# Patient Record
Sex: MALE | Race: WHITE | NOT HISPANIC OR LATINO | Employment: OTHER | URBAN - METROPOLITAN AREA
[De-identification: names, ages, dates, MRNs, and addresses within clinical notes are randomized per-mention and may not be internally consistent; named-entity substitution may affect disease eponyms.]

---

## 2017-01-17 ENCOUNTER — OFFICE VISIT (OUTPATIENT)
Dept: INTERNAL MEDICINE | Facility: CLINIC | Age: 52
End: 2017-01-17

## 2017-01-17 VITALS
BODY MASS INDEX: 25.18 KG/M2 | WEIGHT: 170 LBS | SYSTOLIC BLOOD PRESSURE: 120 MMHG | HEART RATE: 72 BPM | DIASTOLIC BLOOD PRESSURE: 80 MMHG | HEIGHT: 69 IN

## 2017-01-17 DIAGNOSIS — I10 ESSENTIAL HYPERTENSION: Primary | ICD-10-CM

## 2017-01-17 DIAGNOSIS — E87.6 HYPOKALEMIA: ICD-10-CM

## 2017-01-17 DIAGNOSIS — Z72.0 TOBACCO ABUSE: ICD-10-CM

## 2017-01-17 DIAGNOSIS — J44.9 CHRONIC OBSTRUCTIVE PULMONARY DISEASE, UNSPECIFIED COPD TYPE (HCC): ICD-10-CM

## 2017-01-17 LAB
ALBUMIN SERPL-MCNC: 4.4 G/DL (ref 3.2–4.8)
ALBUMIN/GLOB SERPL: 1.8 G/DL (ref 1.5–2.5)
ALP SERPL-CCNC: 93 U/L (ref 25–100)
ALT SERPL W P-5'-P-CCNC: 17 U/L (ref 7–40)
ANION GAP SERPL CALCULATED.3IONS-SCNC: 12 MMOL/L (ref 3–11)
ARTICHOKE IGE QN: 149 MG/DL (ref 0–130)
AST SERPL-CCNC: 12 U/L (ref 0–33)
BILIRUB SERPL-MCNC: 0.3 MG/DL (ref 0.3–1.2)
BUN BLD-MCNC: 15 MG/DL (ref 9–23)
BUN/CREAT SERPL: 16.7 (ref 7–25)
CALCIUM SPEC-SCNC: 9.6 MG/DL (ref 8.7–10.4)
CHLORIDE SERPL-SCNC: 109 MMOL/L (ref 99–109)
CHOLEST SERPL-MCNC: 226 MG/DL (ref 0–200)
CO2 SERPL-SCNC: 32 MMOL/L (ref 20–31)
CREAT BLD-MCNC: 0.9 MG/DL (ref 0.6–1.3)
GFR SERPL CREATININE-BSD FRML MDRD: 89 ML/MIN/1.73
GLOBULIN UR ELPH-MCNC: 2.5 GM/DL
GLUCOSE BLD-MCNC: 89 MG/DL (ref 70–100)
HDLC SERPL-MCNC: 45 MG/DL (ref 40–60)
POTASSIUM BLD-SCNC: 3.4 MMOL/L (ref 3.5–5.5)
PROT SERPL-MCNC: 6.9 G/DL (ref 5.7–8.2)
SODIUM BLD-SCNC: 153 MMOL/L (ref 132–146)
TRIGL SERPL-MCNC: 124 MG/DL (ref 0–150)

## 2017-01-17 PROCEDURE — 99214 OFFICE O/P EST MOD 30 MIN: CPT | Performed by: FAMILY MEDICINE

## 2017-01-17 PROCEDURE — 80061 LIPID PANEL: CPT | Performed by: FAMILY MEDICINE

## 2017-01-17 PROCEDURE — 36415 COLL VENOUS BLD VENIPUNCTURE: CPT | Performed by: FAMILY MEDICINE

## 2017-01-17 PROCEDURE — 80053 COMPREHEN METABOLIC PANEL: CPT | Performed by: FAMILY MEDICINE

## 2017-01-17 RX ORDER — AMLODIPINE BESYLATE AND BENAZEPRIL HYDROCHLORIDE 10; 20 MG/1; MG/1
1 CAPSULE ORAL DAILY
Qty: 30 CAPSULE | Refills: 5 | Status: SHIPPED | OUTPATIENT
Start: 2017-01-17 | End: 2017-06-16 | Stop reason: SDUPTHER

## 2017-01-17 RX ORDER — QUETIAPINE 200 MG/1
TABLET, FILM COATED, EXTENDED RELEASE ORAL DAILY
COMMUNITY
Start: 2016-12-27 | End: 2018-11-08

## 2017-01-17 RX ORDER — POTASSIUM CHLORIDE 750 MG/1
TABLET, FILM COATED, EXTENDED RELEASE ORAL DAILY
COMMUNITY
Start: 2016-11-18 | End: 2017-01-24 | Stop reason: SDUPTHER

## 2017-01-17 RX ORDER — BUSPIRONE HYDROCHLORIDE 15 MG/1
TABLET ORAL DAILY
COMMUNITY
Start: 2016-11-21 | End: 2018-05-07

## 2017-01-17 RX ORDER — QUETIAPINE FUMARATE 50 MG/1
TABLET, EXTENDED RELEASE ORAL DAILY
COMMUNITY
Start: 2016-12-27 | End: 2017-01-17 | Stop reason: DRUGHIGH

## 2017-01-17 NOTE — PROGRESS NOTES
Subjective   Aguila Finn is a 51 y.o. male who presents to follow-up for Hypertension      History of Present Illness   He was last seen in the office on 10/13/2016 for hypertension and COPD management.  Previous intervention/treatment includes: continued on current medications, encouraged smoking cessation, refilled Anoro.  Reports stable symptoms.    Reports medication complaince and denies any intolerable side effects with his blood pressure medication.  Checks his blood pressure at home and does not report any persistently elevated blood pressures.      States that he saw an endocrinologist on 11/17/16 and followed-up in December.  Reportedly had his labs were rechecked and they looked normal so he was told to follow-up as needed.  States that the endocrinologist started him on potassium because of chronic hypokalemia (first noticed in September 2016).    Has not yet seen a pulmonologist for his COPD.  States that he has an appointment on 01/23/17.  Reports medication complaince and denies any intolerable side effects.  Currently smoking 1 PPD and is also vaping.  Cut back from 2 PPD.    Review of Systems   Constitutional: Negative for activity change, appetite change, chills, fatigue, fever and unexpected weight change.   Respiratory: Negative for cough, shortness of breath and wheezing.    Cardiovascular: Negative for chest pain and palpitations.   Gastrointestinal: Negative for abdominal pain, constipation, diarrhea, nausea and vomiting.   Genitourinary: Negative for decreased urine volume, dysuria and hematuria.   Neurological: Negative for dizziness, syncope and headaches.   Psychiatric/Behavioral: Negative for self-injury, sleep disturbance and suicidal ideas. The patient is not nervous/anxious.         Negative for depressed mood.     The following portions of the patient's history were reviewed and updated as appropriate: current medications, past medical history, past social history and problem  "list.    Objective   Visit Vitals   • /80 (BP Location: Left arm, Patient Position: Sitting)   • Pulse 72   • Ht 69\" (175.3 cm)   • Wt 170 lb (77.1 kg)   • BMI 25.1 kg/m2     Physical Exam   Constitutional: He is oriented to person, place, and time. He appears well-developed and well-nourished.   No acute distress.   HENT:   Head: Normocephalic and atraumatic.   Eyes: Conjunctivae and EOM are normal.   Neck: Normal range of motion.   Cardiovascular: Normal rate, regular rhythm, normal heart sounds and intact distal pulses.    Pulmonary/Chest: Effort normal. No respiratory distress. He has decreased breath sounds (bilaterally). He has no wheezes.   Abdominal: Soft. Bowel sounds are normal. There is no tenderness.   Musculoskeletal: Normal range of motion.   Neurological: He is alert and oriented to person, place, and time.   Moves all extremities spontaneously.   Skin: Skin is warm and dry.   Psychiatric: His speech is normal and behavior is normal. Judgment and thought content normal. His affect is blunt.       Assessment/Plan   Aguila was seen today for hypertension.    Diagnoses and all orders for this visit:    Essential hypertension  -     amLODIPine-benazepril (LOTREL) 10-20 MG per capsule; Take 1 capsule by mouth Daily.  -     Comprehensive Metabolic Panel  -     Lipid Panel    Blood pressure today in office was 120/80.  The above labs were ordered today.  Will continue current regimen and refill his medication today.  Advised patient to return to the clinic in 3 months for next routine follow-up.    Chronic obstructive pulmonary disease, unspecified COPD type    Stable symptoms per patient report.  Reviewed patient's chart and discussed that his appointment with Dr. Kim was rescheduled for 02/09/17 at 2PM.  Will continue current regimen today.    Hypokalemia  -     Comprehensive Metabolic Panel    The above labs were ordered today.  Will continue current regimen today and adjust medication dose if " indicated by his labs.  Advised patient to return to the clinic in 3 months for next routine follow-up.    Tobacco abuse    Encouraged smoking cessation today.

## 2017-01-17 NOTE — PATIENT INSTRUCTIONS
Please go to the lab before you leave to have your labs drawn.  You will be called or receive a letter with your results.  You have an appointment with Dr. Barrera (Pulmonary doctor) on 02/09/17 at 2PM.  Your appointment on 01/23/17 was cancelled.  Please call their office at [phone] to determine the reason.  Please continue taking your current medications as directed.  I have refilled your blood pressure medication.  Your medication has been electronically prescribed and will be at your pharmacy.  Please pick this up and take as directed.  If you need any refills on your Anoro, please call and let me know.  Please follow-up as indicated.  Return to the clinic sooner if your symptoms worsen or if you have any other concerns.

## 2017-01-17 NOTE — MR AVS SNAPSHOT
Aguila Finn   1/17/2017 9:00 AM   Office Visit    Dept Phone:  [phone]   Encounter #:  [id number]    Provider:  Gianna Bueno MD   Department:  [facility] PRIMARY CARE                Your Full Care Plan              Today's Medication Changes          These changes are accurate as of: 1/17/17  9:57 AM.  If you have any questions, ask your nurse or doctor.               Medication(s)that have changed:     busPIRone 15 MG tablet   Commonly known as:  BUSPAR   Daily.   What changed:  Another medication with the same name was removed. Continue taking this medication, and follow the directions you see here.   Changed by:  Gianna Bueno MD       QUEtiapine  MG 24 hr tablet   Commonly known as:  SEROquel XR   Daily.   What changed:  Another medication with the same name was removed. Continue taking this medication, and follow the directions you see here.   Changed by:  Gianna Bueno MD         Stop taking medication(s)listed here:     mirtazapine 30 MG tablet   Commonly known as:  REMERON   Stopped by:  Gianna Bueno MD           tamsulosin 0.4 MG capsule 24 hr capsule   Commonly known as:  FLOMAX   Stopped by:  Gianna Bueno MD                Where to Get Your Medications      These medications were sent to [address] AT [address] [phone] [place] [id number] [place] [address]     Phone:  [phone]     amLODIPine-benazepril 10-20 MG per capsule                  Your Updated Medication List          This list is accurate as of: 1/17/17  9:57 AM.  Always use your most recent med list.                amLODIPine-benazepril 10-20 MG per capsule   Commonly known as:  LOTREL   Take 1 capsule by mouth Daily.       ANORO ELLIPTA 62.5-25 MCG/INH aerosol powder    Generic drug:  Umeclidinium-Vilanterol   Inhale 1 puff Daily.       busPIRone 15 MG tablet   Commonly known  as:  BUSPAR       potassium chloride 10 MEQ CR tablet   Commonly known as:  K-DUR       QUEtiapine  MG 24 hr tablet   Commonly known as:  SEROquel XR               We Performed the Following     Comprehensive Metabolic Panel     Lipid Panel       You Were Diagnosed With        Codes Comments    Essential hypertension    -  Primary ICD-10-CM: I10  ICD-9-CM: 401.9     Chronic obstructive pulmonary disease, unspecified COPD type     ICD-10-CM: J44.9  ICD-9-CM: 496     Hypokalemia     ICD-10-CM: E87.6  ICD-9-CM: 276.8     Tobacco abuse     ICD-10-CM: Z72.0  ICD-9-CM: 305.1       Instructions    Please go to the lab before you leave to have your labs drawn.  You will be called or receive a letter with your results.  You have an appointment with Dr. Barrera (Pulmonary doctor) on 02/09/17 at 2PM.  Your appointment on 01/23/17 was cancelled.  Please call their office at [phone] to determine the reason.  Please continue taking your current medications as directed.  I have refilled your blood pressure medication.  Your medication has been electronically prescribed and will be at your pharmacy.  Please pick this up and take as directed.  If you need any refills on your Anoro, please call and let me know.  Please follow-up as indicated.  Return to the clinic sooner if your symptoms worsen or if you have any other concerns.     Patient Instructions History      Upcoming Appointments     Visit Type Date Time Department    FOLLOW UP 1/17/2017  9:00 AM [facility]    NEW PATIENT 2/9/2017  2:00 PM [facility]    FULL PFT 2/9/2017  1:30 PM [facility]    CHEST X-RAY 2/9/2017  1:15 PM [facility]      iDiDiD Signup     [employer] allows you to send messages to your doctor, view your test results, renew your prescriptions, schedule appointments, and more. To sign up, go to [employer] and click on the Sign Up Now link in the New User? box. Enter your iDiDiD  "Activation Code exactly as it appears below along with the last four digits of your Social Security Number and your Date of Birth () to complete the sign-up process. If you do not sign up before the expiration date, you must request a new code.    [employer] Activation Code: [email or web address]  Expires: 2017  9:56 AM    If you have questions, you can email [email or web address] or call [phone] to talk to our MyChart staff. Remember, Mookiehart is NOT to be used for urgent needs. For medical emergencies, dial 911.               Other Info from Your Visit           Your Appointments     2017  1:15 PM EST   [facility]    [address]  [facility] [address]   [phone]            2017  1:30 PM EST   [facility]    [address]  [facility] [address]   [phone]            2017  2:00 PM EST   New Patient with Enoc Kim MD   [facility] (--)    [address]  [facility] [address]   [phone]           Bring all previous medical records and films, along with current medications and insurance information.              Allergies     No Known Allergies      Reason for Visit     Hypertension           Vital Signs     Blood Pressure Pulse Height Weight Body Mass Index Smoking Status    120/80 (BP Location: Left arm, Patient Position: Sitting) 72 69\" (175.3 cm) 170 lb (77.1 kg) 25.1 kg/m2 Current Every Day Smoker      Problems and Diagnoses Noted     Chronic airway obstruction    High blood pressure    Tobacco abuse    Hypokalemia          Results         "

## 2017-01-18 ENCOUNTER — TELEPHONE (OUTPATIENT)
Dept: INTERNAL MEDICINE | Facility: CLINIC | Age: 52
End: 2017-01-18

## 2017-01-18 NOTE — TELEPHONE ENCOUNTER
Attempted to call patient regarding his lab results, but was unable to reach him.  Left a message on  requesting that he call back to discuss his lab results.

## 2017-01-23 ENCOUNTER — LAB (OUTPATIENT)
Dept: INTERNAL MEDICINE | Facility: CLINIC | Age: 52
End: 2017-01-23

## 2017-01-23 DIAGNOSIS — E87.0 HYPERNATREMIA: Primary | ICD-10-CM

## 2017-01-23 LAB
ALBUMIN SERPL-MCNC: 4.2 G/DL (ref 3.2–4.8)
ALBUMIN/GLOB SERPL: 1.8 G/DL (ref 1.5–2.5)
ALP SERPL-CCNC: 96 U/L (ref 25–100)
ALT SERPL W P-5'-P-CCNC: 13 U/L (ref 7–40)
ANION GAP SERPL CALCULATED.3IONS-SCNC: 3 MMOL/L (ref 3–11)
AST SERPL-CCNC: 15 U/L (ref 0–33)
BILIRUB SERPL-MCNC: 0.3 MG/DL (ref 0.3–1.2)
BUN BLD-MCNC: 17 MG/DL (ref 9–23)
BUN/CREAT SERPL: 18.9 (ref 7–25)
CALCIUM SPEC-SCNC: 9.2 MG/DL (ref 8.7–10.4)
CHLORIDE SERPL-SCNC: 108 MMOL/L (ref 99–109)
CO2 SERPL-SCNC: 33 MMOL/L (ref 20–31)
CREAT BLD-MCNC: 0.9 MG/DL (ref 0.6–1.3)
GFR SERPL CREATININE-BSD FRML MDRD: 89 ML/MIN/1.73
GLOBULIN UR ELPH-MCNC: 2.4 GM/DL
GLUCOSE BLD-MCNC: 101 MG/DL (ref 70–100)
OSMOLALITY UR: 504 MOSM/KG (ref 300–1100)
POTASSIUM BLD-SCNC: 3.5 MMOL/L (ref 3.5–5.5)
PROT SERPL-MCNC: 6.6 G/DL (ref 5.7–8.2)
SODIUM BLD-SCNC: 144 MMOL/L (ref 132–146)
SODIUM UR-SCNC: 63 MMOL/L (ref 30–90)

## 2017-01-23 PROCEDURE — 80053 COMPREHEN METABOLIC PANEL: CPT | Performed by: FAMILY MEDICINE

## 2017-01-23 PROCEDURE — 84300 ASSAY OF URINE SODIUM: CPT | Performed by: FAMILY MEDICINE

## 2017-01-23 PROCEDURE — 83935 ASSAY OF URINE OSMOLALITY: CPT | Performed by: FAMILY MEDICINE

## 2017-01-24 ENCOUNTER — OFFICE VISIT (OUTPATIENT)
Dept: INTERNAL MEDICINE | Facility: CLINIC | Age: 52
End: 2017-01-24

## 2017-01-24 VITALS
HEIGHT: 69 IN | WEIGHT: 180.6 LBS | SYSTOLIC BLOOD PRESSURE: 132 MMHG | BODY MASS INDEX: 26.75 KG/M2 | TEMPERATURE: 98.2 F | DIASTOLIC BLOOD PRESSURE: 74 MMHG

## 2017-01-24 DIAGNOSIS — E87.0 HYPERNATREMIA: ICD-10-CM

## 2017-01-24 DIAGNOSIS — E87.6 CHRONIC HYPOKALEMIA: Primary | ICD-10-CM

## 2017-01-24 PROCEDURE — 99213 OFFICE O/P EST LOW 20 MIN: CPT | Performed by: FAMILY MEDICINE

## 2017-01-24 RX ORDER — POTASSIUM CHLORIDE 750 MG/1
10 TABLET, FILM COATED, EXTENDED RELEASE ORAL DAILY
Qty: 30 TABLET | Refills: 2 | Status: SHIPPED | OUTPATIENT
Start: 2017-01-24 | End: 2017-07-25

## 2017-01-24 NOTE — PATIENT INSTRUCTIONS
I have refilled your potassium.  Your medication has been electronically prescribed and will be at your pharmacy.  Please pick it up and take as directed.  Please continue taking your current medications as directed.  Please follow-up as indicated.  Return to the clinic sooner if you have any other concerns.

## 2017-01-24 NOTE — MR AVS SNAPSHOT
Aguila Finn   1/24/2017 8:15 AM   Office Visit    Dept Phone:  [phone]   Encounter #:  [id number]    Provider:  Gianna Bueno MD   Department:  [facility] PRIMARY CARE                Your Full Care Plan              Today's Medication Changes          These changes are accurate as of: 1/24/17  8:39 AM.  If you have any questions, ask your nurse or doctor.               Medication(s)that have changed:     potassium chloride 10 MEQ CR tablet   Commonly known as:  K-DUR   Take 1 tablet by mouth Daily.   What changed:    - how much to take  - how to take this   Changed by:  Gianna Bueno MD            Where to Get Your Medications      These medications were sent to [address] [place] [address] - [phone]  - [phone] [place] [id number]     Phone:  [phone]     potassium chloride 10 MEQ CR tablet                  Your Updated Medication List          This list is accurate as of: 1/24/17  8:39 AM.  Always use your most recent med list.                amLODIPine-benazepril 10-20 MG per capsule   Commonly known as:  LOTREL   Take 1 capsule by mouth Daily.       ANORO ELLIPTA 62.5-25 MCG/INH aerosol powder    Generic drug:  Umeclidinium-Vilanterol   Inhale 1 puff Daily.       busPIRone 15 MG tablet   Commonly known as:  BUSPAR       potassium chloride 10 MEQ CR tablet   Commonly known as:  K-DUR   Take 1 tablet by mouth Daily.       QUEtiapine  MG 24 hr tablet   Commonly known as:  SEROquel XR               You Were Diagnosed With        Codes Comments    Chronic hypokalemia    -  Primary ICD-10-CM: E87.6  ICD-9-CM: 276.8     Acute hypernatremia     ICD-10-CM: E87.0  ICD-9-CM: 276.0       Instructions    I have refilled your potassium.  Your medication has been electronically prescribed and will be at your pharmacy.  Please pick it up and take as directed.  Please continue taking  your current medications as directed.  Please follow-up as indicated.  Return to the clinic sooner if you have any other concerns.     Patient Instructions History      Upcoming Appointments     Visit Type Date Time Department    FOLLOW UP 2017  8:15 AM [facility]    NEW PATIENT 2017 12:00 PM [facility]    CHEST X-RAY 2017 11:15 AM [facility]    FULL PFT 2017 11:30 AM [facility]    FOLLOW UP 2017  9:00 AM [facility]      Laura Sapienshart Signup     [facility] [employer] allows you to send messages to your doctor, view your test results, renew your prescriptions, schedule appointments, and more. To sign up, go to [email or web address] and click on the Sign Up Now link in the New User? box. Enter your IndaBox Activation Code exactly as it appears below along with the last four digits of your Social Security Number and your Date of Birth () to complete the sign-up process. If you do not sign up before the expiration date, you must request a new code.    IndaBox Activation Code: [email or web address]  Expires: 2017  9:56 AM    If you have questions, you can email [email or web address] or call [phone] to talk to our IndaBox staff. Remember, MyChart is NOT to be used for urgent needs. For medical emergencies, dial 911.               Other Info from Your Visit           Your Appointments     2017 11:15 AM EST   Chest X-Ray with RAD TECH [facility]   [facility] (--)    [address]  [place] [address]   [phone]            2017 11:30 AM EST   Full PFT with Pft Lab 3 [facility]   [facility] (--)    [address]  [place] [address]   [phone]            2017 12:00 PM EST   New Patient with JOSE LUIS Monroe   [facility] (--)    [address]  [facility] [id number] [phone]           Bring all previous medical records and films, along with current medications and insurance information.            Apr 18, 2017  9:00 AM EDT   Follow Up with Gianna Bueno MD   [facility] (--)    [address]  [facility] [address] [phone]           Arrive 15 minutes prior to appointment.              Allergies     No Known Allergies      Reason for Visit     Follow-up high sodium       Vital Signs     Blood Pressure Temperature Height    132/74 (BP Location: Right arm, Patient Position: Sitting, Cuff Size: Adult) 98.2 °F (36.8 °C) (Temporal Artery ) 69\" (175.3 cm)    Weight Body Mass Index Smoking Status    180 lb 9.6 oz (81.9 kg) 26.67 kg/m2 Current Every Day Smoker      Problems and Diagnoses Noted     Chronic hypokalemia    Acute hypernatremia            "

## 2017-01-24 NOTE — PROGRESS NOTES
"Subjective   Aguila Finn is a 51 y.o. male who presents to follow-up for Follow-up (high sodium )      History of Present Illness   He was last seen in the office on 01/17/17 for hypertension and chronic hypokalemia management.  Previous intervention/treatment includes: recheck CMP, continued current blood pressure regimen.    Patient was found to have hypernatremia (unsure if chronic versus acute) after his last office visit.  Sodium level was 153, up from 143 three months ago.  Patient otherwise denies any concerning symptoms, including nausea, vomiting, lethargy, seizures.  Also denies any new medications, except for a potassium supplement.    Patient also has chronic hypokalemia, diagnosed about one year ago.  Was started on potassium about one month ago.  Reports medication compliance and denies any intolerable side effects.    Review of Systems   Constitutional: Negative for chills and fever.   Respiratory: Negative for cough, shortness of breath and wheezing.    Cardiovascular: Negative for chest pain and palpitations.   Gastrointestinal: Negative for abdominal pain, constipation, diarrhea, nausea and vomiting.   Neurological: Negative for dizziness, syncope, weakness and headaches.   Psychiatric/Behavioral: Negative for confusion. The patient is not nervous/anxious.         Negative for depressed mood.     The following portions of the patient's history were reviewed and updated as appropriate: current medications, past medical history, past social history and problem list.    Objective   Visit Vitals   • /74 (BP Location: Right arm, Patient Position: Sitting, Cuff Size: Adult)   • Temp 98.2 °F (36.8 °C) (Temporal Artery )   • Ht 69\" (175.3 cm)   • Wt 180 lb 9.6 oz (81.9 kg)   • BMI 26.67 kg/m2     Physical Exam   Constitutional: He is oriented to person, place, and time. He appears well-developed and well-nourished.   No acute distress.   HENT:   Head: Normocephalic and atraumatic.   Eyes: " Conjunctivae and EOM are normal.   Neck: Normal range of motion.   Cardiovascular: Normal rate, regular rhythm, normal heart sounds and intact distal pulses.    Pulmonary/Chest: Effort normal. He has no wheezes.   Fair air entry bilaterally.   Abdominal: Soft. Bowel sounds are normal. There is no tenderness.   Musculoskeletal: Normal range of motion.   Moves all extremities.   Neurological: He is alert and oriented to person, place, and time.   Skin: Skin is warm and dry.   Psychiatric: He has a normal mood and affect. His behavior is normal.       Assessment/Plan   Aguila was seen today for follow-up.    Diagnoses and all orders for this visit:    Chronic hypokalemia  -     potassium chloride (K-DUR) 10 MEQ CR tablet; Take 1 tablet by mouth Daily.    Discussed lab results with patient today.  Potassium has improved from 3.4 to 3.5.  No concerning symptoms per patient report.  Will refill his potassium today and recheck labs in 3 months.    Hypernatremia    Discussed lab results with patient today.  Sodium has improved from 153 to 144.    Aldosterone/renin ratio is pending.  Will consider starting an aldosterone antagonist if indicated.    No concerning symptoms per patient report.  Encouraged to stay well hydrated.  Advised patient to return to the clinic if he develops any concerning symptoms.

## 2017-02-09 ENCOUNTER — OFFICE VISIT (OUTPATIENT)
Dept: PULMONOLOGY | Facility: CLINIC | Age: 52
End: 2017-02-09

## 2017-02-09 VITALS
HEART RATE: 89 BPM | DIASTOLIC BLOOD PRESSURE: 68 MMHG | WEIGHT: 176.8 LBS | TEMPERATURE: 98.6 F | SYSTOLIC BLOOD PRESSURE: 122 MMHG | HEIGHT: 69 IN | RESPIRATION RATE: 16 BRPM | OXYGEN SATURATION: 96 % | BODY MASS INDEX: 26.19 KG/M2

## 2017-02-09 DIAGNOSIS — I10 ESSENTIAL HYPERTENSION: ICD-10-CM

## 2017-02-09 DIAGNOSIS — Z72.0 TOBACCO ABUSE: ICD-10-CM

## 2017-02-09 DIAGNOSIS — F31.76 BIPOLAR 1 DISORDER, DEPRESSED, FULL REMISSION (HCC): ICD-10-CM

## 2017-02-09 DIAGNOSIS — J44.9 CHRONIC OBSTRUCTIVE PULMONARY DISEASE, UNSPECIFIED COPD TYPE (HCC): Primary | ICD-10-CM

## 2017-02-09 DIAGNOSIS — F32.A ANXIETY AND DEPRESSION: ICD-10-CM

## 2017-02-09 DIAGNOSIS — J41.0 SIMPLE CHRONIC BRONCHITIS (HCC): ICD-10-CM

## 2017-02-09 DIAGNOSIS — F41.9 ANXIETY AND DEPRESSION: ICD-10-CM

## 2017-02-09 DIAGNOSIS — E87.6 CHRONIC HYPOKALEMIA: ICD-10-CM

## 2017-02-09 PROBLEM — J42 CHRONIC BRONCHITIS: Status: ACTIVE | Noted: 2017-02-09

## 2017-02-09 PROCEDURE — 94726 PLETHYSMOGRAPHY LUNG VOLUMES: CPT | Performed by: NURSE PRACTITIONER

## 2017-02-09 PROCEDURE — 99204 OFFICE O/P NEW MOD 45 MIN: CPT | Performed by: NURSE PRACTITIONER

## 2017-02-09 PROCEDURE — 94200 LUNG FUNCTION TEST (MBC/MVV): CPT | Performed by: NURSE PRACTITIONER

## 2017-02-09 PROCEDURE — 94060 EVALUATION OF WHEEZING: CPT | Performed by: NURSE PRACTITIONER

## 2017-02-09 PROCEDURE — 94729 DIFFUSING CAPACITY: CPT | Performed by: NURSE PRACTITIONER

## 2017-02-09 NOTE — PROGRESS NOTES
[facility], operated by [facility] Follow up    CHIEF COMPLAINT    Referral for COPD    HISTORY OF PRESENT ILLNESS    Aguila Finn is a 51 y.o.male here today for referral for COPD.  He has a history of smoking a pack a day for about 39 years ago continues to smoke.  He has never been seen by a pulmonologist in the past and remains on ANORO.  He is been on this over the past 6 months or so.  He's never seen a pulmonologist regularly, he did have a brief period, couple years ago where he saw Dr. Luna at [facility] and was diagnosed with COPD.  He has exertional dyspnea, but doesn't feel like it significantly limits his activities.  He does have some fatigue, his significant other states he does snore but she does not note any apneic spells.  He does not have supplemental oxygen at home nor has he ever in the past.    He has a cough with clear to yellow sputum production and congestion.  He denies hemoptysis.  He has no pleuritic chest pains, no sternal or radiating type chest pains.  No edema or palpitations.    His most recent hospitalization for COPD in the flu was 2016 at [place].  He has never had a CT scan of the chest that he can remember.    He has had a history of hypernatremia, he is followed by his PCP for hypertension and chronic hypokalemia.  He had blood work a few weeks ago revealing a sodium of 144 potassium 3.5.  Slightly elevated serum bicarbonate 33.    He denies fevers chills or night sweats.  He did notice he was breathing a lot better after the Ventolin was given during our pulmonary function testing.  He had no significant bronchodilator response but did increase 9%.  He does not have nebulizers at home but they have helped him in the past when he goes to the hospital    He is a history of bipolar depression, suicidal ideations, presenting to [facility] 9/23/16.  He has had no further exacerbations of this on Seroquel.      His wife notes that although he snores she does not notice apneic spells  and he himself denies PND or apnea, she does notice some wheezing at night.    A couple months ago he decreased his smoking from 2 packs a day down to one pack a day, with help using the vapor cigarettes.  It has the lowest dose of nicotine in it and both he and his wife state he has less wheezing and coughing especially in the morning    Patient Active Problem List   Diagnosis   • Essential hypertension   • COPD (chronic obstructive pulmonary disease)   • Anxiety and depression   • Recurrent kidney stones   • Hyperthyroidism   • Bipolar 1 disorder, depressed, full remission   • Tobacco abuse   • Chronic hypokalemia   • Chronic bronchitis       No Known Allergies    Current Outpatient Prescriptions:   •  amLODIPine-benazepril (LOTREL) 10-20 MG per capsule, Take 1 capsule by mouth Daily., Disp: 30 capsule, Rfl: 5  •  ANORO ELLIPTA 62.5-25 MCG/INH aerosol powder , Inhale 1 puff Daily., Disp: 60 each, Rfl: 2  •  busPIRone (BUSPAR) 15 MG tablet, Daily., Disp: , Rfl:   •  potassium chloride (K-DUR) 10 MEQ CR tablet, Take 1 tablet by mouth Daily., Disp: 30 tablet, Rfl: 2  •  QUEtiapine XR (SEROquel XR) 200 MG 24 hr tablet, Daily., Disp: , Rfl:   MEDICATION LIST AND ALLERGIES REVIEWED.    Social History   Substance Use Topics   • Smoking status: Current Every Day Smoker     Packs/day: 2.00     Types: Cigarettes   • Smokeless tobacco: Never Used   • Alcohol use Yes      Comment: a few drinks on occasion, not excessive per patient       FAMILY AND SOCIAL HISTORY REVIEWED.    Review of Systems   Constitutional: Negative for chills, fatigue and fever.   HENT: Positive for postnasal drip. Negative for nosebleeds and sore throat.    Eyes: Negative.  Negative for pain and redness.   Respiratory: Positive for cough, shortness of breath and wheezing. Negative for chest tightness and stridor.    Cardiovascular: Negative for chest pain and palpitations.   Gastrointestinal: Negative for abdominal distention, abdominal pain and nausea.  "  Endocrine: Negative for cold intolerance and heat intolerance.   Genitourinary: Negative for dysuria, flank pain and hematuria.   Musculoskeletal: Negative for arthralgias and myalgias.   Skin: Negative for color change and rash.   Neurological: Negative for dizziness, syncope and numbness.   Hematological: Negative for adenopathy. Does not bruise/bleed easily.   Psychiatric/Behavioral: Negative for agitation, behavioral problems, confusion and sleep disturbance.   .    Visit Vitals   • /68   • Pulse 89   • Temp 98.6 °F (37 °C)   • Resp 16   • Ht 69\" (175.3 cm)   • Wt 176 lb 12.8 oz (80.2 kg)   • SpO2 96%   • BMI 26.11 kg/m2     Physical Exam   Constitutional: He is oriented to person, place, and time. Vital signs are normal. He appears well-developed. He is cooperative.  Non-toxic appearance. No distress.   HENT:   Head: Normocephalic. Head is without abrasion and without contusion.   Mouth/Throat: Oropharynx is clear and moist and mucous membranes are normal.   Eyes: Conjunctivae are normal. Pupils are equal, round, and reactive to light.   Neck: Trachea normal. No JVD present. No tracheal deviation present. No thyroid mass and no thyromegaly present.   Cardiovascular: Normal rate, regular rhythm and normal heart sounds.  Exam reveals no gallop.    No murmur heard.  No venous cords or edema or calf tenderness, no cyanosis.   Pulmonary/Chest: Effort normal. No stridor. No respiratory distress. He has wheezes. He has no rales.   Very and expiratory mild distant wheezing noted posteriorly in the lower lung zones.  No rhonchi   Abdominal: Soft. He exhibits no ascites. There is no hepatosplenomegaly. There is no tenderness.   Lymphadenopathy:        Head (right side): No submandibular adenopathy present.        Head (left side): No submandibular adenopathy present.     He has no cervical adenopathy.        Right: No supraclavicular adenopathy present.        Left: No supraclavicular adenopathy present. "   Neurological: He is alert and oriented to person, place, and time. He has normal strength.   Skin: Skin is warm and dry. No abrasion and no rash noted.   Psychiatric: He has a normal mood and affect. His speech is normal and behavior is normal. Cognition and memory are normal.       RESULTS    Pulmonary function tests reveal severe obstruction with an FEV1 1.35 L, 36%.  FEV1/FVC ratio 42%.  Total lung capacity normal at 109% though residual volume is increased at 204%.  Diffusion is normal     Lungs are hyperexpanded consistent with his history of COPD, costophrenic angles are sharp though is no effusions or consolidations.  He has prominent, chronic appearing interstitial markings.  No prior films to compare.    PROBLEM LIST    Problem List Items Addressed This Visit        Cardiovascular and Mediastinum    Essential hypertension       Respiratory    COPD (chronic obstructive pulmonary disease) - Primary    Overview     Severe COPD, FEV1 1.35 L, 36%.         Relevant Orders    XR Chest PA & Lateral (Completed)    Pulmonary Function Test (Completed)    CT Chest Without Contrast    Chronic bronchitis       Other    Anxiety and depression    Bipolar 1 disorder, depressed, full remission    Tobacco abuse    Chronic hypokalemia            DISCUSSION    Remain on the Anoro    He was given a sample of Asmanex 100, with a written prescription 2 puffs twice a day to rinse and spit, he is to use the spacer.    He was given a sample of pro air respite click.  Told to use 1-2 puffs as needed for shortness of breath.    Prior to him following up in the office we will obtain a CT scan of the chest, for screening given his history of smoking.    Pulmicort function testing was reviewed today    We discussed smoking cessation strategies, smoking cessation aids, and eventual long-term loss of lung function.    We'll order a nebulizer machine and supplies, he was given albuterol samples and instructed to use 1-4 doses daily as  needed for cough wheezing and dyspnea    I suggested we check his oxygen level at night to see if he qualifies as this may help him feel less fatigued during the day but he quickly declined the need for this and became slightly agitated when his wife urged him to at least see if he qualifies.,  He has    Melissa Troncoso, APRN  02/09/201712:14 PM  Electronically signed     Please note that portions of this note were completed with a voice recognition program. Efforts were made to edit the dictations, but occasionally words are mistranscribed.      CC: Gianna Bueno MD

## 2017-04-26 ENCOUNTER — OFFICE VISIT (OUTPATIENT)
Dept: INTERNAL MEDICINE | Facility: CLINIC | Age: 52
End: 2017-04-26

## 2017-04-26 VITALS
HEIGHT: 69 IN | DIASTOLIC BLOOD PRESSURE: 76 MMHG | SYSTOLIC BLOOD PRESSURE: 120 MMHG | WEIGHT: 166 LBS | BODY MASS INDEX: 24.59 KG/M2 | OXYGEN SATURATION: 100 % | RESPIRATION RATE: 18 BRPM | HEART RATE: 86 BPM

## 2017-04-26 DIAGNOSIS — E78.5 HYPERLIPIDEMIA, UNSPECIFIED HYPERLIPIDEMIA TYPE: Primary | ICD-10-CM

## 2017-04-26 DIAGNOSIS — I10 ESSENTIAL HYPERTENSION: ICD-10-CM

## 2017-04-26 DIAGNOSIS — Z72.0 TOBACCO ABUSE: ICD-10-CM

## 2017-04-26 PROCEDURE — 99214 OFFICE O/P EST MOD 30 MIN: CPT | Performed by: FAMILY MEDICINE

## 2017-04-26 RX ORDER — ASPIRIN 81 MG/1
81 TABLET ORAL DAILY
Qty: 30 TABLET | Refills: 5 | Status: SHIPPED | OUTPATIENT
Start: 2017-04-26 | End: 2018-05-08 | Stop reason: SDUPTHER

## 2017-04-26 RX ORDER — ATORVASTATIN CALCIUM 40 MG/1
40 TABLET, FILM COATED ORAL DAILY
Qty: 30 TABLET | Refills: 2 | Status: SHIPPED | OUTPATIENT
Start: 2017-04-26 | End: 2017-07-15 | Stop reason: SDUPTHER

## 2017-04-26 NOTE — PROGRESS NOTES
"Subjective   Aguila Finn is a 51 y.o. male who presents to follow-up for Hypertension      History of Present Illness   He was last seen in the office on 01/17/17 for hypertension management.  Previous intervention/treatment includes: continue on current medication regimen.    Patient is here to follow-up for chronic hypertension.  He is not exercising and is adherent to a low-salt diet.  He does check his blood pressure at home.  It is well controlled at home.  He denies chest pain, chest pressure/discomfort, palpitations and syncope.  Cardiovascular risk factors: dyslipidemia, hypertension, male gender, sedentary lifestyle and smoking/ tobacco exposure.  He is compliant with his medication.  Denies intolerable side effects to his medication.  Denies drinking alcohol.  Currently smoking less than 1 PPD.    Review of Systems   Constitutional: Positive for appetite change (poor, chronic). Negative for chills, fever and unexpected weight change.   Respiratory: Positive for shortness of breath (intermittent) and wheezing (intermittent). Negative for cough.         Negative for hemoptysis.   Cardiovascular: Negative for chest pain and palpitations.   Gastrointestinal: Negative for abdominal pain, constipation, diarrhea, nausea and vomiting.   Neurological: Negative for dizziness, syncope and headaches.   Psychiatric/Behavioral: Negative for self-injury and suicidal ideas. The patient is not nervous/anxious.         Positive for depressed mood.     The following portions of the patient's history were reviewed and updated as appropriate: allergies, current medications, past family history, past medical history, past social history and problem list.    Objective   /76  Pulse 86  Resp 18  Ht 69\" (175.3 cm)  Wt 166 lb (75.3 kg)  SpO2 100%  BMI 24.51 kg/m2     Physical Exam   Constitutional: He is oriented to person, place, and time. He appears well-developed and well-nourished.   No acute distress.   HENT: "   Head: Normocephalic and atraumatic.   Nose: Nose normal.   Mouth/Throat: Oropharynx is clear and moist.   Eyes: Conjunctivae and EOM are normal.   Neck: Normal range of motion.   Cardiovascular: Normal rate, regular rhythm, normal heart sounds and intact distal pulses.    Pulmonary/Chest: Effort normal. No respiratory distress. He has no wheezes.   Fair to poor air movement diffusely.   Abdominal: Soft. Bowel sounds are normal. There is no tenderness.   Musculoskeletal: Normal range of motion.   Moves all extremities.   Neurological: He is alert and oriented to person, place, and time.   Skin: Skin is warm and dry.   Psychiatric: He has a normal mood and affect. His behavior is normal. Judgment and thought content normal.   Vitals reviewed.      Assessment/Plan   Aguila was seen today for hypertension.    Diagnoses and all orders for this visit:    Hyperlipidemia, unspecified hyperlipidemia type  -     atorvastatin (LIPITOR) 40 MG tablet; Take 1 tablet by mouth Daily.  -     aspirin 81 MG EC tablet; Take 1 tablet by mouth Daily.    Reviewed previous labs with patient today.  10 year ASCVD risk is greater than 10% so recommended that patient start taking Atorvastatin and Aspirin.  Patient voiced understanding and agreement.  Patient also reports that his father had a heart attack in his 60s.  Will prescribe the above medications today and consider rechecking labs at his follow-up appointment.  Advised patient to return to the clinic in 3 months for next routine follow-up.    Essential hypertension    Stable.  Blood pressure today in office was 120/76.  Will continue current regimen today.  A refill for his medication was not requested today.  Advised patient to return to the clinic in 3 months for next routine follow-up.    Tobacco abuse    Encouraged smoking cessation today.

## 2017-04-26 NOTE — PATIENT INSTRUCTIONS
I have prescribed Atorvastatin (cholesterol medication) to help with your cholesterol and decrease risk of developing heart disease.  Your new medication has been electronically prescribed and will be at your pharmacy.  Please pick this up and take as directed.  You appear to have a few refills left on your blood pressure medication.  If you need more refills to get to your next appointment, please call the office.  Please continue taking your other current medications as directed.  Please follow-up as indicated.  Return to the clinic sooner if you have any other concerns.

## 2017-05-12 ENCOUNTER — OFFICE VISIT (OUTPATIENT)
Dept: PULMONOLOGY | Facility: CLINIC | Age: 52
End: 2017-05-12

## 2017-05-12 VITALS
SYSTOLIC BLOOD PRESSURE: 120 MMHG | HEIGHT: 69 IN | DIASTOLIC BLOOD PRESSURE: 74 MMHG | BODY MASS INDEX: 25.18 KG/M2 | HEART RATE: 80 BPM | TEMPERATURE: 98.3 F | RESPIRATION RATE: 16 BRPM | OXYGEN SATURATION: 95 % | WEIGHT: 170 LBS

## 2017-05-12 DIAGNOSIS — J44.9 CHRONIC OBSTRUCTIVE PULMONARY DISEASE, UNSPECIFIED COPD TYPE (HCC): Primary | ICD-10-CM

## 2017-05-12 DIAGNOSIS — J42 CHRONIC BRONCHITIS, UNSPECIFIED CHRONIC BRONCHITIS TYPE (HCC): ICD-10-CM

## 2017-05-12 DIAGNOSIS — Z72.0 TOBACCO ABUSE: ICD-10-CM

## 2017-05-12 PROCEDURE — 94620 PR PULMONARY STRESS TESTING,SIMPLE: CPT | Performed by: NURSE PRACTITIONER

## 2017-05-12 PROCEDURE — 94060 EVALUATION OF WHEEZING: CPT | Performed by: NURSE PRACTITIONER

## 2017-05-12 PROCEDURE — 99214 OFFICE O/P EST MOD 30 MIN: CPT | Performed by: NURSE PRACTITIONER

## 2017-06-16 DIAGNOSIS — I10 ESSENTIAL HYPERTENSION: ICD-10-CM

## 2017-06-16 RX ORDER — AMLODIPINE BESYLATE AND BENAZEPRIL HYDROCHLORIDE 10; 20 MG/1; MG/1
1 CAPSULE ORAL DAILY
Qty: 30 CAPSULE | Refills: 5 | Status: SHIPPED | OUTPATIENT
Start: 2017-06-16 | End: 2017-12-29 | Stop reason: SDUPTHER

## 2017-06-16 NOTE — TELEPHONE ENCOUNTER
PATIENT WOULD LIKE AMLODIPINE REFILLED. PATIENT WOULD LIKE THE PRESCRIPTION SENT TO [facility]. THANK YOU.

## 2017-07-15 DIAGNOSIS — E78.5 HYPERLIPIDEMIA, UNSPECIFIED HYPERLIPIDEMIA TYPE: ICD-10-CM

## 2017-07-15 RX ORDER — ATORVASTATIN CALCIUM 40 MG/1
TABLET, FILM COATED ORAL
Qty: 30 TABLET | Refills: 1 | Status: SHIPPED | OUTPATIENT
Start: 2017-07-15 | End: 2018-05-08 | Stop reason: SDUPTHER

## 2017-07-25 ENCOUNTER — HOSPITAL ENCOUNTER (EMERGENCY)
Facility: HOSPITAL | Age: 52
Discharge: HOME | End: 2017-07-25
Attending: EMERGENCY MEDICINE | Admitting: EMERGENCY MEDICINE

## 2017-07-25 ENCOUNTER — OFFICE VISIT (OUTPATIENT)
Dept: INTERNAL MEDICINE | Facility: CLINIC | Age: 52
End: 2017-07-25

## 2017-07-25 VITALS
HEIGHT: 69 IN | BODY MASS INDEX: 25.48 KG/M2 | RESPIRATION RATE: 18 BRPM | SYSTOLIC BLOOD PRESSURE: 120 MMHG | WEIGHT: 172 LBS | DIASTOLIC BLOOD PRESSURE: 88 MMHG | HEART RATE: 78 BPM

## 2017-07-25 VITALS
TEMPERATURE: 98.4 F | SYSTOLIC BLOOD PRESSURE: 130 MMHG | WEIGHT: 172 LBS | HEART RATE: 89 BPM | HEIGHT: 69 IN | RESPIRATION RATE: 18 BRPM | DIASTOLIC BLOOD PRESSURE: 82 MMHG | OXYGEN SATURATION: 93 % | BODY MASS INDEX: 25.48 KG/M2

## 2017-07-25 DIAGNOSIS — E87.6 CHRONIC HYPOKALEMIA: ICD-10-CM

## 2017-07-25 DIAGNOSIS — E87.6 HYPOKALEMIA: Primary | ICD-10-CM

## 2017-07-25 DIAGNOSIS — I10 ESSENTIAL HYPERTENSION: Primary | ICD-10-CM

## 2017-07-25 DIAGNOSIS — E78.5 HYPERLIPIDEMIA, UNSPECIFIED HYPERLIPIDEMIA TYPE: ICD-10-CM

## 2017-07-25 DIAGNOSIS — Z13.1 SCREENING FOR DIABETES MELLITUS: ICD-10-CM

## 2017-07-25 LAB
ALBUMIN SERPL-MCNC: 4 G/DL (ref 3.2–4.8)
ALBUMIN SERPL-MCNC: 4.4 G/DL (ref 3.2–4.8)
ALBUMIN/GLOB SERPL: 1.5 G/DL (ref 1.5–2.5)
ALBUMIN/GLOB SERPL: 1.8 G/DL (ref 1.5–2.5)
ALP SERPL-CCNC: 111 U/L (ref 25–100)
ALP SERPL-CCNC: 113 U/L (ref 25–100)
ALT SERPL W P-5'-P-CCNC: 17 U/L (ref 7–40)
ALT SERPL W P-5'-P-CCNC: 24 U/L (ref 7–40)
ANION GAP SERPL CALCULATED.3IONS-SCNC: 6 MMOL/L (ref 3–11)
ANION GAP SERPL CALCULATED.3IONS-SCNC: 7 MMOL/L (ref 3–11)
ARTICHOKE IGE QN: 97 MG/DL (ref 0–130)
AST SERPL-CCNC: 15 U/L (ref 0–33)
AST SERPL-CCNC: 17 U/L (ref 0–33)
BILIRUB SERPL-MCNC: 0.3 MG/DL (ref 0.3–1.2)
BILIRUB SERPL-MCNC: 0.4 MG/DL (ref 0.3–1.2)
BUN BLD-MCNC: 15 MG/DL (ref 9–23)
BUN BLD-MCNC: 15 MG/DL (ref 9–23)
BUN/CREAT SERPL: 12.5 (ref 7–25)
BUN/CREAT SERPL: 15 (ref 7–25)
CALCIUM SPEC-SCNC: 8.8 MG/DL (ref 8.7–10.4)
CALCIUM SPEC-SCNC: 9.2 MG/DL (ref 8.7–10.4)
CHLORIDE SERPL-SCNC: 105 MMOL/L (ref 99–109)
CHLORIDE SERPL-SCNC: 106 MMOL/L (ref 99–109)
CHOLEST SERPL-MCNC: 153 MG/DL (ref 0–200)
CO2 SERPL-SCNC: 32 MMOL/L (ref 20–31)
CO2 SERPL-SCNC: 34 MMOL/L (ref 20–31)
CREAT BLD-MCNC: 1 MG/DL (ref 0.6–1.3)
CREAT BLD-MCNC: 1.2 MG/DL (ref 0.6–1.3)
GFR SERPL CREATININE-BSD FRML MDRD: 64 ML/MIN/1.73
GFR SERPL CREATININE-BSD FRML MDRD: 78 ML/MIN/1.73
GLOBULIN UR ELPH-MCNC: 2.4 GM/DL
GLOBULIN UR ELPH-MCNC: 2.6 GM/DL
GLUCOSE BLD-MCNC: 86 MG/DL (ref 70–100)
GLUCOSE BLD-MCNC: 88 MG/DL (ref 70–100)
HBA1C MFR BLD: 5.4 %
HDLC SERPL-MCNC: 35 MG/DL (ref 40–60)
MAGNESIUM SERPL-MCNC: 2.1 MG/DL (ref 1.3–2.7)
POTASSIUM BLD-SCNC: 2.7 MMOL/L (ref 3.5–5.5)
POTASSIUM BLD-SCNC: 2.9 MMOL/L (ref 3.5–5.5)
PROT SERPL-MCNC: 6.6 G/DL (ref 5.7–8.2)
PROT SERPL-MCNC: 6.8 G/DL (ref 5.7–8.2)
SODIUM BLD-SCNC: 145 MMOL/L (ref 132–146)
SODIUM BLD-SCNC: 145 MMOL/L (ref 132–146)
TRIGL SERPL-MCNC: 171 MG/DL (ref 0–150)

## 2017-07-25 PROCEDURE — 80061 LIPID PANEL: CPT | Performed by: FAMILY MEDICINE

## 2017-07-25 PROCEDURE — 99214 OFFICE O/P EST MOD 30 MIN: CPT | Performed by: FAMILY MEDICINE

## 2017-07-25 PROCEDURE — 83735 ASSAY OF MAGNESIUM: CPT | Performed by: EMERGENCY MEDICINE

## 2017-07-25 PROCEDURE — 80053 COMPREHEN METABOLIC PANEL: CPT | Performed by: EMERGENCY MEDICINE

## 2017-07-25 PROCEDURE — 93005 ELECTROCARDIOGRAM TRACING: CPT | Performed by: EMERGENCY MEDICINE

## 2017-07-25 PROCEDURE — 99283 EMERGENCY DEPT VISIT LOW MDM: CPT

## 2017-07-25 PROCEDURE — 36415 COLL VENOUS BLD VENIPUNCTURE: CPT

## 2017-07-25 PROCEDURE — 83036 HEMOGLOBIN GLYCOSYLATED A1C: CPT | Performed by: FAMILY MEDICINE

## 2017-07-25 PROCEDURE — 80053 COMPREHEN METABOLIC PANEL: CPT | Performed by: FAMILY MEDICINE

## 2017-07-25 RX ORDER — POTASSIUM CHLORIDE 750 MG/1
TABLET, FILM COATED, EXTENDED RELEASE ORAL
Qty: 10 TABLET | Refills: 0 | Status: SHIPPED | OUTPATIENT
Start: 2017-07-25 | End: 2018-05-07

## 2017-07-25 RX ORDER — POTASSIUM CHLORIDE 750 MG/1
40 CAPSULE, EXTENDED RELEASE ORAL ONCE
Status: COMPLETED | OUTPATIENT
Start: 2017-07-25 | End: 2017-07-25

## 2017-07-25 RX ADMIN — POTASSIUM CHLORIDE 40 MEQ: 750 CAPSULE, EXTENDED RELEASE ORAL at 14:43

## 2017-07-25 NOTE — ED PROVIDER NOTES
Subjective   HPI Comments: Aguila Finn is a 51 y.o.male who presents to the ED with c/o an abnormal lab finding. He recently had routine labs drawn and received a phone this afternoon advising him that he is hypokalemic with a potassium measuring 2.7. Over the last two years or so he has had recurrent episodes of this. He states he has been start on and taken off of potassium replacements several times. He also c/o fatigue but denies any other complaints at this time. History of smoking.       Patient is a 51 y.o. male presenting with general illness.   History provided by:  Patient  Illness   Location:  Abnormal lab finding  Quality:  Hypokalemia  Severity:  Moderate  Onset quality:  Sudden  Timing:  Constant  Progression:  Unchanged  Chronicity:  Recurrent  Context:  Had routine labs drawn showing low potassium measuring 2.7. He was referred to the ED for evaluation   Relieved by:  Nothing  Worsened by:  Nothing  Ineffective treatments:  Previous attempts with potassium replacement in the past  Associated symptoms: fatigue    Associated symptoms: no abdominal pain, no chest pain, no congestion, no cough, no diarrhea, no fever, no nausea, no rhinorrhea, no shortness of breath, no sore throat and no vomiting        Review of Systems   Constitutional: Positive for fatigue. Negative for chills and fever.        Hypokalemia.    HENT: Negative for congestion, rhinorrhea and sore throat.    Respiratory: Negative for cough and shortness of breath.    Cardiovascular: Negative for chest pain.   Gastrointestinal: Negative for abdominal pain, diarrhea, nausea and vomiting.   Genitourinary: Negative for difficulty urinating, dysuria, frequency, hematuria and urgency.   All other systems reviewed and are negative.      Past Medical History:   Diagnosis Date   • Anxiety    • COPD (chronic obstructive pulmonary disease)    • Depression    • Hyperlipidemia    • Hypertension    • Recurrent kidney stones        No Known  Allergies    History reviewed. No pertinent surgical history.    Family History   Problem Relation Age of Onset   • Hyperlipidemia Father    • Hypertension Father    • Heart attack Father    • Diabetes Sister    • COPD Mother        Social History     Social History   • Marital status:      Spouse name: N/A   • Number of children: N/A   • Years of education: N/A     Social History Main Topics   • Smoking status: Current Every Day Smoker     Packs/day: 2.00     Types: Cigarettes   • Smokeless tobacco: Never Used   • Alcohol use Yes      Comment: a few drinks on occasion, not excessive per patient   • Drug use: No   • Sexual activity: Defer     Other Topics Concern   • None     Social History Narrative    He is a smoker of about a pack a day and has been smoking at least the past 39 years.  Denies drug use.  Occasional EtOH use.  Occasional caffeine, he has 2 dogs and a cat.  Recent travel to [place] on a cruise.  He is retired with a high school education and is  and lives at home with his wife.         Objective   Physical Exam   Constitutional: He is oriented to person, place, and time. He appears well-developed and well-nourished. No distress.   HENT:   Head: Normocephalic and atraumatic.   Nose: Nose normal.   Mouth/Throat: Oropharynx is clear and moist.   Eyes: Conjunctivae and EOM are normal. Pupils are equal, round, and reactive to light. No scleral icterus.   Neck: Normal range of motion. Neck supple.   Cardiovascular: Normal rate, regular rhythm, normal heart sounds and intact distal pulses.    No murmur heard.  Pulmonary/Chest: Effort normal and breath sounds normal. No respiratory distress.   Abdominal: Soft. Bowel sounds are normal. There is no tenderness.   Musculoskeletal: Normal range of motion. He exhibits no edema.   Neurological: He is alert and oriented to person, place, and time.   Skin: Skin is warm and dry.   Psychiatric: He has a normal mood and affect. His  behavior is normal.   Nursing note and vitals reviewed.      Procedures         ED Course  ED Course   Comment By Time   Discussed with Dr. Sepulveda.  He reports the office will recheck the potassium.  I discussed the recurrent cycle of episodic hypokalemia when he is removed from his potassium supplementation.  This will be addressed in the office in follow-up Boogie Durand MD 07/25 1563   I discussed findings with the patient in laboratory evaluation.  I discussed outpatient evaluation, recheck of the potassium was to be handled through his primary care physician's office, and indications for return.  Very pleasant and responsible patient and nurse spouse verbalized understanding agreement with the plan of care. Boogie Durand MD 07/25 1610                     [facility]    Final diagnoses:   Hypokalemia       Documentation assistance provided by anuel Stroud.  Information recorded by the scribe was done at my direction and has been verified and validated by me.     Bindu Stroud  07/25/17 1436       Boogie Durand MD  07/25/17 [id number]

## 2017-07-25 NOTE — DISCHARGE INSTRUCTIONS
Take your potassium tablets one twice a day for 2 days, then daily thereafter.    The office will call you probably with a follow-up appointment and an appointment to recheck your potassium level.  Call the office if they do not call you by tomorrow afternoon.  Discuss your recurrent episodes of hypokalemia with your primary care physician when your off supplementation, and discuss evaluation for your potassium wasting, and the possibility of chronic supplementation to prevent this    Continue your usual medications otherwise.    Return to the ED if you have any recurrent or acute symptoms as discussed.

## 2017-07-25 NOTE — PATIENT INSTRUCTIONS
Please go to the lab before you leave to have your labs drawn.  You will be called or receive a letter with your results.  Please continue taking your current medications as directed.  Please follow-up as indicated.  Return to the clinic sooner if you have any other concerns.

## 2017-07-25 NOTE — PROGRESS NOTES
"Subjective   Aguila Finn is a 51 y.o. male who presents to follow-up for Hyperlipidemia      History of Present Illness   He was last seen in the office on 04/26/17 for chronic disease management.  Previous intervention/treatment includes: started on Atorvastatin and Aspirin.    He ishere to follow-up for chronic hypertension.  He is exercising and is adherent to a low-salt diet.  He does check his blood pressure at home.  It is well controlled at home.  Reported range is 120s/80s.  Patient denies chest pain, chest pressure/discomfort, dyspnea, orthopnea, palpitations and syncope.  Cardiovascular risk factors: dyslipidemia, hypertension, male gender, obesity (BMI >= 30 kg/m2) and smoking/ tobacco exposure.  He is compliant with his medications.  He does smoke about 1 PPD (has been trying to cut back).     Patient is also here to follow up for hyperlipidemia management with a lipid panel recheck.   He indicates his exercise level as walking.  Diet: low salt  Patient is compliant with medications.  Side effects to medications: No   Chest pain: No  Myalgia: No  Memory change: No    Patient is due for labs.    Review of Systems   Constitutional: Negative for chills and fever.   Respiratory: Negative for cough, shortness of breath and wheezing.    Cardiovascular: Negative for chest pain and palpitations.   Gastrointestinal: Negative for abdominal pain, constipation, diarrhea, nausea and vomiting.   Neurological: Negative for dizziness, syncope and headaches.     The following portions of the patient's history were reviewed and updated as appropriate: current medications, past family history, past medical history, past social history and problem list.    Objective   /88  Pulse 78  Resp 18  Ht 69\" (175.3 cm)  Wt 172 lb (78 kg)  BMI 25.4 kg/m2     Physical Exam   Constitutional: He is oriented to person, place, and time. He appears well-developed and well-nourished.   No acute distress.   HENT:   Head: " Normocephalic and atraumatic.   Eyes: Conjunctivae and EOM are normal.   Neck: Normal range of motion.   Cardiovascular: Normal rate, regular rhythm, normal heart sounds and intact distal pulses.    No significant lower extremity swelling.   Pulmonary/Chest: Effort normal. He has no wheezes.   Fair air entry bilaterally.   Abdominal: Soft. Bowel sounds are normal. There is no tenderness.   Musculoskeletal: Normal range of motion.   Moves all extremities.   Neurological: He is alert and oriented to person, place, and time.   Skin: Skin is warm and dry.   Psychiatric: He has a normal mood and affect. His behavior is normal.   Vitals reviewed.      Assessment/Plan   Aguila was seen today for hyperlipidemia.    Diagnoses and all orders for this visit:    Essential hypertension  -     Lipid Panel  -     Comprehensive Metabolic Panel    Controlled.  Blood pressure today in office was 120/88.  Will continue current regimen today.  A refill for his medication was not requested today.  Advised patient to return to the clinic in 6 months for next routine follow-up.    Hyperlipidemia, unspecified hyperlipidemia type  -     Lipid Panel  -     Comprehensive Metabolic Panel    Patient denies any concerning side effects since starting his Atorvastatin and aspirin.  The above labs were ordered today.  Will continue current regimen today.  Refills for his medications were not requested today.  Advised patient to return to the clinic in 6 months for next routine follow-up.    Screening for diabetes mellitus  -     POC Glycosylated Hemoglobin (Hb A1C)    The above labs were ordered today.  Will treat if indicated.

## 2017-07-26 DIAGNOSIS — E87.6 CHRONIC HYPOKALEMIA: Primary | ICD-10-CM

## 2017-07-27 ENCOUNTER — LAB (OUTPATIENT)
Dept: INTERNAL MEDICINE | Facility: CLINIC | Age: 52
End: 2017-07-27

## 2017-07-27 DIAGNOSIS — E87.6 CHRONIC HYPOKALEMIA: ICD-10-CM

## 2017-07-27 LAB
ANION GAP SERPL CALCULATED.3IONS-SCNC: 7 MMOL/L (ref 3–11)
BUN BLD-MCNC: 19 MG/DL (ref 9–23)
BUN/CREAT SERPL: 17.3 (ref 7–25)
CALCIUM SPEC-SCNC: 8.9 MG/DL (ref 8.7–10.4)
CHLORIDE SERPL-SCNC: 105 MMOL/L (ref 99–109)
CO2 SERPL-SCNC: 31 MMOL/L (ref 20–31)
CREAT BLD-MCNC: 1.1 MG/DL (ref 0.6–1.3)
GFR SERPL CREATININE-BSD FRML MDRD: 70 ML/MIN/1.73
GLUCOSE BLD-MCNC: 82 MG/DL (ref 70–100)
MAGNESIUM SERPL-MCNC: 2.2 MG/DL (ref 1.3–2.7)
POTASSIUM BLD-SCNC: 3.3 MMOL/L (ref 3.5–5.5)
SODIUM BLD-SCNC: 143 MMOL/L (ref 132–146)

## 2017-07-27 PROCEDURE — 83735 ASSAY OF MAGNESIUM: CPT | Performed by: FAMILY MEDICINE

## 2017-07-27 PROCEDURE — 84244 ASSAY OF RENIN: CPT | Performed by: FAMILY MEDICINE

## 2017-07-27 PROCEDURE — 80048 BASIC METABOLIC PNL TOTAL CA: CPT | Performed by: FAMILY MEDICINE

## 2017-08-05 LAB
ALDOST SERPL-MCNC: 3.7 NG/DL (ref 0–30)
ALDOST/RENIN PLAS-RTO: 7.7 {RATIO} (ref 0–30)
RENIN PLAS-CCNC: 0.48 NG/ML/HR (ref 0.17–5.38)

## 2017-08-07 DIAGNOSIS — E87.6 CHRONIC HYPOKALEMIA: Primary | ICD-10-CM

## 2017-12-29 DIAGNOSIS — I10 ESSENTIAL HYPERTENSION: ICD-10-CM

## 2017-12-29 RX ORDER — AMLODIPINE BESYLATE AND BENAZEPRIL HYDROCHLORIDE 10; 20 MG/1; MG/1
CAPSULE ORAL
Qty: 90 CAPSULE | Refills: 0 | Status: SHIPPED | OUTPATIENT
Start: 2017-12-29 | End: 2018-05-08 | Stop reason: SDUPTHER

## 2018-05-07 ENCOUNTER — OFFICE VISIT (OUTPATIENT)
Dept: INTERNAL MEDICINE | Facility: CLINIC | Age: 53
End: 2018-05-07

## 2018-05-07 VITALS
OXYGEN SATURATION: 98 % | RESPIRATION RATE: 18 BRPM | SYSTOLIC BLOOD PRESSURE: 128 MMHG | WEIGHT: 178 LBS | DIASTOLIC BLOOD PRESSURE: 80 MMHG | HEIGHT: 69 IN | HEART RATE: 79 BPM | BODY MASS INDEX: 26.36 KG/M2

## 2018-05-07 DIAGNOSIS — I10 ESSENTIAL HYPERTENSION: Primary | ICD-10-CM

## 2018-05-07 DIAGNOSIS — E87.6 CHRONIC HYPOKALEMIA: ICD-10-CM

## 2018-05-07 DIAGNOSIS — E78.5 HYPERLIPIDEMIA, UNSPECIFIED HYPERLIPIDEMIA TYPE: ICD-10-CM

## 2018-05-07 LAB
ALBUMIN SERPL-MCNC: 4.4 G/DL (ref 3.2–4.8)
ALBUMIN/GLOB SERPL: 1.9 G/DL (ref 1.5–2.5)
ALP SERPL-CCNC: 89 U/L (ref 25–100)
ALT SERPL W P-5'-P-CCNC: 20 U/L (ref 7–40)
ANION GAP SERPL CALCULATED.3IONS-SCNC: 11 MMOL/L (ref 3–11)
ARTICHOKE IGE QN: 151 MG/DL (ref 0–130)
AST SERPL-CCNC: 16 U/L (ref 0–33)
BILIRUB SERPL-MCNC: 0.4 MG/DL (ref 0.3–1.2)
BUN BLD-MCNC: 18 MG/DL (ref 9–23)
BUN/CREAT SERPL: 18 (ref 7–25)
CALCIUM SPEC-SCNC: 8.8 MG/DL (ref 8.7–10.4)
CHLORIDE SERPL-SCNC: 104 MMOL/L (ref 99–109)
CHOLEST SERPL-MCNC: 219 MG/DL (ref 0–200)
CO2 SERPL-SCNC: 33 MMOL/L (ref 20–31)
CREAT BLD-MCNC: 1 MG/DL (ref 0.6–1.3)
GFR SERPL CREATININE-BSD FRML MDRD: 78 ML/MIN/1.73
GLOBULIN UR ELPH-MCNC: 2.3 GM/DL
GLUCOSE BLD-MCNC: 80 MG/DL (ref 70–100)
HDLC SERPL-MCNC: 44 MG/DL (ref 40–60)
POTASSIUM BLD-SCNC: 2.9 MMOL/L (ref 3.5–5.5)
PROT SERPL-MCNC: 6.7 G/DL (ref 5.7–8.2)
SODIUM BLD-SCNC: 148 MMOL/L (ref 132–146)
TRIGL SERPL-MCNC: 288 MG/DL (ref 0–150)

## 2018-05-07 PROCEDURE — 80053 COMPREHEN METABOLIC PANEL: CPT | Performed by: FAMILY MEDICINE

## 2018-05-07 PROCEDURE — 99214 OFFICE O/P EST MOD 30 MIN: CPT | Performed by: FAMILY MEDICINE

## 2018-05-07 PROCEDURE — 80061 LIPID PANEL: CPT | Performed by: FAMILY MEDICINE

## 2018-05-07 RX ORDER — BUSPIRONE HYDROCHLORIDE 15 MG/1
15 TABLET ORAL DAILY PRN
COMMUNITY
End: 2019-10-29 | Stop reason: SDUPTHER

## 2018-05-07 NOTE — PROGRESS NOTES
"Subjective   Aguila Finn is a 52 y.o. male who presents to follow-up for Hypertension and Hyperlipidemia      History of Present Illness   He was last seen in the office on 07/25/18 for hypertension management.  Previous intervention/treatment includes: continued on current regimen.    Patient is here to follow-up for chronic hypertension management.  He is exercising (walking 3-4 times a week) and is adherent to a low-salt diet.  He does check his blood pressure at home.  It is well controlled at home.  Reported average is 120s/80s.  Patient denies chest pain, chest pressure/discomfort, dyspnea, orthopnea, palpitations and syncope.  Cardiovascular risk factors: dyslipidemia, hypertension, male gender and smoking/ tobacco exposure.  He is compliant with his medications.  He does smoke about 1.5 PPD.    Patient is also here to follow up for hyperlipidemia management with a lipid panel recheck.   He indicates his exercise level as walking 3-4 times per week.  Diet: low salt, not limiting fatty foods  Patient is compliant with medications.  Side effects to medications: No   Chest pain: No  Myalgia: No  Memory change: No    Patient is due for labs.    Review of Systems   Constitutional: Negative for chills and fever.   Respiratory: Negative for cough, shortness of breath and wheezing.    Cardiovascular: Negative for chest pain, palpitations and leg swelling.   Gastrointestinal: Negative for abdominal pain, constipation, diarrhea, nausea and vomiting.   Neurological: Negative for dizziness, syncope and headaches.     The following portions of the patient's history were reviewed and updated as appropriate: current medications, past medical history, past social history and problem list.    Objective   /80   Pulse 79   Resp 18   Ht 175.3 cm (69\")   Wt 80.7 kg (178 lb)   SpO2 98%   BMI 26.29 kg/m²      Physical Exam   Constitutional: He is oriented to person, place, and time. He appears well-developed and " well-nourished.   No acute distress.   HENT:   Head: Normocephalic and atraumatic.   Eyes: Conjunctivae and EOM are normal.   Neck: Normal range of motion.   Cardiovascular: Normal rate, regular rhythm, normal heart sounds and intact distal pulses.    No significant lower extremity swelling.   Pulmonary/Chest: Effort normal. No respiratory distress. He has decreased breath sounds (chronic, fair air entry bilaterally). He has no wheezes.   Abdominal: Soft. Bowel sounds are normal. There is no tenderness.   Musculoskeletal: Normal range of motion.   Moves all extremities.   Neurological: He is alert and oriented to person, place, and time.   Skin: Skin is warm and dry.   Psychiatric: He has a normal mood and affect. His behavior is normal.   Vitals reviewed.    Assessment/Plan   Aguila was seen today for hypertension and hyperlipidemia.    Diagnoses and all orders for this visit:    Essential hypertension  -     Comprehensive Metabolic Panel    Stable.  Blood pressure today in office was 128/80.  Will continue current regimen today.  A refill for his medication was requested today.  Advised patient to return to the clinic in 3 months for next routine follow-up or sooner if needed.    Chronic hypokalemia  -     Comprehensive Metabolic Panel    The above labs were ordered today.  Will treat if lab results indicate.    Hyperlipidemia, unspecified hyperlipidemia type  -     Comprehensive Metabolic Panel  -     Lipid Panel    The above labs were ordered today.  Will continue current regimen for now and plan to adjust if lab results indicate.  Advised patient to return to the clinic in 3 months for next routine follow-up or sooner if needed.

## 2018-05-07 NOTE — PATIENT INSTRUCTIONS
Please go to the lab before you leave to have your labs drawn.  You will be called with your results.  Please follow-up as indicated.  Return to the clinic sooner if your blood pressure worsens or if you have any other concerns.

## 2018-05-08 DIAGNOSIS — E87.6 CHRONIC HYPOKALEMIA: Primary | ICD-10-CM

## 2018-05-08 DIAGNOSIS — E87.0 HYPERNATREMIA: ICD-10-CM

## 2018-05-08 DIAGNOSIS — I10 ESSENTIAL HYPERTENSION: ICD-10-CM

## 2018-05-08 DIAGNOSIS — E78.5 HYPERLIPIDEMIA, UNSPECIFIED HYPERLIPIDEMIA TYPE: ICD-10-CM

## 2018-05-08 RX ORDER — POTASSIUM CHLORIDE 20 MEQ/1
40 TABLET, EXTENDED RELEASE ORAL DAILY
Qty: 6 TABLET | Refills: 0 | Status: SHIPPED | OUTPATIENT
Start: 2018-05-08 | End: 2018-05-11

## 2018-05-08 RX ORDER — ASPIRIN 81 MG/1
81 TABLET ORAL DAILY
Qty: 90 TABLET | Refills: 1 | Status: SHIPPED | OUTPATIENT
Start: 2018-05-08 | End: 2019-11-20 | Stop reason: SDUPTHER

## 2018-05-08 RX ORDER — ATORVASTATIN CALCIUM 40 MG/1
40 TABLET, FILM COATED ORAL DAILY
Qty: 90 TABLET | Refills: 0 | Status: SHIPPED | OUTPATIENT
Start: 2018-05-08 | End: 2019-11-20 | Stop reason: SDUPTHER

## 2018-05-08 RX ORDER — AMLODIPINE BESYLATE AND BENAZEPRIL HYDROCHLORIDE 10; 20 MG/1; MG/1
1 CAPSULE ORAL DAILY
Qty: 90 CAPSULE | Refills: 0 | Status: SHIPPED | OUTPATIENT
Start: 2018-05-08 | End: 2018-08-08 | Stop reason: SDUPTHER

## 2018-08-08 ENCOUNTER — OFFICE VISIT (OUTPATIENT)
Dept: INTERNAL MEDICINE | Facility: CLINIC | Age: 53
End: 2018-08-08

## 2018-08-08 VITALS
SYSTOLIC BLOOD PRESSURE: 120 MMHG | DIASTOLIC BLOOD PRESSURE: 78 MMHG | WEIGHT: 175 LBS | HEIGHT: 69 IN | BODY MASS INDEX: 25.92 KG/M2 | RESPIRATION RATE: 16 BRPM

## 2018-08-08 DIAGNOSIS — E87.6 CHRONIC HYPOKALEMIA: Primary | ICD-10-CM

## 2018-08-08 DIAGNOSIS — I10 ESSENTIAL HYPERTENSION: Primary | ICD-10-CM

## 2018-08-08 DIAGNOSIS — E87.6 CHRONIC HYPOKALEMIA: ICD-10-CM

## 2018-08-08 LAB
ANION GAP SERPL CALCULATED.3IONS-SCNC: 5 MMOL/L (ref 3–11)
BUN BLD-MCNC: 17 MG/DL (ref 9–23)
BUN/CREAT SERPL: 16.5 (ref 7–25)
CALCIUM SPEC-SCNC: 8.8 MG/DL (ref 8.7–10.4)
CHLORIDE SERPL-SCNC: 105 MMOL/L (ref 99–109)
CO2 SERPL-SCNC: 36 MMOL/L (ref 20–31)
CREAT BLD-MCNC: 1.03 MG/DL (ref 0.6–1.3)
GFR SERPL CREATININE-BSD FRML MDRD: 76 ML/MIN/1.73
GLUCOSE BLD-MCNC: 82 MG/DL (ref 70–100)
POTASSIUM BLD-SCNC: 2.8 MMOL/L (ref 3.5–5.5)
SODIUM BLD-SCNC: 146 MMOL/L (ref 132–146)

## 2018-08-08 PROCEDURE — 99214 OFFICE O/P EST MOD 30 MIN: CPT | Performed by: FAMILY MEDICINE

## 2018-08-08 PROCEDURE — 80048 BASIC METABOLIC PNL TOTAL CA: CPT | Performed by: FAMILY MEDICINE

## 2018-08-08 RX ORDER — POTASSIUM CHLORIDE 20 MEQ/1
20 TABLET, EXTENDED RELEASE ORAL DAILY
Qty: 4 TABLET | Refills: 0 | Status: SHIPPED | OUTPATIENT
Start: 2018-08-08 | End: 2018-08-12

## 2018-08-08 RX ORDER — AMLODIPINE BESYLATE AND BENAZEPRIL HYDROCHLORIDE 10; 20 MG/1; MG/1
1 CAPSULE ORAL DAILY
Qty: 90 CAPSULE | Refills: 0 | Status: SHIPPED | OUTPATIENT
Start: 2018-08-08 | End: 2020-01-10 | Stop reason: SDDI

## 2018-08-08 NOTE — PATIENT INSTRUCTIONS
Please go to the lab before you leave to have your labs drawn.  You will be called with your results.  Please keep your upcoming appointment with your kidney specialist to further evaluate your low potassium.  Please continue taking your current medications as directed.  Your blood pressure medication has been refilled.  Please keep your upcoming appointment with your lung specialist.  Please follow-up as indicated.  Return to the clinic sooner if you have any other concerns.

## 2018-08-08 NOTE — PROGRESS NOTES
"Subjective   Aguila Finn is a 52 y.o. male who presents to follow-up for Hypertension      History of Present Illness   Patient is here to follow-up for chronic hypertension management.  He is not exercising as often as he previously was and is adherent to a low-salt diet.  He does not check his blood pressure at home.  Patient denies chest pain, chest pressure/discomfort, palpitations and syncope.  Cardiovascular risk factors: dyslipidemia, hypertension, male gender and smoking/ tobacco exposure.  He is compliant with his medications.  He does smoke about 1 PPD, down from 1.5 PPD.    Review of Systems   Constitutional: Negative for chills and fever.   Cardiovascular: Negative for chest pain, palpitations and leg swelling.   Gastrointestinal: Negative for abdominal pain, constipation, diarrhea, nausea and vomiting.   Genitourinary: Negative for decreased urine volume and flank pain.   Neurological: Negative for dizziness, syncope and headaches.     The following portions of the patient's history were reviewed and updated as appropriate: current medications, past medical history, past social history and problem list.    Objective   /78   Resp 16   Ht 175.3 cm (69\")   Wt 79.4 kg (175 lb)   BMI 25.84 kg/m²      Physical Exam   Constitutional: He is oriented to person, place, and time. He appears well-developed and well-nourished.   No acute distress.   HENT:   Head: Normocephalic and atraumatic.   Eyes: Conjunctivae and EOM are normal.   Neck: Normal range of motion.   Cardiovascular: Normal rate, regular rhythm, normal heart sounds and intact distal pulses.    Pulmonary/Chest: Effort normal. No respiratory distress. He has decreased breath sounds (chronic, fair air entry bilaterally). He has no wheezes.   Abdominal: Soft. There is no tenderness.   Musculoskeletal: Normal range of motion.   Moves all extremities.   Neurological: He is alert and oriented to person, place, and time.   Skin: Skin is warm and " dry.   Psychiatric: He has a normal mood and affect. His behavior is normal.   Vitals reviewed.      Assessment/Plan   Aguila was seen today for hypertension.    Diagnoses and all orders for this visit:    Essential hypertension  -     amLODIPine-benazepril (LOTREL) 10-20 MG per capsule; Take 1 capsule by mouth Daily.    Stable.  Blood pressure today in office was 120/78.  Will continue current regimen today.  A refill for his medication was requested today.  Advised patient to return to the clinic in 3 months for next routine follow-up or sooner if needed.    Chronic hypokalemia  -     Basic Metabolic Panel    The above labs were ordered today.  A referral was ordered in May to have patient further evaluated by a nephrologist for this issue.  Patient reports that he was not contacted to schedule this appointment so he was scheduled today with [facility] for 09/26/18.

## 2018-08-16 ENCOUNTER — OFFICE VISIT (OUTPATIENT)
Dept: PULMONOLOGY | Facility: CLINIC | Age: 53
End: 2018-08-16

## 2018-08-16 VITALS
HEART RATE: 91 BPM | HEIGHT: 69 IN | WEIGHT: 173.38 LBS | SYSTOLIC BLOOD PRESSURE: 140 MMHG | BODY MASS INDEX: 25.68 KG/M2 | TEMPERATURE: 98.8 F | DIASTOLIC BLOOD PRESSURE: 80 MMHG | RESPIRATION RATE: 18 BRPM | OXYGEN SATURATION: 96 %

## 2018-08-16 DIAGNOSIS — Z72.0 TOBACCO ABUSE: ICD-10-CM

## 2018-08-16 DIAGNOSIS — J44.9 CHRONIC OBSTRUCTIVE PULMONARY DISEASE, UNSPECIFIED COPD TYPE (HCC): Primary | ICD-10-CM

## 2018-08-16 PROCEDURE — 99214 OFFICE O/P EST MOD 30 MIN: CPT | Performed by: NURSE PRACTITIONER

## 2018-08-16 RX ORDER — ALBUTEROL SULFATE 90 UG/1
2 AEROSOL, METERED RESPIRATORY (INHALATION) EVERY 4 HOURS PRN
Qty: 6.7 G | Refills: 11 | Status: SHIPPED | OUTPATIENT
Start: 2018-08-16 | End: 2019-08-20 | Stop reason: SDUPTHER

## 2018-08-16 NOTE — PROGRESS NOTES
"[facility] Pulmonary Follow up    CHIEF COMPLAINT    COPD    HISTORY OF PRESENT ILLNESS    Aguila Finn is a 52 y.o.male current smoker with severe COPD here today for follow-up.    He last saw JOSE LUIS Perea in May 2017.  He denies any acute respiratory illnesses or exacerbations since then.    Earlier this year he was seen by his ophthalmologist and diagnosed with glaucoma.  He was previously on Anoro this was discontinued about 4 months ago with the diagnosis of glaucoma.  He has since returned his ophthalmologist and intraocular pressures were normal.  He has noticed worsening shortness of breath since discontinuing Anoro. He is not currently on any inhaled therapy.    Unfortunately he continues to smoke.  He has smoked for about 40 years and indicates that he has \"tried everything\" to quit.  He is currently considering seeing a hypnotist.    He continues on supplemental oxygen at night.    Patient Active Problem List   Diagnosis   • Essential hypertension   • COPD (chronic obstructive pulmonary disease) (CMS/HCC)   • Anxiety and depression   • Recurrent kidney stones   • Bipolar 1 disorder, depressed, full remission (CMS/HCC)   • Tobacco abuse   • Chronic hypokalemia   • Chronic bronchitis (CMS/HCC)   • Hyperlipidemia       No Known Allergies    Current Outpatient Prescriptions:   •  amLODIPine-benazepril (LOTREL) 10-20 MG per capsule, Take 1 capsule by mouth Daily., Disp: 90 capsule, Rfl: 0  •  aspirin 81 MG EC tablet, Take 1 tablet by mouth Daily., Disp: 90 tablet, Rfl: 1  •  atorvastatin (LIPITOR) 40 MG tablet, Take 1 tablet by mouth Daily., Disp: 90 tablet, Rfl: 0  •  busPIRone (BUSPAR) 15 MG tablet, Take 15 mg by mouth Daily As Needed., Disp: , Rfl:   •  QUEtiapine XR (SEROquel XR) 200 MG 24 hr tablet, Daily., Disp: , Rfl:   •  albuterol (PROVENTIL HFA;VENTOLIN HFA) 108 (90 Base) MCG/ACT inhaler, Inhale 2 puffs Every 4 (Four) Hours As Needed for Wheezing., Disp: 6.7 g, Rfl: 11  •  Fluticasone " "Furoate-Vilanterol (BREO ELLIPTA) 100-25 MCG/INH aerosol powder , Inhale 1 puff Daily., Disp: 1 each, Rfl: 5  MEDICATION LIST AND ALLERGIES REVIEWED.    Social History   Substance Use Topics   • Smoking status: Current Every Day Smoker     Packs/day: 2.00     Years: 40.00     Types: Cigarettes   • Smokeless tobacco: Never Used   • Alcohol use Yes      Comment: a few drinks on occasion, not excessive per patient       FAMILY AND SOCIAL HISTORY REVIEWED.    Review of Systems   Constitutional: Negative for chills, fatigue, fever and unexpected weight change.   HENT: Negative for congestion, nosebleeds, postnasal drip, rhinorrhea, sinus pressure and trouble swallowing.    Respiratory: Positive for shortness of breath. Negative for cough, chest tightness and wheezing.    Cardiovascular: Negative for chest pain and leg swelling.   Gastrointestinal: Negative for abdominal pain, constipation, diarrhea, nausea and vomiting.   Genitourinary: Negative for dysuria, frequency, hematuria and urgency.   Musculoskeletal: Negative for myalgias.   Neurological: Negative for dizziness, weakness, numbness and headaches.   All other systems reviewed and are negative.  .    /80 (BP Location: Left arm, Patient Position: Sitting, Cuff Size: Adult)   Pulse 91   Temp 98.8 °F (37.1 °C)   Resp 18   Ht 175.3 cm (69\")   Wt 78.6 kg (173 lb 6 oz)   SpO2 96%   BMI 25.60 kg/m²     Immunization History   Administered Date(s) Administered   • Flu Vaccine Quad PF >18YRS 09/30/2016       Physical Exam   Constitutional: He is oriented to person, place, and time. He appears well-developed. No distress.   HENT:   Head: Normocephalic and atraumatic.   Neck: Neck supple.   Cardiovascular: Normal rate and regular rhythm.    No murmur heard.  Pulmonary/Chest: Effort normal. No stridor. No respiratory distress. He has no wheezes. He has no rales.   Abdominal: Soft.   Musculoskeletal: Normal range of motion. He exhibits no edema.   Neurological: He " is alert and oriented to person, place, and time.   Skin: Skin is warm and dry.   Psychiatric: He has a normal mood and affect. His behavior is normal.   Vitals reviewed.        RESULTS        PROBLEM LIST    Problem List Items Addressed This Visit        Respiratory    COPD (chronic obstructive pulmonary disease) (CMS/[facility]) - Primary    Overview     Severe COPD, FEV1 1.35 L, 36%.         Relevant Medications    Fluticasone Furoate-Vilanterol (BREO ELLIPTA) 100-25 MCG/INH aerosol powder     albuterol (PROVENTIL HFA;VENTOLIN HFA) 108 (90 Base) MCG/ACT inhaler       Other    Tobacco abuse            DISCUSSION    We discussed his recent diagnosis of glaucoma and options for inhalers.  We will avoid any anticholinergics in the future.  He will begin on Breo 100  1 puff daily, he was instructed to rinse his mouth out after each use  I will also prescribe him a rescue inhaler for as needed use    I encouraged continued attempts at smoking cessation.  We discussed low-dose screening CT guidelines.  He does not currently meet the minimum age requirements but I have recommended a low-dose screening CT in September 2019 after his 54th birthday.    He'll continue on supplemental oxygen at night.    Follow-up in 6 months with spirometry or sooner if needed.    I spent 25 minutes with the patient. I spent > 50% percent of this time counseling and discussing diagnosis, diagnostic testing, current status and treatment options.    Amie Jordan, JOSE LUIS  08/16/201810:30 AM  Electronically signed     Please note that portions of this note were completed with a voice recognition program. Efforts were made to edit the dictations, but occasionally words are mistranscribed.      CC: Gianna Bueno MD

## 2018-08-23 ENCOUNTER — LAB (OUTPATIENT)
Dept: INTERNAL MEDICINE | Facility: CLINIC | Age: 53
End: 2018-08-23

## 2018-08-23 DIAGNOSIS — E87.6 CHRONIC HYPOKALEMIA: ICD-10-CM

## 2018-08-23 DIAGNOSIS — E87.6 CHRONIC HYPOKALEMIA: Primary | ICD-10-CM

## 2018-08-23 LAB
ANION GAP SERPL CALCULATED.3IONS-SCNC: 6 MMOL/L (ref 3–11)
BUN BLD-MCNC: 21 MG/DL (ref 9–23)
BUN/CREAT SERPL: 20.6 (ref 7–25)
CALCIUM SPEC-SCNC: 8.7 MG/DL (ref 8.7–10.4)
CHLORIDE SERPL-SCNC: 104 MMOL/L (ref 99–109)
CO2 SERPL-SCNC: 36 MMOL/L (ref 20–31)
CREAT BLD-MCNC: 1.02 MG/DL (ref 0.6–1.3)
GFR SERPL CREATININE-BSD FRML MDRD: 76 ML/MIN/1.73
GLUCOSE BLD-MCNC: 100 MG/DL (ref 70–100)
POTASSIUM BLD-SCNC: 3 MMOL/L (ref 3.5–5.5)
SODIUM BLD-SCNC: 146 MMOL/L (ref 132–146)

## 2018-08-23 PROCEDURE — 80048 BASIC METABOLIC PNL TOTAL CA: CPT | Performed by: FAMILY MEDICINE

## 2018-08-23 RX ORDER — POTASSIUM CHLORIDE 20 MEQ/1
20 TABLET, EXTENDED RELEASE ORAL DAILY
Qty: 3 TABLET | Refills: 0 | Status: SHIPPED | OUTPATIENT
Start: 2018-08-23 | End: 2018-08-26

## 2018-11-08 ENCOUNTER — OFFICE VISIT (OUTPATIENT)
Dept: INTERNAL MEDICINE | Facility: CLINIC | Age: 53
End: 2018-11-08

## 2018-11-08 VITALS
WEIGHT: 182 LBS | BODY MASS INDEX: 26.96 KG/M2 | HEIGHT: 69 IN | DIASTOLIC BLOOD PRESSURE: 72 MMHG | TEMPERATURE: 98.6 F | SYSTOLIC BLOOD PRESSURE: 138 MMHG

## 2018-11-08 DIAGNOSIS — E87.6 CHRONIC HYPOKALEMIA: ICD-10-CM

## 2018-11-08 DIAGNOSIS — F32.A ANXIETY AND DEPRESSION: ICD-10-CM

## 2018-11-08 DIAGNOSIS — F41.9 ANXIETY AND DEPRESSION: ICD-10-CM

## 2018-11-08 DIAGNOSIS — Z23 INFLUENZA VACCINATION ADMINISTERED AT CURRENT VISIT: ICD-10-CM

## 2018-11-08 DIAGNOSIS — I10 ESSENTIAL HYPERTENSION: Primary | ICD-10-CM

## 2018-11-08 PROCEDURE — 90686 IIV4 VACC NO PRSV 0.5 ML IM: CPT | Performed by: INTERNAL MEDICINE

## 2018-11-08 PROCEDURE — 99214 OFFICE O/P EST MOD 30 MIN: CPT | Performed by: INTERNAL MEDICINE

## 2018-11-08 PROCEDURE — 90471 IMMUNIZATION ADMIN: CPT | Performed by: INTERNAL MEDICINE

## 2018-11-08 RX ORDER — SPIRONOLACTONE 50 MG/1
50 TABLET, FILM COATED ORAL DAILY
COMMUNITY
End: 2020-12-07 | Stop reason: SDUPTHER

## 2018-11-08 NOTE — PROGRESS NOTES
"Subjective   Aguila Finn is a 53 y.o. male here for follow-up HTN, hypokalemia, A&D. HTN: no issues, takes meds as prescribed. Recently saw nephro for chronic hypokalemia (baseline ~3) and was started on spironolactone. His check of K last week was normal. A&D: sees psych, just on buspar PRN now and doing very well with that. Also has hx BPDI but has had none of those sx recently.      The following portions of the patient's history were reviewed and updated as appropriate: allergies, current medications, past medical history, past social history and problem list.    Review of Systems:  General: negative  CV: negative  Respiratory: negative  Neuro: negative  Psych: anxiety    Objective   /72 (BP Location: Left arm, Patient Position: Sitting, Cuff Size: Adult)   Temp 98.6 °F (37 °C) (Temporal Artery )   Ht 175.3 cm (69\")   Wt 82.6 kg (182 lb)   BMI 26.88 kg/m²     Physical Exam   Constitutional: He is oriented to person, place, and time. He appears well-developed and well-nourished.   Cardiovascular: Normal rate, regular rhythm and normal heart sounds.    Pulmonary/Chest: Effort normal and breath sounds normal. He has no wheezes. He has no rales.   Neurological: He is alert and oriented to person, place, and time.   Skin: Skin is warm and dry.   Psychiatric: He has a normal mood and affect. His behavior is normal. Thought content normal.   Vitals reviewed.      Assessment/Plan   Aguila was seen today for hypertension.    Diagnoses and all orders for this visit:    Essential hypertension  -acceptable control, continue current meds    Chronic hypokalemia  -on spirono now with normalization of K    Anxiety and depression  -continue buspar PRN    Influenza vaccination administered at current visit  -     Fluarix/Fluzone/Afluria Quad/FluLaval Quad           "

## 2019-02-18 ENCOUNTER — OFFICE VISIT (OUTPATIENT)
Dept: PULMONOLOGY | Facility: CLINIC | Age: 54
End: 2019-02-18

## 2019-02-18 VITALS
WEIGHT: 180 LBS | DIASTOLIC BLOOD PRESSURE: 100 MMHG | BODY MASS INDEX: 26.66 KG/M2 | SYSTOLIC BLOOD PRESSURE: 150 MMHG | HEIGHT: 69 IN | HEART RATE: 94 BPM | TEMPERATURE: 98 F | OXYGEN SATURATION: 96 %

## 2019-02-18 DIAGNOSIS — F17.218 CIGARETTE NICOTINE DEPENDENCE WITH OTHER NICOTINE-INDUCED DISORDER: ICD-10-CM

## 2019-02-18 DIAGNOSIS — J44.9 CHRONIC OBSTRUCTIVE PULMONARY DISEASE, UNSPECIFIED COPD TYPE (HCC): Primary | ICD-10-CM

## 2019-02-18 PROCEDURE — 94618 PULMONARY STRESS TESTING: CPT | Performed by: NURSE PRACTITIONER

## 2019-02-18 PROCEDURE — 94375 RESPIRATORY FLOW VOLUME LOOP: CPT | Performed by: NURSE PRACTITIONER

## 2019-02-18 PROCEDURE — 99406 BEHAV CHNG SMOKING 3-10 MIN: CPT | Performed by: NURSE PRACTITIONER

## 2019-02-18 PROCEDURE — 99214 OFFICE O/P EST MOD 30 MIN: CPT | Performed by: NURSE PRACTITIONER

## 2019-02-18 RX ORDER — NICOTINE 21 MG/24HR
1 PATCH, TRANSDERMAL 24 HOURS TRANSDERMAL EVERY 24 HOURS
Qty: 7 EACH | Refills: 6 | Status: SHIPPED | OUTPATIENT
Start: 2019-02-18 | End: 2019-10-29

## 2019-02-18 NOTE — PROGRESS NOTES
[facility] Pulmonary Follow up    CHIEF COMPLAINT    COPD    HISTORY OF PRESENT ILLNESS    Aguila Finn is a 53 y.o.male here today for follow-up of his COPD.  He was last seen in our office in August.  He denies any respiratory illnesses since that time.  He has noticed a worsening of his shortness of breath with activity.  He states that he has to climb stairs to go into his home and gets very winded when climbing stairs.  He does mention that he recovers quickly with rest.  He has also noticed a chest tightness occasionally with activity that goes away with rest.  The tightness does not radiate and does not cause pain.      He continues to take Breo 100 daily for COPD.  He also uses a rescue inhaler as needed for shortness of breath.  He usually uses this before activity, but not daily.  In the past he has had difficulties with increased ocular pressure and was told not to take inhaled anti-cholinergics.  He saw his ophthalmologist last week and his right eye had normal pressure in his left eye had an increased ocular pressure.  He had a shunt placed in his left eye in the past for increased pressure.    He denies fever, chills, sputum production, hemoptysis, chest pain, weight loss or palpitations.  He denies any allergy symptoms.  He denies reflux symptoms.  He denies lower extremity edema.    He continues to smoke 1 pack per day and has smoked for 80 pack history.  He states that he has tried multiple ways to quit smoking and has been unsuccessful in the past.  He is willing to try anything at this point quit smoking.  He remains on 2 L nasal cannula at nighttime.  His wife is curious to know if he can qualify for portable oxygen.    He is up-to-date on his current vaccinations.  He is accompanied by his wife today.  Patient Active Problem List   Diagnosis   • Essential hypertension   • COPD (chronic obstructive pulmonary disease) (CMS/[facility])   • Anxiety and depression   • Recurrent kidney stones   • Bipolar 1  disorder, depressed, full remission (CMS/HCC)   • Tobacco abuse   • Chronic hypokalemia   • Chronic bronchitis (CMS/HCC)   • Hyperlipidemia       No Known Allergies    Current Outpatient Medications:   •  albuterol (PROVENTIL HFA;VENTOLIN HFA) 108 (90 Base) MCG/ACT inhaler, Inhale 2 puffs Every 4 (Four) Hours As Needed for Wheezing., Disp: 6.7 g, Rfl: 11  •  amLODIPine-benazepril (LOTREL) 10-20 MG per capsule, Take 1 capsule by mouth Daily., Disp: 90 capsule, Rfl: 0  •  Fluticasone Furoate-Vilanterol (BREO ELLIPTA) 100-25 MCG/INH aerosol powder , Inhale 1 puff Daily., Disp: 1 each, Rfl: 5  •  aspirin 81 MG EC tablet, Take 1 tablet by mouth Daily., Disp: 90 tablet, Rfl: 1  •  atorvastatin (LIPITOR) 40 MG tablet, Take 1 tablet by mouth Daily., Disp: 90 tablet, Rfl: 0  •  busPIRone (BUSPAR) 15 MG tablet, Take 15 mg by mouth Daily As Needed., Disp: , Rfl:   •  nicotine (NICODERM CQ) 21 MG/24HR patch, Place 1 patch on the skin as directed by provider Daily., Disp: 7 each, Rfl: 6  •  spironolactone (ALDACTONE) 50 MG tablet, Take 50 mg by mouth Daily., Disp: , Rfl:   MEDICATION LIST AND ALLERGIES REVIEWED.    Social History     Tobacco Use   • Smoking status: Current Every Day Smoker     Packs/day: 2.00     Years: 40.00     Pack years: 80.00     Types: Cigarettes   • Smokeless tobacco: Never Used   Substance Use Topics   • Alcohol use: Yes     Comment: a few drinks on occasion, not excessive per patient   • Drug use: No       FAMILY AND SOCIAL HISTORY REVIEWED.    Review of Systems   Constitutional: Negative for activity change, appetite change, fatigue, fever and unexpected weight change.   HENT: Negative for congestion, postnasal drip, rhinorrhea, sinus pressure, sore throat and voice change.    Eyes: Negative for visual disturbance.   Respiratory: Positive for cough, chest tightness and shortness of breath. Negative for wheezing.    Cardiovascular: Negative for chest pain, palpitations and leg swelling.  "  Gastrointestinal: Negative for abdominal distention, abdominal pain, nausea and vomiting.   Endocrine: Negative for cold intolerance and heat intolerance.   Genitourinary: Negative for difficulty urinating and urgency.   Musculoskeletal: Negative for arthralgias, back pain and neck pain.   Skin: Negative for color change and pallor.   Allergic/Immunologic: Negative for environmental allergies and food allergies.   Neurological: Negative for dizziness, syncope, weakness and light-headedness.   Hematological: Negative for adenopathy. Does not bruise/bleed easily.   Psychiatric/Behavioral: Negative for agitation and behavioral problems.   .    /100 (BP Location: Right arm, Patient Position: Sitting)   Pulse 94   Temp 98 °F (36.7 °C)   Ht 175.3 cm (69\")   Wt 81.6 kg (180 lb)   SpO2 96% Comment: sitting room temp  BMI 26.58 kg/m²     Immunization History   Administered Date(s) Administered   • FLUARIX/FLUZONE/AFLURIA/FLULAVAL QUAD 11/08/2018   • Flu Vaccine Quad PF >18YRS 09/30/2016       Physical Exam   Constitutional: He is oriented to person, place, and time. He appears well-developed and well-nourished.   HENT:   Head: Normocephalic and atraumatic.   Eyes: Pupils are equal, round, and reactive to light.   Neck: Normal range of motion. Neck supple. No thyromegaly present.   Cardiovascular: Normal rate, regular rhythm, normal heart sounds and intact distal pulses. Exam reveals no gallop and no friction rub.   No murmur heard.  Pulmonary/Chest: Effort normal and breath sounds normal. No respiratory distress. He has no wheezes. He has no rales. He exhibits no tenderness.   Abdominal: Soft. Bowel sounds are normal. There is no tenderness.   Musculoskeletal: Normal range of motion.   Lymphadenopathy:     He has no cervical adenopathy.   Neurological: He is alert and oriented to person, place, and time.   Skin: Skin is warm and dry. Capillary refill takes less than 2 seconds. He is not diaphoretic. "   Psychiatric: He has a normal mood and affect. His behavior is normal.   Nursing note and vitals reviewed.        RESULTS    PFTS in the office today, read by me.    Spirometry Interpretation 02/18/19:    1. Very severe airway obstruction. (FEV1< 35% pred.).  2. The maximum voluntary ventilation (MVV) is reduced.     6 minute walk test:  Performed in office today, patient walked 1450 feet, his oxygen level never dropped below 98% however his shortness of breath increased towards the end of the test and his blood pressure went up slightly at 160/98.  He does not qualify for portable oxygen at this time.    PROBLEM LIST    Problem List Items Addressed This Visit        Respiratory    COPD (chronic obstructive pulmonary disease) (CMS/[facility]) - Primary    Overview     Severe COPD, FEV1 1.35 L, 36%.         Relevant Orders    Spirometry Without Bronchodilator (Completed)    Walking Oximetry (Completed)      Other Visit Diagnoses     Cigarette nicotine dependence with other nicotine-induced disorder        Relevant Medications    nicotine (NICODERM CQ) 21 MG/24HR patch    Other Relevant Orders    CT chest low dose wo            DISCUSSION    Mr. Finn was here for follow-up of COPD.  He states that his breathing has worsened since his last visit.  We discussed his PFTs in detail today.  His PFTs are very similar when compared to previous testing.  We discussed his lung function will continue to decrease as he continues to smoke.  He will continue Breo 100 for COPD.  I provided him a sample of Trelegy to try.  I advised him to call his ophthalmologist first to make sure that he can take this medication.  If he cannot take this medication he will continue Breo 100 daily.  Due to his increased ocular pressure has limited on what inhaler she can use.  He will call me if he needs a refill.  He will also continue to use his rescue inhaler as needed for shortness of breath.  I advised him to use this more frequently and see if  that helps his shortness of breath.  Based on his 6 minute walk test he does not qualify for oxygen at this time.    We discussed smoking cessation for 5 minutes today.  I advised him that it is imperative for him to quit smoking.  He is willing to try the nicotine patches to help in smoking cessation.  I will call these in today.  He will be on 21 mg per day nicotine patch for 6 weeks, then he will decrease to 14 mg for 2 weeks and then 7 mg for 2 weeks.  We discussed that as long as he smokes his lung function will continue to decline.  Based on his smoking history he does qualify for a low-dose CT screening when he turns 54 in September.  I will place the order today for CT low-dose screening and we will have this performed in September after his birthday.  He is agreeable to this testing.    He will follow-up in 6 months or sooner if his symptoms worsen.  We will call him prior to September to get him set up with a CT scan.  I spent 25 minutes with the patient and family. I spent > 50% percent of this time counseling and discussing diagnosis, prognosis, diagnostic testing, evaluation, current status, treatment options and management.    Debora Parada, APRN  02/18/20199:15 AM  Electronically signed     Please note that portions of this note were completed with a voice recognition program. Efforts were made to edit the dictations, but occasionally words are mistranscribed.      CC: Kenya Martinez MD

## 2019-05-09 ENCOUNTER — OFFICE VISIT (OUTPATIENT)
Dept: INTERNAL MEDICINE | Facility: CLINIC | Age: 54
End: 2019-05-09

## 2019-05-09 VITALS
DIASTOLIC BLOOD PRESSURE: 82 MMHG | HEIGHT: 69 IN | WEIGHT: 180 LBS | TEMPERATURE: 97.9 F | SYSTOLIC BLOOD PRESSURE: 130 MMHG | BODY MASS INDEX: 26.66 KG/M2

## 2019-05-09 DIAGNOSIS — Z00.00 HEALTH CARE MAINTENANCE: Primary | ICD-10-CM

## 2019-05-09 DIAGNOSIS — Z11.59 ENCOUNTER FOR HEPATITIS C SCREENING TEST FOR LOW RISK PATIENT: ICD-10-CM

## 2019-05-09 DIAGNOSIS — Z12.5 PROSTATE CANCER SCREENING: ICD-10-CM

## 2019-05-09 DIAGNOSIS — Z23 PNEUMOCOCCAL VACCINATION ADMINISTERED AT CURRENT VISIT: ICD-10-CM

## 2019-05-09 LAB
ALBUMIN SERPL-MCNC: 4.4 G/DL (ref 3.5–5.2)
ALBUMIN/GLOB SERPL: 1.5 G/DL
ALP SERPL-CCNC: 80 U/L (ref 39–117)
ALT SERPL W P-5'-P-CCNC: 12 U/L (ref 1–41)
ANION GAP SERPL CALCULATED.3IONS-SCNC: 8.6 MMOL/L
AST SERPL-CCNC: 12 U/L (ref 1–40)
BILIRUB SERPL-MCNC: 0.2 MG/DL (ref 0.2–1.2)
BUN BLD-MCNC: 22 MG/DL (ref 6–20)
BUN/CREAT SERPL: 18.6 (ref 7–25)
CALCIUM SPEC-SCNC: 9.1 MG/DL (ref 8.6–10.5)
CHLORIDE SERPL-SCNC: 105 MMOL/L (ref 98–107)
CHOLEST SERPL-MCNC: 212 MG/DL (ref 0–200)
CO2 SERPL-SCNC: 29.4 MMOL/L (ref 22–29)
CREAT BLD-MCNC: 1.18 MG/DL (ref 0.76–1.27)
DEPRECATED RDW RBC AUTO: 43.7 FL (ref 37–54)
ERYTHROCYTE [DISTWIDTH] IN BLOOD BY AUTOMATED COUNT: 12.8 % (ref 12.3–15.4)
GFR SERPL CREATININE-BSD FRML MDRD: 64 ML/MIN/1.73
GLOBULIN UR ELPH-MCNC: 2.9 GM/DL
GLUCOSE BLD-MCNC: 81 MG/DL (ref 65–99)
HCT VFR BLD AUTO: 45.9 % (ref 37.5–51)
HCV AB SER DONR QL: NORMAL
HDLC SERPL-MCNC: 44 MG/DL (ref 40–60)
HGB BLD-MCNC: 14.8 G/DL (ref 13–17.7)
LDLC SERPL CALC-MCNC: 141 MG/DL (ref 0–100)
LDLC/HDLC SERPL: 3.2 {RATIO}
MAGNESIUM SERPL-MCNC: 2.2 MG/DL (ref 1.6–2.6)
MCH RBC QN AUTO: 30.1 PG (ref 26.6–33)
MCHC RBC AUTO-ENTMCNC: 32.2 G/DL (ref 31.5–35.7)
MCV RBC AUTO: 93.3 FL (ref 79–97)
PLATELET # BLD AUTO: 176 10*3/MM3 (ref 140–450)
PMV BLD AUTO: 12.8 FL (ref 6–12)
POTASSIUM BLD-SCNC: 4.5 MMOL/L (ref 3.5–5.2)
PROT SERPL-MCNC: 7.3 G/DL (ref 6–8.5)
PSA SERPL-MCNC: 0.85 NG/ML (ref 0–4)
RBC # BLD AUTO: 4.92 10*6/MM3 (ref 4.14–5.8)
SODIUM BLD-SCNC: 143 MMOL/L (ref 136–145)
TRIGL SERPL-MCNC: 137 MG/DL (ref 0–150)
VLDLC SERPL-MCNC: 27.4 MG/DL (ref 5–40)
WBC NRBC COR # BLD: 6.71 10*3/MM3 (ref 3.4–10.8)

## 2019-05-09 PROCEDURE — 90471 IMMUNIZATION ADMIN: CPT | Performed by: INTERNAL MEDICINE

## 2019-05-09 PROCEDURE — 86803 HEPATITIS C AB TEST: CPT | Performed by: INTERNAL MEDICINE

## 2019-05-09 PROCEDURE — 85027 COMPLETE CBC AUTOMATED: CPT | Performed by: INTERNAL MEDICINE

## 2019-05-09 PROCEDURE — 80061 LIPID PANEL: CPT | Performed by: INTERNAL MEDICINE

## 2019-05-09 PROCEDURE — G0103 PSA SCREENING: HCPCS | Performed by: INTERNAL MEDICINE

## 2019-05-09 PROCEDURE — 80053 COMPREHEN METABOLIC PANEL: CPT | Performed by: INTERNAL MEDICINE

## 2019-05-09 PROCEDURE — 99396 PREV VISIT EST AGE 40-64: CPT | Performed by: INTERNAL MEDICINE

## 2019-05-09 PROCEDURE — 83735 ASSAY OF MAGNESIUM: CPT | Performed by: INTERNAL MEDICINE

## 2019-05-09 PROCEDURE — 90670 PCV13 VACCINE IM: CPT | Performed by: INTERNAL MEDICINE

## 2019-05-09 NOTE — PROGRESS NOTES
Subjective   Aguila Finn is a 53 y.o. male here for annual exam. He is fasting. He had cscope 3 years ago, unsure of interval. He sees pulm for COPD. Has never had PNA vaccine. He had flu vaccine last visit. He has had shingles, never got zostavax and unaware of shingrix. No acute issues today. Is a smoker.     Review of Systems   Constitutional: Negative.    HENT: Negative.    Eyes: Negative.    Respiratory: Positive for shortness of breath.    Cardiovascular: Negative.    Gastrointestinal: Negative.    Endocrine: Negative.    Genitourinary: Negative.    Musculoskeletal: Negative.    Skin: Negative.    Allergic/Immunologic: Negative.    Neurological: Negative.    Hematological: Negative.    Psychiatric/Behavioral: Negative.        Past Medical History:   Diagnosis Date   • Anxiety    • COPD (chronic obstructive pulmonary disease) (CMS/[facility])    • Depression    • Hyperlipidemia    • Hypertension    • Recurrent kidney stones      Family History   Problem Relation Age of Onset   • Hyperlipidemia Father    • Hypertension Father    • Heart attack Father    • Diabetes Sister    • COPD Mother      History reviewed. No pertinent surgical history.  Social History     Socioeconomic History   • Marital status:      Spouse name: Not on file   • Number of children: Not on file   • Years of education: Not on file   • Highest education level: Not on file   Tobacco Use   • Smoking status: Current Every Day Smoker     Packs/day: 2.00     Years: 40.00     Pack years: 80.00     Types: Cigarettes   • Smokeless tobacco: Never Used   Substance and Sexual Activity   • Alcohol use: Yes     Comment: a few drinks on occasion, not excessive per patient   • Drug use: No   • Sexual activity: Defer   Social History Narrative    He is a smoker of about a pack a day and has been smoking at least the past 39 years.  Denies drug use.  Occasional EtOH use.  Occasional caffeine, he has 2 dogs and a cat.  Recent travel to [place] on a cruise.  He is retired with a high school education and is  and lives at home with his wife.         Current Outpatient Medications:   •  albuterol (PROVENTIL HFA;VENTOLIN HFA) 108 (90 Base) MCG/ACT inhaler, Inhale 2 puffs Every 4 (Four) Hours As Needed for Wheezing., Disp: 6.7 g, Rfl: 11  •  amLODIPine-benazepril (LOTREL) 10-20 MG per capsule, Take 1 capsule by mouth Daily., Disp: 90 capsule, Rfl: 0  •  aspirin 81 MG EC tablet, Take 1 tablet by mouth Daily., Disp: 90 tablet, Rfl: 1  •  atorvastatin (LIPITOR) 40 MG tablet, Take 1 tablet by mouth Daily., Disp: 90 tablet, Rfl: 0  •  busPIRone (BUSPAR) 15 MG tablet, Take 15 mg by mouth Daily As Needed., Disp: , Rfl:   •  Fluticasone Furoate-Vilanterol (BREO ELLIPTA) 100-25 MCG/INH aerosol powder , Inhale 1 puff Daily., Disp: 1 each, Rfl: 5  •  nicotine (NICODERM CQ) 21 MG/24HR patch, Place 1 patch on the skin as directed by provider Daily., Disp: 7 each, Rfl: 6  •  spironolactone (ALDACTONE) 50 MG tablet, Take 50 mg by mouth Daily., Disp: , Rfl:     Objective   /82 (BP Location: Left arm, Patient Position: Sitting, Cuff Size: Adult)   Temp 97.9 °F (36.6 °C) (Temporal)   Ht 175.3 cm (69\")   Wt 81.6 kg (180 lb)   BMI 26.58 kg/m²   Physical Exam   Constitutional: He is oriented to person, place, and time. He appears well-developed and well-nourished. No distress.   HENT:   Head: Normocephalic and atraumatic.   Right Ear: Tympanic membrane, external ear and ear canal normal.   Left Ear: Tympanic membrane, external ear and ear canal normal.   Nose: Nose normal.   Mouth/Throat: Oropharynx is clear and moist.   Eyes: Conjunctivae and lids are normal. Pupils are equal, round, and reactive to light. No scleral icterus.   Neck: Neck supple. Carotid bruit is not present. No thyroid mass and no thyromegaly present.   Cardiovascular: Normal rate, regular rhythm, normal heart sounds and intact distal pulses. Exam reveals no gallop, no S3, no S4 " and no friction rub.   No murmur heard.  Pulmonary/Chest: Effort normal and breath sounds normal. No respiratory distress. He has no wheezes. He has no rhonchi. He has no rales.   Reduced breath sounds throughout all lung fields but no abnormal sounds   Abdominal: Soft. Bowel sounds are normal. He exhibits no distension and no mass. There is no hepatosplenomegaly. There is no tenderness.   Musculoskeletal: Normal range of motion.   Lymphadenopathy:     He has no cervical adenopathy.   Neurological: He is alert and oriented to person, place, and time. No cranial nerve deficit or sensory deficit.   Skin: Skin is warm and dry. No rash noted. No erythema. No pallor.   Psychiatric: He has a normal mood and affect. His speech is normal and behavior is normal. Judgment and thought content normal. Cognition and memory are normal.   Vitals reviewed.      Assessment/Plan   Aguila was seen today for annual exam.    Diagnoses and all orders for this visit:    Health care maintenance  -     Comprehensive Metabolic Panel  -     CBC (No Diff)  -     Lipid Panel  -     Magnesium    Encounter for hepatitis C screening test for low risk patient  -     Hepatitis C Antibody    Prostate cancer screening  -     PSA SCREENING    Pneumococcal vaccination administered at current visit  -     pneumococcal conj. 13-valent (PREVNAR-13) vaccine 0.5 mL        1. HCM  -cscope UTD  -fasting labs today  -prevnar 13 today, pneumovax in one year. Early dosing 2/2 COPD  -defers tdap, will consider shingrix at pharmacy  Reviewed the following with the patient: advised patient of need for:  colonoscopy, PSA and/or prostate exam, immunizations discussed, Prevnar vaccine, Pneumovax vaccine, Adacel-Tdap vaccine and shingrix and exercise recommended, smoking cessation.

## 2019-08-09 NOTE — TELEPHONE ENCOUNTER
Fax refill request for Rx Breo 100/25 approved and faxed per chart to [facility].

## 2019-08-20 ENCOUNTER — OFFICE VISIT (OUTPATIENT)
Dept: PULMONOLOGY | Facility: CLINIC | Age: 54
End: 2019-08-20

## 2019-08-20 VITALS
DIASTOLIC BLOOD PRESSURE: 72 MMHG | WEIGHT: 174.8 LBS | TEMPERATURE: 97.9 F | HEIGHT: 69 IN | OXYGEN SATURATION: 97 % | SYSTOLIC BLOOD PRESSURE: 118 MMHG | HEART RATE: 93 BPM | BODY MASS INDEX: 25.89 KG/M2

## 2019-08-20 DIAGNOSIS — G47.34 NOCTURNAL HYPOXEMIA: ICD-10-CM

## 2019-08-20 DIAGNOSIS — J44.9 CHRONIC OBSTRUCTIVE PULMONARY DISEASE, UNSPECIFIED COPD TYPE (HCC): Primary | ICD-10-CM

## 2019-08-20 DIAGNOSIS — F17.210 CIGARETTE NICOTINE DEPENDENCE WITHOUT COMPLICATION: ICD-10-CM

## 2019-08-20 PROCEDURE — 99406 BEHAV CHNG SMOKING 3-10 MIN: CPT | Performed by: NURSE PRACTITIONER

## 2019-08-20 PROCEDURE — 99214 OFFICE O/P EST MOD 30 MIN: CPT | Performed by: NURSE PRACTITIONER

## 2019-08-20 RX ORDER — ALBUTEROL SULFATE 90 UG/1
2 AEROSOL, METERED RESPIRATORY (INHALATION) EVERY 4 HOURS PRN
Qty: 6.7 G | Refills: 11 | Status: SHIPPED | OUTPATIENT
Start: 2019-08-20 | End: 2022-08-18 | Stop reason: SDUPTHER

## 2019-08-20 NOTE — PROGRESS NOTES
[facility] Pulmonary Follow up    CHIEF COMPLAINT    COPD/nocturnal hypoxemia    HISTORY OF PRESENT ILLNESS    Aguila Finn is a 53 y.o.male here today for follow-up of his COPD and nocturnal hypoxemia.  He was last seen in the office by me in February.  He denies any respiratory illnesses or exacerbations since his last appointment.  He denies any worsening shortness of breath since his last appointment.    He remains on Breo 100 due to increased ocular pressure he has been unable to tolerate any other inhalers in the past.  He states that his breathing has remained stable using Breo 100.  He would use a rescue inhaler at least once a day.    He continues to wear 2 L nasal cannula at nighttime.  He denies any daytime somnolence or fatigue.  He feels well rested in the mornings.  He states that he is sleeping better since he has started wearing oxygen at night.    He denies fever, chills, sputum production, hemoptysis, night sweats, weight loss, chest pain or palpitations.  He denies any lower extremity edema.  He denies any sinus or allergy symptoms.  he denies reflux symptoms.    He continues to smoke less than 1 pack/day.  He has a 80 pack history.  He had tried the patches at his last appointment and got down to smoking 1 cigarette/day.  However his sister passed away and he picked back up with his smoking.  He will qualify for yearly CT screening starting in September.      He is up-to-date on his current vaccinations.    Patient Active Problem List   Diagnosis   • Essential hypertension   • COPD (chronic obstructive pulmonary disease) (CMS/HCC)   • Anxiety and depression   • Recurrent kidney stones   • Bipolar 1 disorder, depressed, full remission (CMS/HCC)   • Tobacco abuse   • Chronic hypokalemia   • Chronic bronchitis (CMS/HCC)   • Hyperlipidemia   • Cigarette nicotine dependence without complication   • Nocturnal hypoxemia       No Known Allergies    Current Outpatient Medications:   •  albuterol sulfate   (90 Base) MCG/ACT inhaler, Inhale 2 puffs Every 4 (Four) Hours As Needed for Wheezing., Disp: 6.7 g, Rfl: 11  •  amLODIPine-benazepril (LOTREL) 10-20 MG per capsule, Take 1 capsule by mouth Daily., Disp: 90 capsule, Rfl: 0  •  aspirin 81 MG EC tablet, Take 1 tablet by mouth Daily., Disp: 90 tablet, Rfl: 1  •  atorvastatin (LIPITOR) 40 MG tablet, Take 1 tablet by mouth Daily., Disp: 90 tablet, Rfl: 0  •  Fluticasone Furoate-Vilanterol (BREO ELLIPTA) 100-25 MCG/INH inhaler, Inhale 1 puff Daily., Disp: 60 each, Rfl: 11  •  nicotine (NICODERM CQ) 21 MG/24HR patch, Place 1 patch on the skin as directed by provider Daily., Disp: 7 each, Rfl: 6  •  spironolactone (ALDACTONE) 50 MG tablet, Take 50 mg by mouth Daily., Disp: , Rfl:   •  busPIRone (BUSPAR) 15 MG tablet, Take 15 mg by mouth Daily As Needed., Disp: , Rfl:   MEDICATION LIST AND ALLERGIES REVIEWED.    Social History     Tobacco Use   • Smoking status: Current Every Day Smoker     Packs/day: 2.00     Years: 40.00     Pack years: 80.00     Types: Cigarettes   • Smokeless tobacco: Never Used   Substance Use Topics   • Alcohol use: Yes     Comment: a few drinks on occasion, not excessive per patient   • Drug use: No       FAMILY AND SOCIAL HISTORY REVIEWED.    Review of Systems   Constitutional: Negative for activity change, appetite change, fatigue, fever and unexpected weight change.   HENT: Negative for congestion, postnasal drip, rhinorrhea, sinus pressure, sore throat and voice change.    Eyes: Positive for pain. Negative for visual disturbance.   Respiratory: Positive for shortness of breath and wheezing. Negative for cough and chest tightness.    Cardiovascular: Negative for chest pain, palpitations and leg swelling.   Gastrointestinal: Negative for abdominal distention, abdominal pain, nausea and vomiting.   Endocrine: Negative for cold intolerance and heat intolerance.   Genitourinary: Negative for difficulty urinating and urgency.   Musculoskeletal:  "Negative for arthralgias, back pain and neck pain.   Skin: Negative for color change and pallor.   Allergic/Immunologic: Negative for environmental allergies and food allergies.   Neurological: Negative for dizziness, syncope, weakness and light-headedness.   Hematological: Negative for adenopathy. Does not bruise/bleed easily.   Psychiatric/Behavioral: Negative for agitation and behavioral problems. The patient is nervous/anxious.    .    /72   Pulse 93   Temp 97.9 °F (36.6 °C)   Ht 175.3 cm (69\")   Wt 79.3 kg (174 lb 12.8 oz)   SpO2 97% Comment: resting, room air  BMI 25.81 kg/m²     Immunization History   Administered Date(s) Administered   • FLUARIX/FLUZONE/AFLURIA/FLULAVAL QUAD 11/08/2018   • Flu Vaccine Quad PF >18YRS 09/30/2016   • Influenza, Unspecified 05/10/2019   • Pneumococcal Conjugate 13-Valent (PCV13) 05/09/2019   • Shingrix 05/10/2019       Physical Exam   Constitutional: He is oriented to person, place, and time. He appears well-developed and well-nourished.   HENT:   Head: Normocephalic and atraumatic.   Eyes: Pupils are equal, round, and reactive to light.   Neck: Normal range of motion. Neck supple. No thyromegaly present.   Cardiovascular: Normal rate, regular rhythm, normal heart sounds and intact distal pulses. Exam reveals no gallop and no friction rub.   No murmur heard.  Pulmonary/Chest: Effort normal and breath sounds normal. No respiratory distress. He has no wheezes. He has no rales. He exhibits no tenderness.   Abdominal: Soft. Bowel sounds are normal. There is no tenderness.   Musculoskeletal: Normal range of motion.   Lymphadenopathy:     He has no cervical adenopathy.   Neurological: He is alert and oriented to person, place, and time.   Skin: Skin is warm and dry. Capillary refill takes less than 2 seconds. He is not diaphoretic.   Psychiatric: He has a normal mood and affect. His behavior is normal.   Nursing note and vitals reviewed.        RESULTS      PROBLEM " LIST    Problem List Items Addressed This Visit        Respiratory    COPD (chronic obstructive pulmonary disease) (CMS/[facility]) - Primary    Overview     Severe COPD, FEV1 1.35 L, 36%.         Relevant Medications    Fluticasone Furoate-Vilanterol (BREO ELLIPTA) 100-25 MCG/INH inhaler    albuterol sulfate  (90 Base) MCG/ACT inhaler    Nocturnal hypoxemia       Other    Cigarette nicotine dependence without complication            DISCUSSION    Mr. Finn was here for follow-up of his COPD.  He will remain on Breo 100 due to his intraocular pressure he cannot increase his steroid use.  He will continue to use his albuterol rescue inhaler as needed for shortness of breath.    He will continue to wear his 2 L nasal cannula at nighttime for his nocturnal hypoxemia.  I will send a new order over to his current [employer] company.      We discussed smoking cessation for 5 to 10 minutes in the office today.  He is not interested in any medication aids at this time.  He states that he is going to try to cut back smoking but does not have a stop date planned.  Based on his 80 pack history he does qualify for a CT low-dose screening in September.  I had already placed this order and we will call him closer to September to have this scheduled.  He is agreeable to proceed with a CT scan.  I will call the results to him when I receive them.    He will follow-up in 6 months or sooner if his symptoms worsen.  He will call with any additional concerns or questions.  I spent 25 minutes with the patient. I spent > 50% percent of this time counseling and discussing diagnosis, prognosis, diagnostic testing, evaluation, current status, treatment options and management.    Debora Parada, APRN  08/20/20199:47 AM  Electronically signed     Please note that portions of this note were completed with a voice recognition program. Efforts were made to edit the dictations, but occasionally words are mistranscribed.      CC: Michelle  Kenya Cummins MD

## 2019-09-03 DIAGNOSIS — F17.210 CIGARETTE NICOTINE DEPENDENCE WITHOUT COMPLICATION: Primary | ICD-10-CM

## 2019-09-27 ENCOUNTER — TRANSCRIBE ORDERS (OUTPATIENT)
Dept: PULMONOLOGY | Facility: CLINIC | Age: 54
End: 2019-09-27

## 2019-10-21 ENCOUNTER — HOSPITAL ENCOUNTER (OUTPATIENT)
Dept: CT IMAGING | Facility: HOSPITAL | Age: 54
Discharge: HOME | End: 2019-10-21
Admitting: NURSE PRACTITIONER

## 2019-10-21 DIAGNOSIS — F17.210 CIGARETTE NICOTINE DEPENDENCE WITHOUT COMPLICATION: ICD-10-CM

## 2019-10-21 PROCEDURE — G0297 LDCT FOR LUNG CA SCREEN: HCPCS

## 2019-10-23 ENCOUNTER — TRANSCRIBE ORDERS (OUTPATIENT)
Dept: PULMONOLOGY | Facility: CLINIC | Age: 54
End: 2019-10-23

## 2019-10-29 ENCOUNTER — OFFICE VISIT (OUTPATIENT)
Dept: INTERNAL MEDICINE | Facility: CLINIC | Age: 54
End: 2019-10-29

## 2019-10-29 VITALS
HEIGHT: 69 IN | BODY MASS INDEX: 26.51 KG/M2 | SYSTOLIC BLOOD PRESSURE: 154 MMHG | TEMPERATURE: 97.9 F | DIASTOLIC BLOOD PRESSURE: 100 MMHG | WEIGHT: 179 LBS

## 2019-10-29 DIAGNOSIS — F41.9 ANXIETY: Primary | ICD-10-CM

## 2019-10-29 DIAGNOSIS — I10 ESSENTIAL HYPERTENSION: ICD-10-CM

## 2019-10-29 PROCEDURE — 99213 OFFICE O/P EST LOW 20 MIN: CPT | Performed by: INTERNAL MEDICINE

## 2019-10-29 RX ORDER — BUSPIRONE HYDROCHLORIDE 15 MG/1
15 TABLET ORAL 3 TIMES DAILY
Qty: 90 TABLET | Refills: 11 | Status: SHIPPED | OUTPATIENT
Start: 2019-10-29 | End: 2022-04-05

## 2019-10-29 NOTE — PROGRESS NOTES
"Subjective   Aguila Finn is a 54 y.o. male here for follow-up anxiety and HTN.  HTN: Elevated today, patient reports generally his blood pressure is controlled.  He does feel quite anxious.  He was on BuSpar 15 mg twice daily in the past and did very well on this.  He found some old,  BuSpar that he has been taking.  He does think that it has been working, but he would like a new prescription.  He denies any negative side effect to BuSpar in the past.  He denies any symptoms of depression or bipolar.    I have reviewed the following portions of the patient's history and confirmed they are accurate: current medications, past medical history and problem list     I have personally completed the patient's review of systems.    Review of Systems:  General: negative  CV: negative  Respiratory: negative  Neuro: negative  Psych: Anxiety    Objective   /100 (BP Location: Left arm, Patient Position: Sitting, Cuff Size: Large Adult)   Temp 97.9 °F (36.6 °C) (Temporal)   Ht 175.3 cm (69\")   Wt 81.2 kg (179 lb)   BMI 26.43 kg/m²     Physical Exam   Constitutional: He is oriented to person, place, and time. He appears well-developed and well-nourished.   Pulmonary/Chest: Effort normal.   Neurological: He is alert and oriented to person, place, and time.   Skin: Skin is warm and dry.   Psychiatric: His behavior is normal. Judgment and thought content normal.   Anxious   Vitals reviewed.      Assessment/Plan   Aguila was seen today for follow-up.    Diagnoses and all orders for this visit:    Anxiety  -     busPIRone (BUSPAR) 15 MG tablet; Take 1 tablet by mouth 3 (Three) Times a Day.  -Uncontrolled, start BuSpar twice daily.  Told patient if he is having a high anxiety that he can take 3 times daily as well    Essential hypertension  -Uncontrolled today, the patient very anxious.  Will follow closely and adjust medications as needed           Karyna Mantilla MA    "

## 2019-11-01 DIAGNOSIS — R91.1 PULMONARY NODULE: Primary | ICD-10-CM

## 2019-11-05 ENCOUNTER — OFFICE VISIT (OUTPATIENT)
Dept: PULMONOLOGY | Facility: CLINIC | Age: 54
End: 2019-11-05

## 2019-11-05 ENCOUNTER — DOCUMENTATION (OUTPATIENT)
Dept: ONCOLOGY | Facility: CLINIC | Age: 54
End: 2019-11-05

## 2019-11-05 VITALS
WEIGHT: 175.8 LBS | DIASTOLIC BLOOD PRESSURE: 100 MMHG | BODY MASS INDEX: 26.04 KG/M2 | HEIGHT: 69 IN | HEART RATE: 88 BPM | SYSTOLIC BLOOD PRESSURE: 158 MMHG | TEMPERATURE: 97.8 F | OXYGEN SATURATION: 97 %

## 2019-11-05 DIAGNOSIS — C34.91 NON-SMALL CELL CANCER OF RIGHT LUNG (HCC): Primary | ICD-10-CM

## 2019-11-05 DIAGNOSIS — R91.1 LUNG NODULE: Primary | ICD-10-CM

## 2019-11-05 PROCEDURE — 99214 OFFICE O/P EST MOD 30 MIN: CPT | Performed by: INTERNAL MEDICINE

## 2019-11-05 NOTE — PROGRESS NOTES
CC:    Abnormal LDCT for lung cancer screening    HPI:    53 y/o WM w/ h/o COPD FEV1 30%, on nocturnal O2, still smoking ~ 80 py, HTN, HLD, Depression, who comes in today to discuss an recently abnormal LDCT for lung cancer screening.  This was performed on 10/21/19 and showed emphysema, and a 1.7 cm non-calcified nodule in the medial / posterior right base abutting the pleura.  No obvious mediastinal or hilar LAD.  No cough / hemoptysis / fever / chills / nightsweats / weight loss.  No symptoms attributable to this nodule.    PMH:    Past Medical History:   Diagnosis Date   • Anxiety    • COPD (chronic obstructive pulmonary disease) (CMS/HCC)    • Depression    • Hyperlipidemia    • Hypertension    • Recurrent kidney stones      PSH:    No past surgical history on file.  FH:    Family History   Problem Relation Age of Onset   • Hyperlipidemia Father    • Hypertension Father    • Heart attack Father    • Diabetes Sister    • COPD Mother      SH:    Social History     Socioeconomic History   • Marital status:      Spouse name: Not on file   • Number of children: Not on file   • Years of education: Not on file   • Highest education level: Not on file   Tobacco Use   • Smoking status: Current Every Day Smoker     Packs/day: 2.00     Years: 40.00     Pack years: 80.00     Types: Cigarettes   • Smokeless tobacco: Never Used   Substance and Sexual Activity   • Alcohol use: Yes     Comment: a few drinks on occasion, not excessive per patient   • Drug use: No   • Sexual activity: Defer   Social History Narrative    He is a smoker of about a pack a day and has been smoking at least the past 39 years.  Denies drug use.  Occasional EtOH use.  Occasional caffeine, he has 2 dogs and a cat.  Recent travel to [place] on a cruise.  He is retired with a high school education and is  and lives at home with his wife.     ALLERGIES:    No Known Allergies  MEDICATIONS:      Current Outpatient  Medications:   •  albuterol sulfate  (90 Base) MCG/ACT inhaler, Inhale 2 puffs Every 4 (Four) Hours As Needed for Wheezing., Disp: 6.7 g, Rfl: 11  •  aspirin 81 MG EC tablet, Take 1 tablet by mouth Daily., Disp: 90 tablet, Rfl: 1  •  atorvastatin (LIPITOR) 40 MG tablet, Take 1 tablet by mouth Daily., Disp: 90 tablet, Rfl: 0  •  busPIRone (BUSPAR) 15 MG tablet, Take 1 tablet by mouth 3 (Three) Times a Day., Disp: 90 tablet, Rfl: 11  •  Fluticasone Furoate-Vilanterol (BREO ELLIPTA) 100-25 MCG/INH inhaler, Inhale 1 puff Daily., Disp: 60 each, Rfl: 11  •  spironolactone (ALDACTONE) 50 MG tablet, Take 50 mg by mouth Daily., Disp: , Rfl:   •  amLODIPine-benazepril (LOTREL) 10-20 MG per capsule, Take 1 capsule by mouth Daily., Disp: 90 capsule, Rfl: 0  ROS:  Per HPI, otherwise all systems reviewed and negative.    DIAGNOSTIC DATA (Reviewed and interpreted by me unless otherwise specified):    LDCT 10/21/19 - emphysema, and a 1.7 cm non-calcified nodule in the medial / posterior right base abutting the pleura.  No obvious mediastinal or hilar LAD.     [place] 2/18/19 - very severe obstruction with FEV1 30%    6MW 2/18/19 - starting sat 99% on RA, minimum sat 99% on RA, walked 441 meters 73% predicted    Vitals:    11/05/19 1014   BP: 158/100   Pulse: 88   Temp: 97.8 °F (36.6 °C)   SpO2: 97%       Physical Exam   Constitutional: Oriented to person, place, and time. Appears well-developed and well-nourished.   Head: Normocephalic and atraumatic.   Nose: Nose normal.   Mouth/Throat: Oropharynx is clear and moist.   Eyes: Conjunctivae are normal.  Pupils normal.  Neck: No tracheal deviation present.   Cardiovascular: Normal rate, regular rhythm, normal heart sounds and intact distal pulses.  Exam reveals no gallop and no friction rub.  No thrill.  No JVD.  No edema.  No murmur heard.  Pulmonary/Chest: Effort normal and breath sounds normal.  No tenderness to palpation.  No clubbing.   Abdominal: Soft. Bowel sounds are  normal. No distension. No tenderness. There is no guarding.   Musculoskeletal: Normal range of motion.  No tenderness.  Lymphadenopathy:  No cervical adenopathy.   Neurological:  No new focal neurological deficits observed   Skin: Skin is warm and dry. No rash noted.   Psychiatric: Normal mood and affect.  Behavior is normal. Judgment normal.    Assessment/Plan     1)  Abnormal CT Chest - intermediate to high pre-test probability for malignancy.  Will start w/ PET-CT for staging.  He is a very marginal surgical candidate.  FEV1 in 30's on several occasions, but there is a discordance in that his 6MW showed no desaturation and he walked 73% predicted.  Also has coronary calcifications which may need investigated before any potential surgery.  Further workup depending on PET results.  If localized to right base and we prove malignancy may be better off having SBRT.    RTC 2 weeks (after PET)    Francisco Javier Rogers MD  Pulmonology and Critical Care Medicine  11/05/19 11:10 AM  Electronically Signed    C.C.:  Gianna Bueno MD, Kenya Martinez MD

## 2019-11-05 NOTE — PROGRESS NOTES
Met patient and wife in lung nodule clinic. Patient has smoked up to 3ppd and started smoking at age 11. LDCT ordered revealing 1.7cm RLL nodule. PFT's show severe COPD but 6min walk is wnl and reports living on the third floor. He does report SOA with incline and upstairs but denies significant SOA otherwise. States he recently lost a sister to cancer and is anxious about this nodule. Scans and PFT's reviewed. Per Dr. Rogers PET scan now. Introduced lung navigator role and provided contact information. He v/u and agreeable to plan.

## 2019-11-06 ENCOUNTER — TRANSCRIBE ORDERS (OUTPATIENT)
Dept: PULMONOLOGY | Facility: CLINIC | Age: 54
End: 2019-11-06

## 2019-11-07 ENCOUNTER — HOSPITAL ENCOUNTER (OUTPATIENT)
Dept: PET IMAGING | Facility: HOSPITAL | Age: 54
Discharge: HOME | End: 2019-11-07
Admitting: INTERNAL MEDICINE

## 2019-11-07 ENCOUNTER — HOSPITAL ENCOUNTER (OUTPATIENT)
Dept: PET IMAGING | Facility: HOSPITAL | Age: 54
Discharge: HOME | End: 2019-11-07

## 2019-11-07 DIAGNOSIS — C34.91 NON-SMALL CELL CANCER OF RIGHT LUNG (HCC): ICD-10-CM

## 2019-11-07 LAB — GLUCOSE BLDC GLUCOMTR-MCNC: 82 MG/DL (ref 70–130)

## 2019-11-07 PROCEDURE — 82962 GLUCOSE BLOOD TEST: CPT

## 2019-11-07 PROCEDURE — A9552 F18 FDG: HCPCS | Performed by: INTERNAL MEDICINE

## 2019-11-07 PROCEDURE — 78815 PET IMAGE W/CT SKULL-THIGH: CPT

## 2019-11-07 PROCEDURE — 0 FLUDEOXYGLUCOSE F18 SOLUTION: Performed by: INTERNAL MEDICINE

## 2019-11-07 RX ADMIN — FLUDEOXYGLUCOSE F18 1 DOSE: 300 INJECTION INTRAVENOUS at 13:34

## 2019-11-13 ENCOUNTER — TRANSCRIBE ORDERS (OUTPATIENT)
Dept: PULMONOLOGY | Facility: CLINIC | Age: 54
End: 2019-11-13

## 2019-11-14 ENCOUNTER — TELEPHONE (OUTPATIENT)
Dept: ONCOLOGY | Facility: CLINIC | Age: 54
End: 2019-11-14

## 2019-11-14 NOTE — TELEPHONE ENCOUNTER
Patient called requesting PET results. Reviewed with Dr. Rogers. Notified him PET is negative but to keep OV with Dr. Rogers 11/26/19 to discussed further plan. He v/u and agreeable to plan. He knows to call with questions or concerns.

## 2019-11-20 DIAGNOSIS — E78.5 HYPERLIPIDEMIA, UNSPECIFIED HYPERLIPIDEMIA TYPE: ICD-10-CM

## 2019-11-20 RX ORDER — ATORVASTATIN CALCIUM 40 MG/1
40 TABLET, FILM COATED ORAL DAILY
Qty: 90 TABLET | Refills: 0 | Status: SHIPPED | OUTPATIENT
Start: 2019-11-20 | End: 2020-02-21

## 2019-11-20 RX ORDER — ASPIRIN 81 MG/1
81 TABLET ORAL DAILY
Qty: 90 TABLET | Refills: 1 | Status: SHIPPED | OUTPATIENT
Start: 2019-11-20 | End: 2020-05-22 | Stop reason: SDUPTHER

## 2019-11-26 ENCOUNTER — OFFICE VISIT (OUTPATIENT)
Dept: PULMONOLOGY | Facility: CLINIC | Age: 54
End: 2019-11-26

## 2019-11-26 VITALS
TEMPERATURE: 97.8 F | DIASTOLIC BLOOD PRESSURE: 86 MMHG | BODY MASS INDEX: 26.36 KG/M2 | SYSTOLIC BLOOD PRESSURE: 138 MMHG | HEART RATE: 74 BPM | OXYGEN SATURATION: 97 % | HEIGHT: 69 IN | WEIGHT: 178 LBS

## 2019-11-26 DIAGNOSIS — R91.1 LUNG NODULE: ICD-10-CM

## 2019-11-26 DIAGNOSIS — Z23 IMMUNIZATION DUE: Primary | ICD-10-CM

## 2019-11-26 DIAGNOSIS — J44.9 CHRONIC OBSTRUCTIVE PULMONARY DISEASE, UNSPECIFIED COPD TYPE (HCC): ICD-10-CM

## 2019-11-26 DIAGNOSIS — Z72.0 TOBACCO ABUSE: ICD-10-CM

## 2019-11-26 PROCEDURE — 90674 CCIIV4 VAC NO PRSV 0.5 ML IM: CPT | Performed by: INTERNAL MEDICINE

## 2019-11-26 PROCEDURE — 90471 IMMUNIZATION ADMIN: CPT | Performed by: INTERNAL MEDICINE

## 2019-11-26 PROCEDURE — 99214 OFFICE O/P EST MOD 30 MIN: CPT | Performed by: INTERNAL MEDICINE

## 2019-11-26 NOTE — PROGRESS NOTES
CC:    Abnormal LDCT for lung cancer screening    HPI:    55 y/o WM w/ h/o COPD FEV1 30%, on nocturnal O2, still smoking ~ 80 py, HTN, HLD, Depression, who comes in today to discuss an recently abnormal LDCT for lung cancer screening.  This was performed on 10/21/19 and showed emphysema, and a 1.7 cm non-calcified nodule in the medial / posterior right base abutting the pleura.  No obvious mediastinal or hilar LAD.  No cough / hemoptysis / fever / chills / nightsweats / weight loss.  No symptoms attributable to this nodule.    INTERVAL HISTORY:    Patient returns for follow up.  Breathing ok.  He quit smoking.  He has some intermittent chest pain and SOA.    PMH:    Past Medical History:   Diagnosis Date   • Anxiety    • COPD (chronic obstructive pulmonary disease) (CMS/HCC)    • Depression    • Hyperlipidemia    • Hypertension    • Recurrent kidney stones      PSH:    No past surgical history on file.  FH:    Family History   Problem Relation Age of Onset   • Hyperlipidemia Father    • Hypertension Father    • Heart attack Father    • Diabetes Sister    • COPD Mother      SH:    Social History     Socioeconomic History   • Marital status:      Spouse name: Not on file   • Number of children: Not on file   • Years of education: Not on file   • Highest education level: Not on file   Tobacco Use   • Smoking status: Former Smoker     Packs/day: 2.00     Years: 40.00     Pack years: 80.00     Types: Cigarettes     Last attempt to quit: 2019     Years since quittin.0   • Smokeless tobacco: Never Used   Substance and Sexual Activity   • Alcohol use: Yes     Comment: a few drinks on occasion, not excessive per patient   • Drug use: No   • Sexual activity: Defer   Social History Narrative    He is a smoker of about a pack a day and has been smoking at least the past 39 years.  Denies drug use.  Occasional EtOH use.  Occasional caffeine, he has 2 dogs and a cat.  [place] [facility]  [place]  He is retired with a high school education and is  and lives at home with his wife.     ALLERGIES:    No Known Allergies  MEDICATIONS:      Current Outpatient Medications:   •  albuterol sulfate  (90 Base) MCG/ACT inhaler, Inhale 2 puffs Every 4 (Four) Hours As Needed for Wheezing., Disp: 6.7 g, Rfl: 11  •  amLODIPine-benazepril (LOTREL) 10-20 MG per capsule, Take 1 capsule by mouth Daily., Disp: 90 capsule, Rfl: 0  •  aspirin 81 MG EC tablet, Take 1 tablet by mouth Daily., Disp: 90 tablet, Rfl: 1  •  atorvastatin (LIPITOR) 40 MG tablet, Take 1 tablet by mouth Daily., Disp: 90 tablet, Rfl: 0  •  busPIRone (BUSPAR) 15 MG tablet, Take 1 tablet by mouth 3 (Three) Times a Day., Disp: 90 tablet, Rfl: 11  •  Fluticasone Furoate-Vilanterol (BREO ELLIPTA) 100-25 MCG/INH inhaler, Inhale 1 puff Daily., Disp: 60 each, Rfl: 11  •  spironolactone (ALDACTONE) 50 MG tablet, Take 50 mg by mouth Daily., Disp: , Rfl:   No current facility-administered medications for this visit.   ROS:  Per HPI, otherwise all systems reviewed and negative.    DIAGNOSTIC DATA (Reviewed and interpreted by me unless otherwise specified):    LDCT 10/21/19 - emphysema, and a 1.7 cm non-calcified nodule in the medial / posterior right base abutting the pleura.  No obvious mediastinal or hilar LAD.     CT-PET 11/7/19 - RLL nodule is PET negative.  Remains stable in size and configuration.    [place] 2/18/19 - very severe obstruction with FEV1 30%    6MW 2/18/19 - starting sat 99% on RA, minimum sat 99% on RA, walked 441 meters 73% predicted    Vitals:    11/26/19 0839   BP: 138/86   Pulse: 74   Temp: 97.8 °F (36.6 °C)   SpO2: 97%       Physical Exam   Constitutional: Oriented to person, place, and time. Appears well-developed and well-nourished.   Head: Normocephalic and atraumatic.   Nose: Nose normal.   Mouth/Throat: Oropharynx is clear and moist.   Eyes: Conjunctivae are normal.  Pupils normal.  Neck: No tracheal  deviation present.   Cardiovascular: Normal rate, regular rhythm, normal heart sounds and intact distal pulses.  Exam reveals no gallop and no friction rub.  No thrill.  No JVD.  No edema.  No murmur heard.  Pulmonary/Chest: Effort normal and breath sounds normal.  No tenderness to palpation.  No clubbing.   Abdominal: Soft. Bowel sounds are normal. No distension. No tenderness. There is no guarding.   Musculoskeletal: Normal range of motion.  No tenderness.  Lymphadenopathy:  No cervical adenopathy.   Neurological:  No new focal neurological deficits observed   Skin: Skin is warm and dry. No rash noted.   Psychiatric: Normal mood and affect.  Behavior is normal. Judgment normal.    Assessment/Plan     1)  Lung Nodule - PET negative.  This has shifted him from a intermediate to high pretest probability, to low-intermediate.  Will continue close surveillance.  Plan on getting CT w/ ilogic protocol in 3 months.      2) COPD - continue Breo and albuterol.  Repeat PFT's next visit, I suspect they may improve now that he has quit smoking.      3) Tobacco abuse - currently in remission, counseled patient, 80 py, quit 2019.    4) Chest pain / SOA - get stress echo and transthoracic echo, refer to Cardiology    RTC 3 months w/ CT ilogic protocol and full PFT    Francisco Javier Rogers MD  Pulmonology and Critical Care Medicine  11/26/19 10:04 AM  Electronically Signed    C.C.:  Gianna Bueno MD, Kenya Martinez MD

## 2019-11-27 DIAGNOSIS — R07.89 CHEST PAIN, ATYPICAL: Primary | ICD-10-CM

## 2019-11-27 DIAGNOSIS — R06.02 SHORTNESS OF BREATH: ICD-10-CM

## 2019-12-18 ENCOUNTER — HOSPITAL ENCOUNTER (OUTPATIENT)
Dept: CARDIOLOGY | Facility: HOSPITAL | Age: 54
Discharge: HOME | End: 2019-12-18
Admitting: INTERNAL MEDICINE

## 2019-12-18 VITALS — HEIGHT: 69 IN | WEIGHT: 177 LBS | BODY MASS INDEX: 26.22 KG/M2

## 2019-12-18 DIAGNOSIS — R07.89 CHEST PAIN, ATYPICAL: ICD-10-CM

## 2019-12-18 DIAGNOSIS — R06.02 SHORTNESS OF BREATH: ICD-10-CM

## 2019-12-18 LAB
BH CV ECHO MEAS - AO MAX PG (FULL): 1.5 MMHG
BH CV ECHO MEAS - AO MAX PG: 5.3 MMHG
BH CV ECHO MEAS - AO ROOT AREA (BSA CORRECTED): 1.7
BH CV ECHO MEAS - AO ROOT AREA: 8.7 CM^2
BH CV ECHO MEAS - AO ROOT DIAM: 3.3 CM
BH CV ECHO MEAS - AO V2 MAX: 114.8 CM/SEC
BH CV ECHO MEAS - AVA(V,A): 2.8 CM^2
BH CV ECHO MEAS - AVA(V,D): 2.8 CM^2
BH CV ECHO MEAS - BSA(HAYCOCK): 2 M^2
BH CV ECHO MEAS - BSA: 2 M^2
BH CV ECHO MEAS - BZI_BMI: 26.1 KILOGRAMS/M^2
BH CV ECHO MEAS - BZI_METRIC_HEIGHT: 175.3 CM
BH CV ECHO MEAS - BZI_METRIC_WEIGHT: 80.3 KG
BH CV ECHO MEAS - EDV(CUBED): 120 ML
BH CV ECHO MEAS - EDV(MOD-SP2): 63 ML
BH CV ECHO MEAS - EDV(MOD-SP4): 80 ML
BH CV ECHO MEAS - EDV(TEICH): 114.6 ML
BH CV ECHO MEAS - EF(CUBED): 50.1 %
BH CV ECHO MEAS - EF(MOD-BP): 63 %
BH CV ECHO MEAS - EF(MOD-SP2): 66.7 %
BH CV ECHO MEAS - EF(MOD-SP4): 62.5 %
BH CV ECHO MEAS - EF(TEICH): 42 %
BH CV ECHO MEAS - ESV(CUBED): 59.9 ML
BH CV ECHO MEAS - ESV(MOD-SP2): 21 ML
BH CV ECHO MEAS - ESV(MOD-SP4): 30 ML
BH CV ECHO MEAS - ESV(TEICH): 66.4 ML
BH CV ECHO MEAS - FS: 20.7 %
BH CV ECHO MEAS - IVS/LVPW: 0.96
BH CV ECHO MEAS - IVSD: 0.94 CM
BH CV ECHO MEAS - LA DIMENSION: 3.4 CM
BH CV ECHO MEAS - LA/AO: 1
BH CV ECHO MEAS - LAD MAJOR: 5.1 CM
BH CV ECHO MEAS - LAT PEAK E' VEL: 10.5 CM/SEC
BH CV ECHO MEAS - LATERAL E/E' RATIO: 6.7
BH CV ECHO MEAS - LV DIASTOLIC VOL/BSA (35-75): 40.8 ML/M^2
BH CV ECHO MEAS - LV MASS(C)D: 167.5 GRAMS
BH CV ECHO MEAS - LV MASS(C)DI: 85.4 GRAMS/M^2
BH CV ECHO MEAS - LV MAX PG: 3.8 MMHG
BH CV ECHO MEAS - LV MEAN PG: 1.7 MMHG
BH CV ECHO MEAS - LV SYSTOLIC VOL/BSA (12-30): 15.3 ML/M^2
BH CV ECHO MEAS - LV V1 MAX: 97 CM/SEC
BH CV ECHO MEAS - LV V1 MEAN: 59.8 CM/SEC
BH CV ECHO MEAS - LV V1 VTI: 17.7 CM
BH CV ECHO MEAS - LVIDD: 4.9 CM
BH CV ECHO MEAS - LVIDS: 3.9 CM
BH CV ECHO MEAS - LVLD AP2: 7.8 CM
BH CV ECHO MEAS - LVLD AP4: 7.7 CM
BH CV ECHO MEAS - LVLS AP2: 6.3 CM
BH CV ECHO MEAS - LVLS AP4: 7 CM
BH CV ECHO MEAS - LVOT AREA (M): 3.1 CM^2
BH CV ECHO MEAS - LVOT AREA: 3.3 CM^2
BH CV ECHO MEAS - LVOT DIAM: 2 CM
BH CV ECHO MEAS - LVPWD: 0.97 CM
BH CV ECHO MEAS - MED PEAK E' VEL: 6.9 CM/SEC
BH CV ECHO MEAS - MEDIAL E/E' RATIO: 10.1
BH CV ECHO MEAS - MV A MAX VEL: 74.8 CM/SEC
BH CV ECHO MEAS - MV DEC TIME: 0.17 SEC
BH CV ECHO MEAS - MV E MAX VEL: 71 CM/SEC
BH CV ECHO MEAS - MV E/A: 0.95
BH CV ECHO MEAS - PA ACC SLOPE: 522.5 CM/SEC^2
BH CV ECHO MEAS - PA ACC TIME: 0.15 SEC
BH CV ECHO MEAS - PA MAX PG: 2.7 MMHG
BH CV ECHO MEAS - PA PR(ACCEL): 9.3 MMHG
BH CV ECHO MEAS - PA V2 MAX: 81.4 CM/SEC
BH CV ECHO MEAS - SI(CUBED): 30.7 ML/M^2
BH CV ECHO MEAS - SI(LVOT): 29.5 ML/M^2
BH CV ECHO MEAS - SI(MOD-SP2): 21.4 ML/M^2
BH CV ECHO MEAS - SI(MOD-SP4): 25.5 ML/M^2
BH CV ECHO MEAS - SI(TEICH): 24.6 ML/M^2
BH CV ECHO MEAS - SV(CUBED): 60.1 ML
BH CV ECHO MEAS - SV(LVOT): 57.8 ML
BH CV ECHO MEAS - SV(MOD-SP2): 42 ML
BH CV ECHO MEAS - SV(MOD-SP4): 50 ML
BH CV ECHO MEAS - SV(TEICH): 48.2 ML
BH CV ECHO MEAS - TAPSE (>1.6): 1.9 CM2
BH CV ECHO MEASUREMENTS AVERAGE E/E' RATIO: 8.16
BH CV STRESS BP STAGE 1: 134
BH CV STRESS DURATION MIN STAGE 1: 3
BH CV STRESS DURATION MIN STAGE 2: 0
BH CV STRESS DURATION SEC STAGE 1: 0
BH CV STRESS DURATION SEC STAGE 2: 38
BH CV STRESS GRADE STAGE 1: 10
BH CV STRESS GRADE STAGE 2: 12
BH CV STRESS HR STAGE 1: 139
BH CV STRESS HR STAGE 2: 203
BH CV STRESS METS STAGE 1: 5
BH CV STRESS METS STAGE 2: 7.5
BH CV STRESS O2 STAGE 1: 94
BH CV STRESS O2 STAGE 2: 94
BH CV STRESS PROTOCOL 1: NORMAL
BH CV STRESS RECOVERY BP: NORMAL MMHG
BH CV STRESS RECOVERY HR: 95 BPM
BH CV STRESS RECOVERY O2: 96 %
BH CV STRESS SPEED STAGE 1: 1.7
BH CV STRESS SPEED STAGE 2: 2.5
BH CV STRESS STAGE 1: 1
BH CV STRESS STAGE 2: 2
BH CV VAS BP RIGHT ARM: NORMAL MMHG
BH CV XLRA - RV BASE: 3 CM
BH CV XLRA - RV LENGTH: 8.3 CM
BH CV XLRA - RV MID: 2.7 CM
BH CV XLRA - TDI S': 12.9 CM/SEC
LEFT ATRIUM VOLUME INDEX: 24 ML/M^2
LEFT ATRIUM VOLUME: 47 ML
MAXIMAL PREDICTED HEART RATE: 165 BPM
PERCENT MAX PREDICTED HR: 123.03 %
STRESS BASELINE BP: NORMAL MMHG
STRESS BASELINE HR: 82 BPM
STRESS O2 SAT REST: 95 %
STRESS PERCENT HR: 145 %
STRESS POST ESTIMATED WORKLOAD: 6.1 METS
STRESS POST EXERCISE DUR MIN: 3 MIN
STRESS POST EXERCISE DUR SEC: 38 SEC
STRESS POST O2 SAT PEAK: 94 %
STRESS POST PEAK BP: NORMAL MMHG
STRESS POST PEAK HR: 203 BPM
STRESS TARGET HR: 140 BPM

## 2019-12-18 PROCEDURE — 93017 CV STRESS TEST TRACING ONLY: CPT

## 2019-12-18 PROCEDURE — 93350 STRESS TTE ONLY: CPT | Performed by: INTERNAL MEDICINE

## 2019-12-18 PROCEDURE — 93352 ADMIN ECG CONTRAST AGENT: CPT | Performed by: INTERNAL MEDICINE

## 2019-12-18 PROCEDURE — 93320 DOPPLER ECHO COMPLETE: CPT

## 2019-12-18 PROCEDURE — 93350 STRESS TTE ONLY: CPT

## 2019-12-18 PROCEDURE — 25010000002 SULFUR HEXAFLUORIDE MICROSPH 60.7-25 MG RECONSTITUTED SUSPENSION: Performed by: INTERNAL MEDICINE

## 2019-12-18 PROCEDURE — 93325 DOPPLER ECHO COLOR FLOW MAPG: CPT

## 2019-12-18 PROCEDURE — 93018 CV STRESS TEST I&R ONLY: CPT | Performed by: INTERNAL MEDICINE

## 2019-12-18 RX ADMIN — SULFUR HEXAFLUORIDE 5 ML: KIT at 08:40

## 2020-01-09 PROBLEM — R06.02 SHORTNESS OF BREATH: Status: ACTIVE | Noted: 2020-01-09

## 2020-01-09 PROBLEM — R07.9 CHEST PAIN: Status: ACTIVE | Noted: 2020-01-09

## 2020-01-09 NOTE — PROGRESS NOTES
OFFICE VISIT  NOTE  [facility] CARDIOLOGY      Name: Aguila Finn Jr.    Date: 1/10/2020  MRN:  [id number]  :  1965      REFERRING/PRIMARY PROVIDER:  Francisco Javier Rogers*    Chief Complaint   Patient presents with   • Chest Pain   • Shortness of Breath       HPI: Aguila Finn Jr. is a 54 y.o. male who presents today for new consultation for chest pain and shortness of breath.  Patient has associated history of COPD, former tobacco abuse, quit 6 months ago, hypertension, hyperlipidemia.  He had a screening CT scan for lung cancer, noted coronary calcifications, he underwent a stress echocardiogram that was equivocal due to ST depression during SVT.  LV function was normal and hyperdynamic at peak heart rate.  Patient reports no symptoms of chest pain.  He states he has occasional chest tightness when he lays down but not during exercise or exertion.  He does have shortness of breath when walking upstairs but his breathing has improved since stopping smoking 6 months ago.  He had a cardiac catheterization is essentially normal in .    Past Medical History:   Diagnosis Date   • Anxiety    • COPD (chronic obstructive pulmonary disease) (CMS/[facility], [facility])    • Depression    • Hyperlipidemia    • Hypertension    • Recurrent kidney stones        Past Surgical History:   Procedure Laterality Date   • CATARACT EXTRACTION, BILATERAL     • COLONOSCOPY     • WISDOM TOOTH EXTRACTION         Social History     Socioeconomic History   • Marital status:      Spouse name: Not on file   • Number of children: Not on file   • Years of education: Not on file   • Highest education level: Not on file   Tobacco Use   • Smoking status: Former Smoker     Packs/day: 2.00     Years: 40.00     Pack years: 80.00     Types: Cigarettes     Last attempt to quit: 2019     Years since quittin.1   • Smokeless tobacco: Never Used   Substance and Sexual Activity   • Alcohol use: Yes      Comment: a few drinks on occasion, not excessive per patient   • Drug use: No   • Sexual activity: Defer   Social History Narrative    He is a smoker of about a pack a day and has been smoking at least the past 39 years.  Denies drug use.  Occasional EtOH use.  Occasional caffeine, he has 2 dogs and a cat.  Recent travel to [place] on a cruise.  He is retired with a high school education and is  and lives at home with his wife.       Family History   Problem Relation Age of Onset   • Hyperlipidemia Father    • Hypertension Father    • Heart attack Father    • Stroke Father    • Diabetes Sister    • Heart disease Sister    • Hypertension Sister    • COPD Mother    • Diabetes Sister    • Hypertension Sister    • Diabetes Sister    • Hypertension Sister    • Throat cancer Sister         ROS:   Constitutional no fever,  no weight loss   Skin no rash, no subcutaneous nodules   Otolaryngeal no difficulty swallowing   Cardiovascular See HPI   Pulmonary no cough, no sputum production   Gastrointestinal no constipation, no diarrhea   Genitourinary no dysuria, no hematuria   Hematologic no easy bruisability, no abnormal bleeding   Musculoskeletal no muscle pain   Neurologic no dizziness, no falls         No Known Allergies      Current Outpatient Medications:   •  albuterol sulfate  (90 Base) MCG/ACT inhaler, Inhale 2 puffs Every 4 (Four) Hours As Needed for Wheezing., Disp: 6.7 g, Rfl: 11  •  aspirin 81 MG EC tablet, Take 1 tablet by mouth Daily., Disp: 90 tablet, Rfl: 1  •  atorvastatin (LIPITOR) 40 MG tablet, Take 1 tablet by mouth Daily., Disp: 90 tablet, Rfl: 0  •  busPIRone (BUSPAR) 15 MG tablet, Take 1 tablet by mouth 3 (Three) Times a Day., Disp: 90 tablet, Rfl: 11  •  Fluticasone Furoate-Vilanterol (BREO ELLIPTA) 100-25 MCG/INH inhaler, Inhale 1 puff Daily., Disp: 60 each, Rfl: 11  •  spironolactone (ALDACTONE) 50 MG tablet, Take 50 mg by mouth Daily., Disp: , Rfl:     Vitals:     "01/10/20 0841   BP: 148/86   BP Location: Right arm   Patient Position: Sitting   Pulse: 83   Weight: 83 kg (183 lb)   Height: 175.3 cm (69\")     Body mass index is 27.02 kg/m².    PHYSICAL EXAM:    General Appearance:   · well developed  · well nourished  HENT:   · oropharynx moist  · lips not cyanotic  Neck:  · thyroid not enlarged  · supple  Respiratory:  · no respiratory distress  · normal breath sounds  · no rales  Cardiovascular:  · no jugular venous distention  · regular rhythm  · apical impulse normal  · S1 normal, S2 normal  · no S3, no S4   · no murmur  · no rub, no thrill  · carotid pulses normal; no bruit  · pedal pulses normal  · lower extremity edema: none    Gastrointestinal:   · bowel sounds normal  · non-tender  · no hepatomegaly, no splenomegaly  Musculoskeletal:  · no clubbing of fingers.   · normocephalic, head atraumatic  Skin:   · warm, dry  Psychiatric:  · judgement and insight appropriate  · normal mood and affect    RESULTS:     ECG 12 Lead  Date/Time: 1/10/2020 9:14 AM  Performed by: Brent Miles MD  Authorized by: Brent Miles MD   Comparison: compared with previous ECG from 7/25/2017  Similar to previous ECG  Rhythm: sinus rhythm  Rate: normal  QRS axis: normal    Clinical impression: normal ECG            Results for orders placed during the hospital encounter of 12/18/19   Adult Stress Echo W/ Cont or Stress Agent if Necessary Per Protocol    Narrative · Equivocal exercise stress echo. Imaging reveals normal hyperdynamic   function at peak heart rate with no wall motion abnormality. However, the   patient had SVT during exercise with resultant 2 mm horizontal anterior   and inferior ST depression.  · Expected exercise time: 9:40, actual time: 3:38 (GOMEZ +55)  · Calculated resting EF = 63.0%.  · Left ventricular systolic function is normal.  · No significant structural or functional valvular disease.  · Technically difficult study due to lung interference.            Labs:  Lab " Results   Component Value Date    CHOL 212 (H) 05/09/2019    TRIG 137 05/09/2019    HDL 44 05/09/2019     (H) 05/09/2019    AST 12 05/09/2019    ALT 12 05/09/2019     Lab Results   Component Value Date    HGBA1C 5.4 07/25/2017     No components found for: CREATINININE  eGFR Non  Amer   Date Value Ref Range Status   05/09/2019 64 >60 mL/min/1.73 Final   08/23/2018 76 >60 mL/min/1.73 Final   08/08/2018 76 >60 mL/min/1.73 Final         ASSESSMENT:  Problem List Items Addressed This Visit        Cardiovascular and Mediastinum    Essential hypertension    Hyperlipidemia - Primary       Respiratory    COPD (chronic obstructive pulmonary disease) (CMS/[facility])    Overview     Severe COPD, FEV1 1.35 L, 36%.         Shortness of breath    Overview     12/18/19 Stress/echo  · Equivocal exercise stress echo. Imaging reveals normal hyperdynamic function at peak heart rate with no wall motion abnormality. However, the patient had SVT during exercise with resultant 2 mm horizontal anterior and inferior ST depression.  · Calculated resting EF = 63.0%.  · No significant structural or functional valvular disease.  · Technically difficult study due to lung interference.            Nervous and Auditory    Chest pain    Overview     12/18/19 Stress/echo  · Equivocal exercise stress echo. Imaging reveals normal hyperdynamic function at peak heart rate with no wall motion abnormality. However, the patient had SVT during exercise with resultant 2 mm horizontal anterior and inferior ST depression.  · Calculated resting EF = 63.0%.  · No significant structural or functional valvular disease.  · Technically difficult study due to lung interference.    05/11/2007 Cath at [facility]  · Minor coronary artery plaques  · False-positive stress nuclear examinations  · Normal hemodynamics               PLAN:    1.  Equivocal stress echocardiogram:  At this time he is relatively asymptomatic from cardiac standpoint, breathing is actually  improving, and he would like to avoid procedures if possible  Patient instructed to call our office if he has any chest pain or worsening shortness of breath and we will schedule cardiac catheterization.    2.  Coronary calcification:  Continue prevention measures with aspirin 81 mg daily and atorvastatin 40 mg daily  Repeat lipids at PCP visit.  Encourage patient to decrease saturated fat intake and increase exercise to 150 minutes/week.    3.  Hypertension:  Long-term goal blood pressure is 130/80  He states he has whitecoat hypertension  Continue current medical therapy and follow-up with PCP for blood pressure check in 2 weeks.    Return to clinic in 6 months.    Thank you for the opportunity to share in the care of your patient; please do not hesitate to call me with any questions.     Brent Miles MD, [facility]  Office: [phone] [address] [id number]

## 2020-01-10 ENCOUNTER — CONSULT (OUTPATIENT)
Dept: CARDIOLOGY | Facility: CLINIC | Age: 55
End: 2020-01-10

## 2020-01-10 VITALS
SYSTOLIC BLOOD PRESSURE: 148 MMHG | WEIGHT: 183 LBS | BODY MASS INDEX: 27.11 KG/M2 | HEIGHT: 69 IN | DIASTOLIC BLOOD PRESSURE: 86 MMHG | HEART RATE: 83 BPM

## 2020-01-10 DIAGNOSIS — R06.02 SHORTNESS OF BREATH: ICD-10-CM

## 2020-01-10 DIAGNOSIS — I10 ESSENTIAL HYPERTENSION: ICD-10-CM

## 2020-01-10 DIAGNOSIS — J44.9 CHRONIC OBSTRUCTIVE PULMONARY DISEASE, UNSPECIFIED COPD TYPE (HCC): ICD-10-CM

## 2020-01-10 DIAGNOSIS — R07.9 CHEST PAIN, UNSPECIFIED TYPE: ICD-10-CM

## 2020-01-10 DIAGNOSIS — E78.5 HYPERLIPIDEMIA, UNSPECIFIED HYPERLIPIDEMIA TYPE: Primary | ICD-10-CM

## 2020-01-10 PROCEDURE — 93000 ELECTROCARDIOGRAM COMPLETE: CPT | Performed by: INTERNAL MEDICINE

## 2020-01-10 PROCEDURE — 99204 OFFICE O/P NEW MOD 45 MIN: CPT | Performed by: INTERNAL MEDICINE

## 2020-02-10 ENCOUNTER — TRANSCRIBE ORDERS (OUTPATIENT)
Dept: PULMONOLOGY | Facility: CLINIC | Age: 55
End: 2020-02-10

## 2020-02-10 ENCOUNTER — HOSPITAL ENCOUNTER (OUTPATIENT)
Dept: CT IMAGING | Facility: HOSPITAL | Age: 55
Discharge: HOME | End: 2020-02-10
Admitting: INTERNAL MEDICINE

## 2020-02-10 DIAGNOSIS — R91.1 LUNG NODULE: ICD-10-CM

## 2020-02-10 PROCEDURE — 71250 CT THORAX DX C-: CPT

## 2020-02-20 ENCOUNTER — OFFICE VISIT (OUTPATIENT)
Dept: PULMONOLOGY | Facility: CLINIC | Age: 55
End: 2020-02-20

## 2020-02-20 VITALS
HEIGHT: 69 IN | OXYGEN SATURATION: 97 % | SYSTOLIC BLOOD PRESSURE: 134 MMHG | BODY MASS INDEX: 26.81 KG/M2 | HEART RATE: 90 BPM | TEMPERATURE: 97.8 F | WEIGHT: 181 LBS | DIASTOLIC BLOOD PRESSURE: 90 MMHG | RESPIRATION RATE: 18 BRPM

## 2020-02-20 DIAGNOSIS — J44.9 CHRONIC OBSTRUCTIVE PULMONARY DISEASE, UNSPECIFIED COPD TYPE (HCC): Primary | ICD-10-CM

## 2020-02-20 DIAGNOSIS — J45.909 ASTHMA, UNSPECIFIED ASTHMA SEVERITY, UNSPECIFIED WHETHER COMPLICATED, UNSPECIFIED WHETHER PERSISTENT: ICD-10-CM

## 2020-02-20 DIAGNOSIS — R91.1 LUNG NODULE: ICD-10-CM

## 2020-02-20 PROCEDURE — 86003 ALLG SPEC IGE CRUDE XTRC EA: CPT | Performed by: INTERNAL MEDICINE

## 2020-02-20 PROCEDURE — 94729 DIFFUSING CAPACITY: CPT | Performed by: INTERNAL MEDICINE

## 2020-02-20 PROCEDURE — 82785 ASSAY OF IGE: CPT | Performed by: INTERNAL MEDICINE

## 2020-02-20 PROCEDURE — 94726 PLETHYSMOGRAPHY LUNG VOLUMES: CPT | Performed by: INTERNAL MEDICINE

## 2020-02-20 PROCEDURE — 99214 OFFICE O/P EST MOD 30 MIN: CPT | Performed by: INTERNAL MEDICINE

## 2020-02-20 PROCEDURE — 94375 RESPIRATORY FLOW VOLUME LOOP: CPT | Performed by: INTERNAL MEDICINE

## 2020-02-20 PROCEDURE — 85025 COMPLETE CBC W/AUTO DIFF WBC: CPT | Performed by: INTERNAL MEDICINE

## 2020-02-20 NOTE — PROGRESS NOTES
CC:    Abnormal LDCT for lung cancer screening    HPI:    55 y/o WM w/ h/o COPD FEV1 30%, on nocturnal O2, still smoking ~ 80 py, HTN, HLD, Depression, who comes in today to discuss an recently abnormal LDCT for lung cancer screening.  This was performed on 10/21/19 and showed emphysema, and a 1.7 cm non-calcified nodule in the medial / posterior right base abutting the pleura.  No obvious mediastinal or hilar LAD.  No cough / hemoptysis / fever / chills / nightsweats / weight loss.  No symptoms attributable to this nodule.    INTERVAL HISTORY:    Patient returns for follow up.  Remains tobacco free.  Getting short of breath even walking on a flat surface.  Cold air makes things considerably worse.  Using Breo.      PMH:    Past Medical History:   Diagnosis Date   • Anxiety    • COPD (chronic obstructive pulmonary disease) (CMS/HCC)    • Depression    • Hyperlipidemia    • Hypertension    • Recurrent kidney stones      PSH:    Past Surgical History:   Procedure Laterality Date   • CATARACT EXTRACTION, BILATERAL     • COLONOSCOPY     • WISDOM TOOTH EXTRACTION       FH:    Family History   Problem Relation Age of Onset   • Hyperlipidemia Father    • Hypertension Father    • Heart attack Father    • Stroke Father    • Diabetes Sister    • Heart disease Sister    • Hypertension Sister    • COPD Mother    • Diabetes Sister    • Hypertension Sister    • Diabetes Sister    • Hypertension Sister    • Throat cancer Sister      SH:    Social History     Socioeconomic History   • Marital status:      Spouse name: Not on file   • Number of children: Not on file   • Years of education: Not on file   • Highest education level: Not on file   Tobacco Use   • Smoking status: Former Smoker     Packs/day: 2.00     Years: 40.00     Pack years: 80.00     Types: Cigarettes     Last attempt to quit: 2019     Years since quittin.2   • Smokeless tobacco: Never Used   Substance and Sexual Activity   • Alcohol use: Yes      Comment: a few drinks on occasion, not excessive per patient   • Drug use: No   • Sexual activity: Defer   Social History Narrative    He is a smoker of about a pack a day and has been smoking at least the past 39 years.  Denies drug use.  Occasional EtOH use.  Occasional caffeine, he has 2 dogs and a cat.  Recent travel to [place] on a cruise.  He is retired with a high school education and is  and lives at home with his wife.     ALLERGIES:    No Known Allergies  MEDICATIONS:      Current Outpatient Medications:   •  albuterol sulfate  (90 Base) MCG/ACT inhaler, Inhale 2 puffs Every 4 (Four) Hours As Needed for Wheezing., Disp: 6.7 g, Rfl: 11  •  aspirin 81 MG EC tablet, Take 1 tablet by mouth Daily., Disp: 90 tablet, Rfl: 1  •  atorvastatin (LIPITOR) 40 MG tablet, Take 1 tablet by mouth Daily., Disp: 90 tablet, Rfl: 0  •  busPIRone (BUSPAR) 15 MG tablet, Take 1 tablet by mouth 3 (Three) Times a Day., Disp: 90 tablet, Rfl: 11  •  Fluticasone Furoate-Vilanterol (BREO ELLIPTA) 100-25 MCG/INH inhaler, Inhale 1 puff Daily., Disp: 60 each, Rfl: 11  •  spironolactone (ALDACTONE) 50 MG tablet, Take 50 mg by mouth Daily., Disp: , Rfl:   ROS:  Per HPI, otherwise all systems reviewed and negative.    DIAGNOSTIC DATA (Reviewed and interpreted by me unless otherwise specified):    LDCT 10/21/19 - emphysema, and a 1.7 cm non-calcified nodule in the medial / posterior right base abutting the pleura.  No obvious mediastinal or hilar LAD.     CT-PET 11/7/19 - RLL nodule is PET negative.  Remains stable in size and configuration.    CT Chest ilogic protocol 2/10/20 - no change in RLL nodule, upper lobe predominant Emphysema R > L    Pradeep 2/18/19 - very severe obstruction with FEV1 30%    PFT 2/20/20 - very severe obstruction, hyperinflation, air trapping, moderate reduction in DLCO partially corrects for alveolar volume    6MW 2/18/19 - starting sat 99% on RA, minimum sat 99% on RA, walked 441  meters 73% predicted    Vitals:    02/20/20 0848   BP: 134/90   Pulse: 90   Resp: 18   Temp: 97.8 °F (36.6 °C)   SpO2: 97%       Physical Exam   Constitutional: Oriented to person, place, and time. Appears well-developed and well-nourished.   Head: Normocephalic and atraumatic.   Nose: Nose normal.   Mouth/Throat: Oropharynx is clear and moist.   Eyes: Conjunctivae are normal.  Pupils normal.  Neck: No tracheal deviation present.   Cardiovascular: Normal rate, regular rhythm, normal heart sounds and intact distal pulses.  Exam reveals no gallop and no friction rub.  No thrill.  No JVD.  No edema.  No murmur heard.  Pulmonary/Chest: Effort normal and breath sounds normal.  No tenderness to palpation.  No clubbing.   Abdominal: Soft. Bowel sounds are normal. No distension. No tenderness. There is no guarding.   Musculoskeletal: Normal range of motion.  No tenderness.  Lymphadenopathy:  No cervical adenopathy.   Neurological:  No new focal neurological deficits observed   Skin: Skin is warm and dry. No rash noted.   Psychiatric: Normal mood and affect.  Behavior is normal. Judgment normal.    Assessment/Plan     1)  Lung Nodule - PET negative and stable after ~ 4 months of surveillance.  Continue close monitoring, CT in 3 months.  If stable will go to every 6 months.  Not a surgical candidate.  Treatment options would be empiric SBRT vs attempt at Delfin w/ fiducials followed by SBRT if there is interval growth.    2) GOLD 4D COPD - Still severely symptomatic.  Change Breo to Trelegy.  Refer to Pulmonary Rehab.  Check A1AT cheek swab and markers of allergic inflammation - cbc w/ diff (eos) & allergy panel - if suggestive may improve if we treat asthmatic component more aggressively.  If no improvement by next visit will consider changing to all nebulized therapy:  Pulmicort, brovana, yupelri and adding daliresp / azithro / NAC.  Another option for him may be therapy with endobronchial valves if no improvement with above  measures.  I will have one of my partners take a look at his chart.    3) Tobacco abuse - currently in remission, counseled patient, 80 py, quit 2019.    RTC 3 months w/ CT Chest, Pradeep, and 6MW    Francisco Javier Rogers MD  Pulmonology and Critical Care Medicine  02/20/20 9:22 AM  Electronically Signed    C.C.:  Gianna Bueno MD, Kenya Martinez MD

## 2020-02-21 DIAGNOSIS — E78.5 HYPERLIPIDEMIA, UNSPECIFIED HYPERLIPIDEMIA TYPE: ICD-10-CM

## 2020-02-21 LAB
BASOPHILS # BLD AUTO: 0.07 10*3/MM3 (ref 0–0.2)
BASOPHILS NFR BLD AUTO: 1 % (ref 0–1.5)
DEPRECATED RDW RBC AUTO: 41.3 FL (ref 37–54)
EOSINOPHIL # BLD AUTO: 0.11 10*3/MM3 (ref 0–0.4)
EOSINOPHIL NFR BLD AUTO: 1.5 % (ref 0.3–6.2)
ERYTHROCYTE [DISTWIDTH] IN BLOOD BY AUTOMATED COUNT: 12.3 % (ref 12.3–15.4)
HCT VFR BLD AUTO: 39.6 % (ref 37.5–51)
HGB BLD-MCNC: 13.6 G/DL (ref 13–17.7)
IMM GRANULOCYTES # BLD AUTO: 0.04 10*3/MM3 (ref 0–0.05)
IMM GRANULOCYTES NFR BLD AUTO: 0.6 % (ref 0–0.5)
LYMPHOCYTES # BLD AUTO: 0.84 10*3/MM3 (ref 0.7–3.1)
LYMPHOCYTES NFR BLD AUTO: 11.7 % (ref 19.6–45.3)
MCH RBC QN AUTO: 31.1 PG (ref 26.6–33)
MCHC RBC AUTO-ENTMCNC: 34.3 G/DL (ref 31.5–35.7)
MCV RBC AUTO: 90.4 FL (ref 79–97)
MONOCYTES # BLD AUTO: 0.9 10*3/MM3 (ref 0.1–0.9)
MONOCYTES NFR BLD AUTO: 12.5 % (ref 5–12)
NEUTROPHILS # BLD AUTO: 5.24 10*3/MM3 (ref 1.7–7)
NEUTROPHILS NFR BLD AUTO: 72.7 % (ref 42.7–76)
NRBC BLD AUTO-RTO: 0 /100 WBC (ref 0–0.2)
PLATELET # BLD AUTO: 245 10*3/MM3 (ref 140–450)
PMV BLD AUTO: 11.8 FL (ref 6–12)
RBC # BLD AUTO: 4.38 10*6/MM3 (ref 4.14–5.8)
WBC NRBC COR # BLD: 7.2 10*3/MM3 (ref 3.4–10.8)

## 2020-02-21 RX ORDER — ATORVASTATIN CALCIUM 40 MG/1
TABLET, FILM COATED ORAL
Qty: 90 TABLET | Refills: 0 | Status: SHIPPED | OUTPATIENT
Start: 2020-02-21 | End: 2020-05-22 | Stop reason: SDUPTHER

## 2020-02-26 LAB
A ALTERNATA IGE QN: <0.1 KU/L
A FUMIGATUS IGE QN: <0.1 KU/L
AMER ROACH IGE QN: <0.1 KU/L
BAHIA GRASS IGE QN: <0.1 KU/L
BERMUDA GRASS IGE QN: <0.1 KU/L
BOXELDER IGE QN: <0.1 KU/L
C HERBARUM IGE QN: <0.1 KU/L
CAT DANDER IGG QN: <0.1 KU/L
CMN PIGWEED IGE QN: <0.1 KU/L
COMMON RAGWEED IGE QN: <0.1 KU/L
CONV CLASS DESCRIPTION: NORMAL
D FARINAE IGE QN: <0.1 KU/L
D PTERONYSS IGE QN: <0.1 KU/L
DOG DANDER IGE QN: <0.1 KU/L
ENGL PLANTAIN IGE QN: <0.1 KU/L
HAZELNUT POLN IGE QN: <0.1 KU/L
JOHNSON GRASS IGE QN: <0.1 KU/L
KENT BLUE GRASS IGE QN: <0.1 KU/L
M RACEMOSUS IGE QN: <0.1 KU/L
MT JUNIPER IGE QN: <0.1 KU/L
MUGWORT IGE QN: <0.1 KU/L
NETTLE IGE QN: <0.1 KU/L
P NOTATUM IGE QN: <0.1 KU/L
S BOTRYOSUM IGE QN: <0.1 KU/L
SHEEP SORREL IGE QN: <0.1 KU/L
SWEET GUM IGE QN: <0.1 KU/L
T011-IGE MAPLE LEAF SYCAMORE: <0.1 KU/L
TOTAL IGE SMQN RAST: 35 IU/ML (ref 6–495)
WHITE ELM IGE QN: <0.1 KU/L
WHITE HICKORY IGE QN: <0.1 KU/L
WHITE MULBERRY IGE QN: <0.1 KU/L
WHITE OAK IGE QN: <0.1 KU/L

## 2020-04-08 ENCOUNTER — TRANSCRIBE ORDERS (OUTPATIENT)
Dept: PULMONOLOGY | Facility: CLINIC | Age: 55
End: 2020-04-08

## 2020-05-22 DIAGNOSIS — E78.5 HYPERLIPIDEMIA, UNSPECIFIED HYPERLIPIDEMIA TYPE: ICD-10-CM

## 2020-05-22 RX ORDER — ATORVASTATIN CALCIUM 40 MG/1
40 TABLET, FILM COATED ORAL DAILY
Qty: 90 TABLET | Refills: 0 | Status: SHIPPED | OUTPATIENT
Start: 2020-05-22 | End: 2020-08-25 | Stop reason: SDUPTHER

## 2020-05-22 RX ORDER — ASPIRIN 81 MG/1
81 TABLET ORAL DAILY
Qty: 90 TABLET | Refills: 1 | Status: SHIPPED | OUTPATIENT
Start: 2020-05-22 | End: 2020-12-06 | Stop reason: SDUPTHER

## 2020-05-22 RX ORDER — ATORVASTATIN CALCIUM 40 MG/1
40 TABLET, FILM COATED ORAL DAILY
Qty: 90 TABLET | Refills: 0 | Status: SHIPPED | OUTPATIENT
Start: 2020-05-22 | End: 2020-07-09

## 2020-05-22 RX ORDER — ASPIRIN 81 MG/1
81 TABLET ORAL DAILY
Qty: 90 TABLET | Refills: 1 | Status: SHIPPED | OUTPATIENT
Start: 2020-05-22 | End: 2020-07-09

## 2020-05-27 ENCOUNTER — HOSPITAL ENCOUNTER (OUTPATIENT)
Dept: CT IMAGING | Facility: HOSPITAL | Age: 55
Discharge: HOME | End: 2020-05-27
Admitting: INTERNAL MEDICINE

## 2020-05-27 DIAGNOSIS — R91.1 LUNG NODULE: ICD-10-CM

## 2020-05-27 PROCEDURE — 71250 CT THORAX DX C-: CPT

## 2020-07-07 NOTE — PROGRESS NOTES
OFFICE VISIT  NOTE  [facility] CARDIOLOGY      Name: Aguila Finn Jr.    Date: 2020  MRN:  [id number]  :  1965      REFERRING/PRIMARY PROVIDER:  Kenya Martinez MD    Chief Complaint   Patient presents with   • Essential hypertension       HPI: Aguila Finn Jr. is a 54 y.o. male who presents today for follow-up for chest pain and shortness of breath.  Patient has associated history of COPD, former tobacco abuse, quit 6 months ago, hypertension, hyperlipidemia.  He had a screening CT scan for lung cancer, noted coronary calcifications, he underwent a stress echocardiogram that was equivocal due to ST depression during SVT.  LV function was normal and hyperdynamic at peak heart rate.  Patient reports no symptoms of chest pain.  when walking upstairs but his breathing has improved since stopping smoking 2019.  He had a cardiac catheterization is essentially normal in .  He is doing well since last visit, denies chest pain or shortness of breath.  No relapse with tobacco.  Walks up and down 3 flights of stairs at least 3-4 times per day without chest pain or shortness of breath.  Tolerating aspirin and atorvastatin.  No recent lipids to review.    Past Medical History:   Diagnosis Date   • Anxiety    • COPD (chronic obstructive pulmonary disease) (CMS/HCC)    • Depression    • Hyperlipidemia    • Hypertension    • Recurrent kidney stones        Past Surgical History:   Procedure Laterality Date   • CATARACT EXTRACTION, BILATERAL     • COLONOSCOPY     • WISDOM TOOTH EXTRACTION         Social History     Socioeconomic History   • Marital status:      Spouse name: Not on file   • Number of children: Not on file   • Years of education: Not on file   • Highest education level: Not on file   Tobacco Use   • Smoking status: Former Smoker     Packs/day: 2.00     Years: 40.00     Pack years: 80.00     Types: Cigarettes     Last attempt to quit: 2019      Years since quittin.6   • Smokeless tobacco: Never Used   Substance and Sexual Activity   • Alcohol use: Yes     Comment: a few drinks on occasion, not excessive per patient   • Drug use: No   • Sexual activity: Defer   Social History Narrative    He is a smoker of about a pack a day and has been smoking at least the past 39 years.  Denies drug use.  Occasional EtOH use.  Occasional caffeine, he has 2 dogs and a cat.  Recent travel to [place] on a cruise.  He is retired with a high school education and is  and lives at home with his wife.       Family History   Problem Relation Age of Onset   • Hyperlipidemia Father    • Hypertension Father    • Heart attack Father    • Stroke Father    • Diabetes Sister    • Heart disease Sister    • Hypertension Sister    • COPD Mother    • Diabetes Sister    • Hypertension Sister    • Diabetes Sister    • Hypertension Sister    • Throat cancer Sister         ROS:   Constitutional no fever,  no weight loss   Skin no rash, no subcutaneous nodules   Otolaryngeal no difficulty swallowing   Cardiovascular See HPI   Pulmonary no cough, no sputum production   Gastrointestinal no constipation, no diarrhea   Genitourinary no dysuria, no hematuria   Hematologic no easy bruisability, no abnormal bleeding   Musculoskeletal no muscle pain   Neurologic no dizziness, no falls         No Known Allergies      Current Outpatient Medications:   •  albuterol sulfate  (90 Base) MCG/ACT inhaler, Inhale 2 puffs Every 4 (Four) Hours As Needed for Wheezing., Disp: 6.7 g, Rfl: 11  •  aspirin 81 MG EC tablet, Take 1 tablet by mouth Daily., Disp: 90 tablet, Rfl: 1  •  atorvastatin (LIPITOR) 40 MG tablet, Take 1 tablet by mouth Daily., Disp: 90 tablet, Rfl: 0  •  busPIRone (BUSPAR) 15 MG tablet, Take 1 tablet by mouth 3 (Three) Times a Day., Disp: 90 tablet, Rfl: 11  •  Fluticasone-Umeclidin-Vilant (TRELEGY ELLIPTA) 100-62.5-25 MCG/INH aerosol powder , Inhale 1  "puff Daily., Disp: 1 each, Rfl: 11  •  spironolactone (ALDACTONE) 50 MG tablet, Take 50 mg by mouth Daily., Disp: , Rfl:     Vitals:    07/09/20 0914   Weight: 81.6 kg (180 lb)   Height: 175.3 cm (69\")     Body mass index is 26.58 kg/m².    RESULTS:   Procedures     VITALS: Pt reports temp of 98.4 degrees F orally. Home BP today , and Heart rate of   Weight measured at home: 180 Lbs.  GENERAL: Stable appearing, in no distress. Alert and oriented.  SKIN: no visible facial rash or concerning facial lesions noted. No skin redness or discoloration seen. Patient denies new skin findings on brief self-exam of arms, legs, chest, abdomen.  EYES: conjunctiva clear, sclera non-icteric, no eye drainage, grossly normal EOM.  EARS: hearing grossly intact, no pain elicited with ear tugging bilaterally  MOUTH: no visible perioral lesions, no perioral cyanosis, no lip swelling.  NECK: Grossly normal ROM, no visible thyroid enlargement. Patient did not palpate any cervical LAD under physician-guided exam.  HEART: Patient self-reported heart rate of  beats per minute (measured by pt with physician instruction, or by fitness tracker HR monitor).  LUNGS: Does not appear dyspneic. No audible wheezes or rales. No nasal flaring.  ABDOMEN: no tenderness with patient self-palpation diffusely around abdomen under physician guidance, no pain elicited by leg lifts  MUSCULOSKELETAL: No significant cervical kyphosis, grossly normal active ROM in upper extremities.  NEURO: Intact recent memory. No facial or eyelid drooping. No speech impairment, answers questions appropriately.  PSYCH: Judgment and insight good; normal mood and affect.        Results for orders placed during the hospital encounter of 12/18/19   Adult Stress Echo W/ Cont or Stress Agent if Necessary Per Protocol    Narrative · Equivocal exercise stress echo. Imaging reveals normal hyperdynamic   function at peak heart rate with no wall motion abnormality. However, the   patient " had SVT during exercise with resultant 2 mm horizontal anterior   and inferior ST depression.  · Expected exercise time: 9:40, actual time: 3:38 (GOMEZ +55)  · Calculated resting EF = 63.0%.  · Left ventricular systolic function is normal.  · No significant structural or functional valvular disease.  · Technically difficult study due to lung interference.            Labs:  Lab Results   Component Value Date    CHOL 212 (H) 05/09/2019    TRIG 137 05/09/2019    HDL 44 05/09/2019     (H) 05/09/2019    AST 12 05/09/2019    ALT 12 05/09/2019     Lab Results   Component Value Date    HGBA1C 5.4 07/25/2017     No components found for: CREATINININE  eGFR Non  Amer   Date Value Ref Range Status   05/09/2019 64 >60 mL/min/1.73 Final   08/23/2018 76 >60 mL/min/1.73 Final   08/08/2018 76 >60 mL/min/1.73 Final         ASSESSMENT:  Problem List Items Addressed This Visit        Cardiovascular and Mediastinum    Essential hypertension - Primary    Hyperlipidemia       Respiratory    COPD (chronic obstructive pulmonary disease) (CMS/[facility])    Overview     Severe COPD, FEV1 1.35 L, 36%.         Shortness of breath    Overview     12/18/19 Stress/echo  · Equivocal exercise stress echo. Imaging reveals normal hyperdynamic function at peak heart rate with no wall motion abnormality. However, the patient had SVT during exercise with resultant 2 mm horizontal anterior and inferior ST depression.  · Calculated resting EF = 63.0%.  · No significant structural or functional valvular disease.  · Technically difficult study due to lung interference.            Nervous and Auditory    Chest pain    Overview     12/18/19 Stress/echo  · Equivocal exercise stress echo. Imaging reveals normal hyperdynamic function at peak heart rate with no wall motion abnormality. However, the patient had SVT during exercise with resultant 2 mm horizontal anterior and inferior ST depression.  · Calculated resting EF = 63.0%.  · No significant structural  or functional valvular disease.  · Technically difficult study due to lung interference.    05/11/2007 Cath at [facility]  · Minor coronary artery plaques  · False-positive stress nuclear examinations  · Normal hemodynamics               PLAN:    1.  Equivocal stress echocardiogram:  At this time he is relatively asymptomatic from cardiac standpoint, breathing is actually improving, and he would like to avoid procedures if possible  Patient instructed to call our office if he has any chest pain or worsening shortness of breath and we will schedule cardiac catheterization.  Continue aspirin    2.  Coronary calcification:  Continue prevention measures with aspirin 81 mg daily and atorvastatin 40 mg daily  Repeat lipids at next PCP visit.  Encourage patient to decrease saturated fat intake and increase exercise to 150 minutes/week.    3.  Hypertension:  Long-term goal blood pressure is 130/80  Continue current medical therapy.  Encourage patient to check blood pressure at home at least 1-2 times per week    Return to clinic in 12 months.    This patient has consented to a telehealth visit via synchronous audio and visual teleconference. The visit was scheduled as a teleconference visit to comply with patient safety concerns in accordance with CDC recommendations.  All vitals recorded within this visit are reported by the patient.  I spent 15 minutes in total including but not limited to the 11 minutes spent in direct conversation with this patient.       Thank you for the opportunity to share in the care of your patient; please do not hesitate to call me with any questions.     Brent Miles MD, [facility]  Office: [phone] [address] [place] [address]

## 2020-07-09 ENCOUNTER — TELEMEDICINE (OUTPATIENT)
Dept: CARDIOLOGY | Facility: CLINIC | Age: 55
End: 2020-07-09

## 2020-07-09 VITALS — WEIGHT: 180 LBS | BODY MASS INDEX: 26.66 KG/M2 | HEIGHT: 69 IN

## 2020-07-09 DIAGNOSIS — R07.9 CHEST PAIN, UNSPECIFIED TYPE: ICD-10-CM

## 2020-07-09 DIAGNOSIS — R06.02 SHORTNESS OF BREATH: ICD-10-CM

## 2020-07-09 DIAGNOSIS — E78.5 HYPERLIPIDEMIA, UNSPECIFIED HYPERLIPIDEMIA TYPE: ICD-10-CM

## 2020-07-09 DIAGNOSIS — J44.9 CHRONIC OBSTRUCTIVE PULMONARY DISEASE, UNSPECIFIED COPD TYPE (HCC): ICD-10-CM

## 2020-07-09 DIAGNOSIS — I10 ESSENTIAL HYPERTENSION: Primary | ICD-10-CM

## 2020-07-09 PROCEDURE — 99214 OFFICE O/P EST MOD 30 MIN: CPT | Performed by: INTERNAL MEDICINE

## 2020-08-25 DIAGNOSIS — E78.5 HYPERLIPIDEMIA, UNSPECIFIED HYPERLIPIDEMIA TYPE: ICD-10-CM

## 2020-08-26 RX ORDER — ATORVASTATIN CALCIUM 40 MG/1
40 TABLET, FILM COATED ORAL DAILY
Qty: 90 TABLET | Refills: 0 | Status: SHIPPED | OUTPATIENT
Start: 2020-08-26 | End: 2020-12-06 | Stop reason: SDUPTHER

## 2020-08-28 DIAGNOSIS — J44.9 CHRONIC OBSTRUCTIVE PULMONARY DISEASE, UNSPECIFIED COPD TYPE (HCC): ICD-10-CM

## 2020-08-28 RX ORDER — ALBUTEROL SULFATE 90 UG/1
AEROSOL, METERED RESPIRATORY (INHALATION)
Qty: 6.7 G | Refills: 10 | OUTPATIENT
Start: 2020-08-28

## 2020-08-28 RX ORDER — ALBUTEROL SULFATE 90 UG/1
2 AEROSOL, METERED RESPIRATORY (INHALATION) EVERY 4 HOURS PRN
Qty: 6.7 G | Refills: 11 | OUTPATIENT
Start: 2020-08-28

## 2020-10-05 ENCOUNTER — LAB (OUTPATIENT)
Dept: LAB | Facility: HOSPITAL | Age: 55
End: 2020-10-05

## 2020-10-05 ENCOUNTER — OFFICE VISIT (OUTPATIENT)
Dept: INTERNAL MEDICINE | Facility: CLINIC | Age: 55
End: 2020-10-05

## 2020-10-05 VITALS
TEMPERATURE: 97.1 F | WEIGHT: 178 LBS | BODY MASS INDEX: 26.36 KG/M2 | SYSTOLIC BLOOD PRESSURE: 138 MMHG | DIASTOLIC BLOOD PRESSURE: 82 MMHG | HEIGHT: 69 IN

## 2020-10-05 DIAGNOSIS — Z23 NEED FOR INFLUENZA VACCINATION: ICD-10-CM

## 2020-10-05 DIAGNOSIS — Z00.00 HEALTH CARE MAINTENANCE: Primary | ICD-10-CM

## 2020-10-05 DIAGNOSIS — Z12.5 PROSTATE CANCER SCREENING: ICD-10-CM

## 2020-10-05 PROCEDURE — 90471 IMMUNIZATION ADMIN: CPT | Performed by: INTERNAL MEDICINE

## 2020-10-05 PROCEDURE — G0103 PSA SCREENING: HCPCS | Performed by: INTERNAL MEDICINE

## 2020-10-05 PROCEDURE — 90686 IIV4 VACC NO PRSV 0.5 ML IM: CPT | Performed by: INTERNAL MEDICINE

## 2020-10-05 PROCEDURE — 80061 LIPID PANEL: CPT | Performed by: INTERNAL MEDICINE

## 2020-10-05 PROCEDURE — 85027 COMPLETE CBC AUTOMATED: CPT | Performed by: INTERNAL MEDICINE

## 2020-10-05 PROCEDURE — 80053 COMPREHEN METABOLIC PANEL: CPT | Performed by: INTERNAL MEDICINE

## 2020-10-05 PROCEDURE — 99396 PREV VISIT EST AGE 40-64: CPT | Performed by: INTERNAL MEDICINE

## 2020-10-05 NOTE — PROGRESS NOTES
Subjective   Aguila Finn Jr. is a 55 y.o. male here for annual exam. No issues today.     Review of Systems   Constitutional: Negative.    HENT: Negative.    Eyes: Negative.    Respiratory: Negative.    Cardiovascular: Negative.    Gastrointestinal: Negative.    Endocrine: Negative.    Genitourinary: Negative.    Musculoskeletal: Negative.    Skin: Negative.    Allergic/Immunologic: Negative.    Neurological: Negative.    Hematological: Negative.    Psychiatric/Behavioral: Negative.           Past Medical History:   Diagnosis Date   • Anxiety    • COPD (chronic obstructive pulmonary disease) (CMS/HCC)    • Depression    • Hyperlipidemia    • Hypertension    • Recurrent kidney stones      Family History   Problem Relation Age of Onset   • Hyperlipidemia Father    • Hypertension Father    • Heart attack Father    • Stroke Father    • Diabetes Sister    • Heart disease Sister    • Hypertension Sister    • COPD Mother    • Diabetes Sister    • Hypertension Sister    • Diabetes Sister    • Hypertension Sister    • Throat cancer Sister      Past Surgical History:   Procedure Laterality Date   • CATARACT EXTRACTION, BILATERAL     • COLONOSCOPY     • WISDOM TOOTH EXTRACTION       Social History     Socioeconomic History   • Marital status:      Spouse name: Not on file   • Number of children: Not on file   • Years of education: Not on file   • Highest education level: Not on file   Tobacco Use   • Smoking status: Former Smoker     Packs/day: 2.00     Years: 40.00     Pack years: 80.00     Types: Cigarettes     Quit date: 2019     Years since quittin.9   • Smokeless tobacco: Never Used   Substance and Sexual Activity   • Alcohol use: Yes     Comment: a few drinks on occasion, not excessive per patient   • Drug use: No   • Sexual activity: Defer   Social History Narrative    He is a smoker of about a pack a day and has been smoking at least the past 39 years.  Denies drug use.  Occasional EtOH use.   "Occasional caffeine, he has 2 dogs and a cat.  Recent travel to [place] on a cruise.  He is retired with a high school education and is  and lives at home with his wife.         Current Outpatient Medications:   •  albuterol sulfate  (90 Base) MCG/ACT inhaler, Inhale 2 puffs Every 4 (Four) Hours As Needed for Wheezing., Disp: 6.7 g, Rfl: 11  •  aspirin 81 MG EC tablet, Take 1 tablet by mouth Daily., Disp: 90 tablet, Rfl: 1  •  atorvastatin (LIPITOR) 40 MG tablet, Take 1 tablet by mouth Daily., Disp: 90 tablet, Rfl: 0  •  busPIRone (BUSPAR) 15 MG tablet, Take 1 tablet by mouth 3 (Three) Times a Day., Disp: 90 tablet, Rfl: 11  •  Fluticasone-Umeclidin-Vilant (TRELEGY ELLIPTA) 100-62.5-25 MCG/INH aerosol powder , Inhale 1 puff Daily., Disp: 1 each, Rfl: 11  •  spironolactone (ALDACTONE) 50 MG tablet, Take 50 mg by mouth Daily., Disp: , Rfl:     Objective   /82 (BP Location: Left arm, Patient Position: Sitting, Cuff Size: Large Adult)   Temp 97.1 °F (36.2 °C) (Temporal)   Ht 175.3 cm (69\")   Wt 80.7 kg (178 lb)   BMI 26.29 kg/m²   Physical Exam  Vitals signs reviewed.   Constitutional:       General: He is not in acute distress.     Appearance: He is well-developed.   HENT:      Head: Normocephalic and atraumatic.      Right Ear: Tympanic membrane, ear canal and external ear normal.      Left Ear: Tympanic membrane, ear canal and external ear normal.      Nose: Nose normal.   Eyes:      General: Lids are normal. No scleral icterus.     Conjunctiva/sclera: Conjunctivae normal.      Pupils: Pupils are equal, round, and reactive to light.   Neck:      Musculoskeletal: Neck supple.      Thyroid: No thyroid mass or thyromegaly.      Vascular: No carotid bruit.   Cardiovascular:      Rate and Rhythm: Normal rate and regular rhythm.      Heart sounds: Normal heart sounds. No murmur. No friction rub. No gallop. No S3 or S4 sounds.    Pulmonary:      Effort: Pulmonary effort is " normal. No respiratory distress.      Breath sounds: Normal breath sounds. No wheezing, rhonchi or rales.   Abdominal:      General: Bowel sounds are normal. There is no distension.      Palpations: Abdomen is soft. There is no mass.      Tenderness: There is no abdominal tenderness.   Musculoskeletal: Normal range of motion.   Lymphadenopathy:      Cervical: No cervical adenopathy.   Skin:     General: Skin is warm and dry.      Coloration: Skin is not pale.      Findings: No erythema or rash.   Neurological:      Mental Status: He is alert and oriented to person, place, and time.      Cranial Nerves: No cranial nerve deficit.      Sensory: No sensory deficit.   Psychiatric:         Speech: Speech normal.         Behavior: Behavior normal.         Thought Content: Thought content normal.         Judgment: Judgment normal.         Assessment/Plan   Aguila was seen today for annual exam.    Diagnoses and all orders for this visit:    Health care maintenance  -     CBC (No Diff)  -     Comprehensive Metabolic Panel  -     Lipid Panel    Prostate cancer screening  -     PSA Screen    Need for influenza vaccination  -     FluLaval Quad >6 Months ([id number])        1. HCM  -cscope UTD  -fasting labs today  Reviewed the following with the patient: advised patient of need for:  colonoscopy, PSA and/or prostate exam, immunizations discussed, influenza vaccine, Adacel-Tdap vaccine and shingrix, encouraged patient to exercise 5-7 days per week for 30 minutes at a time and weight loss encouraged.  -rec shingrix at pharmacy, may have to wait until 60 per insurance       Counseled regarding: age-appropriate screening labs and tests, wearing seatbelt and sunscreen regularly  Discussed: regular exercise and diet changes to promote healthy weight, checking skin regularly for abnormal moles and lesions    Please note that portions of this note were completed with a voice recognition program. Efforts were made to edit the dictations,  but occasionally words are mistranscribed.

## 2020-10-06 LAB
ALBUMIN SERPL-MCNC: 4.8 G/DL (ref 3.5–5.2)
ALBUMIN/GLOB SERPL: 1.6 G/DL
ALP SERPL-CCNC: 93 U/L (ref 39–117)
ALT SERPL W P-5'-P-CCNC: 21 U/L (ref 1–41)
ANION GAP SERPL CALCULATED.3IONS-SCNC: 12.9 MMOL/L (ref 5–15)
AST SERPL-CCNC: 17 U/L (ref 1–40)
BILIRUB SERPL-MCNC: 0.4 MG/DL (ref 0–1.2)
BUN SERPL-MCNC: 18 MG/DL (ref 6–20)
BUN/CREAT SERPL: 15.4 (ref 7–25)
CALCIUM SPEC-SCNC: 9.9 MG/DL (ref 8.6–10.5)
CHLORIDE SERPL-SCNC: 102 MMOL/L (ref 98–107)
CHOLEST SERPL-MCNC: 164 MG/DL (ref 0–200)
CO2 SERPL-SCNC: 28.1 MMOL/L (ref 22–29)
CREAT SERPL-MCNC: 1.17 MG/DL (ref 0.76–1.27)
DEPRECATED RDW RBC AUTO: 40.9 FL (ref 37–54)
ERYTHROCYTE [DISTWIDTH] IN BLOOD BY AUTOMATED COUNT: 12.9 % (ref 12.3–15.4)
GFR SERPL CREATININE-BSD FRML MDRD: 64 ML/MIN/1.73
GLOBULIN UR ELPH-MCNC: 3 GM/DL
GLUCOSE SERPL-MCNC: 91 MG/DL (ref 65–99)
HCT VFR BLD AUTO: 44.9 % (ref 37.5–51)
HDLC SERPL-MCNC: 51 MG/DL (ref 40–60)
HGB BLD-MCNC: 15.3 G/DL (ref 13–17.7)
LDLC SERPL CALC-MCNC: 82 MG/DL (ref 0–100)
LDLC/HDLC SERPL: 1.6 {RATIO}
MCH RBC QN AUTO: 30 PG (ref 26.6–33)
MCHC RBC AUTO-ENTMCNC: 34.1 G/DL (ref 31.5–35.7)
MCV RBC AUTO: 88 FL (ref 79–97)
PLATELET # BLD AUTO: 216 10*3/MM3 (ref 140–450)
PMV BLD AUTO: 12.4 FL (ref 6–12)
POTASSIUM SERPL-SCNC: 4.1 MMOL/L (ref 3.5–5.2)
PROT SERPL-MCNC: 7.8 G/DL (ref 6–8.5)
PSA SERPL-MCNC: 0.87 NG/ML (ref 0–4)
RBC # BLD AUTO: 5.1 10*6/MM3 (ref 4.14–5.8)
SODIUM SERPL-SCNC: 143 MMOL/L (ref 136–145)
TRIGL SERPL-MCNC: 157 MG/DL (ref 0–150)
VLDLC SERPL-MCNC: 31.4 MG/DL (ref 5–40)
WBC # BLD AUTO: 8.15 10*3/MM3 (ref 3.4–10.8)

## 2020-12-06 DIAGNOSIS — E78.5 HYPERLIPIDEMIA, UNSPECIFIED HYPERLIPIDEMIA TYPE: ICD-10-CM

## 2020-12-07 RX ORDER — ASPIRIN 81 MG/1
81 TABLET ORAL DAILY
Qty: 90 TABLET | Refills: 3 | Status: SHIPPED | OUTPATIENT
Start: 2020-12-07 | End: 2022-04-05 | Stop reason: SDUPTHER

## 2020-12-07 RX ORDER — ATORVASTATIN CALCIUM 40 MG/1
40 TABLET, FILM COATED ORAL DAILY
Qty: 90 TABLET | Refills: 3 | Status: SHIPPED | OUTPATIENT
Start: 2020-12-07 | End: 2022-04-05 | Stop reason: SDUPTHER

## 2020-12-07 RX ORDER — SPIRONOLACTONE 50 MG/1
50 TABLET, FILM COATED ORAL DAILY
Qty: 90 TABLET | Refills: 3 | Status: SHIPPED | OUTPATIENT
Start: 2020-12-07 | End: 2022-01-07 | Stop reason: SDUPTHER

## 2021-01-15 ENCOUNTER — DOCUMENTATION (OUTPATIENT)
Dept: PULMONOLOGY | Facility: CLINIC | Age: 56
End: 2021-01-15

## 2021-01-15 ENCOUNTER — TELEPHONE (OUTPATIENT)
Dept: PULMONOLOGY | Facility: CLINIC | Age: 56
End: 2021-01-15

## 2021-01-29 ENCOUNTER — DOCUMENTATION (OUTPATIENT)
Dept: PULMONOLOGY | Facility: CLINIC | Age: 56
End: 2021-01-29

## 2021-02-22 ENCOUNTER — IMMUNIZATION (OUTPATIENT)
Dept: VACCINE CLINIC | Facility: HOSPITAL | Age: 56
End: 2021-02-22

## 2021-02-22 PROCEDURE — 0001A: CPT | Performed by: INTERNAL MEDICINE

## 2021-02-22 PROCEDURE — 91300 HC SARSCOV02 VAC 30MCG/0.3ML IM: CPT | Performed by: INTERNAL MEDICINE

## 2021-02-26 ENCOUNTER — TELEPHONE (OUTPATIENT)
Dept: PULMONOLOGY | Facility: CLINIC | Age: 56
End: 2021-02-26

## 2021-03-15 ENCOUNTER — IMMUNIZATION (OUTPATIENT)
Dept: VACCINE CLINIC | Facility: HOSPITAL | Age: 56
End: 2021-03-15

## 2021-03-15 PROCEDURE — 0002A: CPT | Performed by: INTERNAL MEDICINE

## 2021-03-15 PROCEDURE — 91300 HC SARSCOV02 VAC 30MCG/0.3ML IM: CPT | Performed by: INTERNAL MEDICINE

## 2021-08-24 DIAGNOSIS — J44.9 CHRONIC OBSTRUCTIVE PULMONARY DISEASE, UNSPECIFIED COPD TYPE (HCC): Primary | ICD-10-CM

## 2021-08-24 RX ORDER — TIOTROPIUM BROMIDE INHALATION SPRAY 3.12 UG/1
2 SPRAY, METERED RESPIRATORY (INHALATION)
Qty: 1 EACH | Refills: 6 | Status: SHIPPED | OUTPATIENT
Start: 2021-08-24 | End: 2022-08-18 | Stop reason: SDUPTHER

## 2021-08-24 RX ORDER — BUDESONIDE AND FORMOTEROL FUMARATE DIHYDRATE 160; 4.5 UG/1; UG/1
2 AEROSOL RESPIRATORY (INHALATION) 2 TIMES DAILY
Qty: 1 EACH | Refills: 6 | Status: SHIPPED | OUTPATIENT
Start: 2021-08-24 | End: 2022-06-23 | Stop reason: SDUPTHER

## 2021-12-07 DIAGNOSIS — E78.5 HYPERLIPIDEMIA, UNSPECIFIED HYPERLIPIDEMIA TYPE: ICD-10-CM

## 2021-12-08 RX ORDER — ATORVASTATIN CALCIUM 40 MG/1
TABLET, FILM COATED ORAL
Qty: 90 TABLET | Refills: 3 | OUTPATIENT
Start: 2021-12-08

## 2021-12-08 RX ORDER — ASPIRIN 81 MG/1
TABLET ORAL
Qty: 90 TABLET | Refills: 3 | OUTPATIENT
Start: 2021-12-08

## 2021-12-08 RX ORDER — SPIRONOLACTONE 50 MG/1
TABLET, FILM COATED ORAL
Qty: 90 TABLET | Refills: 3 | OUTPATIENT
Start: 2021-12-08

## 2022-01-07 RX ORDER — SPIRONOLACTONE 50 MG/1
50 TABLET, FILM COATED ORAL DAILY
Qty: 90 TABLET | Refills: 3 | Status: SHIPPED | OUTPATIENT
Start: 2022-01-07 | End: 2023-02-04 | Stop reason: SDUPTHER

## 2022-04-05 ENCOUNTER — TELEMEDICINE (OUTPATIENT)
Dept: INTERNAL MEDICINE | Facility: CLINIC | Age: 57
End: 2022-04-05

## 2022-04-05 DIAGNOSIS — F32.A ANXIETY AND DEPRESSION: Primary | ICD-10-CM

## 2022-04-05 DIAGNOSIS — F41.9 ANXIETY AND DEPRESSION: Primary | ICD-10-CM

## 2022-04-05 DIAGNOSIS — E78.5 HYPERLIPIDEMIA, UNSPECIFIED HYPERLIPIDEMIA TYPE: ICD-10-CM

## 2022-04-05 PROBLEM — R06.02 SHORTNESS OF BREATH: Status: RESOLVED | Noted: 2020-01-09 | Resolved: 2022-04-05

## 2022-04-05 PROCEDURE — 99214 OFFICE O/P EST MOD 30 MIN: CPT | Performed by: INTERNAL MEDICINE

## 2022-04-05 RX ORDER — BUPROPION HYDROCHLORIDE 150 MG/1
150 TABLET ORAL DAILY
Qty: 30 TABLET | Refills: 2 | Status: SHIPPED | OUTPATIENT
Start: 2022-04-05 | End: 2022-07-26 | Stop reason: SDUPTHER

## 2022-04-05 RX ORDER — ASPIRIN 81 MG/1
81 TABLET ORAL DAILY
Qty: 90 TABLET | Refills: 3 | Status: SHIPPED | OUTPATIENT
Start: 2022-04-05 | End: 2023-02-11 | Stop reason: SDUPTHER

## 2022-04-05 RX ORDER — ATORVASTATIN CALCIUM 40 MG/1
40 TABLET, FILM COATED ORAL DAILY
Qty: 90 TABLET | Refills: 3 | Status: SHIPPED | OUTPATIENT
Start: 2022-04-05

## 2022-04-05 NOTE — PROGRESS NOTES
Mode of Visit: Video  Location of patient: other: car in [place]  You have chosen to receive care through a telehealth visit.  The patient has signed the video visit consent form.  The visit included audio and video interaction. No technical issues occurred during this visit.     Subjective   Aguila Finn Jr. is a 56 y.o. male here for mood concerns. He says lately he has felt very anxious, thinking about everything and feels very overwhelmed. Says it started when his wife was dx with cancer but she has been doing well with no recurrence. He notes he spent 2 days in [place] ago and was dx with bipolar depression. He denies any hx of or current sx of shauna or hypomania. He says he can start crying randomly but that is rare. He denies need for counseling, interested in a med. Previously tried zoloft which did not do well for him.     I have reviewed the following portions of the patient's history and confirmed they are accurate: current medications, past medical history, past social history and problem list     I have personally reviewed and performed the ROS. Kenya Martinez MD     Review of Systems:  General: negative  Neuro: negative  Psych: see hpi    Objective   There were no vitals taken for this visit.  No VS done due to tele visit    Physical Exam  Vitals reviewed.   Constitutional:       Appearance: He is well-developed.   Pulmonary:      Effort: Pulmonary effort is normal.   Neurological:      Mental Status: He is alert and oriented to person, place, and time.   Psychiatric:         Behavior: Behavior normal.         Thought Content: Thought content normal.         Judgment: Judgment normal.         Assessment/Plan   Diagnoses and all orders for this visit:    1. Anxiety and depression (Primary)  -     buPROPion XL (Wellbutrin XL) 150 MG 24 hr tablet; Take 1 tablet by mouth Daily.  Dispense: 30 tablet; Refill: 2  -new uncontrolled problem, new med. Discussed common SE to wellbutrin.  I've explained to him that drugs of the this class can have side effects such as weight loss, dry mouth, insomnia, headache, nausea. These medications are generally effective at alleviating symptoms of anxiety and/or depression. Let me know if significant side effects do occur.    2. Hyperlipidemia, unspecified hyperlipidemia type  -     atorvastatin (LIPITOR) 40 MG tablet; Take 1 tablet by mouth Daily.  Dispense: 90 tablet; Refill: 3  -     aspirin (aspirin) 81 MG EC tablet; Take 1 tablet by mouth Daily.  Dispense: 90 tablet; Refill: 3             Kenya Martinez MD

## 2022-06-23 DIAGNOSIS — J44.9 CHRONIC OBSTRUCTIVE PULMONARY DISEASE, UNSPECIFIED COPD TYPE: ICD-10-CM

## 2022-06-23 RX ORDER — BUDESONIDE AND FORMOTEROL FUMARATE DIHYDRATE 160; 4.5 UG/1; UG/1
2 AEROSOL RESPIRATORY (INHALATION) 2 TIMES DAILY
Qty: 10.2 G | Refills: 1 | Status: SHIPPED | OUTPATIENT
Start: 2022-06-23 | End: 2022-08-18 | Stop reason: SDUPTHER

## 2022-07-26 DIAGNOSIS — F32.A ANXIETY AND DEPRESSION: ICD-10-CM

## 2022-07-26 DIAGNOSIS — F41.9 ANXIETY AND DEPRESSION: ICD-10-CM

## 2022-07-27 RX ORDER — BUPROPION HYDROCHLORIDE 150 MG/1
150 TABLET ORAL DAILY
Qty: 30 TABLET | Refills: 2 | Status: SHIPPED | OUTPATIENT
Start: 2022-07-27 | End: 2022-10-24

## 2022-08-18 ENCOUNTER — OFFICE VISIT (OUTPATIENT)
Dept: PULMONOLOGY | Facility: CLINIC | Age: 57
End: 2022-08-18

## 2022-08-18 VITALS
DIASTOLIC BLOOD PRESSURE: 100 MMHG | TEMPERATURE: 97.5 F | WEIGHT: 176.8 LBS | SYSTOLIC BLOOD PRESSURE: 128 MMHG | HEART RATE: 102 BPM | BODY MASS INDEX: 26.19 KG/M2 | HEIGHT: 69 IN | OXYGEN SATURATION: 96 %

## 2022-08-18 DIAGNOSIS — J44.9 CHRONIC OBSTRUCTIVE PULMONARY DISEASE, UNSPECIFIED COPD TYPE: Primary | ICD-10-CM

## 2022-08-18 DIAGNOSIS — R91.1 LUNG NODULE: ICD-10-CM

## 2022-08-18 PROCEDURE — 94726 PLETHYSMOGRAPHY LUNG VOLUMES: CPT | Performed by: INTERNAL MEDICINE

## 2022-08-18 PROCEDURE — 99214 OFFICE O/P EST MOD 30 MIN: CPT | Performed by: INTERNAL MEDICINE

## 2022-08-18 PROCEDURE — 94060 EVALUATION OF WHEEZING: CPT | Performed by: INTERNAL MEDICINE

## 2022-08-18 PROCEDURE — 94729 DIFFUSING CAPACITY: CPT | Performed by: INTERNAL MEDICINE

## 2022-08-18 RX ORDER — ALBUTEROL SULFATE 90 UG/1
4 AEROSOL, METERED RESPIRATORY (INHALATION) ONCE
Status: COMPLETED | OUTPATIENT
Start: 2022-08-18 | End: 2022-08-18

## 2022-08-18 RX ORDER — ALBUTEROL SULFATE 90 UG/1
2 AEROSOL, METERED RESPIRATORY (INHALATION) EVERY 4 HOURS PRN
Qty: 6.7 G | Refills: 11 | Status: SHIPPED | OUTPATIENT
Start: 2022-08-18

## 2022-08-18 RX ORDER — BUDESONIDE AND FORMOTEROL FUMARATE DIHYDRATE 160; 4.5 UG/1; UG/1
2 AEROSOL RESPIRATORY (INHALATION) 2 TIMES DAILY
Qty: 10.2 G | Refills: 11 | Status: SHIPPED | OUTPATIENT
Start: 2022-08-18 | End: 2022-09-28

## 2022-08-18 RX ORDER — TIOTROPIUM BROMIDE INHALATION SPRAY 3.12 UG/1
2 SPRAY, METERED RESPIRATORY (INHALATION)
Qty: 1 EACH | Refills: 11 | Status: SHIPPED | OUTPATIENT
Start: 2022-08-18 | End: 2022-09-28 | Stop reason: SDUPTHER

## 2022-08-18 RX ORDER — ALBUTEROL SULFATE 2.5 MG/3ML
2.5 SOLUTION RESPIRATORY (INHALATION) 4 TIMES DAILY PRN
Qty: 60 EACH | Refills: 11 | Status: SHIPPED | OUTPATIENT
Start: 2022-08-18

## 2022-08-18 RX ADMIN — ALBUTEROL SULFATE 4 PUFF: 90 AEROSOL, METERED RESPIRATORY (INHALATION) at 15:04

## 2022-08-18 NOTE — PROGRESS NOTES
CC:    Abnormal LDCT for lung cancer screening    HPI:    53 y/o WM w/ h/o COPD FEV1 30%, on nocturnal O2, still smoking ~ 80 py, HTN, HLD, Depression, who comes in today to discuss an recently abnormal LDCT for lung cancer screening.  This was performed on 10/21/19 and showed emphysema, and a 1.7 cm non-calcified nodule in the medial / posterior right base abutting the pleura.  No obvious mediastinal or hilar LAD.  No cough / hemoptysis / fever / chills / nightsweats / weight loss.  No symptoms attributable to this nodule.    INTERVAL HISTORY:    Patient returns for follow up - hasn't been seen since 2020.  Remains tobacco free since 2019.  Getting short of breath with exertion.  Occasional wheezing.    PMH:    Past Medical History:   Diagnosis Date   • Anxiety    • COPD (chronic obstructive pulmonary disease) (HCC)    • Depression    • Hyperlipidemia    • Hypertension    • Recurrent kidney stones      PSH:    Past Surgical History:   Procedure Laterality Date   • CATARACT EXTRACTION, BILATERAL     • COLONOSCOPY     • WISDOM TOOTH EXTRACTION       FH:    Family History   Problem Relation Age of Onset   • Hyperlipidemia Father    • Hypertension Father    • Heart attack Father    • Stroke Father    • Diabetes Sister    • Heart disease Sister    • Hypertension Sister    • COPD Mother    • Diabetes Sister    • Hypertension Sister    • Diabetes Sister    • Hypertension Sister    • Throat cancer Sister      SH:    Social History     Socioeconomic History   • Marital status:    Tobacco Use   • Smoking status: Former Smoker     Packs/day: 2.00     Years: 40.00     Pack years: 80.00     Types: Cigarettes     Quit date: 2019     Years since quittin.7   • Smokeless tobacco: Never Used   Substance and Sexual Activity   • Alcohol use: Yes     Comment: a few drinks on occasion, not excessive per patient   • Drug use: No   • Sexual activity: Defer     ALLERGIES:    No Known Allergies  MEDICATIONS:      Current  Outpatient Medications:   •  aspirin (aspirin) 81 MG EC tablet, Take 1 tablet by mouth Daily., Disp: 90 tablet, Rfl: 3  •  atorvastatin (LIPITOR) 40 MG tablet, Take 1 tablet by mouth Daily., Disp: 90 tablet, Rfl: 3  •  budesonide-formoterol (SYMBICORT) 160-4.5 MCG/ACT inhaler, Inhale 2 puffs 2 (Two) Times a Day., Disp: 10.2 g, Rfl: 1  •  buPROPion XL (Wellbutrin XL) 150 MG 24 hr tablet, Take 1 tablet by mouth Daily., Disp: 30 tablet, Rfl: 2  •  spironolactone (ALDACTONE) 50 MG tablet, Take 1 tablet by mouth Daily., Disp: 90 tablet, Rfl: 3  •  albuterol sulfate  (90 Base) MCG/ACT inhaler, Inhale 2 puffs Every 4 (Four) Hours As Needed for Wheezing., Disp: 6.7 g, Rfl: 11  •  tiotropium bromide monohydrate (Spiriva Respimat) 2.5 MCG/ACT aerosol solution inhaler, Inhale 2 puffs Daily., Disp: 1 each, Rfl: 6  No current facility-administered medications for this visit.  ROS:  Per HPI    DIAGNOSTIC DATA (Reviewed and interpreted by me unless otherwise specified):    LDCT 10/21/19 - emphysema, and a 1.7 cm non-calcified nodule in the medial / posterior right base abutting the pleura.  No obvious mediastinal or hilar LAD.     CT-PET 11/7/19 - RLL nodule is PET negative.  Remains stable in size and configuration.    CT Chest ilogic protocol 2/10/20 - no change in RLL nodule, upper lobe predominant Emphysema R > L    [place] 2/18/19 - very severe obstruction with FEV1 30%    PFT 2/20/20 - very severe obstruction, hyperinflation, air trapping, moderate reduction in DLCO partially corrects for alveolar volume    PFT 8/18/22 - very severe combined restriction & obstruction FEV1 29%, +change w/ BD, mild reduction in DLCO    6MW 2/18/19 - starting sat 99% on RA, minimum sat 99% on RA, walked 441 meters 73% predicted    6MW 8/18/22 - starting sat 96% on RA, min sat 92% on RA, walked 396 meters 67% predicted    Vitals:    08/18/22 1459   BP: 128/100   Pulse: 102   Temp: 97.5 °F (36.4 °C)   SpO2: 96%       Physical  Exam:    Constitutional: Alert, no Distress  Mouth/Throat: Oropharynx is clear and moist.   Cardiovascular: Normal rate, regular rhythm, normal heart sounds.   Pulmonary/Chest: Effort normal and breath sounds normal.  No clubbing.       Assessment & Plan     1)  Lung Nodule - patient is overdue for repeat CT.  Will order now.  Might be moving to [place] soon.  RTC ~4-6 weeks to discuss CT.    2) GOLD 4D COPD - Still very symptomatic.  A1AT MM.  Continue symbicort 160.  Add Spiriva.  Refill albuterol MDI and neb.  Tobacco free currently. , IgE 35, RAST neg.    3) Tobacco abuse - currently in remission, counseled patient, 80 py, quit 2019.    RTC 1 moth w/ CT Chest    Francisco Javier Rgoers MD  Pulmonology and Critical Care Medicine  08/18/22 16:39 EDT  Electronically Signed    C.C.:  No ref. provider found, Kenya Martinez MD

## 2022-08-29 ENCOUNTER — HOSPITAL ENCOUNTER (OUTPATIENT)
Dept: CT IMAGING | Facility: HOSPITAL | Age: 57
Discharge: HOME | End: 2022-08-29
Admitting: INTERNAL MEDICINE

## 2022-08-29 DIAGNOSIS — R91.1 LUNG NODULE: ICD-10-CM

## 2022-08-29 PROCEDURE — 71250 CT THORAX DX C-: CPT

## 2022-09-28 ENCOUNTER — OFFICE VISIT (OUTPATIENT)
Dept: PULMONOLOGY | Facility: CLINIC | Age: 57
End: 2022-09-28

## 2022-09-28 VITALS
BODY MASS INDEX: 26.28 KG/M2 | TEMPERATURE: 97.6 F | WEIGHT: 177.4 LBS | HEART RATE: 80 BPM | DIASTOLIC BLOOD PRESSURE: 94 MMHG | HEIGHT: 69 IN | SYSTOLIC BLOOD PRESSURE: 138 MMHG | OXYGEN SATURATION: 97 %

## 2022-09-28 DIAGNOSIS — J44.9 CHRONIC OBSTRUCTIVE PULMONARY DISEASE, UNSPECIFIED COPD TYPE: ICD-10-CM

## 2022-09-28 DIAGNOSIS — R91.1 LUNG NODULE: Primary | ICD-10-CM

## 2022-09-28 PROCEDURE — 99214 OFFICE O/P EST MOD 30 MIN: CPT | Performed by: INTERNAL MEDICINE

## 2022-09-28 RX ORDER — BUDESONIDE 0.5 MG/2ML
0.5 INHALANT ORAL 2 TIMES DAILY
Qty: 60 EACH | Refills: 11 | Status: SHIPPED | OUTPATIENT
Start: 2022-09-28

## 2022-09-28 RX ORDER — TIOTROPIUM BROMIDE INHALATION SPRAY 3.12 UG/1
2 SPRAY, METERED RESPIRATORY (INHALATION)
Qty: 1 EACH | Refills: 11 | Status: SHIPPED | OUTPATIENT
Start: 2022-09-28

## 2022-09-28 RX ORDER — ARFORMOTEROL TARTRATE 15 UG/2ML
15 SOLUTION RESPIRATORY (INHALATION)
Qty: 120 ML | Refills: 11 | Status: SHIPPED | OUTPATIENT
Start: 2022-09-28

## 2022-09-28 NOTE — PROGRESS NOTES
CC:    Abnormal LDCT for lung cancer screening    HPI:    55 y/o WM w/ h/o COPD FEV1 30%, on nocturnal O2, still smoking ~ 80 py, HTN, HLD, Depression, who comes in today to discuss an recently abnormal LDCT for lung cancer screening.  This was performed on 10/21/19 and showed emphysema, and a 1.7 cm non-calcified nodule in the medial / posterior right base abutting the pleura.  No obvious mediastinal or hilar LAD.  No cough / hemoptysis / fever / chills / nightsweats / weight loss.  No symptoms attributable to this nodule.    INTERVAL HISTORY:    Patient returns for follow up tobacco free since 2019.  Using nebulized LABA / ICS bid and spiriva which has helped tremendously.    PMH:    Past Medical History:   Diagnosis Date   • Anxiety    • COPD (chronic obstructive pulmonary disease) (HCC)    • Depression    • Hyperlipidemia    • Hypertension    • Recurrent kidney stones      PSH:    Past Surgical History:   Procedure Laterality Date   • CATARACT EXTRACTION, BILATERAL     • COLONOSCOPY     • WISDOM TOOTH EXTRACTION       FH:    Family History   Problem Relation Age of Onset   • Hyperlipidemia Father    • Hypertension Father    • Heart attack Father    • Stroke Father    • Diabetes Sister    • Heart disease Sister    • Hypertension Sister    • COPD Mother    • Diabetes Sister    • Hypertension Sister    • Diabetes Sister    • Hypertension Sister    • Throat cancer Sister      SH:    Social History     Socioeconomic History   • Marital status:    Tobacco Use   • Smoking status: Former Smoker     Packs/day: 2.00     Years: 40.00     Pack years: 80.00     Types: Cigarettes     Quit date: 2019     Years since quittin.9   • Smokeless tobacco: Never Used   Substance and Sexual Activity   • Alcohol use: Yes     Comment: a few drinks on occasion, not excessive per patient   • Drug use: No   • Sexual activity: Defer     ALLERGIES:    No Known Allergies  MEDICATIONS:      Current Outpatient Medications:   •   albuterol (PROVENTIL) (2.5 MG/3ML) 0.083% nebulizer solution, Take 2.5 mg by nebulization 4 (Four) Times a Day As Needed for Wheezing or Shortness of Air., Disp: 60 each, Rfl: 11  •  albuterol sulfate  (90 Base) MCG/ACT inhaler, Inhale 2 puffs Every 4 (Four) Hours As Needed for Wheezing or Shortness of Air., Disp: 6.7 g, Rfl: 11  •  aspirin (aspirin) 81 MG EC tablet, Take 1 tablet by mouth Daily., Disp: 90 tablet, Rfl: 3  •  atorvastatin (LIPITOR) 40 MG tablet, Take 1 tablet by mouth Daily., Disp: 90 tablet, Rfl: 3  •  budesonide-formoterol (SYMBICORT) 160-4.5 MCG/ACT inhaler, Inhale 2 puffs 2 (Two) Times a Day., Disp: 10.2 g, Rfl: 11  •  buPROPion XL (Wellbutrin XL) 150 MG 24 hr tablet, Take 1 tablet by mouth Daily., Disp: 30 tablet, Rfl: 2  •  spironolactone (ALDACTONE) 50 MG tablet, Take 1 tablet by mouth Daily., Disp: 90 tablet, Rfl: 3  •  tiotropium bromide monohydrate (Spiriva Respimat) 2.5 MCG/ACT aerosol solution inhaler, Inhale 2 puffs Daily., Disp: 1 each, Rfl: 11  ROS:  Per HPI    DIAGNOSTIC DATA (Reviewed and interpreted by me unless otherwise specified):    LDCT 10/21/19 - emphysema, and a 1.7 cm non-calcified nodule in the medial / posterior right base abutting the pleura.  No obvious mediastinal or hilar LAD.     CT-PET 11/7/19 - RLL nodule is PET negative.  Remains stable in size and configuration.    CT Chest ilogic protocol 2/10/20 - no change in RLL nodule, upper lobe predominant Emphysema R > L    CT Chest 8/29/22 - RLL nodule is slightly smaller and has central calcification c/w a benign process, NEW 7 mm XANDER nodule,  Upper lobe predominant emphysema    [place] 2/18/19 - very severe obstruction with FEV1 30%    PFT 2/20/20 - very severe obstruction, hyperinflation, air trapping, moderate reduction in DLCO partially corrects for alveolar volume    PFT 8/18/22 - very severe combined restriction & obstruction FEV1 29%, +change w/ BD, mild reduction in DLCO    6MW 2/18/19 - starting sat 99% on  RA, minimum sat 99% on RA, walked 441 meters 73% predicted    6MW 8/18/22 - starting sat 96% on RA, min sat 92% on RA, walked 396 meters 67% predicted    Vitals:    09/28/22 1120   BP: 138/94   Pulse: 80   Temp: 97.6 °F (36.4 °C)   SpO2: 97%       Physical Exam:    Constitutional: Alert, no Distress  Mouth/Throat: Oropharynx is clear and moist.   Cardiovascular: Normal rate, regular rhythm, normal heart sounds.   Pulmonary/Chest: Effort normal and breath sounds normal.  No clubbing.       Assessment & Plan     1)  Lung Nodule - RLL pleural based nodule smaller and has central calcification c/w benign process.  Has NEW 7 mm XANDER nodule that needs f/u CT in 6 months.    2) GOLD 4D COPD - A1AT MM.  Continue pulmicort and brovana neb bid, albuterol neb prn, and Spiriva.  Tobacco free currently. , IgE 35, RAST neg.    3) Tobacco abuse - currently in remission, counseled patient, 80 py, quit 2019.    RTC 6 months w/ CT Chest w/o contrast and jose    Francisco Javier Rogers MD  Pulmonology and Critical Care Medicine  09/28/22 11:36 EDT  Electronically Signed    C.C.:  No ref. provider found, Kenya Martinez MD

## 2022-10-24 DIAGNOSIS — F41.9 ANXIETY AND DEPRESSION: ICD-10-CM

## 2022-10-24 DIAGNOSIS — F32.A ANXIETY AND DEPRESSION: ICD-10-CM

## 2022-10-24 RX ORDER — BUPROPION HYDROCHLORIDE 150 MG/1
TABLET ORAL
Qty: 90 TABLET | Refills: 0 | Status: SHIPPED | OUTPATIENT
Start: 2022-10-24 | End: 2023-02-11 | Stop reason: SDUPTHER

## 2023-02-06 RX ORDER — SPIRONOLACTONE 50 MG/1
50 TABLET, FILM COATED ORAL DAILY
Qty: 90 TABLET | Refills: 0 | Status: SHIPPED | OUTPATIENT
Start: 2023-02-06

## 2023-02-11 DIAGNOSIS — F41.9 ANXIETY AND DEPRESSION: ICD-10-CM

## 2023-02-11 DIAGNOSIS — E78.5 HYPERLIPIDEMIA, UNSPECIFIED HYPERLIPIDEMIA TYPE: ICD-10-CM

## 2023-02-11 DIAGNOSIS — F32.A ANXIETY AND DEPRESSION: ICD-10-CM

## 2023-02-13 RX ORDER — ASPIRIN 81 MG/1
81 TABLET ORAL DAILY
Qty: 90 TABLET | Refills: 3 | Status: SHIPPED | OUTPATIENT
Start: 2023-02-13

## 2023-02-13 RX ORDER — BUPROPION HYDROCHLORIDE 150 MG/1
150 TABLET ORAL DAILY
Qty: 30 TABLET | Refills: 0 | Status: SHIPPED | OUTPATIENT
Start: 2023-02-13 | End: 2023-03-14

## 2023-02-16 RX ORDER — SPIRONOLACTONE 50 MG/1
50 TABLET, FILM COATED ORAL DAILY
Qty: 90 TABLET | Refills: 0 | OUTPATIENT
Start: 2023-02-16

## 2023-03-13 DIAGNOSIS — F41.9 ANXIETY AND DEPRESSION: ICD-10-CM

## 2023-03-13 DIAGNOSIS — F32.A ANXIETY AND DEPRESSION: ICD-10-CM

## 2023-03-14 RX ORDER — BUPROPION HYDROCHLORIDE 150 MG/1
150 TABLET ORAL DAILY
Qty: 15 TABLET | Refills: 0 | Status: SHIPPED | OUTPATIENT
Start: 2023-03-14

## 2023-05-01 DIAGNOSIS — F41.9 ANXIETY AND DEPRESSION: ICD-10-CM

## 2023-05-01 DIAGNOSIS — F32.A ANXIETY AND DEPRESSION: ICD-10-CM

## 2023-05-01 DIAGNOSIS — E78.5 HYPERLIPIDEMIA, UNSPECIFIED HYPERLIPIDEMIA TYPE: ICD-10-CM

## 2023-05-02 RX ORDER — ASPIRIN 81 MG/1
81 TABLET ORAL DAILY
Qty: 90 TABLET | Refills: 3 | OUTPATIENT
Start: 2023-05-02

## 2023-05-02 RX ORDER — BUPROPION HYDROCHLORIDE 150 MG/1
150 TABLET ORAL DAILY
Qty: 15 TABLET | Refills: 0 | OUTPATIENT
Start: 2023-05-02

## 2023-05-02 RX ORDER — ARFORMOTEROL TARTRATE 15 UG/2ML
15 SOLUTION RESPIRATORY (INHALATION)
Qty: 120 ML | Refills: 11 | Status: SHIPPED | OUTPATIENT
Start: 2023-05-02

## 2023-05-02 RX ORDER — SPIRONOLACTONE 50 MG/1
50 TABLET, FILM COATED ORAL DAILY
Qty: 90 TABLET | Refills: 0 | OUTPATIENT
Start: 2023-05-02

## 2023-05-24 DIAGNOSIS — F32.A ANXIETY AND DEPRESSION: ICD-10-CM

## 2023-05-24 DIAGNOSIS — E78.5 HYPERLIPIDEMIA, UNSPECIFIED HYPERLIPIDEMIA TYPE: ICD-10-CM

## 2023-05-24 DIAGNOSIS — F41.9 ANXIETY AND DEPRESSION: ICD-10-CM

## 2023-05-25 RX ORDER — BUPROPION HYDROCHLORIDE 150 MG/1
150 TABLET ORAL DAILY
Qty: 15 TABLET | Refills: 0 | OUTPATIENT
Start: 2023-05-25

## 2023-05-25 RX ORDER — ATORVASTATIN CALCIUM 40 MG/1
TABLET, FILM COATED ORAL
Qty: 90 TABLET | Refills: 3 | OUTPATIENT
Start: 2023-05-25

## 2023-07-25 ENCOUNTER — OFFICE VISIT (OUTPATIENT)
Dept: INTERNAL MEDICINE | Facility: CLINIC | Age: 58
End: 2023-07-25
Payer: COMMERCIAL

## 2023-07-25 VITALS
OXYGEN SATURATION: 95 % | SYSTOLIC BLOOD PRESSURE: 204 MMHG | DIASTOLIC BLOOD PRESSURE: 100 MMHG | TEMPERATURE: 98.2 F | RESPIRATION RATE: 16 BRPM | HEIGHT: 69 IN | HEART RATE: 107 BPM | WEIGHT: 193 LBS | BODY MASS INDEX: 28.58 KG/M2

## 2023-07-25 DIAGNOSIS — S09.93XA TONGUE INJURY, INITIAL ENCOUNTER: Primary | ICD-10-CM

## 2023-07-25 DIAGNOSIS — I10 ESSENTIAL HYPERTENSION: ICD-10-CM

## 2023-07-25 PROCEDURE — 99213 OFFICE O/P EST LOW 20 MIN: CPT | Performed by: NURSE PRACTITIONER

## 2023-07-25 RX ORDER — BUDESONIDE AND FORMOTEROL FUMARATE DIHYDRATE 160; 4.5 UG/1; UG/1
2 AEROSOL RESPIRATORY (INHALATION) 2 TIMES DAILY
COMMUNITY
Start: 2023-05-12

## 2023-07-25 RX ORDER — CHLORHEXIDINE GLUCONATE 0.12 MG/ML
15 RINSE ORAL 2 TIMES DAILY
Qty: 118 ML | Refills: 1 | Status: SHIPPED | OUTPATIENT
Start: 2023-07-25

## 2023-07-25 NOTE — PROGRESS NOTES
"Chief Complaint  Facial Laceration (Pt bit tongue in the middle of his sleep 2 nights ago)    Subjective      History of Present Illness  Aguila is a 57 y.o. male who presents to the clinic today for evaluation of a laceration to the  tongue . Injury occurred 1 day ago. The mechanism of the wound was thinks he bit it in his sleep. The patient reports no coldness, no numbness, no pain, no paresthesias in the affected area. There were no other injuries.  Patient denies head injury, loss of consciousness, neck pain, chest pain, abdominal pain, extremity injury, numbness, and weakness. The tetanus status is more than 10 years ago. He has been rinsing his mouth with Listerine.    The following portions of the patient's history were reviewed and updated as appropriate: allergies, current medications, past family history, past medical history, past social history, past surgical history, and problem list.    Review of Systems  Pertinent items are noted in HPI.      Objective   Vital Signs:  BP (!) 204/100   Pulse 107   Temp 98.2 °F (36.8 °C) (Tympanic)   Resp 16   Ht 175.3 cm (69.02\")   Wt 87.5 kg (193 lb)   SpO2 95%   BMI 28.48 kg/m²   Estimated body mass index is 28.48 kg/m² as calculated from the following:    Height as of this encounter: 175.3 cm (69.02\").    Weight as of this encounter: 87.5 kg (193 lb).          Physical Exam  Vitals reviewed.   Constitutional:       General: He is not in acute distress.  HENT:      Head: Normocephalic and atraumatic.      Mouth/Throat:      Tongue: Lesions present.     Cardiovascular:      Rate and Rhythm: Regular rhythm. Tachycardia present.      Pulses: Normal pulses.      Heart sounds: Normal heart sounds.   Pulmonary:      Effort: Pulmonary effort is normal.   Skin:     General: Skin is warm and dry.   Neurological:      General: No focal deficit present.      Mental Status: He is alert.   Psychiatric:         Mood and Affect: Mood is anxious.         Behavior: Behavior " normal.         Thought Content: Thought content normal.         Judgment: Judgment normal.        Result Review                    Assessment and Plan  Diagnoses and all orders for this visit:    1. Tongue injury, initial encounter (Primary)  Assessment & Plan:   Wound care discussed.  Tetanus not indicated at this time.  Peridex mouthwash BID and PRN  Follow-up as needed.     Orders:  -     chlorhexidine (Peridex) 0.12 % solution; Apply 15 mL to the mouth or throat 2 (Two) Times a Day.  Dispense: 118 mL; Refill: 1    2. Essential hypertension  Assessment & Plan:  BP elevated in the office today.   He takes spironolactone 50 mg daily.   He does not monitor his BP at home.   He denies vision changes, chest pain, or dyspnea.  He does have some swelling in the ankles.  He says he does not like doctors, white coat syndrome may play a role.    Monitor BP at home daily and call the office on Friday. If BP remains elevated, will make medication changes.                 Follow Up  Return if symptoms worsen or fail to improve.  Patient was given instructions and counseling regarding his condition or for health maintenance advice. Please see specific information pulled into the AVS if appropriate.    Part of this note may be an electronic transcription/translation of spoken language to printed text using the Dragon Dictation System.

## 2023-07-25 NOTE — ASSESSMENT & PLAN NOTE
Wound care discussed.  Tetanus not indicated at this time.  Peridex mouthwash BID and PRN  Follow-up as needed.

## 2023-07-25 NOTE — ASSESSMENT & PLAN NOTE
BP elevated in the office today.   He takes spironolactone 50 mg daily.   He does not monitor his BP at home.   He denies vision changes, chest pain, or dyspnea.  He does have some swelling in the ankles.  He says he does not like doctors, white coat syndrome may play a role.    Monitor BP at home daily and call the office on Friday. If BP remains elevated, will make medication changes.

## 2023-08-28 ENCOUNTER — LAB (OUTPATIENT)
Dept: INTERNAL MEDICINE | Facility: CLINIC | Age: 58
End: 2023-08-28
Payer: COMMERCIAL

## 2023-08-28 ENCOUNTER — OFFICE VISIT (OUTPATIENT)
Dept: INTERNAL MEDICINE | Facility: CLINIC | Age: 58
End: 2023-08-28
Payer: COMMERCIAL

## 2023-08-28 VITALS
BODY MASS INDEX: 27.85 KG/M2 | SYSTOLIC BLOOD PRESSURE: 188 MMHG | HEART RATE: 83 BPM | HEIGHT: 69 IN | DIASTOLIC BLOOD PRESSURE: 98 MMHG | WEIGHT: 188 LBS | OXYGEN SATURATION: 90 %

## 2023-08-28 DIAGNOSIS — I10 ESSENTIAL HYPERTENSION: ICD-10-CM

## 2023-08-28 DIAGNOSIS — E78.5 HYPERLIPIDEMIA, UNSPECIFIED HYPERLIPIDEMIA TYPE: Primary | ICD-10-CM

## 2023-08-28 DIAGNOSIS — L98.9 SKIN LESION: Primary | ICD-10-CM

## 2023-08-28 DIAGNOSIS — Z12.5 PROSTATE CANCER SCREENING: ICD-10-CM

## 2023-08-28 DIAGNOSIS — F41.9 ANXIETY AND DEPRESSION: ICD-10-CM

## 2023-08-28 DIAGNOSIS — F32.A ANXIETY AND DEPRESSION: ICD-10-CM

## 2023-08-28 LAB
ALBUMIN SERPL-MCNC: 4.3 G/DL (ref 3.5–5.2)
ALBUMIN/GLOB SERPL: 1.4 G/DL
ALP SERPL-CCNC: 86 U/L (ref 39–117)
ALT SERPL W P-5'-P-CCNC: 21 U/L (ref 1–41)
ANION GAP SERPL CALCULATED.3IONS-SCNC: 14.3 MMOL/L (ref 5–15)
AST SERPL-CCNC: 21 U/L (ref 1–40)
BILIRUB SERPL-MCNC: 0.5 MG/DL (ref 0–1.2)
BUN SERPL-MCNC: 20 MG/DL (ref 6–20)
BUN/CREAT SERPL: 12.5 (ref 7–25)
CALCIUM SPEC-SCNC: 9.2 MG/DL (ref 8.6–10.5)
CHLORIDE SERPL-SCNC: 101 MMOL/L (ref 98–107)
CHOLEST SERPL-MCNC: 157 MG/DL (ref 0–200)
CO2 SERPL-SCNC: 29.7 MMOL/L (ref 22–29)
CREAT SERPL-MCNC: 1.6 MG/DL (ref 0.76–1.27)
DEPRECATED RDW RBC AUTO: 41.6 FL (ref 37–54)
EGFRCR SERPLBLD CKD-EPI 2021: 49.6 ML/MIN/1.73
ERYTHROCYTE [DISTWIDTH] IN BLOOD BY AUTOMATED COUNT: 13.5 % (ref 12.3–15.4)
GLOBULIN UR ELPH-MCNC: 3 GM/DL
GLUCOSE SERPL-MCNC: 101 MG/DL (ref 65–99)
HCT VFR BLD AUTO: 40.3 % (ref 37.5–51)
HDLC SERPL-MCNC: 48 MG/DL (ref 40–60)
HGB BLD-MCNC: 14.1 G/DL (ref 13–17.7)
LDLC SERPL CALC-MCNC: 88 MG/DL (ref 0–100)
LDLC/HDLC SERPL: 1.77 {RATIO}
MCH RBC QN AUTO: 30.4 PG (ref 26.6–33)
MCHC RBC AUTO-ENTMCNC: 35 G/DL (ref 31.5–35.7)
MCV RBC AUTO: 86.9 FL (ref 79–97)
PLATELET # BLD AUTO: 218 10*3/MM3 (ref 140–450)
PMV BLD AUTO: 11.6 FL (ref 6–12)
POTASSIUM SERPL-SCNC: 3.3 MMOL/L (ref 3.5–5.2)
PROT SERPL-MCNC: 7.3 G/DL (ref 6–8.5)
PSA SERPL-MCNC: 1.32 NG/ML (ref 0–4)
RBC # BLD AUTO: 4.64 10*6/MM3 (ref 4.14–5.8)
SODIUM SERPL-SCNC: 145 MMOL/L (ref 136–145)
TRIGL SERPL-MCNC: 120 MG/DL (ref 0–150)
VLDLC SERPL-MCNC: 21 MG/DL (ref 5–40)
WBC NRBC COR # BLD: 9.71 10*3/MM3 (ref 3.4–10.8)

## 2023-08-28 PROCEDURE — 85027 COMPLETE CBC AUTOMATED: CPT | Performed by: INTERNAL MEDICINE

## 2023-08-28 PROCEDURE — G0103 PSA SCREENING: HCPCS | Performed by: INTERNAL MEDICINE

## 2023-08-28 PROCEDURE — 99214 OFFICE O/P EST MOD 30 MIN: CPT | Performed by: INTERNAL MEDICINE

## 2023-08-28 PROCEDURE — 36415 COLL VENOUS BLD VENIPUNCTURE: CPT | Performed by: INTERNAL MEDICINE

## 2023-08-28 PROCEDURE — 80061 LIPID PANEL: CPT | Performed by: INTERNAL MEDICINE

## 2023-08-28 PROCEDURE — 80053 COMPREHEN METABOLIC PANEL: CPT | Performed by: INTERNAL MEDICINE

## 2023-08-28 RX ORDER — AMLODIPINE BESYLATE 10 MG/1
10 TABLET ORAL DAILY
Qty: 30 TABLET | Refills: 2 | Status: SHIPPED | OUTPATIENT
Start: 2023-08-28

## 2023-08-28 RX ORDER — NYSTATIN 100000 U/G
1 CREAM TOPICAL 2 TIMES DAILY
Qty: 30 G | Refills: 0 | Status: SHIPPED | OUTPATIENT
Start: 2023-08-28

## 2023-08-28 RX ORDER — BUPROPION HYDROCHLORIDE 150 MG/1
150 TABLET ORAL DAILY
Qty: 90 TABLET | Refills: 3 | Status: SHIPPED | OUTPATIENT
Start: 2023-08-28

## 2023-08-28 RX ORDER — BUSPIRONE HYDROCHLORIDE 5 MG/1
5 TABLET ORAL 2 TIMES DAILY
Qty: 60 TABLET | Refills: 2 | Status: SHIPPED | OUTPATIENT
Start: 2023-08-28

## 2023-08-28 NOTE — PROGRESS NOTES
"Subjective   Aguila Finn Jr. is a 57 y.o. male here for left lower extremity skin lesion, anxiety depression, HTN.  He says his blood pressure has been high like it is today.  He is currently on no medication for blood pressure.  He says anxiety has been quite high as well, on Wellbutrin 150 mg.  He does not recall taking BuSpar though it is listed as a historical med.  He is due for blood work.  He has a left lower extremity skin lesion that is growing and has been there for quite some time.  It is very itchy.  He tried some topical stuff on it but nothing has helped.  He lives here and [place], says he would not be leaving town until he get some of these help things figured out.    I have reviewed the patient's relevant medical history and confirmed it is accurate.    I have personally reviewed and performed the ROS. Kenya Martinez MD     Objective   BP (!) 188/98 (BP Location: Left arm)   Pulse 83   Ht 175.3 cm (69\")   Wt 85.3 kg (188 lb)   SpO2 90%   BMI 27.76 kg/mý     Physical Exam  Vitals reviewed.   Constitutional:       Appearance: He is well-developed.   Cardiovascular:      Rate and Rhythm: Normal rate and regular rhythm.      Heart sounds: Normal heart sounds.   Pulmonary:      Effort: Pulmonary effort is normal.      Breath sounds: Decreased breath sounds present. No wheezing or rales.   Skin:     General: Skin is warm and dry.      Comments: Left lower extremity anterior with 9 cm irregularly shaped erythematous lesion with small satellite lesion, some scale and scab   Neurological:      General: No focal deficit present.      Mental Status: He is alert and oriented to person, place, and time.   Psychiatric:         Behavior: Behavior normal.         Thought Content: Thought content normal.         Judgment: Judgment normal.         Current Outpatient Medications:     albuterol (PROVENTIL) (2.5 MG/3ML) 0.083% nebulizer solution, Take 2.5 mg by nebulization 4 (Four) Times a Day As " Needed for Wheezing or Shortness of Air., Disp: 60 each, Rfl: 11    albuterol sulfate  (90 Base) MCG/ACT inhaler, Inhale 2 puffs Every 4 (Four) Hours As Needed for Wheezing or Shortness of Air., Disp: 6.7 g, Rfl: 11    arformoterol (BROVANA) 15 MCG/2ML nebulizer solution, Take 2 mL by nebulization 2 (Two) Times a Day., Disp: 120 mL, Rfl: 11    aspirin 81 MG EC tablet, Take 1 tablet by mouth Daily., Disp: 90 tablet, Rfl: 0    atorvastatin (LIPITOR) 40 MG tablet, Take 1 tablet by mouth Daily., Disp: 90 tablet, Rfl: 0    buPROPion XL (WELLBUTRIN XL) 150 MG 24 hr tablet, Take 1 tablet by mouth Daily., Disp: 90 tablet, Rfl: 0    busPIRone HCl (BUSPAR PO), Take  by mouth., Disp: , Rfl:     spironolactone (ALDACTONE) 50 MG tablet, Take 1 tablet by mouth Daily., Disp: 90 tablet, Rfl: 0    Symbicort 160-4.5 MCG/ACT inhaler, Inhale 2 puffs 2 (Two) Times a Day., Disp: , Rfl:     tiotropium bromide monohydrate (Spiriva Respimat) 2.5 MCG/ACT aerosol solution inhaler, Inhale 2 puffs Daily., Disp: 1 each, Rfl: 11    chlorhexidine (Peridex) 0.12 % solution, Apply 15 mL to the mouth or throat 2 (Two) Times a Day., Disp: 118 mL, Rfl: 1    Assessment & Plan   Diagnoses and all orders for this visit:    1. Skin lesion (Primary)  -     Ambulatory Referral to Dermatology  -     nystatin (MYCOSTATIN) 144367 UNIT/GM cream; Apply 1 application  topically to the appropriate area as directed 2 (Two) Times a Day.  Dispense: 30 g; Refill: 0  -May be fungal but ultimately needs a skin biopsy as it could be a skin cancer    2. Anxiety and depression  -     buPROPion XL (WELLBUTRIN XL) 150 MG 24 hr tablet; Take 1 tablet by mouth Daily.  Dispense: 90 tablet; Refill: 3  -     busPIRone (BUSPAR) 5 MG tablet; Take 1 tablet by mouth 2 (Two) Times a Day.  Dispense: 60 tablet; Refill: 2  -Add BuSpar to Wellbutrin for high anxiety    3. Essential hypertension  -     CBC (No Diff)  -     Comprehensive Metabolic Panel  -     Lipid Panel  -      amLODIPine (NORVASC) 10 MG tablet; Take 1 tablet by mouth Daily.  Dispense: 30 tablet; Refill: 2  -Very elevated today.  Add amlodipine, follow-up 2 weeks    4. Prostate cancer screening  -     PSA Screen             Kenya Martinez MD    Answers submitted by the patient for this visit:  Primary Reason for Visit (Submitted on 8/21/2023)  What is the primary reason for your visit?: High Blood Pressure  High Blood Pressure Questionnaire (Submitted on 8/21/2023)  Chief Complaint: Hypertension  Chronicity: new  Onset: more than 1 year ago  anxiety: Yes  blurred vision: No  malaise/fatigue: No  orthopnea: No  palpitations: No  peripheral edema: Yes  shortness of breath: No  CAD risks: dyslipidemia

## 2023-09-11 RX ORDER — SPIRONOLACTONE 50 MG/1
50 TABLET, FILM COATED ORAL DAILY
Qty: 90 TABLET | Refills: 0 | Status: SHIPPED | OUTPATIENT
Start: 2023-09-11

## 2023-09-12 ENCOUNTER — TELEMEDICINE (OUTPATIENT)
Dept: INTERNAL MEDICINE | Facility: CLINIC | Age: 58
End: 2023-09-12
Payer: COMMERCIAL

## 2023-09-12 VITALS
WEIGHT: 188 LBS | DIASTOLIC BLOOD PRESSURE: 70 MMHG | BODY MASS INDEX: 27.85 KG/M2 | HEIGHT: 69 IN | SYSTOLIC BLOOD PRESSURE: 132 MMHG

## 2023-09-12 DIAGNOSIS — F41.9 ANXIETY AND DEPRESSION: ICD-10-CM

## 2023-09-12 DIAGNOSIS — U07.1 COVID-19 VIRUS INFECTION: ICD-10-CM

## 2023-09-12 DIAGNOSIS — I10 ESSENTIAL HYPERTENSION: Primary | ICD-10-CM

## 2023-09-12 DIAGNOSIS — F32.A ANXIETY AND DEPRESSION: ICD-10-CM

## 2023-09-12 PROBLEM — S09.93XA TONGUE INJURY, INITIAL ENCOUNTER: Status: RESOLVED | Noted: 2023-07-25 | Resolved: 2023-09-12

## 2023-09-12 PROCEDURE — 99214 OFFICE O/P EST MOD 30 MIN: CPT | Performed by: INTERNAL MEDICINE

## 2023-09-12 RX ORDER — AMLODIPINE BESYLATE 10 MG/1
10 TABLET ORAL DAILY
Qty: 90 TABLET | Refills: 2 | Status: SHIPPED | OUTPATIENT
Start: 2023-09-12

## 2023-09-12 RX ORDER — BENZONATATE 100 MG/1
100 CAPSULE ORAL 3 TIMES DAILY PRN
Qty: 21 CAPSULE | Refills: 0 | Status: SHIPPED | OUTPATIENT
Start: 2023-09-12

## 2023-09-12 NOTE — PROGRESS NOTES
"Mode of Visit: Video  Location of patient: home  You have chosen to receive care through a telehealth visit.  The patient has signed the video visit consent form.  The visit included audio and video interaction. No technical issues occurred during this visit.     Subjective   Aguila Finn Jr. is a 57 y.o. male here for follow-up hypertension, anxiety, diagnosed with COVID yesterday.  He says he does not feel well.  He does have history of COPD.  He is interested in taking the antiviral.  No shortness of breath or wheezing.  Taking some over-the-counter severe cold medications.  Blood pressure is much better today, he actually came into the office to have his blood pressure checked.  He denies any negative side effects to medication.  He says anxiety is improved on the BuSpar as well.    I have reviewed the patient's relevant medical history and confirmed it is accurate.    I have personally reviewed and performed the ROS. Kenya Martinez MD     Objective   /70 (BP Location: Left arm, Patient Position: Sitting)   Ht 175.3 cm (69\")   Wt 85.3 kg (188 lb)   BMI 27.76 kg/m²     Physical Exam  Vitals reviewed.   Constitutional:       Appearance: He is well-developed.   Pulmonary:      Effort: Pulmonary effort is normal.   Neurological:      General: No focal deficit present.      Mental Status: He is alert.   Psychiatric:         Behavior: Behavior normal.         Thought Content: Thought content normal.         Judgment: Judgment normal.         Current Outpatient Medications:     albuterol (PROVENTIL) (2.5 MG/3ML) 0.083% nebulizer solution, Take 2.5 mg by nebulization 4 (Four) Times a Day As Needed for Wheezing or Shortness of Air., Disp: 60 each, Rfl: 11    albuterol sulfate  (90 Base) MCG/ACT inhaler, Inhale 2 puffs Every 4 (Four) Hours As Needed for Wheezing or Shortness of Air., Disp: 6.7 g, Rfl: 11    amLODIPine (NORVASC) 10 MG tablet, Take 1 tablet by mouth Daily., Disp: 30 tablet, " Rfl: 2    arformoterol (BROVANA) 15 MCG/2ML nebulizer solution, Take 2 mL by nebulization 2 (Two) Times a Day., Disp: 120 mL, Rfl: 11    aspirin 81 MG EC tablet, Take 1 tablet by mouth Daily., Disp: 90 tablet, Rfl: 0    atorvastatin (LIPITOR) 40 MG tablet, Take 1 tablet by mouth Daily., Disp: 90 tablet, Rfl: 0    buPROPion XL (WELLBUTRIN XL) 150 MG 24 hr tablet, Take 1 tablet by mouth Daily., Disp: 90 tablet, Rfl: 3    busPIRone (BUSPAR) 5 MG tablet, Take 1 tablet by mouth 2 (Two) Times a Day., Disp: 60 tablet, Rfl: 2    nystatin (MYCOSTATIN) 945619 UNIT/GM cream, Apply 1 application  topically to the appropriate area as directed 2 (Two) Times a Day., Disp: 30 g, Rfl: 0    spironolactone (ALDACTONE) 50 MG tablet, TAKE 1 TABLET BY MOUTH DAILY, Disp: 90 tablet, Rfl: 0    Symbicort 160-4.5 MCG/ACT inhaler, Inhale 2 puffs 2 (Two) Times a Day., Disp: , Rfl:     tiotropium bromide monohydrate (Spiriva Respimat) 2.5 MCG/ACT aerosol solution inhaler, Inhale 2 puffs Daily., Disp: 1 each, Rfl: 11    Assessment & Plan   Diagnoses and all orders for this visit:    1. Essential hypertension (Primary)  -     amLODIPine (NORVASC) 10 MG tablet; Take 1 tablet by mouth Daily.  Dispense: 90 tablet; Refill: 2  -Vastly better blood pressure control, continue current regimen    2. Anxiety and depression  -Improved on BuSpar    3. COVID-19 virus infection  -     Molnupiravir (LAGEVRIO) 200 MG capsule; Take 4 capsules by mouth Every 12 (Twelve) Hours for 5 days.  Dispense: 40 capsule; Refill: 0  -     benzonatate (Tessalon Perles) 100 MG capsule; Take 1 capsule by mouth 3 (Three) Times a Day As Needed for Cough.  Dispense: 21 capsule; Refill: 0  -Advised patient to take Mucinex and Delsym as needed    Recheck in 1 month with labs         Kenya Martinez MD

## 2023-09-26 NOTE — TELEPHONE ENCOUNTER
Fax refill  request for albuterol sulfate  (90 Base) MCG/ACT inhaler denied. Pt needs to make appointment for future refills.

## 2023-10-13 RX ORDER — SPIRONOLACTONE 50 MG/1
50 TABLET, FILM COATED ORAL DAILY
Qty: 90 TABLET | Refills: 0 | Status: SHIPPED | OUTPATIENT
Start: 2023-10-13

## 2023-11-07 DIAGNOSIS — E78.5 HYPERLIPIDEMIA, UNSPECIFIED HYPERLIPIDEMIA TYPE: ICD-10-CM

## 2023-11-08 RX ORDER — ATORVASTATIN CALCIUM 40 MG/1
40 TABLET, FILM COATED ORAL DAILY
Qty: 90 TABLET | Refills: 1 | Status: SHIPPED | OUTPATIENT
Start: 2023-11-08

## 2023-11-15 DIAGNOSIS — I10 ESSENTIAL HYPERTENSION: ICD-10-CM

## 2023-11-15 RX ORDER — AMLODIPINE BESYLATE 10 MG/1
10 TABLET ORAL DAILY
Qty: 90 TABLET | Refills: 2 | Status: SHIPPED | OUTPATIENT
Start: 2023-11-15

## 2023-11-15 NOTE — TELEPHONE ENCOUNTER
Received refill request for pt Symbicort, I called [facility] and this has been denied, Pt needs to be seen for refills. I called pharmacy they are aware and will let pt know

## 2023-11-17 RX ORDER — BUDESONIDE AND FORMOTEROL FUMARATE DIHYDRATE 160; 4.5 UG/1; UG/1
2 AEROSOL RESPIRATORY (INHALATION) 2 TIMES DAILY
Qty: 10.2 EACH | Refills: 3 | Status: SHIPPED | OUTPATIENT
Start: 2023-11-17

## 2023-11-17 NOTE — TELEPHONE ENCOUNTER
[facility] faxed a refill request for BUDESONIDE / FORM 160/4.5 MCG (120 INH) Generic for SYMBICORT. Approved and sent.

## 2023-11-24 DIAGNOSIS — F41.9 ANXIETY AND DEPRESSION: ICD-10-CM

## 2023-11-24 DIAGNOSIS — F32.A ANXIETY AND DEPRESSION: ICD-10-CM

## 2023-11-24 RX ORDER — BUSPIRONE HYDROCHLORIDE 5 MG/1
5 TABLET ORAL 2 TIMES DAILY
Qty: 180 TABLET | Refills: 0 | Status: SHIPPED | OUTPATIENT
Start: 2023-11-24

## 2024-02-12 RX ORDER — SPIRONOLACTONE 50 MG/1
50 TABLET, FILM COATED ORAL DAILY
Qty: 90 TABLET | Refills: 0 | Status: SHIPPED | OUTPATIENT
Start: 2024-02-12

## 2024-02-26 DIAGNOSIS — F41.9 ANXIETY AND DEPRESSION: ICD-10-CM

## 2024-02-26 DIAGNOSIS — F32.A ANXIETY AND DEPRESSION: ICD-10-CM

## 2024-02-26 RX ORDER — BUSPIRONE HYDROCHLORIDE 5 MG/1
5 TABLET ORAL 2 TIMES DAILY
Qty: 180 TABLET | Refills: 1 | Status: SHIPPED | OUTPATIENT
Start: 2024-02-26

## 2024-05-01 ENCOUNTER — LAB (OUTPATIENT)
Dept: INTERNAL MEDICINE | Facility: CLINIC | Age: 59
End: 2024-05-01
Payer: COMMERCIAL

## 2024-05-01 ENCOUNTER — OFFICE VISIT (OUTPATIENT)
Dept: INTERNAL MEDICINE | Facility: CLINIC | Age: 59
End: 2024-05-01
Payer: COMMERCIAL

## 2024-05-01 VITALS
HEIGHT: 69 IN | OXYGEN SATURATION: 94 % | HEART RATE: 102 BPM | WEIGHT: 178 LBS | BODY MASS INDEX: 26.36 KG/M2 | DIASTOLIC BLOOD PRESSURE: 82 MMHG | SYSTOLIC BLOOD PRESSURE: 140 MMHG

## 2024-05-01 DIAGNOSIS — I10 ESSENTIAL HYPERTENSION: Primary | ICD-10-CM

## 2024-05-01 DIAGNOSIS — F32.A ANXIETY AND DEPRESSION: ICD-10-CM

## 2024-05-01 DIAGNOSIS — F41.9 ANXIETY AND DEPRESSION: ICD-10-CM

## 2024-05-01 DIAGNOSIS — I10 ESSENTIAL HYPERTENSION: ICD-10-CM

## 2024-05-01 DIAGNOSIS — E78.5 HYPERLIPIDEMIA, UNSPECIFIED HYPERLIPIDEMIA TYPE: ICD-10-CM

## 2024-05-01 DIAGNOSIS — E87.6 HYPOKALEMIA: Primary | ICD-10-CM

## 2024-05-01 PROBLEM — R07.9 CHEST PAIN: Status: RESOLVED | Noted: 2020-01-09 | Resolved: 2024-05-01

## 2024-05-01 LAB
ANION GAP SERPL CALCULATED.3IONS-SCNC: 13.4 MMOL/L (ref 5–15)
BUN SERPL-MCNC: 21 MG/DL (ref 6–20)
BUN/CREAT SERPL: 14.8 (ref 7–25)
CALCIUM SPEC-SCNC: 9.5 MG/DL (ref 8.6–10.5)
CHLORIDE SERPL-SCNC: 100 MMOL/L (ref 98–107)
CO2 SERPL-SCNC: 30.6 MMOL/L (ref 22–29)
CREAT SERPL-MCNC: 1.42 MG/DL (ref 0.76–1.27)
EGFRCR SERPLBLD CKD-EPI 2021: 56.9 ML/MIN/1.73
GLUCOSE SERPL-MCNC: 91 MG/DL (ref 65–99)
POTASSIUM SERPL-SCNC: 3 MMOL/L (ref 3.5–5.2)
SODIUM SERPL-SCNC: 144 MMOL/L (ref 136–145)

## 2024-05-01 PROCEDURE — 36415 COLL VENOUS BLD VENIPUNCTURE: CPT | Performed by: INTERNAL MEDICINE

## 2024-05-01 PROCEDURE — 80048 BASIC METABOLIC PNL TOTAL CA: CPT | Performed by: INTERNAL MEDICINE

## 2024-05-01 PROCEDURE — 99214 OFFICE O/P EST MOD 30 MIN: CPT | Performed by: INTERNAL MEDICINE

## 2024-05-01 RX ORDER — CLOBETASOL PROPIONATE 0.46 MG/ML
SOLUTION TOPICAL 2 TIMES DAILY
COMMUNITY
Start: 2024-04-26

## 2024-05-01 RX ORDER — ATORVASTATIN CALCIUM 40 MG/1
40 TABLET, FILM COATED ORAL DAILY
Qty: 90 TABLET | Refills: 2 | Status: SHIPPED | OUTPATIENT
Start: 2024-05-01

## 2024-05-01 RX ORDER — BUSPIRONE HYDROCHLORIDE 5 MG/1
5 TABLET ORAL 2 TIMES DAILY
Qty: 180 TABLET | Refills: 2 | Status: CANCELLED | OUTPATIENT
Start: 2024-05-01

## 2024-05-01 RX ORDER — KETOCONAZOLE 20 MG/ML
SHAMPOO TOPICAL WEEKLY
COMMUNITY
Start: 2024-04-26

## 2024-05-01 RX ORDER — BUSPIRONE HYDROCHLORIDE 7.5 MG/1
7.5 TABLET ORAL 2 TIMES DAILY
Qty: 60 TABLET | Refills: 2 | Status: SHIPPED | OUTPATIENT
Start: 2024-05-01

## 2024-05-01 RX ORDER — SPIRONOLACTONE 50 MG/1
50 TABLET, FILM COATED ORAL DAILY
Qty: 90 TABLET | Refills: 2 | Status: SHIPPED | OUTPATIENT
Start: 2024-05-01

## 2024-05-01 RX ORDER — MOMETASONE FUROATE 1 MG/G
1 OINTMENT TOPICAL DAILY
COMMUNITY
Start: 2024-04-26

## 2024-05-01 RX ORDER — AMLODIPINE BESYLATE 10 MG/1
10 TABLET ORAL DAILY
Qty: 90 TABLET | Refills: 2 | Status: SHIPPED | OUTPATIENT
Start: 2024-05-01

## 2024-05-01 NOTE — PROGRESS NOTES
"Subjective   Aguila Finn Jr. is a 58 y.o. male here for Follow-up HTN, HLD, anxiety and depression.  He says recently anxiety and depression has been worse.  He would like to see a psychiatrist.  Blood pressure has been okay.  Due for yearly blood work.  He does have chronic hypokalemia.  He does not recall what his prior creatinine level has been.  He just got back from [place] 2 weeks ago.  Has seen dermatology, had a biopsy done on left lower extremity chronic rash.  Still waiting on those results.    I have reviewed the patient's relevant medical history and confirmed it is accurate.    I have personally reviewed and performed the ROS. Kenya Martinez MD     Objective   /82 (BP Location: Left arm)   Pulse 102   Ht 175.3 cm (69\")   Wt 80.7 kg (178 lb)   SpO2 94%   BMI 26.29 kg/m²     Physical Exam  Constitutional:       Appearance: He is well-developed.   Pulmonary:      Effort: Pulmonary effort is normal.   Neurological:      General: No focal deficit present.      Mental Status: He is alert.   Psychiatric:         Behavior: Behavior normal.         Thought Content: Thought content normal.         Judgment: Judgment normal.           Current Outpatient Medications:     albuterol (PROVENTIL) (2.5 MG/3ML) 0.083% nebulizer solution, Take 2.5 mg by nebulization 4 (Four) Times a Day As Needed for Wheezing or Shortness of Air., Disp: 60 each, Rfl: 11    albuterol sulfate  (90 Base) MCG/ACT inhaler, Inhale 2 puffs Every 4 (Four) Hours As Needed for Wheezing or Shortness of Air., Disp: 6.7 g, Rfl: 11    amLODIPine (NORVASC) 10 MG tablet, Take 1 tablet by mouth Daily., Disp: 90 tablet, Rfl: 2    arformoterol (BROVANA) 15 MCG/2ML nebulizer solution, Take 2 mL by nebulization 2 (Two) Times a Day., Disp: 120 mL, Rfl: 11    aspirin 81 MG EC tablet, Take 1 tablet by mouth Daily., Disp: 90 tablet, Rfl: 0    atorvastatin (LIPITOR) 40 MG tablet, Take 1 tablet by mouth Daily., Disp: 90 tablet, " Rfl: 1    busPIRone (BUSPAR) 5 MG tablet, TAKE 1 TABLET BY MOUTH TWICE A DAY, Disp: 180 tablet, Rfl: 1    clobetasol (TEMOVATE) 0.05 % external solution, 2 (Two) Times a Day., Disp: , Rfl:     hydrocortisone 2.5 % cream, 1 Application 2 (Two) Times a Day., Disp: , Rfl:     ketoconazole (NIZORAL) 2 % shampoo, 1 (One) Time Per Week., Disp: , Rfl:     mometasone (ELOCON) 0.1 % ointment, 1 Application Daily., Disp: , Rfl:     spironolactone (ALDACTONE) 50 MG tablet, Take 1 tablet by mouth Daily., Disp: 90 tablet, Rfl: 0    Symbicort 160-4.5 MCG/ACT inhaler, Inhale 2 puffs 2 (Two) Times a Day., Disp: 10.2 each, Rfl: 3    buPROPion XL (WELLBUTRIN XL) 150 MG 24 hr tablet, Take 1 tablet by mouth Daily. (Patient not taking: Reported on 5/1/2024), Disp: 90 tablet, Rfl: 3    Assessment & Plan   Diagnoses and all orders for this visit:    1. Hypokalemia (Primary)  -Recheck BMP, may need K supplement    2. Essential hypertension  -     amLODIPine (NORVASC) 10 MG tablet; Take 1 tablet by mouth Daily.  Dispense: 90 tablet; Refill: 2  -     Basic Metabolic Panel  -Continue amlodipine, check BP values at home    3. Hyperlipidemia, unspecified hyperlipidemia type  -     atorvastatin (LIPITOR) 40 MG tablet; Take 1 tablet by mouth Daily.  Dispense: 90 tablet; Refill: 2    4. Anxiety and depression  -Worsened, increase BuSpar to 7.5 mg twice daily and refer to behavioral health    Other orders  -     spironolactone (ALDACTONE) 50 MG tablet; Take 1 tablet by mouth Daily.  Dispense: 90 tablet; Refill: 2  -     busPIRone (BUSPAR) 7.5 MG tablet; Take 1 tablet by mouth 2 (Two) Times a Day.  Dispense: 60 tablet; Refill: 2             Kenya Martinez MD      Answers submitted by the patient for this visit:  Primary Reason for Visit (Submitted on 4/30/2024)  What is the primary reason for your visit?: Other  Other (Submitted on 4/30/2024)  Please describe your symptoms.: Medication refills  Have you had these symptoms before?: No  How long  have you been having these symptoms?: 1-4 days  Please list any medications you are currently taking for this condition.: Med refills  Please describe any probable cause for these symptoms. : Med refills

## 2024-05-02 DIAGNOSIS — I10 ESSENTIAL HYPERTENSION: Primary | ICD-10-CM

## 2024-05-02 DIAGNOSIS — E87.6 HYPOKALEMIA: ICD-10-CM

## 2024-05-02 RX ORDER — POTASSIUM CHLORIDE 20 MEQ/1
20 TABLET, EXTENDED RELEASE ORAL DAILY
Qty: 30 TABLET | Refills: 2 | Status: SHIPPED | OUTPATIENT
Start: 2024-05-02

## 2024-05-09 DIAGNOSIS — Z83.3 FAMILY HISTORY OF DIABETES MELLITUS: Primary | ICD-10-CM

## 2024-05-10 ENCOUNTER — LAB (OUTPATIENT)
Dept: INTERNAL MEDICINE | Facility: CLINIC | Age: 59
End: 2024-05-10
Payer: COMMERCIAL

## 2024-05-10 DIAGNOSIS — F31.76 BIPOLAR 1 DISORDER, DEPRESSED, FULL REMISSION: ICD-10-CM

## 2024-05-10 DIAGNOSIS — I10 ESSENTIAL HYPERTENSION: ICD-10-CM

## 2024-05-10 DIAGNOSIS — F41.9 ANXIETY AND DEPRESSION: Primary | ICD-10-CM

## 2024-05-10 DIAGNOSIS — E87.6 CHRONIC HYPOKALEMIA: ICD-10-CM

## 2024-05-10 DIAGNOSIS — F17.210 CIGARETTE NICOTINE DEPENDENCE WITHOUT COMPLICATION: ICD-10-CM

## 2024-05-10 DIAGNOSIS — F32.A ANXIETY AND DEPRESSION: Primary | ICD-10-CM

## 2024-05-10 LAB
ANION GAP SERPL CALCULATED.3IONS-SCNC: 11.1 MMOL/L (ref 5–15)
BUN SERPL-MCNC: 15 MG/DL (ref 6–20)
BUN/CREAT SERPL: 12.3 (ref 7–25)
CALCIUM SPEC-SCNC: 8.8 MG/DL (ref 8.6–10.5)
CHLORIDE SERPL-SCNC: 102 MMOL/L (ref 98–107)
CO2 SERPL-SCNC: 30.9 MMOL/L (ref 22–29)
CREAT SERPL-MCNC: 1.22 MG/DL (ref 0.76–1.27)
EGFRCR SERPLBLD CKD-EPI 2021: 68.3 ML/MIN/1.73
GLUCOSE SERPL-MCNC: 84 MG/DL (ref 65–99)
HBA1C MFR BLD: 5.3 % (ref 4.8–5.6)
POTASSIUM SERPL-SCNC: 3.3 MMOL/L (ref 3.5–5.2)
SODIUM SERPL-SCNC: 144 MMOL/L (ref 136–145)

## 2024-05-10 PROCEDURE — 80048 BASIC METABOLIC PNL TOTAL CA: CPT | Performed by: INTERNAL MEDICINE

## 2024-05-10 PROCEDURE — 36415 COLL VENOUS BLD VENIPUNCTURE: CPT | Performed by: INTERNAL MEDICINE

## 2024-05-10 PROCEDURE — 83036 HEMOGLOBIN GLYCOSYLATED A1C: CPT | Performed by: INTERNAL MEDICINE

## 2024-05-13 ENCOUNTER — TELEPHONE (OUTPATIENT)
Dept: INTERNAL MEDICINE | Facility: CLINIC | Age: 59
End: 2024-05-13
Payer: COMMERCIAL

## 2024-05-13 DIAGNOSIS — E87.6 HYPOKALEMIA: ICD-10-CM

## 2024-05-13 RX ORDER — POTASSIUM CHLORIDE 20 MEQ/1
20 TABLET, EXTENDED RELEASE ORAL 2 TIMES DAILY
Qty: 60 TABLET | Refills: 0 | Status: SHIPPED | OUTPATIENT
Start: 2024-05-13

## 2024-05-20 ENCOUNTER — LAB (OUTPATIENT)
Dept: INTERNAL MEDICINE | Facility: CLINIC | Age: 59
End: 2024-05-20
Payer: COMMERCIAL

## 2024-05-20 DIAGNOSIS — I10 ESSENTIAL HYPERTENSION: Primary | ICD-10-CM

## 2024-05-20 LAB
ANION GAP SERPL CALCULATED.3IONS-SCNC: 13 MMOL/L (ref 5–15)
BUN SERPL-MCNC: 13 MG/DL (ref 6–20)
BUN/CREAT SERPL: 9.9 (ref 7–25)
CALCIUM SPEC-SCNC: 9.5 MG/DL (ref 8.6–10.5)
CHLORIDE SERPL-SCNC: 102 MMOL/L (ref 98–107)
CO2 SERPL-SCNC: 30 MMOL/L (ref 22–29)
CREAT SERPL-MCNC: 1.31 MG/DL (ref 0.76–1.27)
EGFRCR SERPLBLD CKD-EPI 2021: 62.7 ML/MIN/1.73
GLUCOSE SERPL-MCNC: 92 MG/DL (ref 65–99)
POTASSIUM SERPL-SCNC: 4 MMOL/L (ref 3.5–5.2)
SODIUM SERPL-SCNC: 145 MMOL/L (ref 136–145)

## 2024-05-20 PROCEDURE — 36415 COLL VENOUS BLD VENIPUNCTURE: CPT | Performed by: INTERNAL MEDICINE

## 2024-05-21 ENCOUNTER — TELEPHONE (OUTPATIENT)
Age: 59
End: 2024-05-21

## 2024-05-21 ENCOUNTER — OFFICE VISIT (OUTPATIENT)
Age: 59
End: 2024-05-21
Payer: COMMERCIAL

## 2024-05-21 VITALS
OXYGEN SATURATION: 94 % | WEIGHT: 179.1 LBS | SYSTOLIC BLOOD PRESSURE: 164 MMHG | HEIGHT: 69 IN | BODY MASS INDEX: 26.53 KG/M2 | HEART RATE: 110 BPM | DIASTOLIC BLOOD PRESSURE: 98 MMHG

## 2024-05-21 DIAGNOSIS — F41.1 GAD (GENERALIZED ANXIETY DISORDER): ICD-10-CM

## 2024-05-21 DIAGNOSIS — Z51.81 ENCOUNTER FOR THERAPEUTIC DRUG MONITORING: ICD-10-CM

## 2024-05-21 DIAGNOSIS — F99 INSOMNIA DUE TO OTHER MENTAL DISORDER: ICD-10-CM

## 2024-05-21 DIAGNOSIS — F33.1 MODERATE EPISODE OF RECURRENT MAJOR DEPRESSIVE DISORDER: Primary | ICD-10-CM

## 2024-05-21 DIAGNOSIS — F51.05 INSOMNIA DUE TO OTHER MENTAL DISORDER: ICD-10-CM

## 2024-05-21 RX ORDER — MIRTAZAPINE 7.5 MG/1
7.5 TABLET, FILM COATED ORAL NIGHTLY
Qty: 90 TABLET | Refills: 0 | Status: SHIPPED | OUTPATIENT
Start: 2024-05-21

## 2024-05-21 NOTE — TELEPHONE ENCOUNTER
Called PT to inform him per provider PT is to continue taking Buspar Rx. PT verbalized understanding and voiced appreciation.
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
## 2024-05-27 DIAGNOSIS — E78.5 HYPERLIPIDEMIA, UNSPECIFIED HYPERLIPIDEMIA TYPE: ICD-10-CM

## 2024-05-28 LAB
1OH-MIDAZOLAM UR QL SCN: NOT DETECTED NG/MG CREAT
6MAM UR QL SCN: NEGATIVE NG/ML
7AMINOCLONAZEPAM/CREAT UR: NOT DETECTED NG/MG CREAT
A-OH ALPRAZ/CREAT UR: NOT DETECTED NG/MG CREAT
A-OH-TRIAZOLAM/CREAT UR CFM: NOT DETECTED NG/MG CREAT
ACP UR QL CFM: NOT DETECTED
ALPRAZ/CREAT UR CFM: NOT DETECTED NG/MG CREAT
AMPHETAMINES UR QL SCN: NEGATIVE NG/ML
APAP UR QL SCN: NEGATIVE UG/ML
BARBITURATES UR QL SCN: NEGATIVE NG/ML
BENZODIAZ SCN METH UR: NEGATIVE
BUPRENORPHINE UR QL SCN: NEGATIVE
BUPRENORPHINE/CREAT UR: NOT DETECTED NG/MG CREAT
CANNABINOIDS UR QL CFM: NORMAL
CANNABINOIDS UR QL SCN: NORMAL NG/ML
CARBOXYTHC UR CFM-MCNC: 203 NG/MG CREAT
CARISOPRODOL UR QL: NEGATIVE NG/ML
CLONAZEPAM/CREAT UR CFM: NOT DETECTED NG/MG CREAT
COCAINE+BZE UR QL SCN: NEGATIVE NG/ML
CREAT UR-MCNC: 189 MG/DL
D-METHORPHAN UR-MCNC: NOT DETECTED NG/ML
D-METHORPHAN+LEVORPHANOL UR QL: NOT DETECTED
DESALKYLFLURAZ/CREAT UR: NOT DETECTED NG/MG CREAT
DIAZEPAM/CREAT UR: NOT DETECTED NG/MG CREAT
ETHANOL UR QL SCN: NEGATIVE G/DL
ETHANOL UR QL SCN: NEGATIVE NG/ML
FENTANYL CTO UR SCN-MCNC: NEGATIVE NG/ML
FENTANYL/CREAT UR: NOT DETECTED NG/MG CREAT
FLUNITRAZEPAM UR QL SCN: NOT DETECTED NG/MG CREAT
GABAPENTIN UR-MCNC: NEGATIVE UG/ML
HYPNOTICS UR QL SCN: NEGATIVE
KETAMINE UR QL: NOT DETECTED
LORAZEPAM/CREAT UR: NOT DETECTED NG/MG CREAT
MEPERIDINE UR QL SCN: NEGATIVE NG/ML
METHADONE UR QL SCN: NEGATIVE NG/ML
METHADONE+METAB UR QL SCN: NEGATIVE NG/ML
MIDAZOLAM/CREAT UR CFM: NOT DETECTED NG/MG CREAT
MISCELLANEOUS, UR: NEGATIVE
NORBUPRENORPHINE/CREAT UR: NOT DETECTED NG/MG CREAT
NORDIAZEPAM/CREAT UR: NOT DETECTED NG/MG CREAT
NORFENTANYL/CREAT UR: NOT DETECTED NG/MG CREAT
NORFLUNITRAZEPAM UR-MCNC: NOT DETECTED NG/MG CREAT
NORKETAMINE UR-MCNC: NOT DETECTED UG/ML
OPIATES UR SCN-MCNC: NEGATIVE NG/ML
OTHER HALLUCINOGENS UR: NEGATIVE
OXAZEPAM/CREAT UR: NOT DETECTED NG/MG CREAT
OXYCODONE CTO UR SCN-MCNC: NEGATIVE NG/ML
PCP UR QL SCN: NEGATIVE NG/ML
PRESCRIBED MEDICATIONS: NORMAL
PROPOXYPH UR QL SCN: NEGATIVE NG/ML
TAPENTADOL CTO UR SCN-MCNC: NEGATIVE NG/ML
TEMAZEPAM/CREAT UR: NOT DETECTED NG/MG CREAT
TRAMADOL UR QL SCN: NEGATIVE NG/ML
ZALEPLON UR-MCNC: NOT DETECTED NG/ML
ZOLPIDEM PHENYL-4-CARB UR QL SCN: NOT DETECTED
ZOLPIDEM UR QL SCN: NOT DETECTED
ZOPICLONE-N-OXIDE UR-MCNC: NOT DETECTED NG/ML

## 2024-05-28 RX ORDER — ASPIRIN 81 MG/1
81 TABLET, COATED ORAL DAILY
Qty: 90 TABLET | Refills: 3 | Status: SHIPPED | OUTPATIENT
Start: 2024-05-28

## 2024-06-08 DIAGNOSIS — E87.6 HYPOKALEMIA: ICD-10-CM

## 2024-06-10 RX ORDER — POTASSIUM CHLORIDE 1500 MG/1
20 TABLET, EXTENDED RELEASE ORAL 2 TIMES DAILY
Qty: 60 TABLET | Refills: 5 | Status: SHIPPED | OUTPATIENT
Start: 2024-06-10

## 2024-07-23 ENCOUNTER — LAB (OUTPATIENT)
Dept: INTERNAL MEDICINE | Facility: CLINIC | Age: 59
End: 2024-07-23
Payer: COMMERCIAL

## 2024-07-23 ENCOUNTER — OFFICE VISIT (OUTPATIENT)
Dept: INTERNAL MEDICINE | Facility: CLINIC | Age: 59
End: 2024-07-23
Payer: COMMERCIAL

## 2024-07-23 ENCOUNTER — HOSPITAL ENCOUNTER (OUTPATIENT)
Dept: GENERAL RADIOLOGY | Facility: HOSPITAL | Age: 59
Discharge: HOME | End: 2024-07-23
Admitting: INTERNAL MEDICINE
Payer: COMMERCIAL

## 2024-07-23 VITALS
HEIGHT: 69 IN | OXYGEN SATURATION: 95 % | HEART RATE: 118 BPM | BODY MASS INDEX: 25.77 KG/M2 | WEIGHT: 174 LBS | DIASTOLIC BLOOD PRESSURE: 84 MMHG | SYSTOLIC BLOOD PRESSURE: 122 MMHG

## 2024-07-23 DIAGNOSIS — E87.6 HYPOKALEMIA: ICD-10-CM

## 2024-07-23 DIAGNOSIS — J90 PLEURAL EFFUSION: ICD-10-CM

## 2024-07-23 DIAGNOSIS — I10 ESSENTIAL HYPERTENSION: Primary | ICD-10-CM

## 2024-07-23 DIAGNOSIS — R06.02 SHORTNESS OF BREATH: ICD-10-CM

## 2024-07-23 DIAGNOSIS — J44.9 CHRONIC OBSTRUCTIVE PULMONARY DISEASE, UNSPECIFIED COPD TYPE: ICD-10-CM

## 2024-07-23 DIAGNOSIS — Z12.11 COLON CANCER SCREENING: ICD-10-CM

## 2024-07-23 DIAGNOSIS — R00.0 TACHYCARDIA: Primary | ICD-10-CM

## 2024-07-23 PROCEDURE — 36415 COLL VENOUS BLD VENIPUNCTURE: CPT | Performed by: INTERNAL MEDICINE

## 2024-07-23 PROCEDURE — 71046 X-RAY EXAM CHEST 2 VIEWS: CPT

## 2024-07-23 PROCEDURE — 80053 COMPREHEN METABOLIC PANEL: CPT | Performed by: INTERNAL MEDICINE

## 2024-07-23 PROCEDURE — 83880 ASSAY OF NATRIURETIC PEPTIDE: CPT | Performed by: INTERNAL MEDICINE

## 2024-07-23 PROCEDURE — 85025 COMPLETE CBC W/AUTO DIFF WBC: CPT | Performed by: INTERNAL MEDICINE

## 2024-07-23 PROCEDURE — 99214 OFFICE O/P EST MOD 30 MIN: CPT | Performed by: INTERNAL MEDICINE

## 2024-07-23 RX ORDER — ALBUTEROL SULFATE 2.5 MG/3ML
2.5 SOLUTION RESPIRATORY (INHALATION) 4 TIMES DAILY PRN
Qty: 60 EACH | Refills: 11 | Status: SHIPPED | OUTPATIENT
Start: 2024-07-23

## 2024-07-23 RX ORDER — BUDESONIDE AND FORMOTEROL FUMARATE DIHYDRATE 160; 4.5 UG/1; UG/1
2 AEROSOL RESPIRATORY (INHALATION) 2 TIMES DAILY
Qty: 10.2 EACH | Refills: 3 | Status: SHIPPED | OUTPATIENT
Start: 2024-07-23

## 2024-07-23 RX ORDER — ARFORMOTEROL TARTRATE 15 UG/2ML
15 SOLUTION RESPIRATORY (INHALATION)
Qty: 120 ML | Refills: 11 | Status: SHIPPED | OUTPATIENT
Start: 2024-07-23

## 2024-07-23 NOTE — PROGRESS NOTES
"Subjective   Aguila Finn Jr. is a 58 y.o. male here initially for small amount of BRBPR which has since resolved.  He said he had been constipated and passed a small amount of bright red blood.  He is up-to-date on his colonoscopy he thinks but not entirely sure.  He says he is having more concerning symptoms including fast heart rate, shortness of breath with exertion.  He has COPD and he was seeing Dr. Rogers, but he had a CT scan and says that Dr. Rogers told him that he probably had lung cancer when he did not.  Thus, he did not want to return to Dr. Rogers.  It was recommended in 2022 that he have a repeat CT of the chest for a new nodule but that was never done.  Patient would like a new pulmonologist.  He would like refill of his nebulizers and inhalers.  He has been out of those so feels like it has been pretty hard to breathe.  He brings his smart watch readings which show mild tachycardia, 2 episodes of \"atrial fibrillation.\"    I have reviewed the patient's relevant medical history and confirmed it is accurate.    I have personally reviewed and performed the ROS. Kenya Martinez MD     Objective   /84 (BP Location: Left arm)   Pulse 118   Ht 175.3 cm (69\")   Wt 78.9 kg (174 lb)   SpO2 95%   BMI 25.70 kg/m²     Physical Exam  Vitals reviewed.   Constitutional:       Appearance: He is well-developed.   Cardiovascular:      Rate and Rhythm: Normal rate and regular rhythm.      Heart sounds: Normal heart sounds.   Pulmonary:      Effort: Pulmonary effort is normal.      Breath sounds: Decreased air movement present. Decreased breath sounds present. No wheezing or rales.   Skin:     General: Skin is warm and dry.   Neurological:      Mental Status: He is alert and oriented to person, place, and time.   Psychiatric:         Behavior: Behavior normal.         Thought Content: Thought content normal.           Current Outpatient Medications:     albuterol (PROVENTIL) (2.5 MG/3ML) " 0.083% nebulizer solution, Take 2.5 mg by nebulization 4 (Four) Times a Day As Needed for Wheezing or Shortness of Air., Disp: 60 each, Rfl: 11    amLODIPine (NORVASC) 10 MG tablet, Take 1 tablet by mouth Daily., Disp: 90 tablet, Rfl: 2    arformoterol (BROVANA) 15 MCG/2ML nebulizer solution, Take 2 mL by nebulization 2 (Two) Times a Day., Disp: 120 mL, Rfl: 11    aspirin (Aspirin Low Dose) 81 MG EC tablet, TAKE 1 TABLET BY MOUTH DAILY, Disp: 90 tablet, Rfl: 3    atorvastatin (LIPITOR) 40 MG tablet, Take 1 tablet by mouth Daily., Disp: 90 tablet, Rfl: 2    busPIRone (BUSPAR) 7.5 MG tablet, Take 1 tablet by mouth 2 (Two) Times a Day., Disp: 60 tablet, Rfl: 2    clobetasol (TEMOVATE) 0.05 % external solution, 2 (Two) Times a Day., Disp: , Rfl:     hydrocortisone 2.5 % cream, 1 Application 2 (Two) Times a Day., Disp: , Rfl:     ketoconazole (NIZORAL) 2 % shampoo, 1 (One) Time Per Week., Disp: , Rfl:     mirtazapine (REMERON) 7.5 MG tablet, Take 1 tablet by mouth Every Night., Disp: 90 tablet, Rfl: 0    mometasone (ELOCON) 0.1 % ointment, 1 Application Daily., Disp: , Rfl:     potassium chloride (Klor-Con M20) 20 MEQ CR tablet, TAKE 1 TABLET BY MOUTH 2 TIMES A DAY, Disp: 60 tablet, Rfl: 5    spironolactone (ALDACTONE) 50 MG tablet, Take 1 tablet by mouth Daily., Disp: 90 tablet, Rfl: 2    Symbicort 160-4.5 MCG/ACT inhaler, Inhale 2 puffs 2 (Two) Times a Day., Disp: 10.2 each, Rfl: 3      ECG 12 Lead    Date/Time: 7/24/2024 7:46 AM  Performed by: Kenya Martinez MD    Authorized by: Kenya Martinez MD  Comparison: not compared with previous ECG   Rhythm: sinus rhythm  Rate: tachycardic  BPM: 106  Conduction: conduction normal  QRS axis: normal  Other: no other findings  Other findings: non-specific ST-T wave changes    Clinical impression: non-specific ECG            Assessment & Plan   Diagnoses and all orders for this visit:    1. Tachycardia (Primary)  -     ECG 12 Lead  -Mild tachycardia  present  -Review of smart watch reported 2 episodes of possible A-fib, looking at those it looks more like PVC or possibly just a bad reading.  A-fib is in the differential, however, so will start with this workup and may need to get a Holter.  Will refer to heart and valve    2. Shortness of breath  -     XR Chest PA & Lateral  -     CBC & Differential  -Will need CT of the chest given abnormal in 2022 which I just discovered, previously reviewed by Dr. Rogers but patient was lost to follow-up as he did not want to return to Dr. Rogers, will get him a new pulmonologist    3. Chronic obstructive pulmonary disease, unspecified COPD type  -     Ambulatory Referral to Pulmonology  -     albuterol (PROVENTIL) (2.5 MG/3ML) 0.083% nebulizer solution; Take 2.5 mg by nebulization 4 (Four) Times a Day As Needed for Wheezing or Shortness of Air.  Dispense: 60 each; Refill: 11  -     arformoterol (BROVANA) 15 MCG/2ML nebulizer solution; Take 2 mL by nebulization 2 (Two) Times a Day.  Dispense: 120 mL; Refill: 11  -     Symbicort 160-4.5 MCG/ACT inhaler; Inhale 2 puffs 2 (Two) Times a Day.  Dispense: 10.2 each; Refill: 3    4. Hypokalemia  -     Comprehensive Metabolic Panel    5. Colon cancer screening  -     Ambulatory Referral For Screening Colonoscopy             Kenya Martinez MD

## 2024-07-24 LAB
ALBUMIN SERPL-MCNC: 4.2 G/DL (ref 3.5–5.2)
ALBUMIN/GLOB SERPL: 1.4 G/DL
ALP SERPL-CCNC: 100 U/L (ref 39–117)
ALT SERPL W P-5'-P-CCNC: 13 U/L (ref 1–41)
ANION GAP SERPL CALCULATED.3IONS-SCNC: 11.4 MMOL/L (ref 5–15)
AST SERPL-CCNC: 15 U/L (ref 1–40)
BASOPHILS # BLD AUTO: 0.07 10*3/MM3 (ref 0–0.2)
BASOPHILS NFR BLD AUTO: 0.8 % (ref 0–1.5)
BILIRUB SERPL-MCNC: 0.4 MG/DL (ref 0–1.2)
BUN SERPL-MCNC: 26 MG/DL (ref 6–20)
BUN/CREAT SERPL: 17.8 (ref 7–25)
CALCIUM SPEC-SCNC: 9.7 MG/DL (ref 8.6–10.5)
CHLORIDE SERPL-SCNC: 106 MMOL/L (ref 98–107)
CO2 SERPL-SCNC: 25.6 MMOL/L (ref 22–29)
CREAT SERPL-MCNC: 1.46 MG/DL (ref 0.76–1.27)
DEPRECATED RDW RBC AUTO: 45.1 FL (ref 37–54)
EGFRCR SERPLBLD CKD-EPI 2021: 55.1 ML/MIN/1.73
EOSINOPHIL # BLD AUTO: 0.46 10*3/MM3 (ref 0–0.4)
EOSINOPHIL NFR BLD AUTO: 5.3 % (ref 0.3–6.2)
ERYTHROCYTE [DISTWIDTH] IN BLOOD BY AUTOMATED COUNT: 13.5 % (ref 12.3–15.4)
GLOBULIN UR ELPH-MCNC: 2.9 GM/DL
GLUCOSE SERPL-MCNC: 96 MG/DL (ref 65–99)
HCT VFR BLD AUTO: 43.6 % (ref 37.5–51)
HGB BLD-MCNC: 14.3 G/DL (ref 13–17.7)
IMM GRANULOCYTES # BLD AUTO: 0.06 10*3/MM3 (ref 0–0.05)
IMM GRANULOCYTES NFR BLD AUTO: 0.7 % (ref 0–0.5)
LYMPHOCYTES # BLD AUTO: 0.86 10*3/MM3 (ref 0.7–3.1)
LYMPHOCYTES NFR BLD AUTO: 9.9 % (ref 19.6–45.3)
MCH RBC QN AUTO: 29.5 PG (ref 26.6–33)
MCHC RBC AUTO-ENTMCNC: 32.8 G/DL (ref 31.5–35.7)
MCV RBC AUTO: 89.9 FL (ref 79–97)
MONOCYTES # BLD AUTO: 0.83 10*3/MM3 (ref 0.1–0.9)
MONOCYTES NFR BLD AUTO: 9.5 % (ref 5–12)
NEUTROPHILS NFR BLD AUTO: 6.45 10*3/MM3 (ref 1.7–7)
NEUTROPHILS NFR BLD AUTO: 73.8 % (ref 42.7–76)
NRBC BLD AUTO-RTO: 0 /100 WBC (ref 0–0.2)
PLATELET # BLD AUTO: 224 10*3/MM3 (ref 140–450)
PMV BLD AUTO: 11.7 FL (ref 6–12)
POTASSIUM SERPL-SCNC: 5.2 MMOL/L (ref 3.5–5.2)
PROT SERPL-MCNC: 7.1 G/DL (ref 6–8.5)
RBC # BLD AUTO: 4.85 10*6/MM3 (ref 4.14–5.8)
SODIUM SERPL-SCNC: 143 MMOL/L (ref 136–145)
WBC NRBC COR # BLD AUTO: 8.73 10*3/MM3 (ref 3.4–10.8)

## 2024-07-24 PROCEDURE — 93000 ELECTROCARDIOGRAM COMPLETE: CPT | Performed by: INTERNAL MEDICINE

## 2024-07-25 ENCOUNTER — TELEPHONE (OUTPATIENT)
Dept: INTERNAL MEDICINE | Facility: CLINIC | Age: 59
End: 2024-07-25
Payer: COMMERCIAL

## 2024-07-25 ENCOUNTER — TELEPHONE (OUTPATIENT)
Dept: INTERNAL MEDICINE | Facility: CLINIC | Age: 59
End: 2024-07-25

## 2024-07-25 DIAGNOSIS — R93.89 ABNORMAL CHEST X-RAY: ICD-10-CM

## 2024-07-25 DIAGNOSIS — R06.02 SHORTNESS OF BREATH: Primary | ICD-10-CM

## 2024-07-25 DIAGNOSIS — J90 PLEURAL EFFUSION: ICD-10-CM

## 2024-07-25 LAB — NT-PROBNP SERPL-MCNC: <36 PG/ML (ref 0–900)

## 2024-07-25 NOTE — TELEPHONE ENCOUNTER
Called the pt b/c I got him scheduled for a ct scan 7/27/2024, the pt was fine with the appt. But said he was suppose to have lab orders in his chart to recheck his blood work, but I saw no active order, so I wanted to give a heads up in case something needs to be order for the pt. Please advise.

## 2024-07-25 NOTE — TELEPHONE ENCOUNTER
Caller: Aguila Finn Jr.    Relationship: Self    Best call back number: [phone]     What is the best time to reach you: ANY    Who are you requesting to speak with (clinical staff, provider,  specific staff member): RADHA    Do you know the name of the person who called: SELF    What was the call regarding: PATIENT WAS CHECKING ON THE BLOOD WORK REQUEST HE PUT IN EARLIER. I NOTICED IT WAS CLOSED ALREADY WITH NO FURTHER INFORMATION AND ATTEMPTED TO CONNECT HIM WITH RADHA BUT AT THE TIME SHE WAS NOT AVAILABLE.    Is it okay if the provider responds through MyChart: NO

## 2024-07-27 ENCOUNTER — HOSPITAL ENCOUNTER (OUTPATIENT)
Facility: HOSPITAL | Age: 59
Discharge: HOME | End: 2024-07-27
Admitting: INTERNAL MEDICINE
Payer: COMMERCIAL

## 2024-07-27 DIAGNOSIS — R93.89 ABNORMAL CHEST X-RAY: ICD-10-CM

## 2024-07-27 DIAGNOSIS — J90 PLEURAL EFFUSION: ICD-10-CM

## 2024-07-27 DIAGNOSIS — R06.02 SHORTNESS OF BREATH: ICD-10-CM

## 2024-07-27 PROCEDURE — 25510000001 IOPAMIDOL 61 % SOLUTION: Performed by: INTERNAL MEDICINE

## 2024-07-27 PROCEDURE — 71270 CT THORAX DX C-/C+: CPT

## 2024-07-27 RX ADMIN — IOPAMIDOL 80 ML: 612 INJECTION, SOLUTION INTRAVENOUS at 07:42

## 2024-07-30 ENCOUNTER — HOSPITAL ENCOUNTER (OUTPATIENT)
Dept: CARDIOLOGY | Facility: HOSPITAL | Age: 59
Discharge: HOME | End: 2024-07-30
Payer: COMMERCIAL

## 2024-07-30 ENCOUNTER — OFFICE VISIT (OUTPATIENT)
Age: 59
End: 2024-07-30
Payer: COMMERCIAL

## 2024-07-30 ENCOUNTER — OFFICE VISIT (OUTPATIENT)
Dept: CARDIOLOGY | Facility: HOSPITAL | Age: 59
End: 2024-07-30
Payer: COMMERCIAL

## 2024-07-30 VITALS
RESPIRATION RATE: 18 BRPM | BODY MASS INDEX: 25.74 KG/M2 | WEIGHT: 173.8 LBS | HEIGHT: 69 IN | OXYGEN SATURATION: 94 % | HEART RATE: 108 BPM | DIASTOLIC BLOOD PRESSURE: 78 MMHG | TEMPERATURE: 97.7 F | SYSTOLIC BLOOD PRESSURE: 150 MMHG

## 2024-07-30 VITALS
SYSTOLIC BLOOD PRESSURE: 150 MMHG | OXYGEN SATURATION: 98 % | BODY MASS INDEX: 25.77 KG/M2 | HEART RATE: 115 BPM | DIASTOLIC BLOOD PRESSURE: 92 MMHG | WEIGHT: 174.5 LBS

## 2024-07-30 DIAGNOSIS — I10 ESSENTIAL HYPERTENSION: ICD-10-CM

## 2024-07-30 DIAGNOSIS — R00.2 PALPITATIONS: ICD-10-CM

## 2024-07-30 DIAGNOSIS — F51.05 INSOMNIA DUE TO OTHER MENTAL DISORDER: ICD-10-CM

## 2024-07-30 DIAGNOSIS — F99 INSOMNIA DUE TO OTHER MENTAL DISORDER: ICD-10-CM

## 2024-07-30 DIAGNOSIS — F33.1 MODERATE EPISODE OF RECURRENT MAJOR DEPRESSIVE DISORDER: Primary | ICD-10-CM

## 2024-07-30 DIAGNOSIS — F41.1 GAD (GENERALIZED ANXIETY DISORDER): ICD-10-CM

## 2024-07-30 DIAGNOSIS — I25.84 CORONARY ARTERY CALCIFICATION: ICD-10-CM

## 2024-07-30 DIAGNOSIS — R07.9 CHEST PAIN, UNSPECIFIED TYPE: ICD-10-CM

## 2024-07-30 DIAGNOSIS — I25.10 CORONARY ARTERY CALCIFICATION: ICD-10-CM

## 2024-07-30 DIAGNOSIS — R06.02 SHORTNESS OF BREATH: Primary | ICD-10-CM

## 2024-07-30 RX ORDER — ISOSORBIDE MONONITRATE 30 MG/1
30 TABLET, EXTENDED RELEASE ORAL NIGHTLY
Qty: 30 TABLET | Refills: 1 | Status: ON HOLD | OUTPATIENT
Start: 2024-07-30

## 2024-07-30 RX ORDER — BUSPIRONE HYDROCHLORIDE 15 MG/1
15 TABLET ORAL 2 TIMES DAILY
Qty: 60 TABLET | Refills: 1 | Status: ON HOLD | OUTPATIENT
Start: 2024-07-30

## 2024-07-30 RX ORDER — MIRTAZAPINE 15 MG/1
15 TABLET, FILM COATED ORAL NIGHTLY
Qty: 30 TABLET | Refills: 1 | Status: ON HOLD | OUTPATIENT
Start: 2024-07-30

## 2024-07-30 RX ORDER — CARVEDILOL 6.25 MG/1
6.25 TABLET ORAL 2 TIMES DAILY
Qty: 60 TABLET | Refills: 1 | Status: ON HOLD | OUTPATIENT
Start: 2024-07-30

## 2024-07-30 NOTE — PROGRESS NOTES
[facility] Heart Monitor Documentation    Aguila Finn Jr.  1965  [id number]  07/30/24      [] ZIO XT Patch  Model [id number] Prescribed for  Days    Serial Number: (N + 9 Digits) N   Apply-By Date on Box:   USPS Tracking Number:   USPS Tracking        [x] [employer] MINI PLUS Mobile Cardiac Telemetry  Model BGMINIPLUS Prescribed for 30 Days    Serial Number: (BGM + 7 Digits) [id number]  Shipped-By Date on Box: 07/26/2024  UPS Tracking Number: [id number]  UPS Tracking      [] [employer] MINI Holter Monitor  Model BGMINIEL Prescribed for  Days    Serial Number: (7 Digits)   Shipped-By Date on Box:   UPS Tracking Number: [id number]  UPS Tracking        This monitor was applied to the patient's chest and checked for proper functioning.  Mr. Aguila Finn Jr. was instructed in the proper use of this monitor.  He was given the opportunity to ask questions and left the office with the device 's instruction manual.    Minerva Blankenship, [facility], 14:43 EDT, 07/30/24                  [facility]

## 2024-07-30 NOTE — PROGRESS NOTES
Office  Follow Up Visit      Patient Name: Aguila Finn Jr.  : 1965   MRN: [id number]     Referring Provider: Kenya Martinez MD    Chief Complaint:       ICD-10-CM ICD-9-CM   1. Moderate episode of recurrent major depressive disorder  F33.1 296.32   2. JIMMY (generalized anxiety disorder)  F41.1 300.02   3. Insomnia due to other mental disorder  F51.05 300.9    F99 327.02   4. JIMMY (generalized anxiety disorder)  F41.1 300.02      History of Present Illness     Aguila Finn Jr. is a 58 y.o. male who is here today for follow up related to anxiety,depression,insomnia.  The patient   of anxiety and insomnia. His last visit was on 2024, during which he began taking Remeron 7.5 mg and BuSpar.    Patient  reports an improvement in his condition, with fewer crying spells, although he did experience one last night which he attributes to  his ongoing health issues. Pt shares he recently had a  CT scan  to check for cancer and possible heart problems, which has increased his anxiety..  Patient reports his wife has been a tremendous support person he has a cardiology appointment today and notes he has always had a phobia of doctors.He feels that BuSpar is somewhat effective, but is unsure.. His mood is poor, which he attributes to his current health issues, but his wife, a registered nurse, supports him. He occasionally experiences dizziness when standing, which he suspects may be related to his heart blockage.  HQ score 7.  He denies SI/HI/AVH. JIMMY score is 10. Despite taking Remeron and BuSpar he struggles with sleep. If he does fall asleep, it may take between 45 minutes to an hour. His appetite is diminished, leading to a weight loss of 6 to 7 pounds. He denies any side effects from his medications .He has an appointment with his cardiologist today because he has a blockage.  He denies medication side effects.       Diagnosis: Anxiety, depression, bipolar 1  Medications: Buspar  7.5 mg BID  Therapy: none currently    Subjective      Review of Systems:   Review of Systems   Constitutional:  Positive for activity change, appetite change and fatigue.   Psychiatric/Behavioral:  Positive for decreased concentration and sleep disturbance. The patient is nervous/anxious.        PHQ-9 Depression Screening  Little interest or pleasure in doing things? (P) 1-->several days   Feeling down, depressed, or hopeless? (P) 1-->several days   Trouble falling or staying asleep, or sleeping too much? (P) 1-->several days   Feeling tired or having little energy? (P) 1-->several days   Poor appetite or overeating? (P) 2-->more than half the days   Feeling bad about yourself - or that you are a failure or have let yourself or your family down? (P) 1-->several days   Trouble concentrating on things, such as reading the newspaper or watching television? (P) 0-->not at all   Moving or speaking so slowly that other people could have noticed? Or the opposite - being so fidgety or restless that you have been moving around a lot more than usual? (P) 0-->not at all   Thoughts that you would be better off dead, or of hurting yourself in some way? (P) 0-->not at all   PHQ-9 Total Score (P) 7   If you checked off any problems, how difficult have these problems made it for you to do your work, take care of things at home, or get along with other people? (P) not difficult at all         JIMMY-7      Over the last two weeks, how often have you been bothered by the following problems?  Feeling nervous, anxious or on edge: (P) Several days  Not being able to stop or control worrying: (P) Several days  Worrying too much about different things: (P) Several days  Trouble Relaxing: (P) More than half the days  Being so restless that it is hard to sit still: (P) Several days  Becoming easily annoyed or irritable: (P) More than half the days  Feeling afraid as if something awful might happen: (P) More than half the days  JIMMY 7 Total  Score: (P) 10  If you checked any problems, how difficult have these problems made it for you to do your work, take care of things at home, or get along with other people: (P) Somewhat difficult    Patient History:   The following portions of the patient's history were reviewed and updated as appropriate: allergies, current medications, past family history, past medical history, past social history, past surgical history and problem list.     Social History     Socioeconomic History    Marital status:    Tobacco Use    Smoking status: Former     Current packs/day: 0.00     Average packs/day: 2.0 packs/day for 15.0 years (30.0 ttl pk-yrs)     Types: Cigarettes     Start date: 9/18/2005     Quit date: 9/18/2020     Years since quitting: 3.8     Passive exposure: Past    Smokeless tobacco: Never   Vaping Use    Vaping status: Never Used   Substance and Sexual Activity    Alcohol use: Yes     Alcohol/week: 1.0 standard drink of alcohol     Types: 1 Cans of beer per week     Comment: a few drinks on occasion, not excessive per patient    Drug use: Never    Sexual activity: Yes     Partners: Female     Birth control/protection: Same-sex partner       Past Psychiatric History:   History of outpatient psychiatrist: yrs ago; unable to recall details  Diagnoses: Depression, anxiety, bipolar 1  History of outpatient therapy: yrs ago; cpl visits  Previous Inpatient hospitalizations: [facility] 5-8 yrs ago; voluntary for anxiety  Previous medication trials: Wellbutrin , BuSpar 7.5 twice daily, Seroquel 200 mg 2017 Vistaril 50 mg, Remeron 30 mg-2016  History of suicide/self harm attempts: denies  Medications:     Current Outpatient Medications:     albuterol (PROVENTIL) (2.5 MG/3ML) 0.083% nebulizer solution, Take 2.5 mg by nebulization 4 (Four) Times a Day As Needed for Wheezing or Shortness of Air., Disp: 60 each, Rfl: 11    amLODIPine (NORVASC) 10 MG tablet, Take 1 tablet by mouth Daily., Disp: 90 tablet, Rfl: 2     arformoterol (BROVANA) 15 MCG/2ML nebulizer solution, Take 2 mL by nebulization 2 (Two) Times a Day., Disp: 120 mL, Rfl: 11    aspirin (Aspirin Low Dose) 81 MG EC tablet, TAKE 1 TABLET BY MOUTH DAILY, Disp: 90 tablet, Rfl: 3    atorvastatin (LIPITOR) 40 MG tablet, Take 1 tablet by mouth Daily., Disp: 90 tablet, Rfl: 2    busPIRone (BUSPAR) 7.5 MG tablet, Take 1 tablet by mouth 2 (Two) Times a Day., Disp: 60 tablet, Rfl: 2    clobetasol (TEMOVATE) 0.05 % external solution, 2 (Two) Times a Day., Disp: , Rfl:     hydrocortisone 2.5 % cream, 1 Application 2 (Two) Times a Day., Disp: , Rfl:     ketoconazole (NIZORAL) 2 % shampoo, 1 (One) Time Per Week., Disp: , Rfl:     mirtazapine (REMERON) 7.5 MG tablet, Take 1 tablet by mouth Every Night., Disp: 90 tablet, Rfl: 0    mometasone (ELOCON) 0.1 % ointment, 1 Application Daily., Disp: , Rfl:     potassium chloride (Klor-Con M20) 20 MEQ CR tablet, TAKE 1 TABLET BY MOUTH 2 TIMES A DAY, Disp: 60 tablet, Rfl: 5    spironolactone (ALDACTONE) 50 MG tablet, Take 1 tablet by mouth Daily., Disp: 90 tablet, Rfl: 2    Symbicort 160-4.5 MCG/ACT inhaler, Inhale 2 puffs 2 (Two) Times a Day., Disp: 10.2 each, Rfl: 3    Objective     Physical Exam    Vital Signs:   Vitals:    07/30/24 1032   BP: 150/92   Pulse: 115   SpO2: 98%   Weight: 79.2 kg (174 lb 8 oz)     Body mass index is 25.77 kg/m².       Mental Status Exam:   MENTAL STATUS EXAM   General Appearance:  Cleanly groomed and dressed  Eye Contact:  Good eye contact  Attitude:  Cooperative  Motor Activity:  Normal gait, posture  Muscle Strength:  Normal  Speech:  Normal rate, tone, volume  Language:  Spontaneous  Mood and affect:  Anxious and depressed  Hopelessness:  Denies  Loneliness: Denies  Thought Process:  Logical and goal-directed  Associations/ Thought Content:  No delusions  Hallucinations:  None  Suicidal Ideations:  Not present  Homicidal Ideation:  Not present  Sensorium:  Alert and clear  Orientation:  Person, place and  time  Immediate Recall, Recent, and Remote Memory:  Intact  Attention Span/ Concentration:  Good  Fund of Knowledge:  Appropriate for age and educational level  Intellectual Functioning:  Average range  Insight:  Fair  Judgement:  Fair  Reliability:  Good  Impulse Control:  Fair       @RESULASTCBCDIFFPANEL,TSH,LABLIPI,[id number],RECUYPEW65,MG,FOLATE,PROLACTIN,CRPRESULT,CMP,[id number])@    Lab Results   Component Value Date    GLUCOSE 96 07/23/2024    BUN 26 (H) 07/23/2024    CREATININE 1.46 (H) 07/23/2024    EGFR 55.1 (L) 07/23/2024    BCR 17.8 07/23/2024    K 5.2 07/23/2024    CO2 25.6 07/23/2024    CALCIUM 9.7 07/23/2024    ALBUMIN 4.2 07/23/2024    BILITOT 0.4 07/23/2024    AST 15 07/23/2024    ALT 13 07/23/2024       Lab Results   Component Value Date    WBC 8.73 07/23/2024    HGB 14.3 07/23/2024    HCT 43.6 07/23/2024    MCV 89.9 07/23/2024     07/23/2024       Lab Results   Component Value Date    CHOL 157 08/28/2023    TRIG 120 08/28/2023    HDL 48 08/28/2023    LDL 88 08/28/2023       Results  Imaging  CT scan was conducted to check for cancer.           Assessment / Plan      Visit Diagnosis/Orders Placed This Visit:  Diagnoses and all orders for this visit:    1. Moderate episode of recurrent major depressive disorder (Primary)  -     mirtazapine (REMERON) 15 MG tablet; Take 1 tablet by mouth Every Night.  Dispense: 30 tablet; Refill: 1    2. JIMMY (generalized anxiety disorder)  -     mirtazapine (REMERON) 15 MG tablet; Take 1 tablet by mouth Every Night.  Dispense: 30 tablet; Refill: 1  -     busPIRone (BUSPAR) 15 MG tablet; Take 1 tablet by mouth 2 (Two) Times a Day.  Dispense: 60 tablet; Refill: 1    3. Insomnia due to other mental disorder  -     mirtazapine (REMERON) 15 MG tablet; Take 1 tablet by mouth Every Night.  Dispense: 30 tablet; Refill: 1      Assessment & Plan      Plan:   -Increase  Remeron 15 mg at night  - Increase BuSpar to 15 mg twice daily  - Labs and Chandana reviewed  -  Suggested therapy to help manage situational stressors if he feels they become unmanageable      Continue supportive psychotherapy efforts and medications as indicated. Treatment and medication options discussed during today's visit. Patient ackowledged and verbally consented to continue with current treatment plan and was educated on the importance of compliance with treatment and follow-up appointments. Patient seems reasonably able to adhere to treatment plan.      Medication Considerations:  Discussed medication options and treatment plan of prescribed medication(s) as well as the risks, benefits, and potential side effects. Patient is agreeable to call the office with any worsening of symptoms or onset of side effects. Patient is agreeable to call 911 or go to the nearest ER should he/she begin having SI/HI.    Quality Measures:   Former smoker    I advised Aguila Finn Jr. of the risks of tobacco use.     Follow Up:   Return in about 6 weeks (around 9/10/2024).      JOSE LUIS Muñoz, [facility]    Patient or patient representative verbalized consent for the use of Ambient Listening during the visit with  JOSE LUIS Gates for chart documentation. 7/30/2024  11:00 EDT

## 2024-07-30 NOTE — PATIENT INSTRUCTIONS
- Heart monitor for 1 month     - Office will schedule testing  - Office will call or send message with testing results    - Cardiology department will call for appointment

## 2024-07-30 NOTE — PROGRESS NOTES
"Chief Complaint  Shortness of Breath and Palpitations    Subjective      History of Present Illness {CC  Problem List  Visit  Diagnosis   Encounters  Notes  Medications  Labs  Result Review Imaging  Media :23}     Aguila Finn Jr., 58 y.o. male with coronary calcification on CT, HTN, HLD, COPD, chronic hypokalemia, prior tobacco use presents to [facility] for Shortness of Breath and Palpitations    Patient presents upon referral from PCP Dr. Kenya Cabrera for evaluation of tachycardia and potential atrial fibrillation.  ECG at PCP was sinus tachycardia.  Recent chest CT with severe CAD.  Patient previously evaluated by Dr. Miles in 2020, appears no cardiology follow-up since then.    Presents today reporting elevated heart rates and shortness of breath for the past few months. Intermittent rapid heartbeat, generally with stress; no syncope. Shortness of breath with rest and activity. Inhalers do not help breathing. Wears home O2 nocturnal and sometimes during the day. Intermittent chest pain; sharp pain that occurs intermittently and generally not with activity. Former smoker, 30 pack year history. No diabetes.       Objective     Vital Signs:   Vitals:    07/30/24 1335 07/30/24 1336 07/30/24 1416   BP: 139/85 150/78    BP Location: Left arm Left arm    Patient Position: Sitting Sitting    Cuff Size: Adult Adult    Pulse: (!) 124 117 108   Resp: 18 18    Temp: 97.7 °F (36.5 °C) 97.7 °F (36.5 °C)    TempSrc: Temporal Temporal    SpO2: 95% 94%    Weight: 78.8 kg (173 lb 12.8 oz) 78.8 kg (173 lb 12.8 oz)    Height: 175.3 cm (69\") 175.3 cm (69\")      Body mass index is 25.67 kg/m².  Physical Exam  Vitals and nursing note reviewed.   Constitutional:       Appearance: Normal appearance.   HENT:      Head: Normocephalic.   Eyes:      Extraocular Movements: Extraocular movements intact.   Neck:      Vascular: No carotid bruit.   Cardiovascular:      Rate and Rhythm: Normal " rate and regular rhythm.      Pulses: Normal pulses.      Heart sounds: Normal heart sounds, S1 normal and S2 normal. No murmur heard.  Pulmonary:      Effort: Pulmonary effort is normal. No respiratory distress.      Breath sounds: Normal breath sounds.   Musculoskeletal:      Cervical back: Neck supple.      Right lower leg: No edema.      Left lower leg: No edema.   Skin:     General: Skin is warm and dry.   Neurological:      General: No focal deficit present.      Mental Status: He is alert.   Psychiatric:         Mood and Affect: Mood normal.         Behavior: Behavior normal.         Thought Content: Thought content normal.        Data Reviewed:{ Labs  Result Review  Imaging  Med Tab  Media :23}     ECG 12 Lead (07/24/2024 07:46)   proBNP (07/23/2024 09:50)  Comprehensive Metabolic Panel (07/23/2024 09:50)  CBC & Differential (07/23/2024 09:50)  CT Chest With & Without Contrast Diagnostic (07/27/2024 07:42)     Adult Stress Echo W/ Cont or Stress Agent if Necessary Per Protocol (12/18/2019 07:49)       Assessment & Plan   Assessment and Plan {CC Problem List  Visit Diagnosis  ROS  Review (Popup)  Health Maintenance  Quality  BestPractice  Medications  SmartSets  SnapShot Encounters  Media :23}     1. Shortness of breath  -Notes ongoing intermittent shortness of breath/CASTELLON for the past few months.  In setting of COPD and severe coronary calcification.  -Recent chest CT 7/27/2024 with no acute cardiopulmonary abnormality, stable left upper lung nodule, moderate emphysema.  Severe coronary artery disease noted.  -ECG at PCP with sinus tachycardia, no acute ischemic changes  -Given his ongoing shortness of breath we will complete TTE for structural/functional evaluation, PET stress for ischemic evaluation  - Adult Transthoracic Echo Complete W/ Cont if Necessary Per Protocol; Future  -Continue with planned pulmonary appointment as scheduled  -Lengthy discussion of ED precaution for any worsened  chest pain or CASTELLON.  Low threshold for ED evaluation if any worsened or recurrent symptoms.  - Ambulatory Referral to Cardiology    2. Palpitations  -Intermittent tachypalpitation symptom.  Notes atrial fibrillation was noted on his Apple Watch on 2 occasions.  Rhythm strips reviewed and potentially normal rhythm with PACs versus arrhythmia.  -Recent ECG at PCP was sinus tachycardia  -30-day MCOT for arrhythmia surveillance  -TTE as above  - Mobile Cardiac Outpatient Telemetry; Future    3. Chest pain  -Intermittent chest pain, generally nonexertional.  However, does endorse ongoing CASTELLON for the past several months.  -Recent ECG at PCP without acute ischemic changes  - Adult Transthoracic Echo Complete W/ Cont if Necessary Per Protocol; Future  -Stress Test With Pet Myocardial Perfusion; Future  -Initiate carvedilol 6.25 Mg twice daily for further afterload control, antianginal.  -Initiate Imdur 30 Mg daily  - Ambulatory Referral to Cardiology, urgent given his CASTELLON and severe CAD on CT.  -Lengthy discussion of ED precaution for any worsened chest pain or CASTELLON.  Low threshold for ED evaluation if any worsened or recurrent symptoms.    4. Coronary artery calcification  -Recent chest CT 7/27/2024 with no acute cardiopulmonary abnormality, stable left upper lung nodule, moderate emphysema.  Severe coronary artery disease noted.  -ECG at PCP with sinus tachycardia, no acute ischemic changes  -Stress Test With Pet Myocardial Perfusion; Future  -Continue ASA, atorvastatin as prescribed  -Initiate carvedilol, Imdur as above  - Ambulatory Referral to Cardiology    5. Essential hypertension  -Elevated today  - Adult Transthoracic Echo Complete W/ Cont if Necessary Per Protocol; Future  -Continue amlodipine 10 Mg, spironolactone 50 Mg daily  -Initiate carvedilol 6.25 Mg twice daily, Imdur 30 Mg daily for further afterload control  - Ambulatory Referral to Cardiology      Follow Up {Instructions Charge Capture  Follow-up  Communications :23}     Return if symptoms worsen or fail to improve. Cardiology will call to schedule an appointment.    Time Spent: I spent 43 minutes caring for Aguila Finn Jr. on this date of service. This time includes time spent by me in the following activities: preparing for the visit, reviewing tests, obtaining and/or reviewing a separately obtained history, performing a medically appropriate examination and/or evaluation, counseling and educating the patient/family/caregiver, documenting information in the medical record and independently interpreting results and communicating that information with the patient/family/caregiver. All time noted occurred on the date of service.    Patient was given instructions and counseling regarding his condition or for health maintenance advice. Please see specific information pulled into the AVS if appropriate.  Patient was instructed to call the [facility] with any questions, concerns, or worsening symptoms.    Dictated Utilizing Dragon Dictation   Please note that portions of this note were completed with a voice recognition program.   Part of this note may be an electronic transcription/translation of spoken language to printed text using the Dragon Dictation System.

## 2024-07-31 ENCOUNTER — TELEPHONE (OUTPATIENT)
Dept: GASTROENTEROLOGY | Facility: CLINIC | Age: 59
End: 2024-07-31

## 2024-07-31 NOTE — TELEPHONE ENCOUNTER
Caller: Aguila Finn Jr.    Relationship to patient: Self    Best call back number: [phone]    Chief complaint: PT IS WEARING A HEART MONITOR FOR 30 DAYS AND NEEDS TO CANCEL HIS COLONOSCOPY    Type of visit: COLONOSCOPY     Requested date:     If rescheduling, when is the original appointment: 8.08    Additional notes:

## 2024-08-01 ENCOUNTER — APPOINTMENT (OUTPATIENT)
Dept: CT IMAGING | Facility: HOSPITAL | Age: 59
End: 2024-08-01
Payer: COMMERCIAL

## 2024-08-01 ENCOUNTER — HOSPITAL ENCOUNTER (INPATIENT)
Facility: HOSPITAL | Age: 59
LOS: 5 days | Discharge: HOME | End: 2024-08-06
Attending: EMERGENCY MEDICINE | Admitting: FAMILY MEDICINE
Payer: COMMERCIAL

## 2024-08-01 ENCOUNTER — APPOINTMENT (OUTPATIENT)
Dept: GENERAL RADIOLOGY | Facility: HOSPITAL | Age: 59
End: 2024-08-01
Payer: COMMERCIAL

## 2024-08-01 ENCOUNTER — APPOINTMENT (OUTPATIENT)
Dept: CARDIOLOGY | Facility: HOSPITAL | Age: 59
End: 2024-08-01
Payer: COMMERCIAL

## 2024-08-01 DIAGNOSIS — J44.1 COPD WITH ACUTE EXACERBATION: ICD-10-CM

## 2024-08-01 DIAGNOSIS — N17.9 AKI (ACUTE KIDNEY INJURY): ICD-10-CM

## 2024-08-01 DIAGNOSIS — J96.01 ACUTE RESPIRATORY FAILURE WITH HYPOXIA: Primary | ICD-10-CM

## 2024-08-01 PROBLEM — J96.20 ACUTE-ON-CHRONIC RESPIRATORY FAILURE: Status: ACTIVE | Noted: 2024-08-01

## 2024-08-01 LAB
ALBUMIN SERPL-MCNC: 4.1 G/DL (ref 3.5–5.2)
ALBUMIN/GLOB SERPL: 1.3 G/DL
ALP SERPL-CCNC: 119 U/L (ref 39–117)
ALT SERPL W P-5'-P-CCNC: 20 U/L (ref 1–41)
ANION GAP SERPL CALCULATED.3IONS-SCNC: 8 MMOL/L (ref 5–15)
ARTERIAL PATENCY WRIST A: ABNORMAL
ASCENDING AORTA: 2.8 CM
AST SERPL-CCNC: 20 U/L (ref 1–40)
ATMOSPHERIC PRESS: ABNORMAL MM[HG]
B PARAPERT DNA SPEC QL NAA+PROBE: NOT DETECTED
B PERT DNA SPEC QL NAA+PROBE: NOT DETECTED
BASE EXCESS BLDA CALC-SCNC: -0.3 MMOL/L (ref 0–2)
BASOPHILS # BLD AUTO: 0.11 10*3/MM3 (ref 0–0.2)
BASOPHILS NFR BLD AUTO: 1 % (ref 0–1.5)
BDY SITE: ABNORMAL
BH CV ECHO MEAS - AO MAX PG: 6 MMHG
BH CV ECHO MEAS - AO MEAN PG: 3 MMHG
BH CV ECHO MEAS - AO ROOT DIAM: 3 CM
BH CV ECHO MEAS - AO V2 MAX: 123.2 CM/SEC
BH CV ECHO MEAS - AO V2 VTI: 19.7 CM
BH CV ECHO MEAS - EF(MOD-BP): 61.6 %
BH CV ECHO MEAS - IVS/LVPW: 1 CM
BH CV ECHO MEAS - IVSD: 0.9 CM
BH CV ECHO MEAS - LA DIMENSION: 2.9 CM
BH CV ECHO MEAS - LAT PEAK E' VEL: 8.6 CM/SEC
BH CV ECHO MEAS - LV MAX PG: 5.7 MMHG
BH CV ECHO MEAS - LV MEAN PG: 2.48 MMHG
BH CV ECHO MEAS - LV V1 MAX: 119 CM/SEC
BH CV ECHO MEAS - LV V1 VTI: 18.4 CM
BH CV ECHO MEAS - LVIDD: 3.9 CM
BH CV ECHO MEAS - LVIDS: 2.8 CM
BH CV ECHO MEAS - LVOT DIAM: 2 CM
BH CV ECHO MEAS - LVPWD: 0.9 CM
BH CV ECHO MEAS - MED PEAK E' VEL: 10.5 CM/SEC
BH CV ECHO MEAS - MV A MAX VEL: 64 CM/SEC
BH CV ECHO MEAS - MV DEC SLOPE: 425.2 CM/SEC2
BH CV ECHO MEAS - MV E MAX VEL: 53.5 CM/SEC
BH CV ECHO MEAS - MV E/A: 0.84
BH CV ECHO MEAS - MV MAX PG: 2.36 MMHG
BH CV ECHO MEAS - MV MEAN PG: 1.5 MMHG
BH CV ECHO MEAS - MV P1/2T: 41 MSEC
BH CV ECHO MEAS - MV V2 VTI: 16.4 CM
BH CV ECHO MEAS - MVA(P1/2T): 5.3 CM2
BH CV ECHO MEAS - PA ACC TIME: 0.12 SEC
BH CV ECHO MEAS - PA V2 MAX: 58.1 CM/SEC
BH CV ECHO MEAS - RAP SYSTOLE: 3 MMHG
BH CV ECHO MEAS - TAPSE (>1.6): 2.19 CM
BH CV ECHO MEASUREMENTS AVERAGE E/E' RATIO: 5.6
BH CV VAS BP LEFT ARM: NORMAL MMHG
BH CV XLRA - RV BASE: 3.4 CM
BH CV XLRA - RV LENGTH: 7.2 CM
BH CV XLRA - RV MID: 2.39 CM
BH CV XLRA - TDI S': 19.9 CM/SEC
BILIRUB SERPL-MCNC: 0.3 MG/DL (ref 0–1.2)
BODY TEMPERATURE: 37
BUN SERPL-MCNC: 24 MG/DL (ref 6–20)
BUN/CREAT SERPL: 14.5 (ref 7–25)
C PNEUM DNA NPH QL NAA+NON-PROBE: NOT DETECTED
CALCIUM SPEC-SCNC: 8.8 MG/DL (ref 8.6–10.5)
CHLORIDE SERPL-SCNC: 103 MMOL/L (ref 98–107)
CO2 BLDA-SCNC: 28.5 MMOL/L (ref 22–33)
CO2 SERPL-SCNC: 28 MMOL/L (ref 22–29)
COHGB MFR BLD: 0.8 % (ref 0–2)
CREAT SERPL-MCNC: 1.66 MG/DL (ref 0.76–1.27)
DEPRECATED RDW RBC AUTO: 43.8 FL (ref 37–54)
EGFRCR SERPLBLD CKD-EPI 2021: 47.2 ML/MIN/1.73
EOSINOPHIL # BLD AUTO: 0.56 10*3/MM3 (ref 0–0.4)
EOSINOPHIL NFR BLD AUTO: 5.1 % (ref 0.3–6.2)
EPAP: 0
ERYTHROCYTE [DISTWIDTH] IN BLOOD BY AUTOMATED COUNT: 13.1 % (ref 12.3–15.4)
FLUAV SUBTYP SPEC NAA+PROBE: NOT DETECTED
FLUBV RNA ISLT QL NAA+PROBE: NOT DETECTED
GLOBULIN UR ELPH-MCNC: 3.2 GM/DL
GLUCOSE SERPL-MCNC: 121 MG/DL (ref 65–99)
HADV DNA SPEC NAA+PROBE: NOT DETECTED
HCO3 BLDA-SCNC: 26.9 MMOL/L (ref 20–26)
HCOV 229E RNA SPEC QL NAA+PROBE: NOT DETECTED
HCOV HKU1 RNA SPEC QL NAA+PROBE: NOT DETECTED
HCOV NL63 RNA SPEC QL NAA+PROBE: NOT DETECTED
HCOV OC43 RNA SPEC QL NAA+PROBE: NOT DETECTED
HCT VFR BLD AUTO: 45.1 % (ref 37.5–51)
HCT VFR BLD CALC: 44 % (ref 38–51)
HGB BLD-MCNC: 14.3 G/DL (ref 13–17.7)
HGB BLDA-MCNC: 14.4 G/DL (ref 13.5–17.5)
HMPV RNA NPH QL NAA+NON-PROBE: NOT DETECTED
HOLD SPECIMEN: NORMAL
HPIV1 RNA ISLT QL NAA+PROBE: NOT DETECTED
HPIV2 RNA SPEC QL NAA+PROBE: NOT DETECTED
HPIV3 RNA NPH QL NAA+PROBE: NOT DETECTED
HPIV4 P GENE NPH QL NAA+PROBE: NOT DETECTED
IMM GRANULOCYTES # BLD AUTO: 0.09 10*3/MM3 (ref 0–0.05)
IMM GRANULOCYTES NFR BLD AUTO: 0.8 % (ref 0–0.5)
INHALED O2 CONCENTRATION: 28 %
IPAP: 0
LEFT ATRIUM VOLUME INDEX: 30.8 ML/M2
LYMPHOCYTES # BLD AUTO: 1.07 10*3/MM3 (ref 0.7–3.1)
LYMPHOCYTES NFR BLD AUTO: 9.7 % (ref 19.6–45.3)
M PNEUMO IGG SER IA-ACNC: NOT DETECTED
MAGNESIUM SERPL-MCNC: 2.3 MG/DL (ref 1.6–2.6)
MCH RBC QN AUTO: 28.9 PG (ref 26.6–33)
MCHC RBC AUTO-ENTMCNC: 31.7 G/DL (ref 31.5–35.7)
MCV RBC AUTO: 91.1 FL (ref 79–97)
METHGB BLD QL: 0.4 % (ref 0–1.5)
MODALITY: ABNORMAL
MONOCYTES # BLD AUTO: 1.03 10*3/MM3 (ref 0.1–0.9)
MONOCYTES NFR BLD AUTO: 9.4 % (ref 5–12)
NEUTROPHILS NFR BLD AUTO: 74 % (ref 42.7–76)
NEUTROPHILS NFR BLD AUTO: 8.14 10*3/MM3 (ref 1.7–7)
NRBC BLD AUTO-RTO: 0 /100 WBC (ref 0–0.2)
NT-PROBNP SERPL-MCNC: <36 PG/ML (ref 0–900)
OXYHGB MFR BLDV: 97.9 % (ref 94–99)
PAW @ PEAK INSP FLOW SETTING VENT: 0 CMH2O
PCO2 BLDA: 52.8 MM HG (ref 35–45)
PCO2 TEMP ADJ BLD: 52.8 MM HG (ref 35–48)
PH BLDA: 7.32 PH UNITS (ref 7.35–7.45)
PH, TEMP CORRECTED: 7.32 PH UNITS
PLATELET # BLD AUTO: 237 10*3/MM3 (ref 140–450)
PMV BLD AUTO: 11.2 FL (ref 6–12)
PO2 BLDA: 132 MM HG (ref 83–108)
PO2 TEMP ADJ BLD: 132 MM HG (ref 83–108)
POTASSIUM SERPL-SCNC: 5 MMOL/L (ref 3.5–5.2)
PROT SERPL-MCNC: 7.3 G/DL (ref 6–8.5)
RBC # BLD AUTO: 4.95 10*6/MM3 (ref 4.14–5.8)
RHINOVIRUS RNA SPEC NAA+PROBE: NOT DETECTED
RSV RNA NPH QL NAA+NON-PROBE: NOT DETECTED
SARS-COV-2 RNA NPH QL NAA+NON-PROBE: NOT DETECTED
SODIUM SERPL-SCNC: 139 MMOL/L (ref 136–145)
TOTAL RATE: 0 BREATHS/MINUTE
TROPONIN T SERPL HS-MCNC: 11 NG/L
WBC NRBC COR # BLD AUTO: 11 10*3/MM3 (ref 3.4–10.8)
WHOLE BLOOD HOLD COAG: NORMAL
WHOLE BLOOD HOLD SPECIMEN: NORMAL

## 2024-08-01 PROCEDURE — 93005 ELECTROCARDIOGRAM TRACING: CPT | Performed by: EMERGENCY MEDICINE

## 2024-08-01 PROCEDURE — 83880 ASSAY OF NATRIURETIC PEPTIDE: CPT | Performed by: EMERGENCY MEDICINE

## 2024-08-01 PROCEDURE — 25010000002 CEFTRIAXONE PER 250 MG: Performed by: INTERNAL MEDICINE

## 2024-08-01 PROCEDURE — 80053 COMPREHEN METABOLIC PANEL: CPT | Performed by: EMERGENCY MEDICINE

## 2024-08-01 PROCEDURE — 36415 COLL VENOUS BLD VENIPUNCTURE: CPT

## 2024-08-01 PROCEDURE — 83050 HGB METHEMOGLOBIN QUAN: CPT

## 2024-08-01 PROCEDURE — 94640 AIRWAY INHALATION TREATMENT: CPT

## 2024-08-01 PROCEDURE — 94664 DEMO&/EVAL PT USE INHALER: CPT

## 2024-08-01 PROCEDURE — 94761 N-INVAS EAR/PLS OXIMETRY MLT: CPT

## 2024-08-01 PROCEDURE — 99285 EMERGENCY DEPT VISIT HI MDM: CPT

## 2024-08-01 PROCEDURE — 82805 BLOOD GASES W/O2 SATURATION: CPT

## 2024-08-01 PROCEDURE — 25010000002 ENOXAPARIN PER 10 MG: Performed by: INTERNAL MEDICINE

## 2024-08-01 PROCEDURE — 25510000001 IOPAMIDOL PER 1 ML: Performed by: EMERGENCY MEDICINE

## 2024-08-01 PROCEDURE — 94799 UNLISTED PULMONARY SVC/PX: CPT

## 2024-08-01 PROCEDURE — 83735 ASSAY OF MAGNESIUM: CPT | Performed by: PHYSICIAN ASSISTANT

## 2024-08-01 PROCEDURE — 71275 CT ANGIOGRAPHY CHEST: CPT

## 2024-08-01 PROCEDURE — 85025 COMPLETE CBC W/AUTO DIFF WBC: CPT | Performed by: EMERGENCY MEDICINE

## 2024-08-01 PROCEDURE — 93306 TTE W/DOPPLER COMPLETE: CPT

## 2024-08-01 PROCEDURE — 84484 ASSAY OF TROPONIN QUANT: CPT | Performed by: EMERGENCY MEDICINE

## 2024-08-01 PROCEDURE — 82375 ASSAY CARBOXYHB QUANT: CPT

## 2024-08-01 PROCEDURE — 99222 1ST HOSP IP/OBS MODERATE 55: CPT | Performed by: INTERNAL MEDICINE

## 2024-08-01 PROCEDURE — 25010000002 METHYLPREDNISOLONE PER 125 MG: Performed by: PHYSICIAN ASSISTANT

## 2024-08-01 PROCEDURE — 93306 TTE W/DOPPLER COMPLETE: CPT | Performed by: INTERNAL MEDICINE

## 2024-08-01 PROCEDURE — 63710000001 PREDNISONE PER 1 MG: Performed by: INTERNAL MEDICINE

## 2024-08-01 PROCEDURE — 0202U NFCT DS 22 TRGT SARS-COV-2: CPT | Performed by: PHYSICIAN ASSISTANT

## 2024-08-01 PROCEDURE — 36600 WITHDRAWAL OF ARTERIAL BLOOD: CPT

## 2024-08-01 PROCEDURE — 71045 X-RAY EXAM CHEST 1 VIEW: CPT

## 2024-08-01 RX ORDER — ONDANSETRON 2 MG/ML
4 INJECTION INTRAMUSCULAR; INTRAVENOUS EVERY 6 HOURS PRN
Status: DISCONTINUED | OUTPATIENT
Start: 2024-08-01 | End: 2024-08-06 | Stop reason: HOSPADM

## 2024-08-01 RX ORDER — ATORVASTATIN CALCIUM 40 MG/1
40 TABLET, FILM COATED ORAL DAILY
Status: DISCONTINUED | OUTPATIENT
Start: 2024-08-01 | End: 2024-08-06 | Stop reason: HOSPADM

## 2024-08-01 RX ORDER — BUDESONIDE AND FORMOTEROL FUMARATE DIHYDRATE 160; 4.5 UG/1; UG/1
2 AEROSOL RESPIRATORY (INHALATION) 2 TIMES DAILY
Status: DISCONTINUED | OUTPATIENT
Start: 2024-08-01 | End: 2024-08-01

## 2024-08-01 RX ORDER — AMLODIPINE BESYLATE 10 MG/1
10 TABLET ORAL DAILY
Status: DISCONTINUED | OUTPATIENT
Start: 2024-08-01 | End: 2024-08-06 | Stop reason: HOSPADM

## 2024-08-01 RX ORDER — POLYETHYLENE GLYCOL 3350 17 G/17G
17 POWDER, FOR SOLUTION ORAL DAILY PRN
Status: DISCONTINUED | OUTPATIENT
Start: 2024-08-01 | End: 2024-08-06 | Stop reason: HOSPADM

## 2024-08-01 RX ORDER — ONDANSETRON 4 MG/1
4 TABLET, ORALLY DISINTEGRATING ORAL EVERY 6 HOURS PRN
Status: DISCONTINUED | OUTPATIENT
Start: 2024-08-01 | End: 2024-08-06 | Stop reason: HOSPADM

## 2024-08-01 RX ORDER — IPRATROPIUM BROMIDE AND ALBUTEROL SULFATE 2.5; .5 MG/3ML; MG/3ML
3 SOLUTION RESPIRATORY (INHALATION) ONCE
Status: COMPLETED | OUTPATIENT
Start: 2024-08-01 | End: 2024-08-01

## 2024-08-01 RX ORDER — AMOXICILLIN 250 MG
2 CAPSULE ORAL 2 TIMES DAILY PRN
Status: DISCONTINUED | OUTPATIENT
Start: 2024-08-01 | End: 2024-08-06 | Stop reason: HOSPADM

## 2024-08-01 RX ORDER — SODIUM CHLORIDE 0.9 % (FLUSH) 0.9 %
10 SYRINGE (ML) INJECTION EVERY 12 HOURS SCHEDULED
Status: DISCONTINUED | OUTPATIENT
Start: 2024-08-01 | End: 2024-08-06 | Stop reason: HOSPADM

## 2024-08-01 RX ORDER — ACETAMINOPHEN 650 MG/1
650 SUPPOSITORY RECTAL EVERY 4 HOURS PRN
Status: DISCONTINUED | OUTPATIENT
Start: 2024-08-01 | End: 2024-08-06 | Stop reason: HOSPADM

## 2024-08-01 RX ORDER — BISACODYL 10 MG
10 SUPPOSITORY, RECTAL RECTAL DAILY PRN
Status: DISCONTINUED | OUTPATIENT
Start: 2024-08-01 | End: 2024-08-06 | Stop reason: HOSPADM

## 2024-08-01 RX ORDER — DOXYCYCLINE 100 MG/1
100 CAPSULE ORAL EVERY 12 HOURS SCHEDULED
Status: DISCONTINUED | OUTPATIENT
Start: 2024-08-01 | End: 2024-08-06 | Stop reason: HOSPADM

## 2024-08-01 RX ORDER — METHYLPREDNISOLONE SODIUM SUCCINATE 125 MG/2ML
125 INJECTION, POWDER, LYOPHILIZED, FOR SOLUTION INTRAMUSCULAR; INTRAVENOUS ONCE
Status: COMPLETED | OUTPATIENT
Start: 2024-08-01 | End: 2024-08-01

## 2024-08-01 RX ORDER — ASPIRIN 81 MG/1
81 TABLET ORAL DAILY
Status: DISCONTINUED | OUTPATIENT
Start: 2024-08-01 | End: 2024-08-06 | Stop reason: HOSPADM

## 2024-08-01 RX ORDER — ACETAMINOPHEN 325 MG/1
650 TABLET ORAL EVERY 4 HOURS PRN
Status: DISCONTINUED | OUTPATIENT
Start: 2024-08-01 | End: 2024-08-06 | Stop reason: HOSPADM

## 2024-08-01 RX ORDER — CARVEDILOL 6.25 MG/1
6.25 TABLET ORAL 2 TIMES DAILY
Status: DISCONTINUED | OUTPATIENT
Start: 2024-08-01 | End: 2024-08-06 | Stop reason: HOSPADM

## 2024-08-01 RX ORDER — SODIUM CHLORIDE 0.9 % (FLUSH) 0.9 %
10 SYRINGE (ML) INJECTION AS NEEDED
Status: DISCONTINUED | OUTPATIENT
Start: 2024-08-01 | End: 2024-08-06 | Stop reason: HOSPADM

## 2024-08-01 RX ORDER — ISOSORBIDE MONONITRATE 30 MG/1
30 TABLET, EXTENDED RELEASE ORAL NIGHTLY
Status: DISCONTINUED | OUTPATIENT
Start: 2024-08-01 | End: 2024-08-06 | Stop reason: HOSPADM

## 2024-08-01 RX ORDER — IPRATROPIUM BROMIDE AND ALBUTEROL SULFATE 2.5; .5 MG/3ML; MG/3ML
3 SOLUTION RESPIRATORY (INHALATION)
Status: DISCONTINUED | OUTPATIENT
Start: 2024-08-01 | End: 2024-08-06 | Stop reason: HOSPADM

## 2024-08-01 RX ORDER — ACETAMINOPHEN 160 MG/5ML
650 SOLUTION ORAL EVERY 4 HOURS PRN
Status: DISCONTINUED | OUTPATIENT
Start: 2024-08-01 | End: 2024-08-06 | Stop reason: HOSPADM

## 2024-08-01 RX ORDER — SPIRONOLACTONE 25 MG/1
50 TABLET ORAL DAILY
Status: DISCONTINUED | OUTPATIENT
Start: 2024-08-01 | End: 2024-08-06 | Stop reason: HOSPADM

## 2024-08-01 RX ORDER — ENOXAPARIN SODIUM 100 MG/ML
40 INJECTION SUBCUTANEOUS DAILY
Status: DISCONTINUED | OUTPATIENT
Start: 2024-08-01 | End: 2024-08-06 | Stop reason: HOSPADM

## 2024-08-01 RX ORDER — MIRTAZAPINE 15 MG/1
15 TABLET, FILM COATED ORAL NIGHTLY
Status: DISCONTINUED | OUTPATIENT
Start: 2024-08-01 | End: 2024-08-06 | Stop reason: HOSPADM

## 2024-08-01 RX ORDER — BUSPIRONE HYDROCHLORIDE 15 MG/1
15 TABLET ORAL 2 TIMES DAILY
Status: DISCONTINUED | OUTPATIENT
Start: 2024-08-01 | End: 2024-08-06 | Stop reason: HOSPADM

## 2024-08-01 RX ORDER — BISACODYL 5 MG/1
5 TABLET, DELAYED RELEASE ORAL DAILY PRN
Status: DISCONTINUED | OUTPATIENT
Start: 2024-08-01 | End: 2024-08-06 | Stop reason: HOSPADM

## 2024-08-01 RX ORDER — IPRATROPIUM BROMIDE AND ALBUTEROL SULFATE 2.5; .5 MG/3ML; MG/3ML
3 SOLUTION RESPIRATORY (INHALATION) EVERY 4 HOURS PRN
Status: DISCONTINUED | OUTPATIENT
Start: 2024-08-01 | End: 2024-08-06 | Stop reason: HOSPADM

## 2024-08-01 RX ORDER — PREDNISONE 20 MG/1
40 TABLET ORAL
Status: DISCONTINUED | OUTPATIENT
Start: 2024-08-01 | End: 2024-08-02

## 2024-08-01 RX ORDER — SODIUM CHLORIDE 9 MG/ML
40 INJECTION, SOLUTION INTRAVENOUS AS NEEDED
Status: DISCONTINUED | OUTPATIENT
Start: 2024-08-01 | End: 2024-08-06 | Stop reason: HOSPADM

## 2024-08-01 RX ORDER — GUAIFENESIN 600 MG/1
1200 TABLET, EXTENDED RELEASE ORAL EVERY 12 HOURS SCHEDULED
Status: DISCONTINUED | OUTPATIENT
Start: 2024-08-01 | End: 2024-08-06 | Stop reason: HOSPADM

## 2024-08-01 RX ORDER — BUDESONIDE AND FORMOTEROL FUMARATE DIHYDRATE 160; 4.5 UG/1; UG/1
2 AEROSOL RESPIRATORY (INHALATION) 2 TIMES DAILY
Status: DISCONTINUED | OUTPATIENT
Start: 2024-08-01 | End: 2024-08-06 | Stop reason: HOSPADM

## 2024-08-01 RX ORDER — ARFORMOTEROL TARTRATE 15 UG/2ML
15 SOLUTION RESPIRATORY (INHALATION)
Status: DISCONTINUED | OUTPATIENT
Start: 2024-08-01 | End: 2024-08-06 | Stop reason: HOSPADM

## 2024-08-01 RX ADMIN — ARFORMOTEROL TARTRATE 15 MCG: 15 SOLUTION RESPIRATORY (INHALATION) at 20:10

## 2024-08-01 RX ADMIN — AMLODIPINE BESYLATE 10 MG: 10 TABLET ORAL at 12:09

## 2024-08-01 RX ADMIN — BUSPIRONE HYDROCHLORIDE 15 MG: 15 TABLET ORAL at 12:08

## 2024-08-01 RX ADMIN — ATORVASTATIN CALCIUM 40 MG: 40 TABLET, FILM COATED ORAL at 12:08

## 2024-08-01 RX ADMIN — IPRATROPIUM BROMIDE AND ALBUTEROL SULFATE 3 ML: 2.5; .5 SOLUTION RESPIRATORY (INHALATION) at 07:44

## 2024-08-01 RX ADMIN — BUSPIRONE HYDROCHLORIDE 15 MG: 15 TABLET ORAL at 20:41

## 2024-08-01 RX ADMIN — GUAIFENESIN 1200 MG: 600 TABLET, EXTENDED RELEASE ORAL at 20:41

## 2024-08-01 RX ADMIN — PREDNISONE 40 MG: 20 TABLET ORAL at 12:08

## 2024-08-01 RX ADMIN — ASPIRIN 81 MG: 81 TABLET, COATED ORAL at 12:07

## 2024-08-01 RX ADMIN — MIRTAZAPINE 15 MG: 15 TABLET, FILM COATED ORAL at 20:41

## 2024-08-01 RX ADMIN — SODIUM CHLORIDE 2000 MG: 900 INJECTION INTRAVENOUS at 12:13

## 2024-08-01 RX ADMIN — GUAIFENESIN 1200 MG: 600 TABLET, EXTENDED RELEASE ORAL at 12:08

## 2024-08-01 RX ADMIN — DOXYCYCLINE 100 MG: 100 CAPSULE ORAL at 12:08

## 2024-08-01 RX ADMIN — BUDESONIDE AND FORMOTEROL FUMARATE DIHYDRATE 2 PUFF: 160; 4.5 AEROSOL RESPIRATORY (INHALATION) at 20:11

## 2024-08-01 RX ADMIN — Medication 10 ML: at 12:17

## 2024-08-01 RX ADMIN — DOXYCYCLINE 100 MG: 100 CAPSULE ORAL at 20:41

## 2024-08-01 RX ADMIN — IOPAMIDOL 85 ML: 755 INJECTION, SOLUTION INTRAVENOUS at 08:27

## 2024-08-01 RX ADMIN — SPIRONOLACTONE 50 MG: 25 TABLET, FILM COATED ORAL at 12:08

## 2024-08-01 RX ADMIN — Medication 10 ML: at 20:41

## 2024-08-01 RX ADMIN — METHYLPREDNISOLONE SODIUM SUCCINATE 125 MG: 125 INJECTION, POWDER, FOR SOLUTION INTRAMUSCULAR; INTRAVENOUS at 07:44

## 2024-08-01 RX ADMIN — Medication 10 MG: at 20:40

## 2024-08-01 RX ADMIN — CARVEDILOL 6.25 MG: 6.25 TABLET, FILM COATED ORAL at 12:09

## 2024-08-01 RX ADMIN — IPRATROPIUM BROMIDE AND ALBUTEROL SULFATE 3 ML: 2.5; .5 SOLUTION RESPIRATORY (INHALATION) at 16:18

## 2024-08-01 RX ADMIN — ENOXAPARIN SODIUM 40 MG: 100 INJECTION SUBCUTANEOUS at 12:16

## 2024-08-01 RX ADMIN — ISOSORBIDE MONONITRATE 30 MG: 30 TABLET, EXTENDED RELEASE ORAL at 20:41

## 2024-08-01 RX ADMIN — IPRATROPIUM BROMIDE AND ALBUTEROL SULFATE 3 ML: 2.5; .5 SOLUTION RESPIRATORY (INHALATION) at 20:11

## 2024-08-01 RX ADMIN — CARVEDILOL 6.25 MG: 6.25 TABLET, FILM COATED ORAL at 20:41

## 2024-08-01 RX ADMIN — IPRATROPIUM BROMIDE AND ALBUTEROL SULFATE 3 ML: 2.5; .5 SOLUTION RESPIRATORY (INHALATION) at 12:58

## 2024-08-01 NOTE — PLAN OF CARE
Goal Outcome Evaluation:  Plan of Care Reviewed With: patient           Outcome Evaluation: admitted from ed.  bp a little high but vss. soa with exertion but stable on 2Lnc.  adequate urine. tolerating diet. npo at [place] for stress test am.

## 2024-08-01 NOTE — ED PROVIDER NOTES
"Subjective   History of Present Illness  Pt is a 57 yo male presenting to ED with complaints of SOB. PMHx significant for HTN, HLD, COPD, Emphysema, Kidney stones and Anxiety. Pt reports waking up around 2 am today SOB. On arrival to ED 80% on RA and he only wears 02 at night. He denies new cough from baseline cough. Denies current chest pain, fever, dizziness, N/V, abdominal pain or leg swelling. Denies recent antibiotics or steroids. He recently had a CT chest showing \"severe coronary calcification\" and has followed up with cardiology and is currently wearing a Holter. He is scheduled for upcoming FREDERICK, ECHO and PET. He explains he has been having intermittent exertional SOB and chest tightness for months but the SOB today is worse. He denies prior cardiac stents. He used his Neb at home without relief. He quit using tobacco in 2019.     History provided by:  Patient, spouse and medical records      Review of Systems   Constitutional:  Negative for chills and fever.   HENT:  Negative for congestion and trouble swallowing.    Eyes:  Negative for visual disturbance.   Respiratory:  Positive for cough and shortness of breath.    Cardiovascular:  Positive for chest pain (intermittent for months, denies current). Negative for leg swelling.   Gastrointestinal:  Positive for diarrhea (chronic). Negative for abdominal pain, nausea and vomiting.   Genitourinary:  Negative for difficulty urinating, dysuria and flank pain.   Musculoskeletal:  Negative for arthralgias and back pain.   Skin: Negative.  Negative for rash and wound.   Allergic/Immunologic: Negative.    Neurological:  Negative for dizziness, syncope, weakness, numbness and headaches.   Psychiatric/Behavioral:  Negative for confusion.    All other systems reviewed and are negative.      Past Medical History:   Diagnosis Date    Anxiety     COPD (chronic obstructive pulmonary disease)     Depression     Hyperlipidemia     Hypertension     Recurrent kidney stones  "       No Known Allergies    Past Surgical History:   Procedure Laterality Date    CATARACT EXTRACTION, BILATERAL      COLONOSCOPY      EYE SURGERY  2017    WISDOM TOOTH EXTRACTION         Family History   Problem Relation Age of Onset    COPD Mother     Hyperlipidemia Father     Hypertension Father     Heart attack Father     Stroke Father     Diabetes Sister     Heart disease Sister     Hypertension Sister     Alcohol abuse Sister     Diabetes Sister     Hypertension Sister     Cancer Sister         Throat    Diabetes Sister     Hypertension Sister        Social History     Socioeconomic History    Marital status:    Tobacco Use    Smoking status: Former     Current packs/day: 0.00     Average packs/day: 2.0 packs/day for 15.0 years (30.0 ttl pk-yrs)     Types: Cigarettes     Start date: 9/18/2005     Quit date: 9/18/2020     Years since quitting: 3.8     Passive exposure: Past    Smokeless tobacco: Never   Vaping Use    Vaping status: Never Used   Substance and Sexual Activity    Alcohol use: Not Currently     Alcohol/week: 1.0 standard drink of alcohol     Types: 1 Cans of beer per week     Comment: a few drinks on occasion, not excessive per patient    Drug use: Yes     Comment: Cannibus gummies    Sexual activity: Yes     Partners: Female     Birth control/protection: Same-sex partner           Objective   Physical Exam  Vitals and nursing note reviewed.   Constitutional:       General: He is in acute distress.      Appearance: He is well-developed. He is ill-appearing.   HENT:      Head: Atraumatic.      Nose: Nose normal.   Eyes:      General: Lids are normal.      Conjunctiva/sclera: Conjunctivae normal.      Pupils: Pupils are equal, round, and reactive to light.   Cardiovascular:      Rate and Rhythm: Regular rhythm. Tachycardia present.      Heart sounds: Normal heart sounds.   Pulmonary:      Effort: Pulmonary effort is normal. Tachypnea, accessory muscle usage and respiratory distress present.       Breath sounds: Decreased breath sounds and wheezing present.   Abdominal:      General: There is no distension.      Palpations: Abdomen is soft.      Tenderness: There is no abdominal tenderness. There is no guarding or rebound.   Musculoskeletal:         General: No tenderness or deformity. Normal range of motion.      Cervical back: Normal range of motion and neck supple.   Skin:     General: Skin is warm and dry.   Neurological:      Mental Status: He is alert and oriented to person, place, and time.      Sensory: No sensory deficit.   Psychiatric:         Behavior: Behavior normal.         Procedures           ED Course  ED Course as of 08/01/24 1448   Thu Aug 01, 2024   0738 SpO2(!): 80 %  RA. Only wears O2 at night. Placed on non re breather.  [RT]   0816 HS Troponin T: 11 [RT]   0816 proBNP: <36.0 [RT]   0816 Creatinine(!): 1.66  Noted elevated Cr from baseline. 2 months ago Cr 1.22 [RT]   0902 Patient maintaining on 3 L in ED and staying above 90%.   Discussed results and tx plan for admission. He and wife are agreeable with plan.  [RT]   0923 Discussed admission with hospitalist Dr. Regalado [RT]      ED Course User Index  [RT] Elizabeth Donahue, PA      Recent Results (from the past 24 hour(s))   Comprehensive Metabolic Panel    Collection Time: 08/01/24  7:34 AM    Specimen: Blood   Result Value Ref Range    Glucose 121 (H) 65 - 99 mg/dL    BUN 24 (H) 6 - 20 mg/dL    Creatinine 1.66 (H) 0.76 - 1.27 mg/dL    Sodium 139 136 - 145 mmol/L    Potassium 5.0 3.5 - 5.2 mmol/L    Chloride 103 98 - 107 mmol/L    CO2 28.0 22.0 - 29.0 mmol/L    Calcium 8.8 8.6 - 10.5 mg/dL    Total Protein 7.3 6.0 - 8.5 g/dL    Albumin 4.1 3.5 - 5.2 g/dL    ALT (SGPT) 20 1 - 41 U/L    AST (SGOT) 20 1 - 40 U/L    Alkaline Phosphatase 119 (H) 39 - 117 U/L    Total Bilirubin 0.3 0.0 - 1.2 mg/dL    Globulin 3.2 gm/dL    A/G Ratio 1.3 g/dL    BUN/Creatinine Ratio 14.5 7.0 - 25.0    Anion Gap 8.0 5.0 - 15.0 mmol/L    eGFR 47.2 (L) >60.0  mL/min/1.73   BNP    Collection Time: 08/01/24  7:34 AM    Specimen: Blood   Result Value Ref Range    proBNP <36.0 0.0 - 900.0 pg/mL   Single High Sensitivity Troponin T    Collection Time: 08/01/24  7:34 AM    Specimen: Blood   Result Value Ref Range    HS Troponin T 11 <22 ng/L   Green Top (Gel)    Collection Time: 08/01/24  7:34 AM   Result Value Ref Range    Extra Tube Hold for add-ons.    Lavender Top    Collection Time: 08/01/24  7:34 AM   Result Value Ref Range    Extra Tube hold for add-on    Gold Top - SST    Collection Time: 08/01/24  7:34 AM   Result Value Ref Range    Extra Tube Hold for add-ons.    Gray Top    Collection Time: 08/01/24  7:34 AM   Result Value Ref Range    Extra Tube Hold for add-ons.    Light Blue Top    Collection Time: 08/01/24  7:34 AM   Result Value Ref Range    Extra Tube Hold for add-ons.    CBC Auto Differential    Collection Time: 08/01/24  7:34 AM    Specimen: Blood   Result Value Ref Range    WBC 11.00 (H) 3.40 - 10.80 10*3/mm3    RBC 4.95 4.14 - 5.80 10*6/mm3    Hemoglobin 14.3 13.0 - 17.7 g/dL    Hematocrit 45.1 37.5 - 51.0 %    MCV 91.1 79.0 - 97.0 fL    MCH 28.9 26.6 - 33.0 pg    MCHC 31.7 31.5 - 35.7 g/dL    RDW 13.1 12.3 - 15.4 %    RDW-SD 43.8 37.0 - 54.0 fl    MPV 11.2 6.0 - 12.0 fL    Platelets 237 140 - 450 10*3/mm3    Neutrophil % 74.0 42.7 - 76.0 %    Lymphocyte % 9.7 (L) 19.6 - 45.3 %    Monocyte % 9.4 5.0 - 12.0 %    Eosinophil % 5.1 0.3 - 6.2 %    Basophil % 1.0 0.0 - 1.5 %    Immature Grans % 0.8 (H) 0.0 - 0.5 %    Neutrophils, Absolute 8.14 (H) 1.70 - 7.00 10*3/mm3    Lymphocytes, Absolute 1.07 0.70 - 3.10 10*3/mm3    Monocytes, Absolute 1.03 (H) 0.10 - 0.90 10*3/mm3    Eosinophils, Absolute 0.56 (H) 0.00 - 0.40 10*3/mm3    Basophils, Absolute 0.11 0.00 - 0.20 10*3/mm3    Immature Grans, Absolute 0.09 (H) 0.00 - 0.05 10*3/mm3    nRBC 0.0 0.0 - 0.2 /100 WBC   Magnesium    Collection Time: 08/01/24  7:34 AM    Specimen: Blood   Result Value Ref Range     Magnesium 2.3 1.6 - 2.6 mg/dL   Respiratory Panel PCR w/COVID-19(SARS-CoV-2) DAKOTAH/JULIANA/ISAIAS/PAD/COR/CHRISTAL In-House, NP Swab in UTM/VTM, 2 HR TAT - Swab, Nasopharynx    Collection Time: 08/01/24  7:36 AM    Specimen: Nasopharynx; Swab   Result Value Ref Range    ADENOVIRUS, PCR Not Detected Not Detected    Coronavirus 229E Not Detected Not Detected    Coronavirus HKU1 Not Detected Not Detected    Coronavirus NL63 Not Detected Not Detected    Coronavirus OC43 Not Detected Not Detected    COVID19 Not Detected Not Detected - Ref. Range    Human Metapneumovirus Not Detected Not Detected    Human Rhinovirus/Enterovirus Not Detected Not Detected    Influenza A PCR Not Detected Not Detected    Influenza B PCR Not Detected Not Detected    Parainfluenza Virus 1 Not Detected Not Detected    Parainfluenza Virus 2 Not Detected Not Detected    Parainfluenza Virus 3 Not Detected Not Detected    Parainfluenza Virus 4 Not Detected Not Detected    RSV, PCR Not Detected Not Detected    Bordetella pertussis pcr Not Detected Not Detected    Bordetella parapertussis PCR Not Detected Not Detected    Chlamydophila pneumoniae PCR Not Detected Not Detected    Mycoplasma pneumo by PCR Not Detected Not Detected   ECG 12 Lead ED Triage Standing Order; SOA    Collection Time: 08/01/24  7:41 AM   Result Value Ref Range    QT Interval 344 ms    QTC Interval 465 ms   Blood Gas, Arterial With Co-Ox    Collection Time: 08/01/24  7:49 AM    Specimen: Arterial Blood   Result Value Ref Range    Site Right Radial     Jean Pierre's Test N/A     pH, Arterial 7.315 (L) 7.350 - 7.450 pH units    pCO2, Arterial 52.8 (H) 35.0 - 45.0 mm Hg    pO2, Arterial 132.0 (H) 83.0 - 108.0 mm Hg    HCO3, Arterial 26.9 (H) 20.0 - 26.0 mmol/L    Base Excess, Arterial -0.3 (L) 0.0 - 2.0 mmol/L    Hemoglobin, Blood Gas 14.4 13.5 - 17.5 g/dL    Hematocrit, Blood Gas 44.0 38.0 - 51.0 %    Oxyhemoglobin 97.9 94 - 99 %    Methemoglobin 0.40 0.00 - 1.50 %    Carboxyhemoglobin 0.8 0 - 2 %     CO2 Content 28.5 22 - 33 mmol/L    Temperature 37.0     Barometric Pressure for Blood Gas      Modality Nasal Cannula     FIO2 28 %    Rate 0 Breaths/minute    PIP 0 cmH2O    IPAP 0     EPAP 0     pH, Temp Corrected 7.315 pH Units    pCO2, Temperature Corrected 52.8 (H) 35 - 48 mm Hg    pO2, Temperature Corrected 132 (H) 83 - 108 mm Hg   Adult Transthoracic Echo Complete W/ Cont if Necessary Per Protocol    Collection Time: 08/01/24 10:55 AM   Result Value Ref Range    LVIDd 3.9 cm    LVIDs 2.8 cm    IVSd 0.90 cm    LVPWd 0.90 cm    IVS/LVPW 1.00 cm    LVOT diam 2.00 cm    MV E max sony 53.5 cm/sec    MV A max sony 64.0 cm/sec    MV E/A 0.84     RV Base 3.4 cm    RV Mid 2.39 cm    RV Length 7.2 cm    TAPSE (>1.6) 2.19 cm    LA dimension (2D)  2.9 cm    LV V1 max 119.0 cm/sec    LV V1 max PG 5.7 mmHg    LV V1 mean PG 2.48 mmHg    LV V1 VTI 18.4 cm    Ao pk sony 123.2 cm/sec    Ao max PG 6 mmHg    MV max PG 2.36 mmHg    MV P1/2t 41.0 msec    RAP systole 3.0 mmHg    PA V2 max 58.1 cm/sec    PA acc time 0.12 sec    Ao root diam 3.0 cm    EF(MOD-bp) 61.6 %    LA ESV Index (BP) 30.8 ml/m2    Med Peak E' Sony 10.50 cm/sec    Lat Peak E' Sony 8.6 cm/sec    Avg E/e' ratio 5.60     RV S' 19.90 cm/sec    Ao mean PG 3 mmHg    Ao V2 VTI 19.7 cm    MV mean PG 1.50 mmHg    MV V2 VTI 16.40 cm    MVA(P1/2t) 5.30 cm2    MV dec slope 425.20 cm/sec2    Ascending aorta 2.8 cm    BH CV VAS BP LEFT /104 mmHg     Note: In addition to lab results from this visit, the labs listed above may include labs taken at another facility or during a different encounter within the last 24 hours. Please correlate lab times with ED admission and discharge times for further clarification of the services performed during this visit.    CT Angiogram Chest   Final Result   1.No acute abnormality is identified within the thorax. Specifically, there is no evidence of acute pulmonary embolism.   2.Pulmonary emphysema. Please correlate with patient's smoking  "history/risk factors to determine whether the patient meets criteria for routine lung cancer screening with low dose chest CT.   3.Stable 7 mm left upper lobe nodule, similar since 8/29/2022.   4.Possible distal esophageal wall thickening. Please correlate clinically for esophagitis.   5.Additional findings as detailed above.            Electronically Signed: David Sawyer MD     8/1/2024 8:39 AM EDT     Workstation ID: [id number]      XR Chest 1 View   Final Result   Impression:   Emphysematous changes without evidence of acute/superimposed process.          Electronically Signed: Dylan Best MD     8/1/2024 7:50 AM EDT     Workstation ID: [id number]        Vitals:    08/01/24 1055 08/01/24 1115 08/01/24 1200 08/01/24 1258   BP: (!) 150/104 158/95     BP Location:  Left arm     Patient Position:  Lying     Pulse:  98     Resp:  20  19   Temp:   97.6 °F (36.4 °C)    TempSrc:   Oral    SpO2:  94%     Weight: 78.9 kg (174 lb) 76.5 kg (168 lb 9.6 oz)     Height: 175.3 cm (69.02\") 175.3 cm (69\")       Medications   sodium chloride 0.9 % flush 10 mL (has no administration in time range)   amLODIPine (NORVASC) tablet 10 mg (10 mg Oral Given 8/1/24 1209)   arformoterol (BROVANA) nebulizer solution 15 mcg ( Nebulization Canceled Entry 8/1/24 1125)   aspirin EC tablet 81 mg (81 mg Oral Given 8/1/24 1207)   atorvastatin (LIPITOR) tablet 40 mg (40 mg Oral Given 8/1/24 1208)   busPIRone (BUSPAR) tablet 15 mg (15 mg Oral Given 8/1/24 1208)   carvedilol (COREG) tablet 6.25 mg (6.25 mg Oral Given 8/1/24 1209)   isosorbide mononitrate (IMDUR) 24 hr tablet 30 mg (has no administration in time range)   mirtazapine (REMERON) tablet 15 mg (has no administration in time range)   spironolactone (ALDACTONE) tablet 50 mg (50 mg Oral Given 8/1/24 1208)   predniSONE (DELTASONE) tablet 40 mg (40 mg Oral Given 8/1/24 1208)   ipratropium-albuterol (DUO-NEB) nebulizer solution 3 mL (3 mL Nebulization Given 8/1/24 1258)   guaiFENesin (MUCINEX) 12 " hr tablet 1,200 mg (1,200 mg Oral Given 8/1/24 1208)   ipratropium-albuterol (DUO-NEB) nebulizer solution 3 mL (has no administration in time range)   sodium chloride 0.9 % flush 10 mL (10 mL Intravenous Given 8/1/24 1217)   sodium chloride 0.9 % flush 10 mL (has no administration in time range)   sodium chloride 0.9 % infusion 40 mL (has no administration in time range)   Enoxaparin Sodium (LOVENOX) syringe 40 mg (40 mg Subcutaneous Given 8/1/24 1216)   Potassium Replacement - Follow Nurse / BPA Driven Protocol (has no administration in time range)   Magnesium Standard Dose Replacement - Follow Nurse / BPA Driven Protocol (has no administration in time range)   Phosphorus Replacement - Follow Nurse / BPA Driven Protocol (has no administration in time range)   Calcium Replacement - Follow Nurse / BPA Driven Protocol (has no administration in time range)   acetaminophen (TYLENOL) tablet 650 mg (has no administration in time range)     Or   acetaminophen (TYLENOL) 160 MG/5ML oral solution 650 mg (has no administration in time range)     Or   acetaminophen (TYLENOL) suppository 650 mg (has no administration in time range)   sennosides-docusate (PERICOLACE) 8.6-50 MG per tablet 2 tablet (has no administration in time range)     And   polyethylene glycol (MIRALAX) packet 17 g (has no administration in time range)     And   bisacodyl (DULCOLAX) EC tablet 5 mg (has no administration in time range)     And   bisacodyl (DULCOLAX) suppository 10 mg (has no administration in time range)   melatonin tablet 10 mg (has no administration in time range)   ondansetron ODT (ZOFRAN-ODT) disintegrating tablet 4 mg (has no administration in time range)     Or   ondansetron (ZOFRAN) injection 4 mg (has no administration in time range)   cefTRIAXone (ROCEPHIN) 2,000 mg in sodium chloride 0.9 % 100 mL MBP (2,000 mg Intravenous New Bag 8/1/24 1213)   doxycycline (MONODOX) capsule 100 mg (100 mg Oral Given 8/1/24 1208)    budesonide-formoterol (SYMBICORT) 160-4.5 MCG/ACT inhaler 2 puff (has no administration in time range)   ipratropium-albuterol (DUO-NEB) nebulizer solution 3 mL (3 mL Nebulization Given 8/1/24 0744)   methylPREDNISolone sodium succinate (SOLU-Medrol) injection 125 mg (125 mg Intravenous Given 8/1/24 0744)   iopamidol (ISOVUE-370) 76 % injection 100 mL (85 mL Intravenous Given 8/1/24 0827)     ECG/EMG Results (last 24 hours)       Procedure Component Value Units Date/Time    ECG 12 Lead ED Triage Standing Order; SOA [[id number]] Collected: 08/01/24 0741     Updated: 08/01/24 0741     QT Interval 344 ms      QTC Interval 465 ms     Narrative:      Test Reason : ED Triage Standing Order~  Blood Pressure :   */*   mmHG  Vent. Rate : 110 BPM     Atrial Rate : 110 BPM     P-R Int : 120 ms          QRS Dur :  92 ms      QT Int : 344 ms       P-R-T Axes :  84  76  52 degrees     QTc Int : 465 ms    Sinus tachycardia  Nonspecific ST and T wave abnormality  Abnormal ECG  When compared with ECG of 25-JUL-2017 15:16,  Nonspecific T wave abnormality now evident in Lateral leads    Referred By: edmd           Confirmed By:           ECG 12 Lead ED Triage Standing Order; SOA   Preliminary Result   Test Reason : ED Triage Standing Order~   Blood Pressure :   */*   mmHG   Vent. Rate : 110 BPM     Atrial Rate : 110 BPM      P-R Int : 120 ms          QRS Dur :  92 ms       QT Int : 344 ms       P-R-T Axes :  84  76  52 degrees      QTc Int : 465 ms      Sinus tachycardia   Nonspecific ST and T wave abnormality   Abnormal ECG   When compared with ECG of 25-JUL-2017 15:16,   Nonspecific T wave abnormality now evident in Lateral leads      Referred By: edmd           Confirmed By:       Telemetry Scan   Final Result                                                 Medical Decision Making  Pt is a 57 yo male presenting to ED with complaints of SOB. On arrival to ED O2 80% and does not wear 02 at home. Labs in ED notable for WBC  11, Cr  1.66, Glucose 121, HS Trop 11 and BNP 36. EKG sinus tach. CTA chest with emphysema and left lung nodule but no pneumonia or PE. Patient stable on 2-3 L in ED after steroids and neb. Discussed results and tx plan for admission.     DDx  Resp failure, COPD exacerbation, ACS, NSTEMI, PE, CHF    Discussed patient with Dr. Cee who is agreeable with ED course.   Discussed admission with hospitalist Dr. Regalado.     Problems Addressed:  Acute respiratory failure with hypoxia: complicated acute illness or injury  GOMEZ (acute kidney injury): complicated acute illness or injury  COPD with acute exacerbation: complicated acute illness or injury    Amount and/or Complexity of Data Reviewed  Independent Historian: spouse  External Data Reviewed: notes.     Details: Reviewed previous non ED visits including prior labs, imaging, available notes, medications, allergies and surgical hx.     Labs: ordered. Decision-making details documented in ED Course.  Radiology: ordered. Decision-making details documented in ED Course.  ECG/medicine tests: ordered. Decision-making details documented in ED Course.    Risk  Prescription drug management.  Decision regarding hospitalization.        Final diagnoses:   Acute respiratory failure with hypoxia   COPD with acute exacerbation   GOMEZ (acute kidney injury)       ED Disposition  ED Disposition       ED Disposition   Decision to Admit    Condition   --    Comment   Level of Care: Telemetry [5]   Diagnosis: COPD exacerbation [085728]   Admitting Physician: SORAYA REGALADO [[id number]]   Attending Physician: SORAYA REGALADO [[id number]]   Certification: I Certify That Inpatient Hospital Services Are Medically Necessary For Greater Than 2 Midnights                 No follow-up provider specified.       Medication List      No changes were made to your prescriptions during this visit.            Elizabeth Donahue PA  08/01/24 1765

## 2024-08-01 NOTE — H&P
[facility] Medicine Services  HISTORY AND PHYSICAL    Patient Name: Aguila Finn Jr.  : 1965  MRN: [id number]  Primary Care Physician: Kenya Martinez MD  Date of admission: 2024      Subjective   Subjective     Chief Complaint:  Sob for 1 month    HPI:  Aguila Finn Jr. is a 58 y.o. male with past medical history of essential hypertension, dyslipidemia, nephrolithiasis, anxiety and depression, COPD/emphysema on nocturnal home O2 who presented to the hospital with worsening shortness of breath for 1 month    Patient reported that his COPD/emphysema was under control on his inhalers till a month ago when he started having gradual worsening shortness of breath, wheezing and worsening cough.  He scheduled an appointment with Ms. Rodriguez/pulmonary which is due in the next few weeks.  Shortness of breath has been getting worse over the last 4 weeks and currently, he is short of breath at rest.  Even minimal activity like walking to the bathroom or taking a shower will make him very short of breath.  He is complaining of associated cough with productive yellowish-greenish sputum.  Denies any chest pain, fever, chills.  He mentioned that he is supposed to get echo done today and PET Mycota perfusion scan tomorrow before Dr. Miles in 3 weeks.    In ER, patient was found to be hypoxic, satting 80% on room air.  Improved to 93% on 2 L nasal cannula.  Blood workup is unrevealing.  CTA chest with emphysema without any acute pulmonary infiltrates or PE      Personal History     Past Medical History:   Diagnosis Date    Anxiety     COPD (chronic obstructive pulmonary disease)     Depression     Hyperlipidemia     Hypertension     Recurrent kidney stones            Past Surgical History:   Procedure Laterality Date    CATARACT EXTRACTION, BILATERAL      COLONOSCOPY      EYE SURGERY  2017    WISDOM TOOTH EXTRACTION         Family History: family history includes  Alcohol abuse in his sister; COPD in his mother; Cancer in his sister; Diabetes in his sister, sister, and sister; Heart attack in his father; Heart disease in his sister; Hyperlipidemia in his father; Hypertension in his father, sister, sister, and sister; Stroke in his father.     Social History:  reports that he quit smoking about 3 years ago. His smoking use included cigarettes. He started smoking about 18 years ago. He has a 30 pack-year smoking history. He has been exposed to tobacco smoke. He has never used smokeless tobacco. He reports that he does not currently use alcohol after a past usage of about 1.0 standard drink of alcohol per week. He reports current drug use.  Social History     Social History Narrative    He is a smoker of about a pack a day and has been smoking at least the past 39 years.  Denies drug use.  Occasional EtOH use.  Occasional caffeine, he has 2 dogs and a cat.  Recent travel to [place] on a cruise.  He is retired with a high school education and is  and lives at home with his wife.       Medications:  Available home medication information reviewed.  albuterol, amLODIPine, arformoterol, aspirin, atorvastatin, budesonide-formoterol, busPIRone, carvedilol, clobetasol, hydrocortisone, isosorbide mononitrate, ketoconazole, mirtazapine, mometasone, potassium chloride, and spironolactone    No Known Allergies    Objective   Objective     Vital Signs:   Heart Rate:  [104-118] 107  Resp:  [22-24] 24  BP: (123-186)/() 123/85  Flow (L/min):  [2-15] 2       Physical Exam   General: Comfortable, respiratory distress.  Conversant  Head: Atraumatic and normocephalic  Eyes: No Icterus. No pallor  Ears:  Ears appear intact with no abnormalities noted  Throat: No oral lesions, no thrush  Neck: Supple, trachea midline  Lungs: Moderate respiratory distress.  Using accessory muscles of breathing.  Diminished air entry both lung fields with mild wheezing  Heart:   Normal S1 and S2, no murmur, no gallop, No JVD, no lower extremity swelling  Abdomen:  Soft, no tenderness, no organomegaly, normal bowel sounds, no organomegaly  Extremities: pulses equal bilaterally  Skin: No bleeding, bruising or rash, normal skin turgor and elasticity  Neurologic: Cranial nerves appear intact with no evidence of facial asymmetry, normal motor and sensory functions in all 4 extremities  Psych: Alert and oriented x 3, normal mood    Result Review:  I have personally reviewed the results from the time of this admission to 8/1/2024 09:13 EDT and agree with these findings:  [x]  Laboratory list / accordion  [x]  Microbiology  []  Radiology  []  EKG/Telemetry   [x]  Cardiology/Vascular   []  Pathology  [x]  Old records      LAB RESULTS:      Lab 08/01/24  0734   WBC 11.00*   HEMOGLOBIN 14.3   HEMATOCRIT 45.1   PLATELETS 237   NEUTROS ABS 8.14*   IMMATURE GRANS (ABS) 0.09*   LYMPHS ABS 1.07   MONOS ABS 1.03*   EOS ABS 0.56*   MCV 91.1         Lab 08/01/24  0734   SODIUM 139   POTASSIUM 5.0   CHLORIDE 103   CO2 28.0   ANION GAP 8.0   BUN 24*   CREATININE 1.66*   EGFR 47.2*   GLUCOSE 121*   CALCIUM 8.8   MAGNESIUM 2.3         Lab 08/01/24  0734   TOTAL PROTEIN 7.3   ALBUMIN 4.1   GLOBULIN 3.2   ALT (SGPT) 20   AST (SGOT) 20   BILIRUBIN 0.3   ALK PHOS 119*         Lab 08/01/24  0734   PROBNP <36.0   HSTROP T 11                 Lab 08/01/24  0749   PH, ARTERIAL 7.315*   PCO2, ARTERIAL 52.8*   PO2 .0*   FIO2 28   HCO3 ART 26.9*   BASE EXCESS ART -0.3*   CARBOXYHEMOGLOBIN 0.8         Microbiology Results (last 10 days)       Procedure Component Value - Date/Time    COVID PRE-OP / PRE-PROCEDURE SCREENING ORDER (NO ISOLATION) - Swab, Nasopharynx [[id number]]  (Normal) Collected: 08/01/24 0736    Lab Status: Final result Specimen: Swab from Nasopharynx Updated: 08/01/24 0842    Narrative:      The following orders were created for panel order COVID PRE-OP / PRE-PROCEDURE SCREENING ORDER (NO ISOLATION) -  Swab, Nasopharynx.  Procedure                               Abnormality         Status                     ---------                               -----------         ------                     Respiratory Panel PCR w/...[[id number]]  Normal              Final result                 Please view results for these tests on the individual orders.    Respiratory Panel PCR w/COVID-19(SARS-CoV-2) DAKOTAH/JULIANA/ISAIAS/PAD/COR/CHRISTAL In-House, NP Swab in UTM/VTM, 2 HR TAT - Swab, Nasopharynx [[id number]]  (Normal) Collected: 08/01/24 0736    Lab Status: Final result Specimen: Swab from Nasopharynx Updated: 08/01/24 0842     ADENOVIRUS, PCR Not Detected     Coronavirus 229E Not Detected     Coronavirus HKU1 Not Detected     Coronavirus NL63 Not Detected     Coronavirus OC43 Not Detected     COVID19 Not Detected     Human Metapneumovirus Not Detected     Human Rhinovirus/Enterovirus Not Detected     Influenza A PCR Not Detected     Influenza B PCR Not Detected     Parainfluenza Virus 1 Not Detected     Parainfluenza Virus 2 Not Detected     Parainfluenza Virus 3 Not Detected     Parainfluenza Virus 4 Not Detected     RSV, PCR Not Detected     Bordetella pertussis pcr Not Detected     Bordetella parapertussis PCR Not Detected     Chlamydophila pneumoniae PCR Not Detected     Mycoplasma pneumo by PCR Not Detected    Narrative:      In the setting of a positive respiratory panel with a viral infection PLUS a negative procalcitonin without other underlying concern for bacterial infection, consider observing off antibiotics or discontinuation of antibiotics and continue supportive care. If the respiratory panel is positive for atypical bacterial infection (Bordetella pertussis, Chlamydophila pneumoniae, or Mycoplasma pneumoniae), consider antibiotic de-escalation to target atypical bacterial infection.            CT Angiogram Chest    Result Date: 8/1/2024  CT ANGIOGRAM CHEST Date of Exam: 8/1/2024 8:15 AM EDT Indication: SOB, hypoxia,  tachycardia. Comparison: None available. Technique: CTA of the chest was performed after the uneventful intravenous administration of 85 mL Isovue-370. Reconstructed coronal and sagittal images were also obtained. In addition, a 3-D volume rendered image was created for interpretation. Automated exposure control and iterative reconstruction methods were used. FINDINGS: Thoracic inlet: Unremarkable. Pulmonary arteries: No filling defects are identified within the pulmonary arteries to suggest acute pulmonary embolism. Great vessels: Atherosclerotic plaque is seen within the thoracic aorta and proximal arch vessels. Mediastinum/Kayla: No pathologically enlarged mediastinal lymph nodes are seen. Possible distal esophageal wall thickening. Lung parenchyma: Lungs are emphysematous. There is an unchanged 7 mm nodule within the left upper lobe (series 5, image 46). This appears similar since 8/29/2022. There are bullous changes of the right upper lobe with subpleural scarring also noted. Trachea and airways: The trachea and central airways appear unremarkable. Pleural space: No significant pleural effusion or pneumothorax is seen. Heart and pericardium: Coronary artery calcifications are noted. Otherwise, the heart and pericardium appear unremarkable. Chest wall: No acute or suspicious osseous or soft tissue lesion is identified. Upper abdomen: No acute abnormality is identified within the visualized upper abdomen. Bilateral punctate nonobstructive nephrolithiasis. Colonic diverticulosis noted. Partially visualized left renal cyst.     Impression: 1.No acute abnormality is identified within the thorax. Specifically, there is no evidence of acute pulmonary embolism. 2.Pulmonary emphysema. Please correlate with patient's smoking history/risk factors to determine whether the patient meets criteria for routine lung cancer screening with low dose chest CT. 3.Stable 7 mm left upper lobe nodule, similar since 8/29/2022. 4.Possible  distal esophageal wall thickening. Please correlate clinically for esophagitis. 5.Additional findings as detailed above. Electronically Signed: David Sawyer MD  8/1/2024 8:39 AM EDT  Workstation ID: [id number]    XR Chest 1 View    Result Date: 8/1/2024  XR CHEST 1 VW Date of Exam: 8/1/2024 7:38 AM EDT Indication: SOA triage protocol Comparison: Chest radiograph 7/23/2024. Findings: Cardiomediastinal silhouette is within normal limits. Emphysematous changes. No focal consolidation. No pleural effusion or pneumothorax. Osseous structures are unremarkable. Electronic device overlies the left upper chest.     Impression: Impression: Emphysematous changes without evidence of acute/superimposed process. Electronically Signed: Dylan Best MD  8/1/2024 7:50 AM EDT  Workstation ID: [id number]     Results for orders placed during the hospital encounter of 12/18/19    Adult Stress Echo W/ Cont or Stress Agent if Necessary Per Protocol    Interpretation Summary  · Equivocal exercise stress echo. Imaging reveals normal hyperdynamic function at peak heart rate with no wall motion abnormality. However, the patient had SVT during exercise with resultant 2 mm horizontal anterior and inferior ST depression.  · Expected exercise time: 9:40, actual time: 3:38 (GOMEZ +55)  · Calculated resting EF = 63.0%.  · Left ventricular systolic function is normal.  · No significant structural or functional valvular disease.  · Technically difficult study due to lung interference.      Assessment & Plan   Assessment & Plan       * No active hospital problems. *    Summary:  Aguila Finn . is a 58 y.o. male with past medical history of essential hypertension, dyslipidemia, nephrolithiasis, anxiety and depression, COPD/emphysema on nocturnal home O2 who presented to the hospital with worsening shortness of breath for 1 month    Acute COPD exacerbation  Acute hypoxic respiratory failure  Patient is known to have COPD and on nocturnal oxygen.   Presented with shortness of breath.  Found to be hypoxic satting 80% on room air.  Having moderate respiratory distress on exam  CTA chest in ER with no evidence of PE or acute infiltrates  Given his increased cough and sputum production, will treat for acute bronchitis with IV Rocephin and doxycycline.  Obtain sputum culture, MRSA swab  P.o. prednisone  Continue Brovana and Symbicort  Patient encouraged to keep his appointment with pulmonary as outpatient    Coronary artery disease  CT chest with extensive coronary calcification  Obtain echocardiogram.  Was supposed to get PET myocardial perfusion scan tomorrow as a part of workup for ischemic heart disease prior to seeing Dr. Mejia in 3 weeks.  Will order it.  Keep n.p.o. from midnight    Essential hypertension  Dyslipidemia  Continue amlodipine and Coreg  Statins    Depression and anxiety  Continue mirtazapine, BuSpar    Chronic hypokalemia  Continue home Aldactone  Replace electrolytes per protocol    VTE Prophylaxis:  No VTE prophylaxis order currently exists.          CODE STATUS:    There are no questions and answers to display.       Expected Discharge   Expected discharge date/ time has not been documented.     Chau Regalado MD  08/01/24

## 2024-08-01 NOTE — ED NOTES
.. Aguila Finn Jr.    Nursing Report ED to Floor:  Mental status: A/o times 4  Ambulatory status: up with assist  Oxygen Therapy:  2l nc  Cardiac Rhythm: NSR  Admitted from: ED   Safety Concerns:  airway.   Social Issues: from home  ED Room #:  19    ED Nurse Phone Extension - [phone] or may call [phone].      HPI:   Chief Complaint   Patient presents with    Shortness of Breath       Past Medical History:  Past Medical History:   Diagnosis Date    Anxiety     COPD (chronic obstructive pulmonary disease)     Depression     Hyperlipidemia     Hypertension     Recurrent kidney stones         Past Surgical History:  Past Surgical History:   Procedure Laterality Date    CATARACT EXTRACTION, BILATERAL      COLONOSCOPY      EYE SURGERY  2017    WISDOM TOOTH EXTRACTION          Admitting Doctor:   Chau Regalado MD    Consulting Provider(s):  Consults       No orders found from 7/3/2024 to 8/2/2024.             Admitting Diagnosis:   The primary encounter diagnosis was Acute respiratory failure with hypoxia. Diagnoses of COPD with acute exacerbation and GOMEZ (acute kidney injury) were also pertinent to this visit.    Most Recent Vitals:   Vitals:    08/01/24 0832 08/01/24 0916 08/01/24 0930 08/01/24 1000   BP:   147/95 (!) 150/104   BP Location:       Patient Position:       Pulse: 107 100 97 101   Resp:       TempSrc:       SpO2:  94% 94% 95%   Weight:       Height:           Active LDAs/IV Access:   Lines, Drains & Airways       Active LDAs       Name Placement date Placement time Site Days    Peripheral IV 08/01/24 0735 Anterior;Distal;Right;Upper Arm 08/01/24  0735  Arm  less than 1                    Labs (abnormal labs have a star):   Labs Reviewed   COMPREHENSIVE METABOLIC PANEL - Abnormal; Notable for the following components:       Result Value    Glucose 121 (*)     BUN 24 (*)     Creatinine 1.66 (*)     Alkaline Phosphatase 119 (*)     eGFR 47.2 (*)     All other components within normal limits     Narrative:     GFR Normal >60  Chronic Kidney Disease <60  Kidney Failure <15     CBC WITH AUTO DIFFERENTIAL - Abnormal; Notable for the following components:    WBC 11.00 (*)     Lymphocyte % 9.7 (*)     Immature Grans % 0.8 (*)     Neutrophils, Absolute 8.14 (*)     Monocytes, Absolute 1.03 (*)     Eosinophils, Absolute 0.56 (*)     Immature Grans, Absolute 0.09 (*)     All other components within normal limits   BLOOD GAS, ARTERIAL W/CO-OXIMETRY - Abnormal; Notable for the following components:    pH, Arterial 7.315 (*)     pCO2, Arterial 52.8 (*)     pO2, Arterial 132.0 (*)     HCO3, Arterial 26.9 (*)     Base Excess, Arterial -0.3 (*)     pCO2, Temperature Corrected 52.8 (*)     pO2, Temperature Corrected 132 (*)     All other components within normal limits   RESPIRATORY PANEL PCR W/ COVID-19 (SARS-COV-2), NP SWAB IN UTM/VTP, 2 HR TAT - Normal    Narrative:     In the setting of a positive respiratory panel with a viral infection PLUS a negative procalcitonin without other underlying concern for bacterial infection, consider observing off antibiotics or discontinuation of antibiotics and continue supportive care. If the respiratory panel is positive for atypical bacterial infection (Bordetella pertussis, Chlamydophila pneumoniae, or Mycoplasma pneumoniae), consider antibiotic de-escalation to target atypical bacterial infection.   BNP (IN-HOUSE) - Normal    Narrative:     This assay is used as an aid in the diagnosis of individuals suspected of having heart failure. It can be used as an aid in the diagnosis of acute decompensated heart failure (ADHF) in patients presenting with signs and symptoms of ADHF to the emergency department (ED). In addition, NT-proBNP of <300 pg/mL indicates ADHF is not likely.    Age Range Result Interpretation  NT-proBNP Concentration (pg/mL:      <50             Positive            >450                   Gray                 300-450                    Negative              <300    50-75           Positive            >900                  Gray                300-900                  Negative            <300      >75             Positive            >1800                  Gray                300-1800                  Negative            <300   SINGLE HS TROPONIN T - Normal    Narrative:     High Sensitive Troponin T Reference Range:  <14.0 ng/L- Negative Female for AMI  <22.0 ng/L- Negative Male for AMI  >=14 - Abnormal Female indicating possible myocardial injury.  >=22 - Abnormal Male indicating possible myocardial injury.   Clinicians would have to utilize clinical acumen, EKG, Troponin, and serial changes to determine if it is an Acute Myocardial Infarction or myocardial injury due to an underlying chronic condition.        MAGNESIUM - Normal   COVID PRE-OP / PRE-PROCEDURE SCREENING ORDER (NO ISOLATION)    Narrative:     The following orders were created for panel order COVID PRE-OP / PRE-PROCEDURE SCREENING ORDER (NO ISOLATION) - Swab, Nasopharynx.  Procedure                               Abnormality         Status                     ---------                               -----------         ------                     Respiratory Panel PCR w/...[[id number]]  Normal              Final result                 Please view results for these tests on the individual orders.   RAINBOW DRAW    Narrative:     The following orders were created for panel order [place] Draw.  Procedure                               Abnormality         Status                     ---------                               -----------         ------                     Green Top (Gel)[[id number]]                                  Final result               Lavender Top[[id number]]                                     Final result               Gold Top - SST[[id number]]                                   Final result               Pollock Top[[id number]]                                         Final result               Light  Blue Top[[id number]]                                   Final result                 Please view results for these tests on the individual orders.   BLOOD GAS, ARTERIAL   CBC AND DIFFERENTIAL    Narrative:     The following orders were created for panel order CBC & Differential.  Procedure                               Abnormality         Status                     ---------                               -----------         ------                     CBC Auto Differential[[id number]]        Abnormal            Final result                 Please view results for these tests on the individual orders.   GREEN TOP   LAVENDER TOP   GOLD TOP - SST   GRAY TOP   LIGHT BLUE TOP       Meds Given in ED:   Medications   sodium chloride 0.9 % flush 10 mL (has no administration in time range)   amLODIPine (NORVASC) tablet 10 mg (has no administration in time range)   arformoterol (BROVANA) nebulizer solution 15 mcg (has no administration in time range)   aspirin EC tablet 81 mg (has no administration in time range)   atorvastatin (LIPITOR) tablet 40 mg (has no administration in time range)   busPIRone (BUSPAR) tablet 15 mg (has no administration in time range)   carvedilol (COREG) tablet 6.25 mg (has no administration in time range)   isosorbide mononitrate (IMDUR) 24 hr tablet 30 mg (has no administration in time range)   mirtazapine (REMERON) tablet 15 mg (has no administration in time range)   spironolactone (ALDACTONE) tablet 50 mg (has no administration in time range)   budesonide-formoterol (SYMBICORT) 160-4.5 MCG/ACT inhaler 2 puff (has no administration in time range)   predniSONE (DELTASONE) tablet 40 mg (has no administration in time range)   ipratropium-albuterol (DUO-NEB) nebulizer solution 3 mL (has no administration in time range)   guaiFENesin (MUCINEX) 12 hr tablet 1,200 mg (has no administration in time range)   ipratropium-albuterol (DUO-NEB) nebulizer solution 3 mL (has no administration in time range)   sodium  chloride 0.9 % flush 10 mL (has no administration in time range)   sodium chloride 0.9 % flush 10 mL (has no administration in time range)   sodium chloride 0.9 % infusion 40 mL (has no administration in time range)   Enoxaparin Sodium (LOVENOX) syringe 40 mg (has no administration in time range)   Potassium Replacement - Follow Nurse / BPA Driven Protocol (has no administration in time range)   Magnesium Standard Dose Replacement - Follow Nurse / BPA Driven Protocol (has no administration in time range)   Phosphorus Replacement - Follow Nurse / BPA Driven Protocol (has no administration in time range)   Calcium Replacement - Follow Nurse / BPA Driven Protocol (has no administration in time range)   acetaminophen (TYLENOL) tablet 650 mg (has no administration in time range)     Or   acetaminophen (TYLENOL) 160 MG/5ML oral solution 650 mg (has no administration in time range)     Or   acetaminophen (TYLENOL) suppository 650 mg (has no administration in time range)   sennosides-docusate (PERICOLACE) 8.6-50 MG per tablet 2 tablet (has no administration in time range)     And   polyethylene glycol (MIRALAX) packet 17 g (has no administration in time range)     And   bisacodyl (DULCOLAX) EC tablet 5 mg (has no administration in time range)     And   bisacodyl (DULCOLAX) suppository 10 mg (has no administration in time range)   melatonin tablet 10 mg (has no administration in time range)   ondansetron ODT (ZOFRAN-ODT) disintegrating tablet 4 mg (has no administration in time range)     Or   ondansetron (ZOFRAN) injection 4 mg (has no administration in time range)   ipratropium-albuterol (DUO-NEB) nebulizer solution 3 mL (3 mL Nebulization Given 8/1/24 0784)   methylPREDNISolone sodium succinate (SOLU-Medrol) injection 125 mg (125 mg Intravenous Given 8/1/24 0744)   iopamidol (ISOVUE-370) 76 % injection 100 mL (85 mL Intravenous Given 8/1/24 0846)           Last NIH score:                                                           Dysphagia screening results:        [place] Coma Scale:  No data recorded     CIWA:        Restraint Type:            Isolation Status:  No active isolations

## 2024-08-02 ENCOUNTER — APPOINTMENT (OUTPATIENT)
Dept: CARDIOLOGY | Facility: HOSPITAL | Age: 59
End: 2024-08-02
Payer: COMMERCIAL

## 2024-08-02 LAB
ALBUMIN SERPL-MCNC: 4 G/DL (ref 3.5–5.2)
ALBUMIN/GLOB SERPL: 1.9 G/DL
ALP SERPL-CCNC: 99 U/L (ref 39–117)
ALT SERPL W P-5'-P-CCNC: 16 U/L (ref 1–41)
ANION GAP SERPL CALCULATED.3IONS-SCNC: 11 MMOL/L (ref 5–15)
AST SERPL-CCNC: 11 U/L (ref 1–40)
BASOPHILS # BLD AUTO: 0.02 10*3/MM3 (ref 0–0.2)
BASOPHILS NFR BLD AUTO: 0.2 % (ref 0–1.5)
BH CV REST NUCLEAR ISOTOPE DOSE: 25.9 MCI
BH CV STRESS BP STAGE 2: NORMAL
BH CV STRESS BP STAGE 4: NORMAL
BH CV STRESS COMMENTS STAGE 1: NORMAL
BH CV STRESS DOSE REGADENOSON STAGE 1: 0.4
BH CV STRESS DURATION MIN STAGE 1: 1
BH CV STRESS DURATION MIN STAGE 2: 1
BH CV STRESS DURATION MIN STAGE 3: 1
BH CV STRESS DURATION MIN STAGE 4: 1
BH CV STRESS DURATION SEC STAGE 1: 0
BH CV STRESS DURATION SEC STAGE 2: 0
BH CV STRESS DURATION SEC STAGE 3: 0
BH CV STRESS DURATION SEC STAGE 4: 0
BH CV STRESS HR STAGE 1: 97
BH CV STRESS HR STAGE 2: 102
BH CV STRESS HR STAGE 3: 100
BH CV STRESS HR STAGE 4: 97
BH CV STRESS NUCLEAR ISOTOPE DOSE: 25.9 MCI
BH CV STRESS O2 STAGE 1: 96
BH CV STRESS O2 STAGE 2: 99
BH CV STRESS O2 STAGE 3: 99
BH CV STRESS O2 STAGE 4: 99
BH CV STRESS PROTOCOL 1: NORMAL
BH CV STRESS RECOVERY BP: NORMAL MMHG
BH CV STRESS RECOVERY HR: 94 BPM
BH CV STRESS RECOVERY O2: 98 %
BH CV STRESS STAGE 1: 1
BH CV STRESS STAGE 2: 2
BH CV STRESS STAGE 3: 3
BH CV STRESS STAGE 4: 4
BILIRUB SERPL-MCNC: 0.2 MG/DL (ref 0–1.2)
BUN SERPL-MCNC: 34 MG/DL (ref 6–20)
BUN/CREAT SERPL: 17.2 (ref 7–25)
CALCIUM SPEC-SCNC: 8.9 MG/DL (ref 8.6–10.5)
CHLORIDE SERPL-SCNC: 104 MMOL/L (ref 98–107)
CO2 SERPL-SCNC: 28 MMOL/L (ref 22–29)
CREAT SERPL-MCNC: 1.98 MG/DL (ref 0.76–1.27)
DEPRECATED RDW RBC AUTO: 42.5 FL (ref 37–54)
EGFRCR SERPLBLD CKD-EPI 2021: 38.2 ML/MIN/1.73
EOSINOPHIL # BLD AUTO: 0 10*3/MM3 (ref 0–0.4)
EOSINOPHIL NFR BLD AUTO: 0 % (ref 0.3–6.2)
ERYTHROCYTE [DISTWIDTH] IN BLOOD BY AUTOMATED COUNT: 13 % (ref 12.3–15.4)
GLOBULIN UR ELPH-MCNC: 2.1 GM/DL
GLUCOSE SERPL-MCNC: 131 MG/DL (ref 65–99)
HCT VFR BLD AUTO: 39.5 % (ref 37.5–51)
HGB BLD-MCNC: 12.8 G/DL (ref 13–17.7)
IMM GRANULOCYTES # BLD AUTO: 0.07 10*3/MM3 (ref 0–0.05)
IMM GRANULOCYTES NFR BLD AUTO: 0.5 % (ref 0–0.5)
LV EF NUC BP: 68 %
LYMPHOCYTES # BLD AUTO: 0.54 10*3/MM3 (ref 0.7–3.1)
LYMPHOCYTES NFR BLD AUTO: 4.2 % (ref 19.6–45.3)
MAGNESIUM SERPL-MCNC: 2.7 MG/DL (ref 1.6–2.6)
MAXIMAL PREDICTED HEART RATE: 161 BPM
MCH RBC QN AUTO: 28.8 PG (ref 26.6–33)
MCHC RBC AUTO-ENTMCNC: 32.4 G/DL (ref 31.5–35.7)
MCV RBC AUTO: 88.8 FL (ref 79–97)
MONOCYTES # BLD AUTO: 0.81 10*3/MM3 (ref 0.1–0.9)
MONOCYTES NFR BLD AUTO: 6.3 % (ref 5–12)
NEUTROPHILS NFR BLD AUTO: 11.35 10*3/MM3 (ref 1.7–7)
NEUTROPHILS NFR BLD AUTO: 88.8 % (ref 42.7–76)
NRBC BLD AUTO-RTO: 0 /100 WBC (ref 0–0.2)
PERCENT MAX PREDICTED HR: 63.35 %
PHOSPHATE SERPL-MCNC: 4.4 MG/DL (ref 2.5–4.5)
PLATELET # BLD AUTO: 211 10*3/MM3 (ref 140–450)
PMV BLD AUTO: 12 FL (ref 6–12)
POTASSIUM SERPL-SCNC: 5.2 MMOL/L (ref 3.5–5.2)
PROT SERPL-MCNC: 6.1 G/DL (ref 6–8.5)
QT INTERVAL: 344 MS
QTC INTERVAL: 465 MS
RBC # BLD AUTO: 4.45 10*6/MM3 (ref 4.14–5.8)
SODIUM SERPL-SCNC: 143 MMOL/L (ref 136–145)
STRESS BASELINE BP: NORMAL MMHG
STRESS BASELINE HR: 83 BPM
STRESS O2 SAT REST: 97 %
STRESS PERCENT HR: 75 %
STRESS POST ESTIMATED WORKLOAD: 1 METS
STRESS POST EXERCISE DUR MIN: 4 MIN
STRESS POST EXERCISE DUR SEC: 0 SEC
STRESS POST O2 SAT PEAK: 99 %
STRESS POST PEAK BP: NORMAL MMHG
STRESS POST PEAK HR: 102 BPM
STRESS TARGET HR: 137 BPM
WBC NRBC COR # BLD AUTO: 12.79 10*3/MM3 (ref 3.4–10.8)

## 2024-08-02 PROCEDURE — 93016 CV STRESS TEST SUPVJ ONLY: CPT | Performed by: INTERNAL MEDICINE

## 2024-08-02 PROCEDURE — 83735 ASSAY OF MAGNESIUM: CPT | Performed by: INTERNAL MEDICINE

## 2024-08-02 PROCEDURE — 94799 UNLISTED PULMONARY SVC/PX: CPT

## 2024-08-02 PROCEDURE — 25010000002 METHYLPREDNISOLONE PER 125 MG: Performed by: INTERNAL MEDICINE

## 2024-08-02 PROCEDURE — 63710000001 PREDNISONE PER 1 MG: Performed by: INTERNAL MEDICINE

## 2024-08-02 PROCEDURE — 25010000002 ENOXAPARIN PER 10 MG: Performed by: INTERNAL MEDICINE

## 2024-08-02 PROCEDURE — 78431 MYOCRD IMG PET RST&STRS CT: CPT | Performed by: INTERNAL MEDICINE

## 2024-08-02 PROCEDURE — 25810000003 SODIUM CHLORIDE 0.9 % SOLUTION: Performed by: INTERNAL MEDICINE

## 2024-08-02 PROCEDURE — 84100 ASSAY OF PHOSPHORUS: CPT | Performed by: INTERNAL MEDICINE

## 2024-08-02 PROCEDURE — G0378 HOSPITAL OBSERVATION PER HR: HCPCS

## 2024-08-02 PROCEDURE — 25010000002 CEFTRIAXONE PER 250 MG: Performed by: INTERNAL MEDICINE

## 2024-08-02 PROCEDURE — 93018 CV STRESS TEST I&R ONLY: CPT | Performed by: INTERNAL MEDICINE

## 2024-08-02 PROCEDURE — 0 RUBIDIUM CHLORIDE: Performed by: INTERNAL MEDICINE

## 2024-08-02 PROCEDURE — 85025 COMPLETE CBC W/AUTO DIFF WBC: CPT | Performed by: INTERNAL MEDICINE

## 2024-08-02 PROCEDURE — 99232 SBSQ HOSP IP/OBS MODERATE 35: CPT | Performed by: INTERNAL MEDICINE

## 2024-08-02 PROCEDURE — 80053 COMPREHEN METABOLIC PANEL: CPT | Performed by: INTERNAL MEDICINE

## 2024-08-02 PROCEDURE — 78431 MYOCRD IMG PET RST&STRS CT: CPT

## 2024-08-02 PROCEDURE — 25010000002 REGADENOSON 0.4 MG/5ML SOLUTION: Performed by: INTERNAL MEDICINE

## 2024-08-02 PROCEDURE — 93017 CV STRESS TEST TRACING ONLY: CPT

## 2024-08-02 PROCEDURE — A9555 RB82 RUBIDIUM: HCPCS | Performed by: INTERNAL MEDICINE

## 2024-08-02 RX ORDER — REGADENOSON 0.08 MG/ML
0.4 INJECTION, SOLUTION INTRAVENOUS ONCE
Status: COMPLETED | OUTPATIENT
Start: 2024-08-02 | End: 2024-08-02

## 2024-08-02 RX ORDER — METHYLPREDNISOLONE SODIUM SUCCINATE 125 MG/2ML
60 INJECTION, POWDER, LYOPHILIZED, FOR SOLUTION INTRAMUSCULAR; INTRAVENOUS EVERY 12 HOURS
Status: DISCONTINUED | OUTPATIENT
Start: 2024-08-02 | End: 2024-08-06 | Stop reason: HOSPADM

## 2024-08-02 RX ORDER — SODIUM CHLORIDE 9 MG/ML
75 INJECTION, SOLUTION INTRAVENOUS CONTINUOUS
Status: DISCONTINUED | OUTPATIENT
Start: 2024-08-02 | End: 2024-08-04

## 2024-08-02 RX ADMIN — ASPIRIN 81 MG: 81 TABLET, COATED ORAL at 09:19

## 2024-08-02 RX ADMIN — DOXYCYCLINE 100 MG: 100 CAPSULE ORAL at 22:34

## 2024-08-02 RX ADMIN — ARFORMOTEROL TARTRATE 15 MCG: 15 SOLUTION RESPIRATORY (INHALATION) at 20:34

## 2024-08-02 RX ADMIN — GUAIFENESIN 1200 MG: 600 TABLET, EXTENDED RELEASE ORAL at 09:18

## 2024-08-02 RX ADMIN — Medication 10 ML: at 09:21

## 2024-08-02 RX ADMIN — CARVEDILOL 6.25 MG: 6.25 TABLET, FILM COATED ORAL at 09:19

## 2024-08-02 RX ADMIN — BUSPIRONE HYDROCHLORIDE 15 MG: 15 TABLET ORAL at 22:35

## 2024-08-02 RX ADMIN — Medication 10 MG: at 22:35

## 2024-08-02 RX ADMIN — BUDESONIDE AND FORMOTEROL FUMARATE DIHYDRATE 2 PUFF: 160; 4.5 AEROSOL RESPIRATORY (INHALATION) at 20:34

## 2024-08-02 RX ADMIN — DOXYCYCLINE 100 MG: 100 CAPSULE ORAL at 09:19

## 2024-08-02 RX ADMIN — BUDESONIDE AND FORMOTEROL FUMARATE DIHYDRATE 2 PUFF: 160; 4.5 AEROSOL RESPIRATORY (INHALATION) at 15:39

## 2024-08-02 RX ADMIN — METHYLPREDNISOLONE SODIUM SUCCINATE 60 MG: 125 INJECTION, POWDER, FOR SOLUTION INTRAMUSCULAR; INTRAVENOUS at 22:36

## 2024-08-02 RX ADMIN — SODIUM CHLORIDE 75 ML/HR: 9 INJECTION, SOLUTION INTRAVENOUS at 11:31

## 2024-08-02 RX ADMIN — CARVEDILOL 6.25 MG: 6.25 TABLET, FILM COATED ORAL at 22:34

## 2024-08-02 RX ADMIN — MIRTAZAPINE 15 MG: 15 TABLET, FILM COATED ORAL at 22:35

## 2024-08-02 RX ADMIN — REGADENOSON 0.4 MG: 0.08 INJECTION, SOLUTION INTRAVENOUS at 08:38

## 2024-08-02 RX ADMIN — IPRATROPIUM BROMIDE AND ALBUTEROL SULFATE 3 ML: 2.5; .5 SOLUTION RESPIRATORY (INHALATION) at 20:34

## 2024-08-02 RX ADMIN — GUAIFENESIN 1200 MG: 600 TABLET, EXTENDED RELEASE ORAL at 22:34

## 2024-08-02 RX ADMIN — ENOXAPARIN SODIUM 40 MG: 100 INJECTION SUBCUTANEOUS at 09:22

## 2024-08-02 RX ADMIN — ATORVASTATIN CALCIUM 40 MG: 40 TABLET, FILM COATED ORAL at 09:19

## 2024-08-02 RX ADMIN — RUBIDIUM CHLORIDE RB-82 1 DOSE: 150 INJECTION, SOLUTION INTRAVENOUS at 08:39

## 2024-08-02 RX ADMIN — BUSPIRONE HYDROCHLORIDE 15 MG: 15 TABLET ORAL at 09:19

## 2024-08-02 RX ADMIN — AMLODIPINE BESYLATE 10 MG: 10 TABLET ORAL at 09:20

## 2024-08-02 RX ADMIN — ISOSORBIDE MONONITRATE 30 MG: 30 TABLET, EXTENDED RELEASE ORAL at 22:35

## 2024-08-02 RX ADMIN — PREDNISONE 40 MG: 20 TABLET ORAL at 09:30

## 2024-08-02 RX ADMIN — SPIRONOLACTONE 50 MG: 25 TABLET, FILM COATED ORAL at 09:19

## 2024-08-02 RX ADMIN — IPRATROPIUM BROMIDE AND ALBUTEROL SULFATE 3 ML: 2.5; .5 SOLUTION RESPIRATORY (INHALATION) at 15:31

## 2024-08-02 RX ADMIN — SODIUM CHLORIDE 2000 MG: 900 INJECTION INTRAVENOUS at 11:31

## 2024-08-02 RX ADMIN — RUBIDIUM CHLORIDE RB-82 1 DOSE: 150 INJECTION, SOLUTION INTRAVENOUS at 08:28

## 2024-08-02 NOTE — CASE MANAGEMENT/SOCIAL WORK
Discharge Planning Assessment  [facility]     Patient Name: Aguila Finn Jr.  MRN: [id number]  Today's Date: 8/2/2024    Admit Date: 8/1/2024    Plan: Home   Discharge Needs Assessment       Row Name 08/02/24 1016       Living Environment    People in Home spouse    Unique Family Situation Lives with spouse.    Current Living Arrangements home    Potentially Unsafe Housing Conditions none    In the past 12 months has the electric, gas, oil, or water company threatened to shut off services in your home? No    Primary Care Provided by self    Family Caregiver if Needed none    Able to Return to Prior Arrangements no       Resource/Environmental Concerns    Resource/Environmental Concerns none       Transportation Needs    In the past 12 months, has lack of transportation kept you from medical appointments or from getting medications? no    In the past 12 months, has lack of transportation kept you from meetings, work, or from getting things needed for daily living? No       Food Insecurity    Within the past 12 months, you worried that your food would run out before you got the money to buy more. Never true    Within the past 12 months, the food you bought just didn't last and you didn't have money to get more. Never true       Transition Planning    Patient/Family Anticipates Transition to home    Patient/Family Anticipated Services at Transition none    Transportation Anticipated family or friend will provide       Discharge Needs Assessment    Equipment Currently Used at Home oxygen  used 2L at night. Used [employer] in the past for oxygen needs. Whats a new company.    Equipment Needed After Discharge none                   Discharge Plan       Row Name 08/02/24 1017       Plan    Plan Home    Patient/Family in Agreement with Plan yes    Plan Comments Spoke with patient at bedside. Patient lives with Spouse in [facility]. He is independent with ADLs. He uses oxygen at night at 2L. He used [employer] in past  for oxygen needs and would like a new company. He has an oxygen concentrator here that he bought from [employer] and feels like it is not working properly. He is not current with home health services. PCP is Kenya Martinez. Insurance is [employer]. Patient feels like he doesn't need PT services or rehab. His discharge plan is home with private transport. CM will follow for any discharge needs.    Final Discharge Disposition Code 01 - home or self-care                  Continued Care and Services - Admitted Since 8/1/2024    No active coordination exists for this encounter.          Demographic Summary       Row Name 08/02/24 1015       General Information    Admission Type inpatient    Preferred Language English                   Functional Status    No documentation.                  Psychosocial    No documentation.                  Abuse/Neglect    No documentation.                  Legal    No documentation.                  Substance Abuse    No documentation.                  Patient Forms    No documentation.                     Criss Li RN

## 2024-08-02 NOTE — PLAN OF CARE
Goal Outcome Evaluation:  Plan of Care Reviewed With: patient        Progress: improving  Outcome Evaluation: Slept most of the night on 2LNC. Denies CP. +SOAOE. SR on tele. No c/o pain or discomfort. VSS. Kept NPO p MN for stress test

## 2024-08-02 NOTE — PROGRESS NOTES
[facility] Medicine Services  PROGRESS NOTE    Patient Name: Aguila Finn Jr.  : 1965  MRN: [id number]    Date of Admission: 2024  Primary Care Physician: Kenya Martinez MD    Subjective   Subjective     CC:  COPD AE    HPI:  Breathing feels a bit better today. No fevers  Slight cough    Stress test this am, no chest pain.      Objective   Objective     Vital Signs:   Temp:  [97.6 °F (36.4 °C)-98.4 °F (36.9 °C)] 97.9 °F (36.6 °C)  Heart Rate:  [] 90  Resp:  [16-24] 18  BP: (120-168)/() 132/72  Flow (L/min):  [2] 2     Physical Exam:  NAD, in bed  MM moist  RRR  Diminished breath sounds bilaterally  Abd soft, NT  Normal affect  Alert, speech clear    Results Reviewed:  LAB RESULTS:      Lab 24  0509 24  0734   WBC 12.79* 11.00*   HEMOGLOBIN 12.8* 14.3   HEMATOCRIT 39.5 45.1   PLATELETS 211 237   NEUTROS ABS 11.35* 8.14*   IMMATURE GRANS (ABS) 0.07* 0.09*   LYMPHS ABS 0.54* 1.07   MONOS ABS 0.81 1.03*   EOS ABS 0.00 0.56*   MCV 88.8 91.1         Lab 24  0509 24  0734   SODIUM 143 139   POTASSIUM 5.2 5.0   CHLORIDE 104 103   CO2 28.0 28.0   ANION GAP 11.0 8.0   BUN 34* 24*   CREATININE 1.98* 1.66*   EGFR 38.2* 47.2*   GLUCOSE 131* 121*   CALCIUM 8.9 8.8   MAGNESIUM 2.7* 2.3   PHOSPHORUS 4.4  --          Lab 24  0509 24  0734   TOTAL PROTEIN 6.1 7.3   ALBUMIN 4.0 4.1   GLOBULIN 2.1 3.2   ALT (SGPT) 16 20   AST (SGOT) 11 20   BILIRUBIN 0.2 0.3   ALK PHOS 99 119*         Lab 24  0734   PROBNP <36.0   HSTROP T 11                 Lab 24  0749   PH, ARTERIAL 7.315*   PCO2, ARTERIAL 52.8*   PO2 .0*   FIO2 28   HCO3 ART 26.9*   BASE EXCESS ART -0.3*   CARBOXYHEMOGLOBIN 0.8     Brief Urine Lab Results       None            Microbiology Results Abnormal       Procedure Component Value - Date/Time    COVID PRE-OP / PRE-PROCEDURE SCREENING ORDER (NO ISOLATION) - Swab, Nasopharynx [[id number]]  (Normal)  Collected: 08/01/24 0736    Lab Status: Final result Specimen: Swab from Nasopharynx Updated: 08/01/24 0842    Narrative:      The following orders were created for panel order COVID PRE-OP / PRE-PROCEDURE SCREENING ORDER (NO ISOLATION) - Swab, Nasopharynx.  Procedure                               Abnormality         Status                     ---------                               -----------         ------                     Respiratory Panel PCR w/...[[id number]]  Normal              Final result                 Please view results for these tests on the individual orders.    Respiratory Panel PCR w/COVID-19(SARS-CoV-2) DAKOTAH/JULIANA/ISAIAS/PAD/COR/CHRISTAL In-House, NP Swab in UTM/VTM, 2 HR TAT - Swab, Nasopharynx [[id number]]  (Normal) Collected: 08/01/24 0736    Lab Status: Final result Specimen: Swab from Nasopharynx Updated: 08/01/24 0842     ADENOVIRUS, PCR Not Detected     Coronavirus 229E Not Detected     Coronavirus HKU1 Not Detected     Coronavirus NL63 Not Detected     Coronavirus OC43 Not Detected     COVID19 Not Detected     Human Metapneumovirus Not Detected     Human Rhinovirus/Enterovirus Not Detected     Influenza A PCR Not Detected     Influenza B PCR Not Detected     Parainfluenza Virus 1 Not Detected     Parainfluenza Virus 2 Not Detected     Parainfluenza Virus 3 Not Detected     Parainfluenza Virus 4 Not Detected     RSV, PCR Not Detected     Bordetella pertussis pcr Not Detected     Bordetella parapertussis PCR Not Detected     Chlamydophila pneumoniae PCR Not Detected     Mycoplasma pneumo by PCR Not Detected    Narrative:      In the setting of a positive respiratory panel with a viral infection PLUS a negative procalcitonin without other underlying concern for bacterial infection, consider observing off antibiotics or discontinuation of antibiotics and continue supportive care. If the respiratory panel is positive for atypical bacterial infection (Bordetella pertussis, Chlamydophila pneumoniae, or  Mycoplasma pneumoniae), consider antibiotic de-escalation to target atypical bacterial infection.            Adult Transthoracic Echo Complete W/ Cont if Necessary Per Protocol    Result Date: 8/1/2024    Left ventricular systolic function is normal. Calculated left ventricular EF = 61.6% Left ventricular ejection fraction appears to be 61 - 65%.   Left ventricular diastolic function was normal.   No significant structural or functional valvular disease. No significant change compared to previous study     CT Angiogram Chest    Result Date: 8/1/2024  CT ANGIOGRAM CHEST Date of Exam: 8/1/2024 8:15 AM EDT Indication: SOB, hypoxia, tachycardia. Comparison: None available. Technique: CTA of the chest was performed after the uneventful intravenous administration of 85 mL Isovue-370. Reconstructed coronal and sagittal images were also obtained. In addition, a 3-D volume rendered image was created for interpretation. Automated exposure control and iterative reconstruction methods were used. FINDINGS: Thoracic inlet: Unremarkable. Pulmonary arteries: No filling defects are identified within the pulmonary arteries to suggest acute pulmonary embolism. Great vessels: Atherosclerotic plaque is seen within the thoracic aorta and proximal arch vessels. Mediastinum/Kayla: No pathologically enlarged mediastinal lymph nodes are seen. Possible distal esophageal wall thickening. Lung parenchyma: Lungs are emphysematous. There is an unchanged 7 mm nodule within the left upper lobe (series 5, image 46). This appears similar since 8/29/2022. There are bullous changes of the right upper lobe with subpleural scarring also noted. Trachea and airways: The trachea and central airways appear unremarkable. Pleural space: No significant pleural effusion or pneumothorax is seen. Heart and pericardium: Coronary artery calcifications are noted. Otherwise, the heart and pericardium appear unremarkable. Chest wall: No acute or suspicious osseous or  soft tissue lesion is identified. Upper abdomen: No acute abnormality is identified within the visualized upper abdomen. Bilateral punctate nonobstructive nephrolithiasis. Colonic diverticulosis noted. Partially visualized left renal cyst.     Impression: 1.No acute abnormality is identified within the thorax. Specifically, there is no evidence of acute pulmonary embolism. 2.Pulmonary emphysema. Please correlate with patient's smoking history/risk factors to determine whether the patient meets criteria for routine lung cancer screening with low dose chest CT. 3.Stable 7 mm left upper lobe nodule, similar since 8/29/2022. 4.Possible distal esophageal wall thickening. Please correlate clinically for esophagitis. 5.Additional findings as detailed above. Electronically Signed: David Sawyer MD  8/1/2024 8:39 AM EDT  Workstation ID: [id number]    XR Chest 1 View    Result Date: 8/1/2024  XR CHEST 1 VW Date of Exam: 8/1/2024 7:38 AM EDT Indication: SOA triage protocol Comparison: Chest radiograph 7/23/2024. Findings: Cardiomediastinal silhouette is within normal limits. Emphysematous changes. No focal consolidation. No pleural effusion or pneumothorax. Osseous structures are unremarkable. Electronic device overlies the left upper chest.     Impression: Impression: Emphysematous changes without evidence of acute/superimposed process. Electronically Signed: Dylan Best MD  8/1/2024 7:50 AM EDT  Workstation ID: [id number]     Results for orders placed during the hospital encounter of 08/01/24    Adult Transthoracic Echo Complete W/ Cont if Necessary Per Protocol    Interpretation Summary    Left ventricular systolic function is normal. Calculated left ventricular EF = 61.6% Left ventricular ejection fraction appears to be 61 - 65%.    Left ventricular diastolic function was normal.    No significant structural or functional valvular disease.    No significant change compared to previous study      Current  medications:  Scheduled Meds:amLODIPine, 10 mg, Oral, Daily  arformoterol, 15 mcg, Nebulization, BID - RT  aspirin, 81 mg, Oral, Daily  atorvastatin, 40 mg, Oral, Daily  budesonide-formoterol, 2 puff, Inhalation, BID  busPIRone, 15 mg, Oral, BID  carvedilol, 6.25 mg, Oral, BID  cefTRIAXone, 2,000 mg, Intravenous, Q24H  doxycycline, 100 mg, Oral, Q12H  enoxaparin, 40 mg, Subcutaneous, Daily  guaiFENesin, 1,200 mg, Oral, Q12H  ipratropium-albuterol, 3 mL, Nebulization, 4x Daily - RT  isosorbide mononitrate, 30 mg, Oral, Nightly  melatonin, 10 mg, Oral, Nightly  mirtazapine, 15 mg, Oral, Nightly  predniSONE, 40 mg, Oral, Daily With Breakfast  sodium chloride, 10 mL, Intravenous, Q12H  spironolactone, 50 mg, Oral, Daily      Continuous Infusions:   PRN Meds:.  acetaminophen **OR** acetaminophen **OR** acetaminophen    senna-docusate sodium **AND** polyethylene glycol **AND** bisacodyl **AND** bisacodyl    Calcium Replacement - Follow Nurse / BPA Driven Protocol    ipratropium-albuterol    Magnesium Standard Dose Replacement - Follow Nurse / BPA Driven Protocol    ondansetron ODT **OR** ondansetron    Phosphorus Replacement - Follow Nurse / BPA Driven Protocol    Potassium Replacement - Follow Nurse / BPA Driven Protocol    sodium chloride    sodium chloride    sodium chloride    Assessment & Plan   Assessment & Plan     Active Hospital Problems    Diagnosis  POA    **COPD exacerbation [J44.1]  Yes    Acute-on-chronic respiratory failure [J96.20]  Yes      Resolved Hospital Problems   No resolved problems to display.        Brief Hospital Course to date:  Aguila Finn . is a 58 y.o. male with history of HTN, HLD, Nephrolithiasis, anxiety, depression and COPD (on nocturnal O2) presents with progressive shortness of breath for one month.    COPD with AE  - IV solumedrol  - symbicort  - scheduled nebs  - empiric rocephin and doxycycline  - has followup with Pulmonary later this week    Renal insufficiency  -  creatinine 1.9  - IV fluids  - hold aldactone    CAD  - stress test this am, results pending  - echo today shows normal EF    HTN    HLD    Anxiety and depression    Chronic hypokalemia  - holding aldactone due to renal insufficiency            Expected Discharge Location and Transportation: home  Expected Discharge   Expected Discharge Date: 8/4/2024; Expected Discharge Time:      VTE Prophylaxis:  Pharmacologic VTE prophylaxis orders are present.         AM-PAC 6 Clicks Score (PT): 22 (08/01/24 2035)    CODE STATUS:   Code Status and Medical Interventions: CPR (Attempt to Resuscitate); Full Support   Ordered at: 08/01/24 0949     Level Of Support Discussed With:    Patient     Code Status (Patient has no pulse and is not breathing):    CPR (Attempt to Resuscitate)     Medical Interventions (Patient has pulse or is breathing):    Full Support       Toño Mills MD  08/02/24

## 2024-08-03 LAB
ANION GAP SERPL CALCULATED.3IONS-SCNC: 11 MMOL/L (ref 5–15)
BUN SERPL-MCNC: 29 MG/DL (ref 6–20)
BUN/CREAT SERPL: 22.7 (ref 7–25)
CALCIUM SPEC-SCNC: 8.6 MG/DL (ref 8.6–10.5)
CHLORIDE SERPL-SCNC: 104 MMOL/L (ref 98–107)
CO2 SERPL-SCNC: 26 MMOL/L (ref 22–29)
CREAT SERPL-MCNC: 1.28 MG/DL (ref 0.76–1.27)
EGFRCR SERPLBLD CKD-EPI 2021: 64.5 ML/MIN/1.73
GLUCOSE SERPL-MCNC: 195 MG/DL (ref 65–99)
POTASSIUM SERPL-SCNC: 4.6 MMOL/L (ref 3.5–5.2)
SODIUM SERPL-SCNC: 141 MMOL/L (ref 136–145)

## 2024-08-03 PROCEDURE — G0378 HOSPITAL OBSERVATION PER HR: HCPCS

## 2024-08-03 PROCEDURE — 25010000002 METHYLPREDNISOLONE PER 125 MG: Performed by: INTERNAL MEDICINE

## 2024-08-03 PROCEDURE — 94799 UNLISTED PULMONARY SVC/PX: CPT

## 2024-08-03 PROCEDURE — 25010000002 ENOXAPARIN PER 10 MG: Performed by: INTERNAL MEDICINE

## 2024-08-03 PROCEDURE — 25810000003 SODIUM CHLORIDE 0.9 % SOLUTION: Performed by: INTERNAL MEDICINE

## 2024-08-03 PROCEDURE — 94664 DEMO&/EVAL PT USE INHALER: CPT

## 2024-08-03 PROCEDURE — 25010000002 CEFTRIAXONE PER 250 MG: Performed by: INTERNAL MEDICINE

## 2024-08-03 PROCEDURE — 80048 BASIC METABOLIC PNL TOTAL CA: CPT | Performed by: INTERNAL MEDICINE

## 2024-08-03 PROCEDURE — 99232 SBSQ HOSP IP/OBS MODERATE 35: CPT | Performed by: INTERNAL MEDICINE

## 2024-08-03 RX ORDER — PANTOPRAZOLE SODIUM 40 MG/1
40 TABLET, DELAYED RELEASE ORAL
Status: DISCONTINUED | OUTPATIENT
Start: 2024-08-04 | End: 2024-08-06 | Stop reason: HOSPADM

## 2024-08-03 RX ADMIN — ATORVASTATIN CALCIUM 40 MG: 40 TABLET, FILM COATED ORAL at 08:49

## 2024-08-03 RX ADMIN — IPRATROPIUM BROMIDE AND ALBUTEROL SULFATE 3 ML: 2.5; .5 SOLUTION RESPIRATORY (INHALATION) at 18:58

## 2024-08-03 RX ADMIN — Medication 10 MG: at 20:47

## 2024-08-03 RX ADMIN — METHYLPREDNISOLONE SODIUM SUCCINATE 60 MG: 125 INJECTION, POWDER, FOR SOLUTION INTRAMUSCULAR; INTRAVENOUS at 14:45

## 2024-08-03 RX ADMIN — CARVEDILOL 6.25 MG: 6.25 TABLET, FILM COATED ORAL at 20:48

## 2024-08-03 RX ADMIN — ISOSORBIDE MONONITRATE 30 MG: 30 TABLET, EXTENDED RELEASE ORAL at 20:47

## 2024-08-03 RX ADMIN — GUAIFENESIN 1200 MG: 600 TABLET, EXTENDED RELEASE ORAL at 20:48

## 2024-08-03 RX ADMIN — SODIUM CHLORIDE 75 ML/HR: 9 INJECTION, SOLUTION INTRAVENOUS at 01:52

## 2024-08-03 RX ADMIN — BUSPIRONE HYDROCHLORIDE 15 MG: 15 TABLET ORAL at 20:48

## 2024-08-03 RX ADMIN — SODIUM CHLORIDE 2000 MG: 900 INJECTION INTRAVENOUS at 14:44

## 2024-08-03 RX ADMIN — BUDESONIDE AND FORMOTEROL FUMARATE DIHYDRATE 2 PUFF: 160; 4.5 AEROSOL RESPIRATORY (INHALATION) at 18:59

## 2024-08-03 RX ADMIN — BUSPIRONE HYDROCHLORIDE 15 MG: 15 TABLET ORAL at 08:50

## 2024-08-03 RX ADMIN — METHYLPREDNISOLONE SODIUM SUCCINATE 60 MG: 125 INJECTION, POWDER, FOR SOLUTION INTRAMUSCULAR; INTRAVENOUS at 20:48

## 2024-08-03 RX ADMIN — DOXYCYCLINE 100 MG: 100 CAPSULE ORAL at 20:48

## 2024-08-03 RX ADMIN — BUDESONIDE AND FORMOTEROL FUMARATE DIHYDRATE 2 PUFF: 160; 4.5 AEROSOL RESPIRATORY (INHALATION) at 08:30

## 2024-08-03 RX ADMIN — Medication 10 ML: at 20:48

## 2024-08-03 RX ADMIN — Medication 10 ML: at 14:45

## 2024-08-03 RX ADMIN — MIRTAZAPINE 15 MG: 15 TABLET, FILM COATED ORAL at 20:47

## 2024-08-03 RX ADMIN — IPRATROPIUM BROMIDE AND ALBUTEROL SULFATE 3 ML: 2.5; .5 SOLUTION RESPIRATORY (INHALATION) at 15:50

## 2024-08-03 RX ADMIN — AMLODIPINE BESYLATE 10 MG: 10 TABLET ORAL at 08:50

## 2024-08-03 RX ADMIN — IPRATROPIUM BROMIDE AND ALBUTEROL SULFATE 3 ML: 2.5; .5 SOLUTION RESPIRATORY (INHALATION) at 08:29

## 2024-08-03 RX ADMIN — DOXYCYCLINE 100 MG: 100 CAPSULE ORAL at 08:49

## 2024-08-03 RX ADMIN — IPRATROPIUM BROMIDE AND ALBUTEROL SULFATE 3 ML: 2.5; .5 SOLUTION RESPIRATORY (INHALATION) at 12:58

## 2024-08-03 RX ADMIN — ENOXAPARIN SODIUM 40 MG: 100 INJECTION SUBCUTANEOUS at 08:50

## 2024-08-03 RX ADMIN — ASPIRIN 81 MG: 81 TABLET, COATED ORAL at 08:50

## 2024-08-03 RX ADMIN — ARFORMOTEROL TARTRATE 15 MCG: 15 SOLUTION RESPIRATORY (INHALATION) at 08:29

## 2024-08-03 RX ADMIN — ARFORMOTEROL TARTRATE 15 MCG: 15 SOLUTION RESPIRATORY (INHALATION) at 18:58

## 2024-08-03 RX ADMIN — CARVEDILOL 6.25 MG: 6.25 TABLET, FILM COATED ORAL at 08:49

## 2024-08-03 RX ADMIN — GUAIFENESIN 1200 MG: 600 TABLET, EXTENDED RELEASE ORAL at 08:49

## 2024-08-03 NOTE — PROGRESS NOTES
[facility] Medicine Services  PROGRESS NOTE    Patient Name: Aguila Finn Jr.  : 1965  MRN: [id number]    Date of Admission: 2024  Primary Care Physician: Kenya Martinez MD    Subjective   Subjective     CC:  COPD AE    HPI:  Continues to feel short of breath. No fevers.  Not coughing.        Objective   Objective     Vital Signs:   Temp:  [97.6 °F (36.4 °C)-98.5 °F (36.9 °C)] 97.6 °F (36.4 °C)  Heart Rate:  [] 92  Resp:  [16-20] 18  BP: (129-160)/(61-84) 130/78  Flow (L/min):  [2] 2     Physical Exam:  NAD, in bed  MM moist  RRR  Diminished breath sounds bilaterally  Abd soft, NT  Normal affect  Alert, speech clear    Results Reviewed:  LAB RESULTS:      Lab 24  0509 24  0734   WBC 12.79* 11.00*   HEMOGLOBIN 12.8* 14.3   HEMATOCRIT 39.5 45.1   PLATELETS 211 237   NEUTROS ABS 11.35* 8.14*   IMMATURE GRANS (ABS) 0.07* 0.09*   LYMPHS ABS 0.54* 1.07   MONOS ABS 0.81 1.03*   EOS ABS 0.00 0.56*   MCV 88.8 91.1         Lab 24  0509 24  0734   SODIUM 143 139   POTASSIUM 5.2 5.0   CHLORIDE 104 103   CO2 28.0 28.0   ANION GAP 11.0 8.0   BUN 34* 24*   CREATININE 1.98* 1.66*   EGFR 38.2* 47.2*   GLUCOSE 131* 121*   CALCIUM 8.9 8.8   MAGNESIUM 2.7* 2.3   PHOSPHORUS 4.4  --          Lab 24  0509 24  0734   TOTAL PROTEIN 6.1 7.3   ALBUMIN 4.0 4.1   GLOBULIN 2.1 3.2   ALT (SGPT) 16 20   AST (SGOT) 11 20   BILIRUBIN 0.2 0.3   ALK PHOS 99 119*         Lab 24  0734   PROBNP <36.0   HSTROP T 11                 Lab 24  0749   PH, ARTERIAL 7.315*   PCO2, ARTERIAL 52.8*   PO2 .0*   FIO2 28   HCO3 ART 26.9*   BASE EXCESS ART -0.3*   CARBOXYHEMOGLOBIN 0.8     Brief Urine Lab Results       None            Microbiology Results Abnormal       Procedure Component Value - Date/Time    COVID PRE-OP / PRE-PROCEDURE SCREENING ORDER (NO ISOLATION) - Swab, Nasopharynx [[id number]]  (Normal) Collected: 24 0736    Lab Status:  Final result Specimen: Swab from Nasopharynx Updated: 08/01/24 0842    Narrative:      The following orders were created for panel order COVID PRE-OP / PRE-PROCEDURE SCREENING ORDER (NO ISOLATION) - Swab, Nasopharynx.  Procedure                               Abnormality         Status                     ---------                               -----------         ------                     Respiratory Panel PCR w/...[[id number]]  Normal              Final result                 Please view results for these tests on the individual orders.    Respiratory Panel PCR w/COVID-19(SARS-CoV-2) DAKOTAH/JULIANA/ISAIAS/PAD/COR/CHRISTAL In-House, NP Swab in UTM/VTM, 2 HR TAT - Swab, Nasopharynx [[id number]]  (Normal) Collected: 08/01/24 0736    Lab Status: Final result Specimen: Swab from Nasopharynx Updated: 08/01/24 0842     ADENOVIRUS, PCR Not Detected     Coronavirus 229E Not Detected     Coronavirus HKU1 Not Detected     Coronavirus NL63 Not Detected     Coronavirus OC43 Not Detected     COVID19 Not Detected     Human Metapneumovirus Not Detected     Human Rhinovirus/Enterovirus Not Detected     Influenza A PCR Not Detected     Influenza B PCR Not Detected     Parainfluenza Virus 1 Not Detected     Parainfluenza Virus 2 Not Detected     Parainfluenza Virus 3 Not Detected     Parainfluenza Virus 4 Not Detected     RSV, PCR Not Detected     Bordetella pertussis pcr Not Detected     Bordetella parapertussis PCR Not Detected     Chlamydophila pneumoniae PCR Not Detected     Mycoplasma pneumo by PCR Not Detected    Narrative:      In the setting of a positive respiratory panel with a viral infection PLUS a negative procalcitonin without other underlying concern for bacterial infection, consider observing off antibiotics or discontinuation of antibiotics and continue supportive care. If the respiratory panel is positive for atypical bacterial infection (Bordetella pertussis, Chlamydophila pneumoniae, or Mycoplasma pneumoniae), consider antibiotic  de-escalation to target atypical bacterial infection.            Stress Test With Pet Myocardial Perfusion    Result Date: 8/2/2024    Myocardial perfusion imaging indicates a normal myocardial perfusion study with no evidence of ischemia. Impressions are consistent with a low risk study.   Patient denied any chest discomfort/pain during stress; he did experience some shortness of breath that resolved after a couple of minutes.   Left ventricular ejection fraction is normal (Calculated EF = 68%).   REST:  68%EF STRESS:  74%EF.   Coronary calcification noted on CT images.      Results for orders placed during the hospital encounter of 08/01/24    Adult Transthoracic Echo Complete W/ Cont if Necessary Per Protocol    Interpretation Summary    Left ventricular systolic function is normal. Calculated left ventricular EF = 61.6% Left ventricular ejection fraction appears to be 61 - 65%.    Left ventricular diastolic function was normal.    No significant structural or functional valvular disease.    No significant change compared to previous study      Current medications:  Scheduled Meds:amLODIPine, 10 mg, Oral, Daily  arformoterol, 15 mcg, Nebulization, BID - RT  aspirin, 81 mg, Oral, Daily  atorvastatin, 40 mg, Oral, Daily  budesonide-formoterol, 2 puff, Inhalation, BID  busPIRone, 15 mg, Oral, BID  carvedilol, 6.25 mg, Oral, BID  cefTRIAXone, 2,000 mg, Intravenous, Q24H  doxycycline, 100 mg, Oral, Q12H  enoxaparin, 40 mg, Subcutaneous, Daily  guaiFENesin, 1,200 mg, Oral, Q12H  ipratropium-albuterol, 3 mL, Nebulization, 4x Daily - RT  isosorbide mononitrate, 30 mg, Oral, Nightly  melatonin, 10 mg, Oral, Nightly  methylPREDNISolone sodium succinate, 60 mg, Intravenous, Q12H  mirtazapine, 15 mg, Oral, Nightly  sodium chloride, 10 mL, Intravenous, Q12H  [Held by provider] spironolactone, 50 mg, Oral, Daily      Continuous Infusions:sodium chloride, 75 mL/hr, Last Rate: 75 mL/hr (08/03/24 0152)      PRN Meds:.   acetaminophen **OR** acetaminophen **OR** acetaminophen    senna-docusate sodium **AND** polyethylene glycol **AND** bisacodyl **AND** bisacodyl    Calcium Replacement - Follow Nurse / BPA Driven Protocol    ipratropium-albuterol    Magnesium Standard Dose Replacement - Follow Nurse / BPA Driven Protocol    ondansetron ODT **OR** ondansetron    Phosphorus Replacement - Follow Nurse / BPA Driven Protocol    Potassium Replacement - Follow Nurse / BPA Driven Protocol    sodium chloride    sodium chloride    sodium chloride    Assessment & Plan   Assessment & Plan     Active Hospital Problems    Diagnosis  POA    **COPD exacerbation [J44.1]  Yes    Acute-on-chronic respiratory failure [J96.20]  Yes      Resolved Hospital Problems   No resolved problems to display.        Brief Hospital Course to date:  Aguila Finn Jr. is a 58 y.o. male with history of HTN, HLD, Nephrolithiasis, anxiety, depression and COPD (on nocturnal O2) presents with progressive shortness of breath for one month.    COPD with AE  Pulmonary nodule - appears stable by CT  - continue IV solumedrol 60 mg BID  - symbicort  - scheduled nebs (duonebs and aformoterol)  - empiric rocephin and doxycycline  - has followup with Pulmonary 8/14    Renal insufficiency  - creatinine 1.6 at admission, 1.9 yesterday, BMP today pending  - baseline creatinine over past year 1.2-1.6  - continue IV fluids, CTA at admission  - hold aldactone    CAD  - stress test 8/2/24, consistent with low risk study  - echo 8/2 shows normal EF  - has followup with Cardiology Dr Miles 8/22    HTN    HLD    Anxiety and depression    Chronic hypokalemia  - holding aldactone due to renal insufficiency, K yesterday 5.2, bmp today  pending    Possible Esophageal thickening  - distal thickening incidentally noted on CT imaging at admission  - start PPI  - may need GI referral              Expected Discharge Location and Transportation: home  Expected Discharge   Expected Discharge  Date: 8/4/2024; Expected Discharge Time:      VTE Prophylaxis:  Pharmacologic VTE prophylaxis orders are present.         AM-PAC 6 Clicks Score (PT): 22 (08/02/24 1750)    CODE STATUS:   Code Status and Medical Interventions: CPR (Attempt to Resuscitate); Full Support   Ordered at: 08/01/24 0949     Level Of Support Discussed With:    Patient     Code Status (Patient has no pulse and is not breathing):    CPR (Attempt to Resuscitate)     Medical Interventions (Patient has pulse or is breathing):    Full Support       Toño Mills MD  08/03/24

## 2024-08-04 LAB
ANION GAP SERPL CALCULATED.3IONS-SCNC: 13 MMOL/L (ref 5–15)
BUN SERPL-MCNC: 25 MG/DL (ref 6–20)
BUN/CREAT SERPL: 23.6 (ref 7–25)
CALCIUM SPEC-SCNC: 8.2 MG/DL (ref 8.6–10.5)
CHLORIDE SERPL-SCNC: 106 MMOL/L (ref 98–107)
CO2 SERPL-SCNC: 24 MMOL/L (ref 22–29)
CREAT SERPL-MCNC: 1.06 MG/DL (ref 0.76–1.27)
DEPRECATED RDW RBC AUTO: 43.8 FL (ref 37–54)
EGFRCR SERPLBLD CKD-EPI 2021: 80.8 ML/MIN/1.73
ERYTHROCYTE [DISTWIDTH] IN BLOOD BY AUTOMATED COUNT: 13.2 % (ref 12.3–15.4)
GLUCOSE SERPL-MCNC: 127 MG/DL (ref 65–99)
HCT VFR BLD AUTO: 39.3 % (ref 37.5–51)
HGB BLD-MCNC: 12.7 G/DL (ref 13–17.7)
MCH RBC QN AUTO: 29.1 PG (ref 26.6–33)
MCHC RBC AUTO-ENTMCNC: 32.3 G/DL (ref 31.5–35.7)
MCV RBC AUTO: 90.1 FL (ref 79–97)
PLATELET # BLD AUTO: 208 10*3/MM3 (ref 140–450)
PMV BLD AUTO: 12.1 FL (ref 6–12)
POTASSIUM SERPL-SCNC: 4.5 MMOL/L (ref 3.5–5.2)
RBC # BLD AUTO: 4.36 10*6/MM3 (ref 4.14–5.8)
SODIUM SERPL-SCNC: 143 MMOL/L (ref 136–145)
WBC NRBC COR # BLD AUTO: 17.78 10*3/MM3 (ref 3.4–10.8)

## 2024-08-04 PROCEDURE — 97161 PT EVAL LOW COMPLEX 20 MIN: CPT

## 2024-08-04 PROCEDURE — 94799 UNLISTED PULMONARY SVC/PX: CPT

## 2024-08-04 PROCEDURE — G0378 HOSPITAL OBSERVATION PER HR: HCPCS

## 2024-08-04 PROCEDURE — 99232 SBSQ HOSP IP/OBS MODERATE 35: CPT | Performed by: INTERNAL MEDICINE

## 2024-08-04 PROCEDURE — 25010000002 ENOXAPARIN PER 10 MG: Performed by: INTERNAL MEDICINE

## 2024-08-04 PROCEDURE — 25010000002 METHYLPREDNISOLONE PER 125 MG: Performed by: INTERNAL MEDICINE

## 2024-08-04 PROCEDURE — 85027 COMPLETE CBC AUTOMATED: CPT | Performed by: INTERNAL MEDICINE

## 2024-08-04 PROCEDURE — 25010000002 CEFTRIAXONE PER 250 MG: Performed by: INTERNAL MEDICINE

## 2024-08-04 PROCEDURE — 25810000003 SODIUM CHLORIDE 0.9 % SOLUTION: Performed by: INTERNAL MEDICINE

## 2024-08-04 PROCEDURE — 94761 N-INVAS EAR/PLS OXIMETRY MLT: CPT

## 2024-08-04 PROCEDURE — 80048 BASIC METABOLIC PNL TOTAL CA: CPT | Performed by: INTERNAL MEDICINE

## 2024-08-04 PROCEDURE — 94664 DEMO&/EVAL PT USE INHALER: CPT

## 2024-08-04 RX ADMIN — BUDESONIDE AND FORMOTEROL FUMARATE DIHYDRATE 2 PUFF: 160; 4.5 AEROSOL RESPIRATORY (INHALATION) at 09:13

## 2024-08-04 RX ADMIN — GUAIFENESIN 1200 MG: 600 TABLET, EXTENDED RELEASE ORAL at 08:40

## 2024-08-04 RX ADMIN — MIRTAZAPINE 15 MG: 15 TABLET, FILM COATED ORAL at 21:03

## 2024-08-04 RX ADMIN — SODIUM CHLORIDE 2000 MG: 900 INJECTION INTRAVENOUS at 12:13

## 2024-08-04 RX ADMIN — BUDESONIDE AND FORMOTEROL FUMARATE DIHYDRATE 2 PUFF: 160; 4.5 AEROSOL RESPIRATORY (INHALATION) at 19:35

## 2024-08-04 RX ADMIN — ISOSORBIDE MONONITRATE 30 MG: 30 TABLET, EXTENDED RELEASE ORAL at 21:03

## 2024-08-04 RX ADMIN — IPRATROPIUM BROMIDE AND ALBUTEROL SULFATE 3 ML: 2.5; .5 SOLUTION RESPIRATORY (INHALATION) at 13:04

## 2024-08-04 RX ADMIN — METHYLPREDNISOLONE SODIUM SUCCINATE 60 MG: 125 INJECTION, POWDER, FOR SOLUTION INTRAMUSCULAR; INTRAVENOUS at 08:40

## 2024-08-04 RX ADMIN — Medication 10 ML: at 08:40

## 2024-08-04 RX ADMIN — DOXYCYCLINE 100 MG: 100 CAPSULE ORAL at 21:03

## 2024-08-04 RX ADMIN — BUSPIRONE HYDROCHLORIDE 15 MG: 15 TABLET ORAL at 08:40

## 2024-08-04 RX ADMIN — ENOXAPARIN SODIUM 40 MG: 100 INJECTION SUBCUTANEOUS at 08:40

## 2024-08-04 RX ADMIN — AMLODIPINE BESYLATE 10 MG: 10 TABLET ORAL at 08:40

## 2024-08-04 RX ADMIN — ATORVASTATIN CALCIUM 40 MG: 40 TABLET, FILM COATED ORAL at 08:40

## 2024-08-04 RX ADMIN — ASPIRIN 81 MG: 81 TABLET, COATED ORAL at 08:40

## 2024-08-04 RX ADMIN — ARFORMOTEROL TARTRATE 15 MCG: 15 SOLUTION RESPIRATORY (INHALATION) at 19:38

## 2024-08-04 RX ADMIN — METHYLPREDNISOLONE SODIUM SUCCINATE 60 MG: 125 INJECTION, POWDER, FOR SOLUTION INTRAMUSCULAR; INTRAVENOUS at 21:03

## 2024-08-04 RX ADMIN — IPRATROPIUM BROMIDE AND ALBUTEROL SULFATE 3 ML: 2.5; .5 SOLUTION RESPIRATORY (INHALATION) at 09:10

## 2024-08-04 RX ADMIN — ARFORMOTEROL TARTRATE 15 MCG: 15 SOLUTION RESPIRATORY (INHALATION) at 09:13

## 2024-08-04 RX ADMIN — Medication 10 ML: at 21:03

## 2024-08-04 RX ADMIN — IPRATROPIUM BROMIDE AND ALBUTEROL SULFATE 3 ML: 2.5; .5 SOLUTION RESPIRATORY (INHALATION) at 16:02

## 2024-08-04 RX ADMIN — SODIUM CHLORIDE 75 ML/HR: 9 INJECTION, SOLUTION INTRAVENOUS at 12:13

## 2024-08-04 RX ADMIN — BUSPIRONE HYDROCHLORIDE 15 MG: 15 TABLET ORAL at 21:03

## 2024-08-04 RX ADMIN — IPRATROPIUM BROMIDE AND ALBUTEROL SULFATE 3 ML: 2.5; .5 SOLUTION RESPIRATORY (INHALATION) at 19:35

## 2024-08-04 RX ADMIN — PANTOPRAZOLE SODIUM 40 MG: 40 TABLET, DELAYED RELEASE ORAL at 05:33

## 2024-08-04 RX ADMIN — Medication 10 MG: at 21:03

## 2024-08-04 RX ADMIN — CARVEDILOL 6.25 MG: 6.25 TABLET, FILM COATED ORAL at 08:40

## 2024-08-04 RX ADMIN — DOXYCYCLINE 100 MG: 100 CAPSULE ORAL at 08:40

## 2024-08-04 RX ADMIN — CARVEDILOL 6.25 MG: 6.25 TABLET, FILM COATED ORAL at 21:03

## 2024-08-04 RX ADMIN — GUAIFENESIN 1200 MG: 600 TABLET, EXTENDED RELEASE ORAL at 21:03

## 2024-08-04 NOTE — PLAN OF CARE
Goal Outcome Evaluation:  Plan of Care Reviewed With: patient           Outcome Evaluation: VSS on 2L NC. SOA on exertion. alert and oriented X 4. no complains of pain or discomfort.

## 2024-08-04 NOTE — PLAN OF CARE
Goal Outcome Evaluation:      PT progressing toward goals. No events overnight. Call bell in reach. Bed in low and locked position.

## 2024-08-04 NOTE — PLAN OF CARE
Goal Outcome Evaluation:  Plan of Care Reviewed With: patient, spouse           Outcome Evaluation: PT eval complete. Pt presents with balance deficits, CASTELLON, and decreased functional endurance warranting skilled IP PT service to return to PLOF. Pt amb 40' unsupported on 2L O2 + 40' unsupported on 4L O2, remains significantly limited by SOA. PT rec home w/ assist and OPPT at d/c.      Anticipated Discharge Disposition (PT): home with assist, home with outpatient therapy services

## 2024-08-04 NOTE — PROGRESS NOTES
[facility] Medicine Services  PROGRESS NOTE    Patient Name: Aguila Finn Jr.  : 1965  MRN: [id number]    Date of Admission: 2024  Primary Care Physician: Kenya Martinez MD    Subjective   Subjective     CC:  COPD AE    HPI:  Lungs still tight, getting short of breath going to the bathroom.  Not coughing, No fevers.      Objective   Objective     Vital Signs:   Temp:  [97.7 °F (36.5 °C)-98.1 °F (36.7 °C)] 98 °F (36.7 °C)  Heart Rate:  [67-92] 67  Resp:  [16-18] 16  BP: (134-154)/(67-84) 134/71  Flow (L/min):  [2] 2     Physical Exam:  NAD, in bed  MM moist  RRR  Diminished breath sounds bilaterally. Rare wheezes  Abd soft, NT  Normal affect  Alert, speech clear    Results Reviewed:  LAB RESULTS:      Lab 24  0509 24  0734   WBC 17.78* 12.79* 11.00*   HEMOGLOBIN 12.7* 12.8* 14.3   HEMATOCRIT 39.3 39.5 45.1   PLATELETS 208 211 237   NEUTROS ABS  --  11.35* 8.14*   IMMATURE GRANS (ABS)  --  0.07* 0.09*   LYMPHS ABS  --  0.54* 1.07   MONOS ABS  --  0.81 1.03*   EOS ABS  --  0.00 0.56*   MCV 90.1 88.8 91.1         Lab 248 24  1442 24  0509 24  0734   SODIUM 143 141 143 139   POTASSIUM 4.5 4.6 5.2 5.0   CHLORIDE 106 104 104 103   CO2 24.0 26.0 28.0 28.0   ANION GAP 13.0 11.0 11.0 8.0   BUN 25* 29* 34* 24*   CREATININE 1.06 1.28* 1.98* 1.66*   EGFR 80.8 64.5 38.2* 47.2*   GLUCOSE 127* 195* 131* 121*   CALCIUM 8.2* 8.6 8.9 8.8   MAGNESIUM  --   --  2.7* 2.3   PHOSPHORUS  --   --  4.4  --          Lab 24  0509 24  0734   TOTAL PROTEIN 6.1 7.3   ALBUMIN 4.0 4.1   GLOBULIN 2.1 3.2   ALT (SGPT) 16 20   AST (SGOT) 11 20   BILIRUBIN 0.2 0.3   ALK PHOS 99 119*         Lab 24  0734   PROBNP <36.0   HSTROP T 11                 Lab 24  0749   PH, ARTERIAL 7.315*   PCO2, ARTERIAL 52.8*   PO2 .0*   FIO2 28   HCO3 ART 26.9*   BASE EXCESS ART -0.3*   CARBOXYHEMOGLOBIN 0.8     Brief Urine Lab  Results       None            Microbiology Results Abnormal       Procedure Component Value - Date/Time    COVID PRE-OP / PRE-PROCEDURE SCREENING ORDER (NO ISOLATION) - Swab, Nasopharynx [[id number]]  (Normal) Collected: 08/01/24 0736    Lab Status: Final result Specimen: Swab from Nasopharynx Updated: 08/01/24 0842    Narrative:      The following orders were created for panel order COVID PRE-OP / PRE-PROCEDURE SCREENING ORDER (NO ISOLATION) - Swab, Nasopharynx.  Procedure                               Abnormality         Status                     ---------                               -----------         ------                     Respiratory Panel PCR w/...[[id number]]  Normal              Final result                 Please view results for these tests on the individual orders.    Respiratory Panel PCR w/COVID-19(SARS-CoV-2) DAKOTAH/JULIANA/ISAIAS/PAD/COR/CHRISTAL In-House, NP Swab in UTM/VTM, 2 HR TAT - Swab, Nasopharynx [[id number]]  (Normal) Collected: 08/01/24 0736    Lab Status: Final result Specimen: Swab from Nasopharynx Updated: 08/01/24 0842     ADENOVIRUS, PCR Not Detected     Coronavirus 229E Not Detected     Coronavirus HKU1 Not Detected     Coronavirus NL63 Not Detected     Coronavirus OC43 Not Detected     COVID19 Not Detected     Human Metapneumovirus Not Detected     Human Rhinovirus/Enterovirus Not Detected     Influenza A PCR Not Detected     Influenza B PCR Not Detected     Parainfluenza Virus 1 Not Detected     Parainfluenza Virus 2 Not Detected     Parainfluenza Virus 3 Not Detected     Parainfluenza Virus 4 Not Detected     RSV, PCR Not Detected     Bordetella pertussis pcr Not Detected     Bordetella parapertussis PCR Not Detected     Chlamydophila pneumoniae PCR Not Detected     Mycoplasma pneumo by PCR Not Detected    Narrative:      In the setting of a positive respiratory panel with a viral infection PLUS a negative procalcitonin without other underlying concern for bacterial infection, consider  observing off antibiotics or discontinuation of antibiotics and continue supportive care. If the respiratory panel is positive for atypical bacterial infection (Bordetella pertussis, Chlamydophila pneumoniae, or Mycoplasma pneumoniae), consider antibiotic de-escalation to target atypical bacterial infection.            No radiology results from the last 24 hrs    Results for orders placed during the hospital encounter of 08/01/24    Adult Transthoracic Echo Complete W/ Cont if Necessary Per Protocol    Interpretation Summary    Left ventricular systolic function is normal. Calculated left ventricular EF = 61.6% Left ventricular ejection fraction appears to be 61 - 65%.    Left ventricular diastolic function was normal.    No significant structural or functional valvular disease.    No significant change compared to previous study      Current medications:  Scheduled Meds:amLODIPine, 10 mg, Oral, Daily  arformoterol, 15 mcg, Nebulization, BID - RT  aspirin, 81 mg, Oral, Daily  atorvastatin, 40 mg, Oral, Daily  budesonide-formoterol, 2 puff, Inhalation, BID  busPIRone, 15 mg, Oral, BID  carvedilol, 6.25 mg, Oral, BID  cefTRIAXone, 2,000 mg, Intravenous, Q24H  doxycycline, 100 mg, Oral, Q12H  enoxaparin, 40 mg, Subcutaneous, Daily  guaiFENesin, 1,200 mg, Oral, Q12H  ipratropium-albuterol, 3 mL, Nebulization, 4x Daily - RT  isosorbide mononitrate, 30 mg, Oral, Nightly  melatonin, 10 mg, Oral, Nightly  methylPREDNISolone sodium succinate, 60 mg, Intravenous, Q12H  mirtazapine, 15 mg, Oral, Nightly  pantoprazole, 40 mg, Oral, Q AM  sodium chloride, 10 mL, Intravenous, Q12H  [Held by provider] spironolactone, 50 mg, Oral, Daily      Continuous Infusions:sodium chloride, 75 mL/hr, Last Rate: 75 mL/hr (08/04/24 1213)      PRN Meds:.  acetaminophen **OR** acetaminophen **OR** acetaminophen    senna-docusate sodium **AND** polyethylene glycol **AND** bisacodyl **AND** bisacodyl    Calcium Replacement - Follow Nurse / BPA  Driven Protocol    ipratropium-albuterol    Magnesium Standard Dose Replacement - Follow Nurse / BPA Driven Protocol    ondansetron ODT **OR** ondansetron    Phosphorus Replacement - Follow Nurse / BPA Driven Protocol    Potassium Replacement - Follow Nurse / BPA Driven Protocol    sodium chloride    sodium chloride    sodium chloride    Assessment & Plan   Assessment & Plan     Active Hospital Problems    Diagnosis  POA    **COPD exacerbation [J44.1]  Yes    Acute-on-chronic respiratory failure [J96.20]  Yes      Resolved Hospital Problems   No resolved problems to display.        Brief Hospital Course to date:  Aguila Finn Jr. is a 58 y.o. male with history of HTN, HLD, Nephrolithiasis, anxiety, depression and COPD (on nocturnal O2) presents with progressive shortness of breath for one month.    COPD with AE  Pulmonary nodule - appears stable by CT  - continue IV solumedrol 60 mg BID  - symbicort  - scheduled nebs (duonebs and aformoterol)  - empiric rocephin and doxycycline  - has followup with Pulmonary 8/14  - patient needs oxygen concentrator (wants both fixed and portable), will discuss with CM in am    Renal insufficiency  - creatinine improved  - baseline creatinine over past year 1.2-1.6  - hold further IV fluids  - hold aldactone  - bmp am    CAD  - stress test 8/2/24, consistent with low risk study  - echo 8/2 shows normal EF  - has followup with Cardiology Dr Miles 8/22    HTN    HLD    Anxiety and depression    Chronic hypokalemia  - holding aldactone due to renal insufficiency, K yesterday 5.2, bmp today  pending    Possible Esophageal thickening  - distal thickening incidentally noted on CT imaging at admission  - start PPI  - may need GI referral              Expected Discharge Location and Transportation: home  Expected Discharge   Expected Discharge Date: 8/4/2024; Expected Discharge Time:      VTE Prophylaxis:  Pharmacologic VTE prophylaxis orders are present.         AM-PAC 6 Clicks  Score (PT): 23 (08/03/24 9171)    CODE STATUS:   Code Status and Medical Interventions: CPR (Attempt to Resuscitate); Full Support   Ordered at: 08/01/24 0922     Level Of Support Discussed With:    Patient     Code Status (Patient has no pulse and is not breathing):    CPR (Attempt to Resuscitate)     Medical Interventions (Patient has pulse or is breathing):    Full Support       Toño Mills MD  08/04/24

## 2024-08-04 NOTE — THERAPY EVALUATION
Patient Name: Aguila Finn Jr.  : 1965    MRN: [id number]                              Today's Date: 2024       Admit Date: 2024    Visit Dx:     ICD-10-CM ICD-9-CM   1. Acute respiratory failure with hypoxia  J96.01 518.81   2. COPD with acute exacerbation  J44.1 491.21   3. GOMEZ (acute kidney injury)  N17.9 584.9     Patient Active Problem List   Diagnosis    Essential hypertension    COPD (chronic obstructive pulmonary disease)    Anxiety and depression    Recurrent kidney stones    Tobacco abuse    Chronic hypokalemia    Chronic bronchitis    Hyperlipidemia    Cigarette nicotine dependence without complication    Nocturnal hypoxemia    COPD exacerbation    Acute-on-chronic respiratory failure     Past Medical History:   Diagnosis Date    Anxiety     COPD (chronic obstructive pulmonary disease)     Depression     Hyperlipidemia     Hypertension     Recurrent kidney stones      Past Surgical History:   Procedure Laterality Date    CATARACT EXTRACTION, BILATERAL      COLONOSCOPY      EYE SURGERY  2017    WISDOM TOOTH EXTRACTION        General Information       Row Name 24 1446          Physical Therapy Time and Intention    Document Type evaluation  -KE     Mode of Treatment physical therapy  -GINGER       Row Name 24 1446          General Information    Patient Profile Reviewed yes  -KE     Prior Level of Function independent:;all household mobility;community mobility;gait;ADL's;driving  Ind w/ all ADLs and mobility; denies DME/AD at baseline; lives in  in [place] part of year, spouse lives in [place] and pt splits time between residences; home O2 at night  -KE     Existing Precautions/Restrictions fall;oxygen therapy device and L/min  -KE     Barriers to Rehab previous functional deficit  -GINGER       Row Name 24 1446          Living Environment    People in Home spouse  lives alone in [place] in  part of the year; plans to d/c home to spouse  -GINGER       Row Name 24 1446           Home Main Entrance    Number of Stairs, Main Entrance none  4 JULIA RV  -KE     Stair Railings, Main Entrance railings safe and in good condition  -       Row Name 08/04/24 1446          Stairs Within Home, Primary    Number of Stairs, Within Home, Primary none  -       Row Name 08/04/24 1446          Cognition    Orientation Status (Cognition) oriented x 4  -KE       Row Name 08/04/24 1446          Safety Issues, Functional Mobility    Safety Issues Affecting Function (Mobility) awareness of need for assistance;insight into deficits/self-awareness;safety precaution awareness;safety precautions follow-through/compliance  -     Impairments Affecting Function (Mobility) balance;endurance/activity tolerance;shortness of breath  -               User Key  (r) = Recorded By, (t) = Taken By, (c) = Cosigned By      Initials Name Provider Type    Jeannette Yang, PT Physical Therapist                   Mobility       Row Name 08/04/24 1452          Bed Mobility    Bed Mobility supine-sit-supine  -KE     Supine-Sit-Supine Jonancy (Bed Mobility) standby assist  -     Assistive Device (Bed Mobility) head of bed elevated  -     Comment, (Bed Mobility) increased time and effort  -[facility] Name 08/04/24 1452          Transfers    Comment, (Transfers) pt wishing to attempt mobility on RA, spO2 dropping to 87% on RA resting in bed; 2L returned for remaining mobility  -       Row Name 08/04/24 1452          Sit-Stand Transfer    Sit-Stand Jonancy (Transfers) supervision  -[facility] Name 08/04/24 1452          Gait/Stairs (Locomotion)    Jonancy Level (Gait) contact guard  -     Patient was able to Ambulate yes  -KE     Distance in Feet (Gait) 40  +40  -KE     Deviations/Abnormal Patterns (Gait) bilateral deviations;gait speed decreased;stride length decreased;noel decreased  -KE     Bilateral Gait Deviations heel strike decreased  -     Comment, (Gait/Stairs) Demo step through gait  pattern with slow gait speed and decr heel strike. 1 overt LOB noted, pt self correcting with stepping strategy. Req 1 standing rest d/t SOA, SpO2 noted to be 85% on 2L O2. VCs for PLB, pt continuing to not recover req O2 be increased to 4L. Pt amb remaining distances on 4L w/ SpO2 remaining ~86%. Recovering in ~30 sec - 1 min w/ seated rest. Further mobility limited by CASTELLON.  -KE               User Key  (r) = Recorded By, (t) = Taken By, (c) = Cosigned By      Initials Name Provider Type    Jeannette Yang, BABAK Physical Therapist                   Obj/Interventions       Row Name 08/04/24 1456          Range of Motion Comprehensive    General Range of Motion bilateral lower extremity ROM WFL  -       Row Name 08/04/24 1456          Strength Comprehensive (MMT)    General Manual Muscle Testing (MMT) Assessment lower extremity strength deficits identified  -KE     Comment, General Manual Muscle Testing (MMT) Assessment B LEs grossly 4+/5  -KE       Row Name 08/04/24 1456          Balance    Balance Assessment sitting static balance;sitting dynamic balance;sit to stand dynamic balance;standing static balance;standing dynamic balance  -KE     Static Sitting Balance standby assist  -KE     Dynamic Sitting Balance standby assist  -KE     Position, Sitting Balance sitting edge of bed  -KE     Sit to Stand Dynamic Balance standby assist  -KE     Static Standing Balance standby assist  -KE     Dynamic Standing Balance contact guard  -KE     Position/Device Used, Standing Balance unsupported  -KE     Balance Interventions sitting;standing;sit to stand;supported;dynamic;static  -KE     Comment, Balance 1 overt LOB  -KE       Row Name 08/04/24 1456          Sensory Assessment (Somatosensory)    Sensory Assessment (Somatosensory) sensation intact  -GINGER               User Key  (r) = Recorded By, (t) = Taken By, (c) = Cosigned By      Initials Name Provider Type    Jeannette Yang, BABAK Physical Therapist                    Goals/Plan       Row Name 08/04/24 1502          Bed Mobility Goal 1 (PT)    Activity/Assistive Device (Bed Mobility Goal 1, PT) sit to supine/supine to sit  -KE     Forest Level/Cues Needed (Bed Mobility Goal 1, PT) independent  -KE     Time Frame (Bed Mobility Goal 1, PT) short term goal (STG);5 days  -KE     Progress/Outcomes (Bed Mobility Goal 1, PT) new goal  -KE       Row Name 08/04/24 1502          Transfer Goal 1 (PT)    Activity/Assistive Device (Transfer Goal 1, PT) bed-to-chair/chair-to-bed;sit-to-stand/stand-to-sit  -KE     Forest Level/Cues Needed (Transfer Goal 1, PT) independent  -KE     Time Frame (Transfer Goal 1, PT) long term goal (LTG);10 days  -KE     Progress/Outcome (Transfer Goal 1, PT) new goal  -       Row Name 08/04/24 1502          Gait Training Goal 1 (PT)    Activity/Assistive Device (Gait Training Goal 1, PT) gait (walking locomotion);improve balance and speed;increase endurance/gait distance;decrease fall risk  -KE     Forest Level (Gait Training Goal 1, PT) independent  -KE     Distance (Gait Training Goal 1, PT) 300'  -KE     Time Frame (Gait Training Goal 1, PT) long term goal (LTG);10 days  -KE     Progress/Outcome (Gait Training Goal 1, PT) new goal  -       Row Name 08/04/24 1502          Stairs Goal 1 (PT)    Activity/Assistive Device (Stairs Goal 1, PT) stairs, all skills  -KE     Forest Level/Cues Needed (Stairs Goal 1, PT) independent  -KE     Time Frame (Stairs Goal 1, PT) long term goal (LTG);10 days  -KE     Strategies/Barriers (Stairs Goal 1, PT) 3  -KE     Progress/Outcome (Stairs Goal 1, PT) new goal  -       Row Name 08/04/24 1502          Therapy Assessment/Plan (PT)    Planned Therapy Interventions (PT) balance training;neuromuscular re-education;bed mobility training;gait training;home exercise program;patient/family education;postural re-education;transfer training;stretching;strengthening;stair training;ROM (range of motion)  -KE                User Key  (r) = Recorded By, (t) = Taken By, (c) = Cosigned By      Initials Name Provider Type    Jeannette Yang, PT Physical Therapist                   Clinical Impression       Row Name 08/04/24 1458          Pain    Pretreatment Pain Rating 0/10 - no pain  -KE     Posttreatment Pain Rating 0/10 - no pain  -KE     Pain Intervention(s) Ambulation/increased activity;Repositioned  -       Row Name 08/04/24 1457          Plan of Care Review    Plan of Care Reviewed With patient;spouse  -     Outcome Evaluation PT eval complete. Pt presents with balance deficits, CASTELLNO, and decreased functional endurance warranting skilled IP PT service to return to PLOF. Pt amb 40' unsupported on 2L O2 + 40' unsupported on 4L O2, remains significantly limited by SOA. PT rec home w/ assist and OPPT at d/c.  -       Row Name 08/04/24 1451          Therapy Assessment/Plan (PT)    Patient/Family Therapy Goals Statement (PT) to go home  -KE     Rehab Potential (PT) good, to achieve stated therapy goals  -     Criteria for Skilled Interventions Met (PT) yes;meets criteria;skilled treatment is necessary  -     Therapy Frequency (PT) daily  -KE     Predicted Duration of Therapy Intervention (PT) 10 days  -       Row Name 08/04/24 1457          Vital Signs    Pre Systolic BP Rehab 134  -KE     Pre Treatment Diastolic BP 71  -KE     Pretreatment Heart Rate (beats/min) 94  -KE     Intratreatment Heart Rate (beats/min) 108  -KE     Posttreatment Heart Rate (beats/min) 96  -KE     Pre SpO2 (%) 89  -KE     O2 Delivery Pre Treatment supplemental O2  -KE     Intra SpO2 (%) 85  -KE     O2 Delivery Intra Treatment supplemental O2  -KE     Post SpO2 (%) 91  -KE     O2 Delivery Post Treatment supplemental O2  -KE     Pre Patient Position Supine  -KE     Intra Patient Position Standing  -KE     Post Patient Position Sitting  -       Row Name 08/04/24 1457          Positioning and Restraints    Pre-Treatment Position in bed   -KE     Post Treatment Position chair  -KE     In Chair notified nsg;reclined;waffle cushion;call light within reach;encouraged to call for assist;with family/caregiver;legs elevated  -KE               User Key  (r) = Recorded By, (t) = Taken By, (c) = Cosigned By      Initials Name Provider Type    Jeannette Yang PT Physical Therapist                   Outcome Measures       Row Name 08/04/24 1503          How much help from another person do you currently need...    Turning from your back to your side while in flat bed without using bedrails? 4  -KE     Moving from lying on back to sitting on the side of a flat bed without bedrails? 4  -KE     Moving to and from a bed to a chair (including a wheelchair)? 4  -KE     Standing up from a chair using your arms (e.g., wheelchair, bedside chair)? 4  -KE     Climbing 3-5 steps with a railing? 3  -KE     To walk in hospital room? 3  -KE     AM-PAC 6 Clicks Score (PT) 22  -KE     Highest Level of Mobility Goal 7 --> Walk 25 feet or more  -KE       Row Name 08/04/24 1503          Functional Assessment    Outcome Measure Options AM-PAC 6 Clicks Basic Mobility (PT)  -KE               User Key  (r) = Recorded By, (t) = Taken By, (c) = Cosigned By      Initials Name Provider Type    Jeannette Yang, BABAK Physical Therapist                                 Physical Therapy Education       Title: PT OT SLP Therapies (In Progress)       Topic: Physical Therapy (In Progress)       Point: Mobility training (In Progress)       Learning Progress Summary             Patient Acceptance, E, NR by GINGER at 8/4/2024 1412                         Point: Home exercise program (Not Started)       Learner Progress:  Not documented in this visit.              Point: Body mechanics (In Progress)       Learning Progress Summary             Patient Acceptance, E, NR by GINGER at 8/4/2024 1412                         Point: Precautions (In Progress)       Learning Progress Summary             Patient  Acceptance, E, NR by GINGER at 8/4/2024 1412                                         User Key       Initials Effective Dates Name Provider Type Discipline     11/16/23 -  Jeannette Tobar PT Physical Therapist PT                  PT Recommendation and Plan  Planned Therapy Interventions (PT): balance training, neuromuscular re-education, bed mobility training, gait training, home exercise program, patient/family education, postural re-education, transfer training, stretching, strengthening, stair training, ROM (range of motion)  Plan of Care Reviewed With: patient, spouse  Outcome Evaluation: PT eval complete. Pt presents with balance deficits, CASTELLON, and decreased functional endurance warranting skilled IP PT service to return to PLOF. Pt amb 40' unsupported on 2L O2 + 40' unsupported on 4L O2, remains significantly limited by SOA. PT rec home w/ assist and OPPT at d/c.     Time Calculation:   PT Evaluation Complexity  History, PT Evaluation Complexity: 3 or more personal factors and/or comorbidities  Examination of Body Systems (PT Eval Complexity): total of 3 or more elements  Clinical Presentation (PT Evaluation Complexity): stable  Clinical Decision Making (PT Evaluation Complexity): low complexity  Overall Complexity (PT Evaluation Complexity): low complexity     PT Charges       Row Name 08/04/24 1504             Time Calculation    Start Time 1412  -KE      PT Received On 08/04/24  -KE      PT Goal Re-Cert Due Date 08/14/24  -KE         Untimed Charges    PT Eval/Re-eval Minutes 52  -KE         Total Minutes    Untimed Charges Total Minutes 52  -KE       Total Minutes 52  -KE                User Key  (r) = Recorded By, (t) = Taken By, (c) = Cosigned By      Initials Name Provider Type    Jeannette Yang PT Physical Therapist                  Therapy Charges for Today       Code Description Service Date Service Provider Modifiers Qty    [id number]  PT EVAL LOW COMPLEXITY 4 8/4/2024 Jeannette Tobar PT  GP 1            PT G-Codes  Outcome Measure Options: AM-PAC 6 Clicks Basic Mobility (PT)  AM-PAC 6 Clicks Score (PT): 22  PT Discharge Summary  Anticipated Discharge Disposition (PT): home with assist, home with outpatient therapy services    Jeannette Tobar, PT  8/4/2024

## 2024-08-05 LAB
ANION GAP SERPL CALCULATED.3IONS-SCNC: 7 MMOL/L (ref 5–15)
BUN SERPL-MCNC: 27 MG/DL (ref 6–20)
BUN/CREAT SERPL: 23.7 (ref 7–25)
CALCIUM SPEC-SCNC: 8.4 MG/DL (ref 8.6–10.5)
CHLORIDE SERPL-SCNC: 107 MMOL/L (ref 98–107)
CO2 SERPL-SCNC: 29 MMOL/L (ref 22–29)
CREAT SERPL-MCNC: 1.14 MG/DL (ref 0.76–1.27)
DEPRECATED RDW RBC AUTO: 43 FL (ref 37–54)
EGFRCR SERPLBLD CKD-EPI 2021: 74.1 ML/MIN/1.73
ERYTHROCYTE [DISTWIDTH] IN BLOOD BY AUTOMATED COUNT: 13.2 % (ref 12.3–15.4)
GLUCOSE SERPL-MCNC: 142 MG/DL (ref 65–99)
HCT VFR BLD AUTO: 37.5 % (ref 37.5–51)
HGB BLD-MCNC: 12.2 G/DL (ref 13–17.7)
MCH RBC QN AUTO: 28.8 PG (ref 26.6–33)
MCHC RBC AUTO-ENTMCNC: 32.5 G/DL (ref 31.5–35.7)
MCV RBC AUTO: 88.7 FL (ref 79–97)
PLATELET # BLD AUTO: 208 10*3/MM3 (ref 140–450)
PMV BLD AUTO: 11.9 FL (ref 6–12)
POTASSIUM SERPL-SCNC: 4.7 MMOL/L (ref 3.5–5.2)
RBC # BLD AUTO: 4.23 10*6/MM3 (ref 4.14–5.8)
SODIUM SERPL-SCNC: 143 MMOL/L (ref 136–145)
WBC NRBC COR # BLD AUTO: 15.59 10*3/MM3 (ref 3.4–10.8)

## 2024-08-05 PROCEDURE — 94799 UNLISTED PULMONARY SVC/PX: CPT

## 2024-08-05 PROCEDURE — 25010000002 CEFTRIAXONE PER 250 MG: Performed by: INTERNAL MEDICINE

## 2024-08-05 PROCEDURE — 25010000002 METHYLPREDNISOLONE PER 125 MG: Performed by: INTERNAL MEDICINE

## 2024-08-05 PROCEDURE — 80048 BASIC METABOLIC PNL TOTAL CA: CPT | Performed by: INTERNAL MEDICINE

## 2024-08-05 PROCEDURE — 25010000002 ENOXAPARIN PER 10 MG: Performed by: INTERNAL MEDICINE

## 2024-08-05 PROCEDURE — 99232 SBSQ HOSP IP/OBS MODERATE 35: CPT | Performed by: INTERNAL MEDICINE

## 2024-08-05 PROCEDURE — 85027 COMPLETE CBC AUTOMATED: CPT | Performed by: INTERNAL MEDICINE

## 2024-08-05 PROCEDURE — 94664 DEMO&/EVAL PT USE INHALER: CPT

## 2024-08-05 RX ADMIN — Medication 10 ML: at 08:59

## 2024-08-05 RX ADMIN — DOXYCYCLINE 100 MG: 100 CAPSULE ORAL at 20:44

## 2024-08-05 RX ADMIN — BUSPIRONE HYDROCHLORIDE 15 MG: 15 TABLET ORAL at 20:44

## 2024-08-05 RX ADMIN — ASPIRIN 81 MG: 81 TABLET, COATED ORAL at 08:58

## 2024-08-05 RX ADMIN — IPRATROPIUM BROMIDE AND ALBUTEROL SULFATE 3 ML: 2.5; .5 SOLUTION RESPIRATORY (INHALATION) at 19:51

## 2024-08-05 RX ADMIN — Medication 10 MG: at 20:44

## 2024-08-05 RX ADMIN — Medication 10 ML: at 20:44

## 2024-08-05 RX ADMIN — CARVEDILOL 6.25 MG: 6.25 TABLET, FILM COATED ORAL at 20:44

## 2024-08-05 RX ADMIN — GUAIFENESIN 1200 MG: 600 TABLET, EXTENDED RELEASE ORAL at 08:58

## 2024-08-05 RX ADMIN — GUAIFENESIN 1200 MG: 600 TABLET, EXTENDED RELEASE ORAL at 20:44

## 2024-08-05 RX ADMIN — BUDESONIDE AND FORMOTEROL FUMARATE DIHYDRATE 2 PUFF: 160; 4.5 AEROSOL RESPIRATORY (INHALATION) at 19:53

## 2024-08-05 RX ADMIN — IPRATROPIUM BROMIDE AND ALBUTEROL SULFATE 3 ML: 2.5; .5 SOLUTION RESPIRATORY (INHALATION) at 13:16

## 2024-08-05 RX ADMIN — ATORVASTATIN CALCIUM 40 MG: 40 TABLET, FILM COATED ORAL at 08:58

## 2024-08-05 RX ADMIN — ARFORMOTEROL TARTRATE 15 MCG: 15 SOLUTION RESPIRATORY (INHALATION) at 09:43

## 2024-08-05 RX ADMIN — METHYLPREDNISOLONE SODIUM SUCCINATE 60 MG: 125 INJECTION, POWDER, FOR SOLUTION INTRAMUSCULAR; INTRAVENOUS at 20:44

## 2024-08-05 RX ADMIN — MIRTAZAPINE 15 MG: 15 TABLET, FILM COATED ORAL at 20:44

## 2024-08-05 RX ADMIN — ARFORMOTEROL TARTRATE 15 MCG: 15 SOLUTION RESPIRATORY (INHALATION) at 19:51

## 2024-08-05 RX ADMIN — SODIUM CHLORIDE 2000 MG: 900 INJECTION INTRAVENOUS at 14:06

## 2024-08-05 RX ADMIN — BUDESONIDE AND FORMOTEROL FUMARATE DIHYDRATE 2 PUFF: 160; 4.5 AEROSOL RESPIRATORY (INHALATION) at 10:06

## 2024-08-05 RX ADMIN — IPRATROPIUM BROMIDE AND ALBUTEROL SULFATE 3 ML: 2.5; .5 SOLUTION RESPIRATORY (INHALATION) at 16:33

## 2024-08-05 RX ADMIN — ENOXAPARIN SODIUM 40 MG: 100 INJECTION SUBCUTANEOUS at 08:58

## 2024-08-05 RX ADMIN — ISOSORBIDE MONONITRATE 30 MG: 30 TABLET, EXTENDED RELEASE ORAL at 20:43

## 2024-08-05 RX ADMIN — DOXYCYCLINE 100 MG: 100 CAPSULE ORAL at 08:58

## 2024-08-05 RX ADMIN — AMLODIPINE BESYLATE 10 MG: 10 TABLET ORAL at 08:58

## 2024-08-05 RX ADMIN — PANTOPRAZOLE SODIUM 40 MG: 40 TABLET, DELAYED RELEASE ORAL at 06:19

## 2024-08-05 RX ADMIN — CARVEDILOL 6.25 MG: 6.25 TABLET, FILM COATED ORAL at 08:58

## 2024-08-05 RX ADMIN — IPRATROPIUM BROMIDE AND ALBUTEROL SULFATE 3 ML: 2.5; .5 SOLUTION RESPIRATORY (INHALATION) at 09:43

## 2024-08-05 RX ADMIN — METHYLPREDNISOLONE SODIUM SUCCINATE 60 MG: 125 INJECTION, POWDER, FOR SOLUTION INTRAMUSCULAR; INTRAVENOUS at 08:58

## 2024-08-05 RX ADMIN — BUSPIRONE HYDROCHLORIDE 15 MG: 15 TABLET ORAL at 08:58

## 2024-08-05 NOTE — PROGRESS NOTES
[facility] Medicine Services  PROGRESS NOTE    Patient Name: Aguila Finn Jr.  : 1965  MRN: [id number]    Date of Admission: 2024  Primary Care Physician: Kenya Martinez MD    Subjective   Subjective     CC:  COPD AE    HPI:  Still feels short of breath, particularly with walking.  Says his breathing has not been good for the last two months      Objective   Objective     Vital Signs:   Temp:  [97.6 °F (36.4 °C)-98 °F (36.7 °C)] 98 °F (36.7 °C)  Heart Rate:  [] 75  Resp:  [16-26] 20  BP: (145-158)/(76-86) 150/86  Flow (L/min):  [2-2.5] 2     Physical Exam:  Non toxic, in bed  MM moist  RRR  Diminished breath sounds bilaterally -- became quite winded after walking in room, however oxygen sats remained stable (88-92% on 2.5 liters)  Abd soft, NT  Normal affect      Results Reviewed:  LAB RESULTS:      Lab 24  05024  0734   WBC 15.59* 17.78* 12.79* 11.00*   HEMOGLOBIN 12.2* 12.7* 12.8* 14.3   HEMATOCRIT 37.5 39.3 39.5 45.1   PLATELETS 208 208 211 237   NEUTROS ABS  --   --  11.35* 8.14*   IMMATURE GRANS (ABS)  --   --  0.07* 0.09*   LYMPHS ABS  --   --  0.54* 1.07   MONOS ABS  --   --  0.81 1.03*   EOS ABS  --   --  0.00 0.56*   MCV 88.7 90.1 88.8 91.1         Lab 24  0540 248 24  0509 24  0734   SODIUM 143 143 141 143 139   POTASSIUM 4.7 4.5 4.6 5.2 5.0   CHLORIDE 107 106 104 104 103   CO2 29.0 24.0 26.0 28.0 28.0   ANION GAP 7.0 13.0 11.0 11.0 8.0   BUN 27* 25* 29* 34* 24*   CREATININE 1.14 1.06 1.28* 1.98* 1.66*   EGFR 74.1 80.8 64.5 38.2* 47.2*   GLUCOSE 142* 127* 195* 131* 121*   CALCIUM 8.4* 8.2* 8.6 8.9 8.8   MAGNESIUM  --   --   --  2.7* 2.3   PHOSPHORUS  --   --   --  4.4  --          Lab 24  0509 24  0734   TOTAL PROTEIN 6.1 7.3   ALBUMIN 4.0 4.1   GLOBULIN 2.1 3.2   ALT (SGPT) 16 20   AST (SGOT) 11 20   BILIRUBIN 0.2 0.3   ALK PHOS 99 119*          Lab 08/01/24  0734   PROBNP <36.0   HSTROP T 11                 Lab 08/01/24  0749   PH, ARTERIAL 7.315*   PCO2, ARTERIAL 52.8*   PO2 .0*   FIO2 28   HCO3 ART 26.9*   BASE EXCESS ART -0.3*   CARBOXYHEMOGLOBIN 0.8     Brief Urine Lab Results       None            Microbiology Results Abnormal       Procedure Component Value - Date/Time    COVID PRE-OP / PRE-PROCEDURE SCREENING ORDER (NO ISOLATION) - Swab, Nasopharynx [[id number]]  (Normal) Collected: 08/01/24 0736    Lab Status: Final result Specimen: Swab from Nasopharynx Updated: 08/01/24 0842    Narrative:      The following orders were created for panel order COVID PRE-OP / PRE-PROCEDURE SCREENING ORDER (NO ISOLATION) - Swab, Nasopharynx.  Procedure                               Abnormality         Status                     ---------                               -----------         ------                     Respiratory Panel PCR w/...[[id number]]  Normal              Final result                 Please view results for these tests on the individual orders.    Respiratory Panel PCR w/COVID-19(SARS-CoV-2) DAKOTAH/JULIANA/ISAIAS/PAD/COR/CHRISTAL In-House, NP Swab in UTM/VTM, 2 HR TAT - Swab, Nasopharynx [[id number]]  (Normal) Collected: 08/01/24 0736    Lab Status: Final result Specimen: Swab from Nasopharynx Updated: 08/01/24 0842     ADENOVIRUS, PCR Not Detected     Coronavirus 229E Not Detected     Coronavirus HKU1 Not Detected     Coronavirus NL63 Not Detected     Coronavirus OC43 Not Detected     COVID19 Not Detected     Human Metapneumovirus Not Detected     Human Rhinovirus/Enterovirus Not Detected     Influenza A PCR Not Detected     Influenza B PCR Not Detected     Parainfluenza Virus 1 Not Detected     Parainfluenza Virus 2 Not Detected     Parainfluenza Virus 3 Not Detected     Parainfluenza Virus 4 Not Detected     RSV, PCR Not Detected     Bordetella pertussis pcr Not Detected     Bordetella parapertussis PCR Not Detected     Chlamydophila pneumoniae PCR Not  Detected     Mycoplasma pneumo by PCR Not Detected    Narrative:      In the setting of a positive respiratory panel with a viral infection PLUS a negative procalcitonin without other underlying concern for bacterial infection, consider observing off antibiotics or discontinuation of antibiotics and continue supportive care. If the respiratory panel is positive for atypical bacterial infection (Bordetella pertussis, Chlamydophila pneumoniae, or Mycoplasma pneumoniae), consider antibiotic de-escalation to target atypical bacterial infection.            No radiology results from the last 24 hrs    Results for orders placed during the hospital encounter of 08/01/24    Adult Transthoracic Echo Complete W/ Cont if Necessary Per Protocol    Interpretation Summary    Left ventricular systolic function is normal. Calculated left ventricular EF = 61.6% Left ventricular ejection fraction appears to be 61 - 65%.    Left ventricular diastolic function was normal.    No significant structural or functional valvular disease.    No significant change compared to previous study      Current medications:  Scheduled Meds:amLODIPine, 10 mg, Oral, Daily  arformoterol, 15 mcg, Nebulization, BID - RT  aspirin, 81 mg, Oral, Daily  atorvastatin, 40 mg, Oral, Daily  budesonide-formoterol, 2 puff, Inhalation, BID  busPIRone, 15 mg, Oral, BID  carvedilol, 6.25 mg, Oral, BID  cefTRIAXone, 2,000 mg, Intravenous, Q24H  doxycycline, 100 mg, Oral, Q12H  enoxaparin, 40 mg, Subcutaneous, Daily  guaiFENesin, 1,200 mg, Oral, Q12H  ipratropium-albuterol, 3 mL, Nebulization, 4x Daily - RT  isosorbide mononitrate, 30 mg, Oral, Nightly  melatonin, 10 mg, Oral, Nightly  methylPREDNISolone sodium succinate, 60 mg, Intravenous, Q12H  mirtazapine, 15 mg, Oral, Nightly  pantoprazole, 40 mg, Oral, Q AM  sodium chloride, 10 mL, Intravenous, Q12H  [Held by provider] spironolactone, 50 mg, Oral, Daily      Continuous Infusions:     PRN Meds:.  acetaminophen **OR**  acetaminophen **OR** acetaminophen    senna-docusate sodium **AND** polyethylene glycol **AND** bisacodyl **AND** bisacodyl    Calcium Replacement - Follow Nurse / BPA Driven Protocol    ipratropium-albuterol    Magnesium Standard Dose Replacement - Follow Nurse / BPA Driven Protocol    ondansetron ODT **OR** ondansetron    Phosphorus Replacement - Follow Nurse / BPA Driven Protocol    Potassium Replacement - Follow Nurse / BPA Driven Protocol    sodium chloride    sodium chloride    sodium chloride    Assessment & Plan   Assessment & Plan     Active Hospital Problems    Diagnosis  POA    **COPD exacerbation [J44.1]  Yes    Acute-on-chronic respiratory failure [J96.20]  Yes      Resolved Hospital Problems   No resolved problems to display.        Brief Hospital Course to date:  Aguila Finn Jr. is a 58 y.o. male with history of HTN, HLD, Nephrolithiasis, anxiety, depression and COPD (on nocturnal O2) presents with progressive shortness of breath for one month.    COPD with AE  Pulmonary nodule - appears stable by CT  - continue IV solumedrol 60 mg BID  - symbicort  - scheduled nebs (duonebs and aformoterol)  - empiric rocephin and doxycycline  - has followup with Pulmonary 8/14  - patient needs oxygen concentrator (wants both fixed and portable), discussed with CM in am    Renal insufficiency  - creatinine improved  - baseline creatinine over past year 1.2-1.6  - hold further IV fluids  - hold aldactone    CAD  - stress test 8/2/24, consistent with low risk study  - echo 8/2 shows normal EF  - has followup with Cardiology Dr Miles 8/22    HTN    HLD    Anxiety and depression    Chronic hypokalemia  - holding aldactone due to renal insufficiency and elevated K at admission    Possible Esophageal thickening  - distal thickening incidentally noted on CT imaging at admission  - start PPI  - may need GI referral      Expected Discharge Location and Transportation: home  Expected Discharge   Expected Discharge  Date: 8/6/2024; Expected Discharge Time:      VTE Prophylaxis:  Pharmacologic VTE prophylaxis orders are present.         AM-PAC 6 Clicks Score (PT): 22 (08/04/24 2055)    CODE STATUS:   Code Status and Medical Interventions: CPR (Attempt to Resuscitate); Full Support   Ordered at: 08/01/24 0949     Level Of Support Discussed With:    Patient     Code Status (Patient has no pulse and is not breathing):    CPR (Attempt to Resuscitate)     Medical Interventions (Patient has pulse or is breathing):    Full Support       Toño Mills MD  08/05/24

## 2024-08-05 NOTE — PLAN OF CARE
Goal Outcome Evaluation:  Plan of Care Reviewed With: patient        Progress: improving  Outcome Evaluation: Remains on 2LNC. Still has SOA on exertion. SR on tele. Denies CP or any discomfort. No acute distress. Cont current POC

## 2024-08-05 NOTE — CASE MANAGEMENT/SOCIAL WORK
Continued Stay Note  [facility]     Patient Name: Aguila Finn Jr.  MRN: [id number]  Today's Date: 8/5/2024    Admit Date: 8/1/2024    Plan: Home   Discharge Plan       Row Name 08/05/24 1553       Plan    Plan Home    Patient/Family in Agreement with Plan yes    Plan Comments Spoke with patient at bedside. Patient was in need of oxygen. CM order through [employer]. Order in [employer]. Patient discharge plan is home with private car. CM will follow.    Final Discharge Disposition Code 01 - home or self-care                   Discharge Codes    No documentation.                 Expected Discharge Date and Time       Expected Discharge Date Expected Discharge Time    Aug 6, 2024               Criss Li RN

## 2024-08-06 ENCOUNTER — READMISSION MANAGEMENT (OUTPATIENT)
Dept: CALL CENTER | Facility: HOSPITAL | Age: 59
End: 2024-08-06
Payer: COMMERCIAL

## 2024-08-06 VITALS
SYSTOLIC BLOOD PRESSURE: 164 MMHG | WEIGHT: 168.65 LBS | TEMPERATURE: 98 F | OXYGEN SATURATION: 97 % | HEIGHT: 69 IN | HEART RATE: 71 BPM | RESPIRATION RATE: 16 BRPM | DIASTOLIC BLOOD PRESSURE: 89 MMHG | BODY MASS INDEX: 24.98 KG/M2

## 2024-08-06 PROCEDURE — 25010000002 METHYLPREDNISOLONE PER 125 MG: Performed by: INTERNAL MEDICINE

## 2024-08-06 PROCEDURE — 94664 DEMO&/EVAL PT USE INHALER: CPT

## 2024-08-06 PROCEDURE — 99239 HOSP IP/OBS DSCHRG MGMT >30: CPT | Performed by: FAMILY MEDICINE

## 2024-08-06 PROCEDURE — 94799 UNLISTED PULMONARY SVC/PX: CPT

## 2024-08-06 PROCEDURE — 25010000002 ENOXAPARIN PER 10 MG: Performed by: INTERNAL MEDICINE

## 2024-08-06 RX ORDER — GUAIFENESIN 600 MG/1
1200 TABLET, EXTENDED RELEASE ORAL EVERY 12 HOURS SCHEDULED
Qty: 30 TABLET | Refills: 0 | Status: SHIPPED | OUTPATIENT
Start: 2024-08-06

## 2024-08-06 RX ORDER — DOXYCYCLINE 100 MG/1
100 CAPSULE ORAL EVERY 12 HOURS SCHEDULED
Qty: 4 CAPSULE | Refills: 0 | Status: SHIPPED | OUTPATIENT
Start: 2024-08-06 | End: 2024-08-08

## 2024-08-06 RX ORDER — PREDNISONE 10 MG/1
TABLET ORAL
Qty: 50 TABLET | Refills: 0 | Status: SHIPPED | OUTPATIENT
Start: 2024-08-06 | End: 2024-08-26

## 2024-08-06 RX ORDER — PANTOPRAZOLE SODIUM 40 MG/1
40 TABLET, DELAYED RELEASE ORAL
Qty: 30 TABLET | Refills: 0 | Status: SHIPPED | OUTPATIENT
Start: 2024-08-07 | End: 2024-08-12 | Stop reason: SDUPTHER

## 2024-08-06 RX ADMIN — ATORVASTATIN CALCIUM 40 MG: 40 TABLET, FILM COATED ORAL at 08:03

## 2024-08-06 RX ADMIN — Medication 10 ML: at 08:03

## 2024-08-06 RX ADMIN — IPRATROPIUM BROMIDE AND ALBUTEROL SULFATE 3 ML: 2.5; .5 SOLUTION RESPIRATORY (INHALATION) at 07:52

## 2024-08-06 RX ADMIN — ASPIRIN 81 MG: 81 TABLET, COATED ORAL at 08:03

## 2024-08-06 RX ADMIN — GUAIFENESIN 1200 MG: 600 TABLET, EXTENDED RELEASE ORAL at 08:03

## 2024-08-06 RX ADMIN — ENOXAPARIN SODIUM 40 MG: 100 INJECTION SUBCUTANEOUS at 08:03

## 2024-08-06 RX ADMIN — AMLODIPINE BESYLATE 10 MG: 10 TABLET ORAL at 08:03

## 2024-08-06 RX ADMIN — METHYLPREDNISOLONE SODIUM SUCCINATE 60 MG: 125 INJECTION, POWDER, FOR SOLUTION INTRAMUSCULAR; INTRAVENOUS at 08:03

## 2024-08-06 RX ADMIN — ARFORMOTEROL TARTRATE 15 MCG: 15 SOLUTION RESPIRATORY (INHALATION) at 07:52

## 2024-08-06 RX ADMIN — BUSPIRONE HYDROCHLORIDE 15 MG: 15 TABLET ORAL at 08:03

## 2024-08-06 RX ADMIN — PANTOPRAZOLE SODIUM 40 MG: 40 TABLET, DELAYED RELEASE ORAL at 06:20

## 2024-08-06 RX ADMIN — CARVEDILOL 6.25 MG: 6.25 TABLET, FILM COATED ORAL at 08:03

## 2024-08-06 RX ADMIN — DOXYCYCLINE 100 MG: 100 CAPSULE ORAL at 08:03

## 2024-08-06 RX ADMIN — BUDESONIDE AND FORMOTEROL FUMARATE DIHYDRATE 2 PUFF: 160; 4.5 AEROSOL RESPIRATORY (INHALATION) at 07:56

## 2024-08-06 NOTE — PLAN OF CARE
Goal Outcome Evaluation:  Plan of Care Reviewed With: patient        Progress: no change  Outcome Evaluation: Remians on 2LNC. No acute events overnight. SR on tele. VSS. POC ONgoing

## 2024-08-06 NOTE — PAYOR COMM NOTE
"Baldemar Echevarria Jr. (58 y.o. Male)     [id number]       Kerrie Gonzalez, ZULMA  Utilization Review  [phone]  [phone]        Date of Birth   1965    Social Security Number       Address   [address] [place] [address]    Home Phone   [phone]    MRN   [id number]       [facility]    Marital Status                               Admission Date   24    Admission Type   Emergency    Admitting Provider   Danika Dorsey DO    Attending Provider   Danika Dorsey DO    Department, Room/Bed   [facility] 6B, N646/1       Discharge Date       Discharge Disposition   Home or Self Care    Discharge Destination                                 Attending Provider: Danika Dorsey DO    Allergies: No Known Allergies    Isolation: None   Infection: None   Code Status: CPR    Ht: 175.3 cm (69.02\")   Wt: 76.5 kg (168 lb 10.4 oz)    Admission Cmt: None   Principal Problem: COPD exacerbation [J44.1]                   Active Insurance as of 2024       Primary Coverage       Payor Plan Insurance Group Employer/Plan Group    Critical access hospital BLUE CROSS [facility] EMPLOYEE [id number]       Payor Plan Address Payor Plan Phone Number Payor Plan Fax Number Effective Dates    [address] [phone]  2016 - None Entered    [place] [address]         Subscriber Name Subscriber Birth Date Member ID       BALDEMAR ECHEVARRIA JR. 1965 [id number]                     Emergency Contacts        (Rel.) Home Phone Work Phone Mobile Phone    Stacey Echevarria (Spouse) [phone] -- [phone]                 Discharge Summary        Danika Dorsey DO at [facility]              [facility] Medicine Services  DISCHARGE SUMMARY    Patient Name: Baldemar Echevarria Jr.  : 1965  MRN: [id number]    Date of Admission: 2024  7:27 AM  Date of Discharge:  2024  Primary Care " Physician: Kenya Martinez MD    Consults       No orders found from 7/3/2024 to 8/2/2024.            Hospital Course       Active Hospital Problems    Diagnosis  POA    **COPD exacerbation [J44.1]  Yes    Acute-on-chronic respiratory failure [J96.20]  Yes      Resolved Hospital Problems   No resolved problems to display.          Hospital Course:  Aguila Finn Jr. is a 58 y.o. male  with history of HTN, HLD, Nephrolithiasis, anxiety, depression and COPD (on nocturnal O2) presents with progressive shortness of breath for one month.     COPD with AE  Pulmonary nodule - appears stable by CT  - Wean IV Solumedrol to Prednisone taper   - Continue Symbicort  - Continue Doxycycline   - has followup with Pulmonary 8/14  - CM has arranged O2 for home      Renal insufficiency  - resolved      CAD  - stress test 8/2/24, consistent with low risk study  - echo 8/2 shows normal EF  - has followup with Cardiology Dr Miles 8/22     HTN  HLD  -Home meds     Anxiety and depression     Chronic hypokalemia  - resolved      Possible Esophageal thickening  - distal thickening incidentally noted on CT imaging at admission  - started PPI this admit  - consider outpatient GI evaluation     Discharge Follow Up Recommendations for outpatient labs/diagnostics:  -PCP 1 week  -Keep Pulmonary follow-up 8/14     Day of Discharge     HPI:   Patient seen and examined. Eager to go home.     Review of Systems  Gen- No fevers, chills  CV- No chest pain, palpitations  Resp- No cough, +dyspnea  GI- No N/V/D, abd pain    Vital Signs:   Temp:  [97.2 °F (36.2 °C)-98.5 °F (36.9 °C)] 98 °F (36.7 °C)  Heart Rate:  [] 72  Resp:  [16-22] 16  BP: (123-150)/(73-99) 150/99  Flow (L/min):  [2] 2      Physical Exam:  Constitutional: No acute distress, awake, alert  HENT: NCAT, mucous membranes moist  Respiratory: Diminished bilaterally, respiratory effort normal 2L NC  Cardiovascular: RRR, no murmurs, rubs, or gallops  Gastrointestinal:  Positive bowel sounds, soft, nontender, nondistended  Musculoskeletal: No bilateral ankle edema  Psychiatric: Appropriate affect, cooperative  Neurologic: Oriented x 3, NAGY, speech clear  Skin: No rashes     Pertinent  and/or Most Recent Results     LAB RESULTS:      Lab 08/05/24  0540 08/04/24  0438 08/02/24  0509 08/01/24  0734   WBC 15.59* 17.78* 12.79* 11.00*   HEMOGLOBIN 12.2* 12.7* 12.8* 14.3   HEMATOCRIT 37.5 39.3 39.5 45.1   PLATELETS 208 208 211 237   NEUTROS ABS  --   --  11.35* 8.14*   IMMATURE GRANS (ABS)  --   --  0.07* 0.09*   LYMPHS ABS  --   --  0.54* 1.07   MONOS ABS  --   --  0.81 1.03*   EOS ABS  --   --  0.00 0.56*   MCV 88.7 90.1 88.8 91.1         Lab 08/05/24  0540 08/04/24  0438 08/03/24  1442 08/02/24  0509 08/01/24  0734   SODIUM 143 143 141 143 139   POTASSIUM 4.7 4.5 4.6 5.2 5.0   CHLORIDE 107 106 104 104 103   CO2 29.0 24.0 26.0 28.0 28.0   ANION GAP 7.0 13.0 11.0 11.0 8.0   BUN 27* 25* 29* 34* 24*   CREATININE 1.14 1.06 1.28* 1.98* 1.66*   EGFR 74.1 80.8 64.5 38.2* 47.2*   GLUCOSE 142* 127* 195* 131* 121*   CALCIUM 8.4* 8.2* 8.6 8.9 8.8   MAGNESIUM  --   --   --  2.7* 2.3   PHOSPHORUS  --   --   --  4.4  --          Lab 08/02/24  0509 08/01/24  0734   TOTAL PROTEIN 6.1 7.3   ALBUMIN 4.0 4.1   GLOBULIN 2.1 3.2   ALT (SGPT) 16 20   AST (SGOT) 11 20   BILIRUBIN 0.2 0.3   ALK PHOS 99 119*         Lab 08/01/24  0734   PROBNP <36.0   HSTROP T 11                 Lab 08/01/24  0749   PH, ARTERIAL 7.315*   PCO2, ARTERIAL 52.8*   PO2 .0*   FIO2 28   HCO3 ART 26.9*   BASE EXCESS ART -0.3*   CARBOXYHEMOGLOBIN 0.8     Brief Urine Lab Results       None          Microbiology Results (last 10 days)       Procedure Component Value - Date/Time    COVID PRE-OP / PRE-PROCEDURE SCREENING ORDER (NO ISOLATION) - Swab, Nasopharynx [[id number]]  (Normal) Collected: 08/01/24 0736    Lab Status: Final result Specimen: Swab from Nasopharynx Updated: 08/01/24 0842    Narrative:      The following orders were  created for panel order COVID PRE-OP / PRE-PROCEDURE SCREENING ORDER (NO ISOLATION) - Swab, Nasopharynx.  Procedure                               Abnormality         Status                     ---------                               -----------         ------                     Respiratory Panel PCR w/...[[id number]]  Normal              Final result                 Please view results for these tests on the individual orders.    Respiratory Panel PCR w/COVID-19(SARS-CoV-2) DAKOTAH/JULIANA/ISAIAS/PAD/COR/CHRISTAL In-House, NP Swab in UTM/VTM, 2 HR TAT - Swab, Nasopharynx [[id number]]  (Normal) Collected: 08/01/24 0736    Lab Status: Final result Specimen: Swab from Nasopharynx Updated: 08/01/24 0842     ADENOVIRUS, PCR Not Detected     Coronavirus 229E Not Detected     Coronavirus HKU1 Not Detected     Coronavirus NL63 Not Detected     Coronavirus OC43 Not Detected     COVID19 Not Detected     Human Metapneumovirus Not Detected     Human Rhinovirus/Enterovirus Not Detected     Influenza A PCR Not Detected     Influenza B PCR Not Detected     Parainfluenza Virus 1 Not Detected     Parainfluenza Virus 2 Not Detected     Parainfluenza Virus 3 Not Detected     Parainfluenza Virus 4 Not Detected     RSV, PCR Not Detected     Bordetella pertussis pcr Not Detected     Bordetella parapertussis PCR Not Detected     Chlamydophila pneumoniae PCR Not Detected     Mycoplasma pneumo by PCR Not Detected    Narrative:      In the setting of a positive respiratory panel with a viral infection PLUS a negative procalcitonin without other underlying concern for bacterial infection, consider observing off antibiotics or discontinuation of antibiotics and continue supportive care. If the respiratory panel is positive for atypical bacterial infection (Bordetella pertussis, Chlamydophila pneumoniae, or Mycoplasma pneumoniae), consider antibiotic de-escalation to target atypical bacterial infection.            Stress Test With Pet Myocardial  Perfusion    Result Date: 8/2/2024    Myocardial perfusion imaging indicates a normal myocardial perfusion study with no evidence of ischemia. Impressions are consistent with a low risk study.   Patient denied any chest discomfort/pain during stress; he did experience some shortness of breath that resolved after a couple of minutes.   Left ventricular ejection fraction is normal (Calculated EF = 68%).   REST:  68%EF STRESS:  74%EF.   Coronary calcification noted on CT images.     Adult Transthoracic Echo Complete W/ Cont if Necessary Per Protocol    Result Date: 8/1/2024    Left ventricular systolic function is normal. Calculated left ventricular EF = 61.6% Left ventricular ejection fraction appears to be 61 - 65%.   Left ventricular diastolic function was normal.   No significant structural or functional valvular disease. No significant change compared to previous study     CT Angiogram Chest    Result Date: 8/1/2024  CT ANGIOGRAM CHEST Date of Exam: 8/1/2024 8:15 AM EDT Indication: SOB, hypoxia, tachycardia. Comparison: None available. Technique: CTA of the chest was performed after the uneventful intravenous administration of 85 mL Isovue-370. Reconstructed coronal and sagittal images were also obtained. In addition, a 3-D volume rendered image was created for interpretation. Automated exposure control and iterative reconstruction methods were used. FINDINGS: Thoracic inlet: Unremarkable. Pulmonary arteries: No filling defects are identified within the pulmonary arteries to suggest acute pulmonary embolism. Great vessels: Atherosclerotic plaque is seen within the thoracic aorta and proximal arch vessels. Mediastinum/Kayla: No pathologically enlarged mediastinal lymph nodes are seen. Possible distal esophageal wall thickening. Lung parenchyma: Lungs are emphysematous. There is an unchanged 7 mm nodule within the left upper lobe (series 5, image 46). This appears similar since 8/29/2022. There are bullous changes of  the right upper lobe with subpleural scarring also noted. Trachea and airways: The trachea and central airways appear unremarkable. Pleural space: No significant pleural effusion or pneumothorax is seen. Heart and pericardium: Coronary artery calcifications are noted. Otherwise, the heart and pericardium appear unremarkable. Chest wall: No acute or suspicious osseous or soft tissue lesion is identified. Upper abdomen: No acute abnormality is identified within the visualized upper abdomen. Bilateral punctate nonobstructive nephrolithiasis. Colonic diverticulosis noted. Partially visualized left renal cyst.     1.No acute abnormality is identified within the thorax. Specifically, there is no evidence of acute pulmonary embolism. 2.Pulmonary emphysema. Please correlate with patient's smoking history/risk factors to determine whether the patient meets criteria for routine lung cancer screening with low dose chest CT. 3.Stable 7 mm left upper lobe nodule, similar since 8/29/2022. 4.Possible distal esophageal wall thickening. Please correlate clinically for esophagitis. 5.Additional findings as detailed above. Electronically Signed: David Sawyer MD  8/1/2024 8:39 AM EDT  Workstation ID: [id number]    XR Chest 1 View    Result Date: 8/1/2024  XR CHEST 1 VW Date of Exam: 8/1/2024 7:38 AM EDT Indication: SOA triage protocol Comparison: Chest radiograph 7/23/2024. Findings: Cardiomediastinal silhouette is within normal limits. Emphysematous changes. No focal consolidation. No pleural effusion or pneumothorax. Osseous structures are unremarkable. Electronic device overlies the left upper chest.     Impression: Emphysematous changes without evidence of acute/superimposed process. Electronically Signed: Dylan Best MD  8/1/2024 7:50 AM EDT  Workstation ID: [id number]    CT Chest With & Without Contrast Diagnostic    Result Date: 7/27/2024  CT CHEST W WO CONTRAST DIAGNOSTIC Date of Exam: 7/27/2024 7:32 AM EDT Indication: SOA,  f/u nodules, effusion. Comparison: Chest radiograph 7/23/2024 and chest CT 8/29/2022. Technique: Axial CT images were obtained of the chest before and after the uneventful intravenous administration of 80 mL of Isovue-300.  Reconstructed coronal and sagittal images were also obtained. Automated exposure control and iterative construction methods were used. Findings: The thyroid, trachea and esophagus appear within normal limits. There is mild circumferential distal esophageal wall thickening that could be related to reflux and/or esophagitis. There is severe coronary artery calcification. Trace pericardial effusion.  Mild aortic and aortic branch vessel atherosclerosis. There is no evidence of pulmonary embolism. No mediastinal lymphadenopathy. There is moderate emphysema. There is apical lung scarring, right greater than left. Previous identified 7 mm nodule in the left upper lobe is unchanged on axial image 43. There is mild stable peribronchial thickening. No pneumothorax, pleural effusion or focal airspace consolidation. Airways are patent. There are no acute findings in the superficial soft tissues. Mild bilateral gynecomastia. There is a stable left adrenal nodule measuring 16 mm, likely adenoma. There are small simple left kidney cysts. There is a punctate nonobstructing stone in the superior pole of each kidney. Subcentimeter cyst is present in the dome of the liver. There are no acute osseous abnormalities or destructive bone lesions. There is significant thoracic degenerative changes.     Impression: 1.No acute cardiopulmonary abnormality. 2.Stable 7 mm left upper lobe lung nodule. This is unchanged for 2 years and considered benign. 3.Moderate emphysema. 4.Severe coronary artery disease. 5.Stable left adrenal nodule, likely adenoma. 6.Punctate nonobstructing bilateral kidney stones. Electronically Signed: Taye Christie MD  7/27/2024 7:52 AM EDT  Workstation ID: [id number]             Results for orders  placed during the hospital encounter of 08/01/24    Adult Transthoracic Echo Complete W/ Cont if Necessary Per Protocol    Interpretation Summary    Left ventricular systolic function is normal. Calculated left ventricular EF = 61.6% Left ventricular ejection fraction appears to be 61 - 65%.    Left ventricular diastolic function was normal.    No significant structural or functional valvular disease.    No significant change compared to previous study      Plan for Follow-up of Pending Labs/Results: Inbox     Discharge Details        Discharge Medications        New Medications        Instructions Start Date   doxycycline 100 MG capsule  Commonly known as: MONODOX   100 mg, Oral, Every 12 Hours Scheduled      guaiFENesin 600 MG 12 hr tablet  Commonly known as: MUCINEX   1,200 mg, Oral, Every 12 Hours Scheduled      pantoprazole 40 MG EC tablet  Commonly known as: PROTONIX   40 mg, Oral, Every Early Morning   Start Date: August 7, 2024     predniSONE 10 MG tablet  Commonly known as: DELTASONE   Take 4 tablets by mouth Daily for 5 days, THEN 3 tablets Daily for 5 days, THEN 2 tablets Daily for 5 days, THEN 1 tablet Daily for 5 days.   Start Date: August 6, 2024            Continue These Medications        Instructions Start Date   albuterol (2.5 MG/3ML) 0.083% nebulizer solution  Commonly known as: PROVENTIL   2.5 mg, Nebulization, 4 Times Daily PRN      amLODIPine 10 MG tablet  Commonly known as: NORVASC   10 mg, Oral, Daily      arformoterol 15 MCG/2ML nebulizer solution  Commonly known as: BROVANA   15 mcg, Nebulization, 2 Times Daily - RT      Aspirin Low Dose 81 MG EC tablet  Generic drug: aspirin   81 mg, Oral, Daily      atorvastatin 40 MG tablet  Commonly known as: LIPITOR   40 mg, Oral, Daily      busPIRone 15 MG tablet  Commonly known as: BUSPAR   15 mg, Oral, 2 Times Daily      carvedilol 6.25 MG tablet  Commonly known as: COREG   6.25 mg, Oral, 2 Times Daily      isosorbide mononitrate 30 MG 24 hr  tablet  Commonly known as: IMDUR   30 mg, Oral, Nightly      mirtazapine 15 MG tablet  Commonly known as: REMERON   15 mg, Oral, Nightly      spironolactone 50 MG tablet  Commonly known as: ALDACTONE   50 mg, Oral, Daily      Symbicort 160-4.5 MCG/ACT inhaler  Generic drug: budesonide-formoterol   2 puffs, Inhalation, 2 Times Daily             Stop These Medications      Klor-Con M20 20 MEQ CR tablet  Generic drug: potassium chloride              No Known Allergies      Discharge Disposition:  Home or Self Care    Diet:  Hospital:  Diet Order   Procedures    Diet: Regular/House; Fluid Consistency: Thin (IDDSI 0)            Activity:      Restrictions or Other Recommendations:       CODE STATUS:    Code Status and Medical Interventions: CPR (Attempt to Resuscitate); Full Support   Ordered at: 08/01/24 0949     Level Of Support Discussed With:    Patient     Code Status (Patient has no pulse and is not breathing):    CPR (Attempt to Resuscitate)     Medical Interventions (Patient has pulse or is breathing):    Full Support       Future Appointments   Date Time Provider Department Center   8/14/2024  9:00 AM [facility] 1 [facility]   8/14/2024  9:30 AM RAD TECH [facility]   8/14/2024 10:00 AM Debora Parada APRN [facility]   8/22/2024  3:00 PM Brent Miles MD [facility]   9/10/2024 10:15 AM Dorothy Rogers APRN [facility]       Additional Instructions for the Follow-ups that You Need to Schedule       Discharge Follow-up with PCP   As directed       Currently Documented PCP:    Kenya Martinez MD    PCP Phone Number:    [phone]     Follow Up Details: 1 week        Discharge Follow-up with Specified Provider: JOSE [facility] as scheduled 8/14/24   As directed      To: JOSE LU[facility] as scheduled 8/14/24                      Danika Dorsey DO  08/06/24      Time Spent on Discharge:  I  spent  50  minutes on this discharge activity which included: face-to-face encounter with the patient, reviewing the data in the system, coordination of the care with the nursing staff as well as consultants, documentation, and entering orders.            Electronically signed by Danika Dorsey DO at 08/06/24 9867

## 2024-08-06 NOTE — OUTREACH NOTE
Prep Survey      Flowsheet Row Responses   [facility] facility patient discharged from? [place]   Is LACE score < 7 ? No   Eligibility [facility]   Date of Admission 08/01/24   Date of Discharge 08/06/24   Discharge Disposition Home or Self Care   Discharge diagnosis COPD exacerbation   Does the patient have one of the following disease processes/diagnoses(primary or secondary)? COPD   Is there a DME ordered? Yes   What DME was ordered? [facility] for oxygen needs   Prep survey completed? Yes            EDWIN CASTELLANOS - Registered Nurse

## 2024-08-06 NOTE — CASE MANAGEMENT/SOCIAL WORK
Case Management Discharge Note      Final Note: Patient discharge home today. Oxygen at bedside[facility]         Selected Continued Care - Discharged on 8/6/2024 Admission date: 8/1/2024 - Discharge disposition: Home or Self Care      Destination    No services have been selected for the patient.                Durable Medical Equipment Coordination complete.      Service Provider Selected Services Address Phone Fax Patient Preferred    [facility] [place] [facility] [address] [facility] [place] [address] [phone] --              Dialysis/Infusion    No services have been selected for the patient.                Home Medical Care    No services have been selected for the patient.                Therapy    No services have been selected for the patient.                Community Resources    No services have been selected for the patient.                Community & DME    No services have been selected for the patient.                         Final Discharge Disposition Code: 01 - home or self-care

## 2024-08-06 NOTE — CASE MANAGEMENT/SOCIAL WORK
Continued Stay Note  [facility]     Patient Name: Aguila Finn Jr.  MRN: [id number]  Today's Date: 8/6/2024    Admit Date: 8/1/2024    Plan: Home   Discharge Plan       Row Name 08/06/24 0904       Plan    Plan Comments Per nurse yesterday, patient drop to 88% on room air at rest.                   Discharge Codes    No documentation.                 Expected Discharge Date and Time       Expected Discharge Date Expected Discharge Time    Aug 6, 2024               Criss Li RN

## 2024-08-06 NOTE — PAYOR COMM NOTE
"Baldemar Echevarria Jr. (58 y.o. Male)     [id number]     Kerrie Gonzalez, RN  Utilization Review  [phone]  [phone]      UD clinicals, P2P has been requested          Date of Birth   1965    Social Security Number       Address   [address] [facility] [address] [place], [place] [address]    Home Phone   [phone]    MRN   [id number]       Scientologist   Worship    Marital Status                               Admission Date   8/1/24    Admission Type   Emergency    Admitting Provider   Danika Dorsey DO    Attending Provider   Danika Dorsey DO    Department, Room/Bed   [facility] 6B, N646/1       Discharge Date       Discharge Disposition       Discharge Destination                                 Attending Provider: Danika Dorsey DO    Allergies: No Known Allergies    Isolation: None   Infection: None   Code Status: CPR    Ht: 175.3 cm (69.02\")   Wt: 76.5 kg (168 lb 10.4 oz)    Admission Cmt: None   Principal Problem: COPD exacerbation [J44.1]                   Active Insurance as of 8/1/2024       Primary Coverage       Payor Plan Insurance Group Employer/Plan Group    [employer] EMPLOYEE [id number]       Payor Plan Address Payor Plan Phone Number Payor Plan Fax Number Effective Dates    [address] [phone]  5/1/2016 - None Entered    [place] [address]         Subscriber Name Subscriber Birth Date Member ID       BALDEMAR ECHEVARRIA JR. 1965 [id number]                     Emergency Contacts        (Rel.) Home Phone Work Phone Mobile Phone    Stacey Echevarria (Spouse) [phone] -- [phone]              Vital Signs (last 2 days)       Date/Time Temp Temp src Pulse Resp BP Patient Position SpO2    08/06/24 0800 -- -- 72 -- 150/99 -- 99    08/06/24 0750 98 (36.7) Oral 78 16 150/99 Lying 97    08/06/24 0304 97.2 (36.2) Oral 59 16 136/87 Lying 93    08/05/24 2318 97.7 (36.5) Oral 86 20 123/88 " Sitting 93    08/05/24 1951 -- -- 101 22 -- Sitting 95    08/05/24 1945 98.5 (36.9) Oral 120 22 144/92 Sitting 92    08/05/24 1703 -- -- -- -- -- -- 88    08/05/24 1633 -- -- 92 20 -- -- 92    08/05/24 1506 98.3 (36.8) Oral 73 18 125/73 Lying 93    08/05/24 1316 -- -- 75 20 -- -- 95    08/05/24 1102 98 (36.7) Oral 77 18 150/86 Lying 93    08/05/24 1024 -- -- 82 -- -- -- 94    08/05/24 1006 -- -- 91 18 -- -- 96    08/05/24 0943 -- -- 77 20 -- -- 94    08/05/24 0722 97.7 (36.5) Oral 70 16 152/86 Lying 93    08/05/24 0249 97.6 (36.4) Oral 83 24 153/83 Lying 90    08/04/24 2322 97.6 (36.4) Oral 79 18 145/76 Lying 91    08/04/24 2211 -- -- 94 -- 145/77 -- 95    08/04/24 2044 97.6 (36.4) Axillary 118 26 -- Sitting 91    08/04/24 1935 -- -- 100 18 -- -- 91    08/04/24 1800 -- -- 100 -- -- -- 93    08/04/24 1700 -- -- 96 -- -- -- 92    08/04/24 1610 98 (36.7) Oral -- 17 158/77 Sitting --    08/04/24 1602 -- -- -- 16 -- -- --    08/04/24 1600 -- -- 90 -- -- -- 92    08/04/24 1500 -- -- 96 -- -- -- 90    08/04/24 1400 -- -- 85 -- -- -- 93    08/04/24 1304 -- -- -- 16 -- -- --    08/04/24 1300 -- -- 77 -- -- -- 92    08/04/24 1200 -- -- 75 -- -- -- 93 08/04/24 1100 98 (36.7) Oral 73 17 134/71 Lying 93    08/04/24 1000 -- -- 83 -- -- -- 93 08/04/24 0910 -- -- -- 17 -- -- --    08/04/24 0900 -- -- 80 -- -- -- 93 08/04/24 0800 -- -- 57 -- -- -- 93 08/04/24 0700 97.7 (36.5) Oral 72 17 139/84 Lying 94    08/04/24 0403 97.9 (36.6) Oral 67 16 136/78 Lying 95          Oxygen Therapy (last 2 days)       Date/Time SpO2 Device (Oxygen Therapy) Flow (L/min) Oxygen Concentration (%) ETCO2 (mmHg)    08/06/24 0800 99 nasal cannula 2 -- --    08/06/24 0756 -- -- 2 -- --    08/06/24 0750 97 nasal cannula -- -- --    08/06/24 0622 -- nasal cannula 2 -- --    08/06/24 0415 -- nasal cannula 2 -- --    08/06/24 0304 93 -- -- -- --    08/06/24 0215 -- nasal cannula 2 -- --    08/06/24 0015 -- nasal cannula 2 -- --    08/05/24 2318 93 --  -- -- --    08/05/24 2215 -- nasal cannula 2 -- --    08/05/24 2044 -- nasal cannula 2 -- --    08/05/24 1951 95 nasal cannula 2 -- --    08/05/24 1945 92 -- -- -- --    08/05/24 1819 -- room air -- -- --    08/05/24 1703 88 room air -- -- --    08/05/24 1642 -- nasal cannula 2 -- --    08/05/24 1633 92 nasal cannula 2 -- --    08/05/24 1506 93 -- -- -- --    08/05/24 1419 -- nasal cannula 2 -- --    08/05/24 1316 95 nasal cannula 2 -- --    08/05/24 1247 -- nasal cannula 2 -- --    08/05/24 1102 93 -- -- -- --    08/05/24 1030 -- nasal cannula 2 -- --    08/05/24 1024 94 nasal cannula 2 -- --    08/05/24 1006 96 nasal cannula 2 -- --    08/05/24 0943 94 nasal cannula 2 -- --    08/05/24 0858 -- nasal cannula 2 -- --    08/05/24 0722 93 -- -- -- --    08/05/24 0615 -- nasal cannula 2 -- --    08/05/24 0415 -- nasal cannula 2 -- --    08/05/24 0249 90 -- -- -- --    08/05/24 0215 -- nasal cannula 2 -- --    08/05/24 0015 -- nasal cannula 2 -- --    08/04/24 2322 91 -- -- -- --    08/04/24 2215 -- nasal cannula 2 -- --    08/04/24 2211 95 -- -- -- --    08/04/24 2055 -- nasal cannula 2 -- --    08/04/24 2044 91 -- -- -- --    08/04/24 1935 91 -- -- -- --    08/04/24 1800 93 nasal cannula 2 -- --    08/04/24 1700 92 -- -- -- --    08/04/24 1610 -- nasal cannula 2.5 -- --    08/04/24 1602 -- nasal cannula 2.5 -- --    08/04/24 1600 92 nasal cannula 2 -- --    08/04/24 1500 90 -- -- -- --    08/04/24 1400 93 nasal cannula 2.5 -- --    08/04/24 1304 -- -- 2 -- --    08/04/24 1300 92 -- -- -- --    08/04/24 1200 93 nasal cannula 2 -- --    08/04/24 1100 93 nasal cannula 2 -- --    08/04/24 1000 93 nasal cannula 2 -- --    08/04/24 0923 -- -- 2 -- --    08/04/24 0910 -- nasal cannula 2 -- --    08/04/24 0900 93 -- -- -- --    08/04/24 0800 93 nasal cannula 2 -- --    08/04/24 0700 94 -- -- -- --    08/04/24 0403 95 -- -- -- --          Lines, Drains & Airways       Active LDAs       Name Placement date Placement time Site  Days    Peripheral IV 08/04/24 1510 Anterior;Left Forearm 08/04/24  1510  Forearm  1                  Current Facility-Administered Medications   Medication Dose Route Frequency Provider Last Rate Last Admin    acetaminophen (TYLENOL) tablet 650 mg  650 mg Oral Q4H PRN Chau Regalado MD        Or    acetaminophen (TYLENOL) 160 MG/5ML oral solution 650 mg  650 mg Oral Q4H PRN Chau Regalado MD        Or    acetaminophen (TYLENOL) suppository 650 mg  650 mg Rectal Q4H PRN Chau Regalado MD        amLODIPine (NORVASC) tablet 10 mg  10 mg Oral Daily Chau Regalado MD   10 mg at 08/06/24 0803    arformoterol (BROVANA) nebulizer solution 15 mcg  15 mcg Nebulization BID - RT Chau Regalado MD   15 mcg at 08/06/24 0752    aspirin EC tablet 81 mg  81 mg Oral Daily Chau Regalado MD   81 mg at 08/06/24 0803    atorvastatin (LIPITOR) tablet 40 mg  40 mg Oral Daily Chau Regalado MD   40 mg at 08/06/24 0803    sennosides-docusate (PERICOLACE) 8.6-50 MG per tablet 2 tablet  2 tablet Oral BID PRN Chau Regalado MD        And    polyethylene glycol (MIRALAX) packet 17 g  17 g Oral Daily PRN Chau Regalado MD        And    bisacodyl (DULCOLAX) EC tablet 5 mg  5 mg Oral Daily PRN Chau Regalado MD        And    bisacodyl (DULCOLAX) suppository 10 mg  10 mg Rectal Daily PRN Chau Reaglado MD        budesonide-formoterol (SYMBICORT) 160-4.5 MCG/ACT inhaler 2 puff  2 puff Inhalation BID Chau Regalado MD   2 puff at 08/06/24 0756    busPIRone (BUSPAR) tablet 15 mg  15 mg Oral BID Chau Reaglado MD   15 mg at 08/06/24 0803    Calcium Replacement - Follow Nurse / BPA Driven Protocol   Does not apply PRN Chau Rgealado MD        carvedilol (COREG) tablet 6.25 mg  6.25 mg Oral BID Chau Regalado MD   6.25 mg at 08/06/24 0803    doxycycline (MONODOX) capsule 100 mg  100 mg Oral Q12H Chau Regalado MD   100 mg at  08/06/24 0803    Enoxaparin Sodium (LOVENOX) syringe 40 mg  40 mg Subcutaneous Daily Chau Regalado MD   40 mg at 08/06/24 0803    guaiFENesin (MUCINEX) 12 hr tablet 1,200 mg  1,200 mg Oral Q12H Chau Regalado MD   1,200 mg at 08/06/24 0803    ipratropium-albuterol (DUO-NEB) nebulizer solution 3 mL  3 mL Nebulization 4x Daily - RT Chau Regalado MD   3 mL at 08/06/24 0752    ipratropium-albuterol (DUO-NEB) nebulizer solution 3 mL  3 mL Nebulization Q4H PRN Chau Regalado MD        isosorbide mononitrate (IMDUR) 24 hr tablet 30 mg  30 mg Oral Nightly Chau Regalado MD   30 mg at 08/05/24 2043    Magnesium Standard Dose Replacement - Follow Nurse / BPA Driven Protocol   Does not apply PRN Chau Regalado MD        melatonin tablet 10 mg  10 mg Oral Nightly Chau Regalado MD   10 mg at 08/05/24 2044    methylPREDNISolone sodium succinate (SOLU-Medrol) injection 60 mg  60 mg Intravenous Q12H Toño Mills MD   60 mg at 08/06/24 0803    mirtazapine (REMERON) tablet 15 mg  15 mg Oral Nightly Chau Regalado MD   15 mg at 08/05/24 2044    ondansetron ODT (ZOFRAN-ODT) disintegrating tablet 4 mg  4 mg Oral Q6H PRN Chau Regalado MD        Or    ondansetron (ZOFRAN) injection 4 mg  4 mg Intravenous Q6H PRN Chau Regalado MD        pantoprazole (PROTONIX) EC tablet 40 mg  40 mg Oral Q AM Toño Mills MD   40 mg at 08/06/24 0620    Phosphorus Replacement - Follow Nurse / BPA Driven Protocol   Does not apply PRN Chau Regalado MD        Potassium Replacement - Follow Nurse / BPA Driven Protocol   Does not apply PRN Chau Regalado MD        sodium chloride 0.9 % flush 10 mL  10 mL Intravenous PRN Hal Cee MD        sodium chloride 0.9 % flush 10 mL  10 mL Intravenous Q12H Chau Regalado MD   10 mL at 08/06/24 0803    sodium chloride 0.9 % flush 10 mL  10 mL Intravenous PRN Chau Regalado MD        sodium  chloride 0.9 % infusion 40 mL  40 mL Intravenous PRN Chau Regalado MD        [Held by provider] spironolactone (ALDACTONE) tablet 50 mg  50 mg Oral Daily Chau Regalado MD   50 mg at 24 0919     Lab Results (last 48 hours)       Procedure Component Value Units Date/Time    Basic Metabolic Panel [[id number]]  (Abnormal) Collected: 24    Specimen: Blood Updated: 24     Glucose 142 mg/dL      BUN 27 mg/dL      Creatinine 1.14 mg/dL      Sodium 143 mmol/L      Potassium 4.7 mmol/L      Chloride 107 mmol/L      CO2 29.0 mmol/L      Calcium 8.4 mg/dL      BUN/Creatinine Ratio 23.7     Anion Gap 7.0 mmol/L      eGFR 74.1 mL/min/1.73     Narrative:      GFR Normal >60  Chronic Kidney Disease <60  Kidney Failure <15      CBC (No Diff) [[id number]]  (Abnormal) Collected: 24    Specimen: Blood Updated: 24     WBC 15.59 10*3/mm3      RBC 4.23 10*6/mm3      Hemoglobin 12.2 g/dL      Hematocrit 37.5 %      MCV 88.7 fL      MCH 28.8 pg      MCHC 32.5 g/dL      RDW 13.2 %      RDW-SD 43.0 fl      MPV 11.9 fL      Platelets 208 10*3/mm3           Imaging Results (Last 48 Hours)       ** No results found for the last 48 hours. **          ECG/EMG Results (last 48 hours)       Procedure Component Value Units Date/Time    Telemetry Scan [[id number]] Resulted: 24     Updated: 24          Operative/Procedure Notes (last 48 hours)  Notes from 24 0845 through 24 0845   No notes of this type exist for this encounter.          Physician Progress Notes (last 48 hours)        Toño Mills MD at 24 1434              [facility] Medicine Services  PROGRESS NOTE    Patient Name: Aguila Finn Jr.  : 1965  MRN: [id number]    Date of Admission: 2024  Primary Care Physician: Kenya Martinez MD    Subjective   Subjective     CC:  COPD AE    HPI:  Still feels short of breath, particularly with  walking.  Says his breathing has not been good for the last two months      Objective   Objective     Vital Signs:   Temp:  [97.6 °F (36.4 °C)-98 °F (36.7 °C)] 98 °F (36.7 °C)  Heart Rate:  [] 75  Resp:  [16-26] 20  BP: (145-158)/(76-86) 150/86  Flow (L/min):  [2-2.5] 2     Physical Exam:  Non toxic, in bed  MM moist  RRR  Diminished breath sounds bilaterally -- became quite winded after walking in room, however oxygen sats remained stable (88-92% on 2.5 liters)  Abd soft, NT  Normal affect      Results Reviewed:  LAB RESULTS:      Lab 08/05/24  0540 08/04/24 0438 08/02/24  0509 08/01/24  0734   WBC 15.59* 17.78* 12.79* 11.00*   HEMOGLOBIN 12.2* 12.7* 12.8* 14.3   HEMATOCRIT 37.5 39.3 39.5 45.1   PLATELETS 208 208 211 237   NEUTROS ABS  --   --  11.35* 8.14*   IMMATURE GRANS (ABS)  --   --  0.07* 0.09*   LYMPHS ABS  --   --  0.54* 1.07   MONOS ABS  --   --  0.81 1.03*   EOS ABS  --   --  0.00 0.56*   MCV 88.7 90.1 88.8 91.1         Lab 08/05/24  0540 08/04/24 0438 08/03/24  1442 08/02/24  0509 08/01/24  0734   SODIUM 143 143 141 143 139   POTASSIUM 4.7 4.5 4.6 5.2 5.0   CHLORIDE 107 106 104 104 103   CO2 29.0 24.0 26.0 28.0 28.0   ANION GAP 7.0 13.0 11.0 11.0 8.0   BUN 27* 25* 29* 34* 24*   CREATININE 1.14 1.06 1.28* 1.98* 1.66*   EGFR 74.1 80.8 64.5 38.2* 47.2*   GLUCOSE 142* 127* 195* 131* 121*   CALCIUM 8.4* 8.2* 8.6 8.9 8.8   MAGNESIUM  --   --   --  2.7* 2.3   PHOSPHORUS  --   --   --  4.4  --          Lab 08/02/24  0509 08/01/24  0734   TOTAL PROTEIN 6.1 7.3   ALBUMIN 4.0 4.1   GLOBULIN 2.1 3.2   ALT (SGPT) 16 20   AST (SGOT) 11 20   BILIRUBIN 0.2 0.3   ALK PHOS 99 119*         Lab 08/01/24  0734   PROBNP <36.0   HSTROP T 11                 Lab 08/01/24  0749   PH, ARTERIAL 7.315*   PCO2, ARTERIAL 52.8*   PO2 .0*   FIO2 28   HCO3 ART 26.9*   BASE EXCESS ART -0.3*   CARBOXYHEMOGLOBIN 0.8     Brief Urine Lab Results       None            Microbiology Results Abnormal       Procedure Component  Value - Date/Time    COVID PRE-OP / PRE-PROCEDURE SCREENING ORDER (NO ISOLATION) - Swab, Nasopharynx [[id number]]  (Normal) Collected: 08/01/24 0736    Lab Status: Final result Specimen: Swab from Nasopharynx Updated: 08/01/24 0842    Narrative:      The following orders were created for panel order COVID PRE-OP / PRE-PROCEDURE SCREENING ORDER (NO ISOLATION) - Swab, Nasopharynx.  Procedure                               Abnormality         Status                     ---------                               -----------         ------                     Respiratory Panel PCR w/...[[id number]]  Normal              Final result                 Please view results for these tests on the individual orders.    Respiratory Panel PCR w/COVID-19(SARS-CoV-2) DAKOTAH/JULIANA/ISAIAS/PAD/COR/CHRISTAL In-House, NP Swab in UTM/VTM, 2 HR TAT - Swab, Nasopharynx [[id number]]  (Normal) Collected: 08/01/24 0736    Lab Status: Final result Specimen: Swab from Nasopharynx Updated: 08/01/24 0842     ADENOVIRUS, PCR Not Detected     Coronavirus 229E Not Detected     Coronavirus HKU1 Not Detected     Coronavirus NL63 Not Detected     Coronavirus OC43 Not Detected     COVID19 Not Detected     Human Metapneumovirus Not Detected     Human Rhinovirus/Enterovirus Not Detected     Influenza A PCR Not Detected     Influenza B PCR Not Detected     Parainfluenza Virus 1 Not Detected     Parainfluenza Virus 2 Not Detected     Parainfluenza Virus 3 Not Detected     Parainfluenza Virus 4 Not Detected     RSV, PCR Not Detected     Bordetella pertussis pcr Not Detected     Bordetella parapertussis PCR Not Detected     Chlamydophila pneumoniae PCR Not Detected     Mycoplasma pneumo by PCR Not Detected    Narrative:      In the setting of a positive respiratory panel with a viral infection PLUS a negative procalcitonin without other underlying concern for bacterial infection, consider observing off antibiotics or discontinuation of antibiotics and continue supportive care. If  the respiratory panel is positive for atypical bacterial infection (Bordetella pertussis, Chlamydophila pneumoniae, or Mycoplasma pneumoniae), consider antibiotic de-escalation to target atypical bacterial infection.            No radiology results from the last 24 hrs    Results for orders placed during the hospital encounter of 08/01/24    Adult Transthoracic Echo Complete W/ Cont if Necessary Per Protocol    Interpretation Summary    Left ventricular systolic function is normal. Calculated left ventricular EF = 61.6% Left ventricular ejection fraction appears to be 61 - 65%.    Left ventricular diastolic function was normal.    No significant structural or functional valvular disease.    No significant change compared to previous study      Current medications:  Scheduled Meds:amLODIPine, 10 mg, Oral, Daily  arformoterol, 15 mcg, Nebulization, BID - RT  aspirin, 81 mg, Oral, Daily  atorvastatin, 40 mg, Oral, Daily  budesonide-formoterol, 2 puff, Inhalation, BID  busPIRone, 15 mg, Oral, BID  carvedilol, 6.25 mg, Oral, BID  cefTRIAXone, 2,000 mg, Intravenous, Q24H  doxycycline, 100 mg, Oral, Q12H  enoxaparin, 40 mg, Subcutaneous, Daily  guaiFENesin, 1,200 mg, Oral, Q12H  ipratropium-albuterol, 3 mL, Nebulization, 4x Daily - RT  isosorbide mononitrate, 30 mg, Oral, Nightly  melatonin, 10 mg, Oral, Nightly  methylPREDNISolone sodium succinate, 60 mg, Intravenous, Q12H  mirtazapine, 15 mg, Oral, Nightly  pantoprazole, 40 mg, Oral, Q AM  sodium chloride, 10 mL, Intravenous, Q12H  [Held by provider] spironolactone, 50 mg, Oral, Daily      Continuous Infusions:     PRN Meds:.  acetaminophen **OR** acetaminophen **OR** acetaminophen    senna-docusate sodium **AND** polyethylene glycol **AND** bisacodyl **AND** bisacodyl    Calcium Replacement - Follow Nurse / BPA Driven Protocol    ipratropium-albuterol    Magnesium Standard Dose Replacement - Follow Nurse / BPA Driven Protocol    ondansetron ODT **OR** ondansetron     Phosphorus Replacement - Follow Nurse / BPA Driven Protocol    Potassium Replacement - Follow Nurse / BPA Driven Protocol    sodium chloride    sodium chloride    sodium chloride    Assessment & Plan   Assessment & Plan     Active Hospital Problems    Diagnosis  POA    **COPD exacerbation [J44.1]  Yes    Acute-on-chronic respiratory failure [J96.20]  Yes      Resolved Hospital Problems   No resolved problems to display.        Brief Hospital Course to date:  Aguila Finn Jr. is a 58 y.o. male with history of HTN, HLD, Nephrolithiasis, anxiety, depression and COPD (on nocturnal O2) presents with progressive shortness of breath for one month.    COPD with AE  Pulmonary nodule - appears stable by CT  - continue IV solumedrol 60 mg BID  - symbicort  - scheduled nebs (duonebs and aformoterol)  - empiric rocephin and doxycycline  - has followup with Pulmonary 8/14  - patient needs oxygen concentrator (wants both fixed and portable), discussed with CM in am    Renal insufficiency  - creatinine improved  - baseline creatinine over past year 1.2-1.6  - hold further IV fluids  - hold aldactone    CAD  - stress test 8/2/24, consistent with low risk study  - echo 8/2 shows normal EF  - has followup with Cardiology Dr Miles 8/22    HTN    HLD    Anxiety and depression    Chronic hypokalemia  - holding aldactone due to renal insufficiency and elevated K at admission    Possible Esophageal thickening  - distal thickening incidentally noted on CT imaging at admission  - start PPI  - may need GI referral      Expected Discharge Location and Transportation: home  Expected Discharge   Expected Discharge Date: 8/6/2024; Expected Discharge Time:      VTE Prophylaxis:  Pharmacologic VTE prophylaxis orders are present.         AM-PAC 6 Clicks Score (PT): 22 (08/04/24 2055)    CODE STATUS:   Code Status and Medical Interventions: CPR (Attempt to Resuscitate); Full Support   Ordered at: 08/01/24 0910     Level Of Support Discussed  With:    Patient     Code Status (Patient has no pulse and is not breathing):    CPR (Attempt to Resuscitate)     Medical Interventions (Patient has pulse or is breathing):    Full Support       Toño Mills MD  24        Electronically signed by Toño Mills MD at 24 1439       Toño Mills MD at 24 1451              [facility] Medicine Services  PROGRESS NOTE    Patient Name: Aguila Finn Jr.  : 1965  MRN: [id number]    Date of Admission: 2024  Primary Care Physician: Kenya Martinez MD    Subjective   Subjective     CC:  COPD AE    HPI:  Lungs still tight, getting short of breath going to the bathroom.  Not coughing, No fevers.      Objective   Objective     Vital Signs:   Temp:  [97.7 °F (36.5 °C)-98.1 °F (36.7 °C)] 98 °F (36.7 °C)  Heart Rate:  [67-92] 67  Resp:  [16-18] 16  BP: (134-154)/(67-84) 134/71  Flow (L/min):  [2] 2     Physical Exam:  NAD, in bed  MM moist  RRR  Diminished breath sounds bilaterally. Rare wheezes  Abd soft, NT  Normal affect  Alert, speech clear    Results Reviewed:  LAB RESULTS:      Lab 24  05024  0734   WBC 17.78* 12.79* 11.00*   HEMOGLOBIN 12.7* 12.8* 14.3   HEMATOCRIT 39.3 39.5 45.1   PLATELETS 208 211 237   NEUTROS ABS  --  11.35* 8.14*   IMMATURE GRANS (ABS)  --  0.07* 0.09*   LYMPHS ABS  --  0.54* 1.07   MONOS ABS  --  0.81 1.03*   EOS ABS  --  0.00 0.56*   MCV 90.1 88.8 91.1         Lab 24  0509 24  0734   SODIUM 143 141 143 139   POTASSIUM 4.5 4.6 5.2 5.0   CHLORIDE 106 104 104 103   CO2 24.0 26.0 28.0 28.0   ANION GAP 13.0 11.0 11.0 8.0   BUN 25* 29* 34* 24*   CREATININE 1.06 1.28* 1.98* 1.66*   EGFR 80.8 64.5 38.2* 47.2*   GLUCOSE 127* 195* 131* 121*   CALCIUM 8.2* 8.6 8.9 8.8   MAGNESIUM  --   --  2.7* 2.3   PHOSPHORUS  --   --  4.4  --          Lab 24  0509 24  0734   TOTAL PROTEIN 6.1 7.3   ALBUMIN 4.0 4.1    GLOBULIN 2.1 3.2   ALT (SGPT) 16 20   AST (SGOT) 11 20   BILIRUBIN 0.2 0.3   ALK PHOS 99 119*         Lab 08/01/24  0734   PROBNP <36.0   HSTROP T 11                 Lab 08/01/24  0749   PH, ARTERIAL 7.315*   PCO2, ARTERIAL 52.8*   PO2 .0*   FIO2 28   HCO3 ART 26.9*   BASE EXCESS ART -0.3*   CARBOXYHEMOGLOBIN 0.8     Brief Urine Lab Results       None            Microbiology Results Abnormal       Procedure Component Value - Date/Time    COVID PRE-OP / PRE-PROCEDURE SCREENING ORDER (NO ISOLATION) - Swab, Nasopharynx [[id number]]  (Normal) Collected: 08/01/24 0736    Lab Status: Final result Specimen: Swab from Nasopharynx Updated: 08/01/24 0842    Narrative:      The following orders were created for panel order COVID PRE-OP / PRE-PROCEDURE SCREENING ORDER (NO ISOLATION) - Swab, Nasopharynx.  Procedure                               Abnormality         Status                     ---------                               -----------         ------                     Respiratory Panel PCR w/...[[id number]]  Normal              Final result                 Please view results for these tests on the individual orders.    Respiratory Panel PCR w/COVID-19(SARS-CoV-2) DAKOTAH/JULIANA/ISAIAS/PAD/COR/CHRISTAL In-House, NP Swab in UTM/VTM, 2 HR TAT - Swab, Nasopharynx [[id number]]  (Normal) Collected: 08/01/24 0736    Lab Status: Final result Specimen: Swab from Nasopharynx Updated: 08/01/24 0842     ADENOVIRUS, PCR Not Detected     Coronavirus 229E Not Detected     Coronavirus HKU1 Not Detected     Coronavirus NL63 Not Detected     Coronavirus OC43 Not Detected     COVID19 Not Detected     Human Metapneumovirus Not Detected     Human Rhinovirus/Enterovirus Not Detected     Influenza A PCR Not Detected     Influenza B PCR Not Detected     Parainfluenza Virus 1 Not Detected     Parainfluenza Virus 2 Not Detected     Parainfluenza Virus 3 Not Detected     Parainfluenza Virus 4 Not Detected     RSV, PCR Not Detected     Bordetella pertussis  pcr Not Detected     Bordetella parapertussis PCR Not Detected     Chlamydophila pneumoniae PCR Not Detected     Mycoplasma pneumo by PCR Not Detected    Narrative:      In the setting of a positive respiratory panel with a viral infection PLUS a negative procalcitonin without other underlying concern for bacterial infection, consider observing off antibiotics or discontinuation of antibiotics and continue supportive care. If the respiratory panel is positive for atypical bacterial infection (Bordetella pertussis, Chlamydophila pneumoniae, or Mycoplasma pneumoniae), consider antibiotic de-escalation to target atypical bacterial infection.            No radiology results from the last 24 hrs    Results for orders placed during the hospital encounter of 08/01/24    Adult Transthoracic Echo Complete W/ Cont if Necessary Per Protocol    Interpretation Summary    Left ventricular systolic function is normal. Calculated left ventricular EF = 61.6% Left ventricular ejection fraction appears to be 61 - 65%.    Left ventricular diastolic function was normal.    No significant structural or functional valvular disease.    No significant change compared to previous study      Current medications:  Scheduled Meds:amLODIPine, 10 mg, Oral, Daily  arformoterol, 15 mcg, Nebulization, BID - RT  aspirin, 81 mg, Oral, Daily  atorvastatin, 40 mg, Oral, Daily  budesonide-formoterol, 2 puff, Inhalation, BID  busPIRone, 15 mg, Oral, BID  carvedilol, 6.25 mg, Oral, BID  cefTRIAXone, 2,000 mg, Intravenous, Q24H  doxycycline, 100 mg, Oral, Q12H  enoxaparin, 40 mg, Subcutaneous, Daily  guaiFENesin, 1,200 mg, Oral, Q12H  ipratropium-albuterol, 3 mL, Nebulization, 4x Daily - RT  isosorbide mononitrate, 30 mg, Oral, Nightly  melatonin, 10 mg, Oral, Nightly  methylPREDNISolone sodium succinate, 60 mg, Intravenous, Q12H  mirtazapine, 15 mg, Oral, Nightly  pantoprazole, 40 mg, Oral, Q AM  sodium chloride, 10 mL, Intravenous, Q12H  [Held by  provider] spironolactone, 50 mg, Oral, Daily      Continuous Infusions:sodium chloride, 75 mL/hr, Last Rate: 75 mL/hr (08/04/24 1213)      PRN Meds:.  acetaminophen **OR** acetaminophen **OR** acetaminophen    senna-docusate sodium **AND** polyethylene glycol **AND** bisacodyl **AND** bisacodyl    Calcium Replacement - Follow Nurse / BPA Driven Protocol    ipratropium-albuterol    Magnesium Standard Dose Replacement - Follow Nurse / BPA Driven Protocol    ondansetron ODT **OR** ondansetron    Phosphorus Replacement - Follow Nurse / BPA Driven Protocol    Potassium Replacement - Follow Nurse / BPA Driven Protocol    sodium chloride    sodium chloride    sodium chloride    Assessment & Plan   Assessment & Plan     Active Hospital Problems    Diagnosis  POA    **COPD exacerbation [J44.1]  Yes    Acute-on-chronic respiratory failure [J96.20]  Yes      Resolved Hospital Problems   No resolved problems to display.        Brief Hospital Course to date:  Aguila Finn Jr. is a 58 y.o. male with history of HTN, HLD, Nephrolithiasis, anxiety, depression and COPD (on nocturnal O2) presents with progressive shortness of breath for one month.    COPD with AE  Pulmonary nodule - appears stable by CT  - continue IV solumedrol 60 mg BID  - symbicort  - scheduled nebs (duonebs and aformoterol)  - empiric rocephin and doxycycline  - has followup with Pulmonary 8/14  - patient needs oxygen concentrator (wants both fixed and portable), will discuss with CM in am    Renal insufficiency  - creatinine improved  - baseline creatinine over past year 1.2-1.6  - hold further IV fluids  - hold aldactone  - bmp am    CAD  - stress test 8/2/24, consistent with low risk study  - echo 8/2 shows normal EF  - has followup with Cardiology Dr Miles 8/22    HTN    HLD    Anxiety and depression    Chronic hypokalemia  - holding aldactone due to renal insufficiency, K yesterday 5.2, bmp today  pending    Possible Esophageal thickening  - distal  thickening incidentally noted on CT imaging at admission  - start PPI  - may need GI referral              Expected Discharge Location and Transportation: home  Expected Discharge   Expected Discharge Date: 8/4/2024; Expected Discharge Time:      VTE Prophylaxis:  Pharmacologic VTE prophylaxis orders are present.         AM-PAC 6 Clicks Score (PT): 23 (08/03/24 2231)    CODE STATUS:   Code Status and Medical Interventions: CPR (Attempt to Resuscitate); Full Support   Ordered at: 08/01/24 0949     Level Of Support Discussed With:    Patient     Code Status (Patient has no pulse and is not breathing):    CPR (Attempt to Resuscitate)     Medical Interventions (Patient has pulse or is breathing):    Full Support       Toño Mills MD  08/04/24        Electronically signed by Toño Mills MD at 08/04/24 1456       Consult Notes (last 48 hours)  Notes from 08/04/24 0845 through 08/06/24 0845   No notes of this type exist for this encounter.

## 2024-08-06 NOTE — DISCHARGE SUMMARY
[facility] Medicine Services  DISCHARGE SUMMARY    Patient Name: Aguila Finn Jr.  : 1965  MRN: [id number]    Date of Admission: 2024  7:27 AM  Date of Discharge:  2024  Primary Care Physician: Kenya Martinez MD    Consults       No orders found from 7/3/2024 to 2024.            Hospital Course       Active Hospital Problems    Diagnosis  POA    **COPD exacerbation [J44.1]  Yes    Acute-on-chronic respiratory failure [J96.20]  Yes      Resolved Hospital Problems   No resolved problems to display.          Hospital Course:  Aguila Finn Jr. is a 58 y.o. male  with history of HTN, HLD, Nephrolithiasis, anxiety, depression and COPD (on nocturnal O2) presents with progressive shortness of breath for one month.     COPD with AE  Pulmonary nodule - appears stable by CT  - Wean IV Solumedrol to Prednisone taper   - Continue Symbicort  - Continue Doxycycline   - has followup with Pulmonary   - CM has arranged O2 for home      Renal insufficiency  - resolved      CAD  - stress test 24, consistent with low risk study  - echo  shows normal EF  - has followup with Cardiology Dr Miles      HTN  HLD  -Home meds     Anxiety and depression     Chronic hypokalemia  - resolved      Possible Esophageal thickening  - distal thickening incidentally noted on CT imaging at admission  - started PPI this admit  - consider outpatient GI evaluation     Discharge Follow Up Recommendations for outpatient labs/diagnostics:  -PCP 1 week  -Keep Pulmonary follow-up      Day of Discharge     HPI:   Patient seen and examined. Eager to go home.     Review of Systems  Gen- No fevers, chills  CV- No chest pain, palpitations  Resp- No cough, +dyspnea  GI- No N/V/D, abd pain    Vital Signs:   Temp:  [97.2 °F (36.2 °C)-98.5 °F (36.9 °C)] 98 °F (36.7 °C)  Heart Rate:  [] 72  Resp:  [16-22] 16  BP: (123-150)/(73-99) 150/99  Flow (L/min):  [2] 2      Physical  Exam:  Constitutional: No acute distress, awake, alert  HENT: NCAT, mucous membranes moist  Respiratory: Diminished bilaterally, respiratory effort normal 2L NC  Cardiovascular: RRR, no murmurs, rubs, or gallops  Gastrointestinal: Positive bowel sounds, soft, nontender, nondistended  Musculoskeletal: No bilateral ankle edema  Psychiatric: Appropriate affect, cooperative  Neurologic: Oriented x 3, NAGY, speech clear  Skin: No rashes     Pertinent  and/or Most Recent Results     LAB RESULTS:      Lab 08/05/24  0540 08/04/24  0438 08/02/24  0509 08/01/24  0734   WBC 15.59* 17.78* 12.79* 11.00*   HEMOGLOBIN 12.2* 12.7* 12.8* 14.3   HEMATOCRIT 37.5 39.3 39.5 45.1   PLATELETS 208 208 211 237   NEUTROS ABS  --   --  11.35* 8.14*   IMMATURE GRANS (ABS)  --   --  0.07* 0.09*   LYMPHS ABS  --   --  0.54* 1.07   MONOS ABS  --   --  0.81 1.03*   EOS ABS  --   --  0.00 0.56*   MCV 88.7 90.1 88.8 91.1         Lab 08/05/24  0540 08/04/24  0438 08/03/24  1442 08/02/24  0509 08/01/24  0734   SODIUM 143 143 141 143 139   POTASSIUM 4.7 4.5 4.6 5.2 5.0   CHLORIDE 107 106 104 104 103   CO2 29.0 24.0 26.0 28.0 28.0   ANION GAP 7.0 13.0 11.0 11.0 8.0   BUN 27* 25* 29* 34* 24*   CREATININE 1.14 1.06 1.28* 1.98* 1.66*   EGFR 74.1 80.8 64.5 38.2* 47.2*   GLUCOSE 142* 127* 195* 131* 121*   CALCIUM 8.4* 8.2* 8.6 8.9 8.8   MAGNESIUM  --   --   --  2.7* 2.3   PHOSPHORUS  --   --   --  4.4  --          Lab 08/02/24  0509 08/01/24  0734   TOTAL PROTEIN 6.1 7.3   ALBUMIN 4.0 4.1   GLOBULIN 2.1 3.2   ALT (SGPT) 16 20   AST (SGOT) 11 20   BILIRUBIN 0.2 0.3   ALK PHOS 99 119*         Lab 08/01/24  0734   PROBNP <36.0   HSTROP T 11                 Lab 08/01/24  0749   PH, ARTERIAL 7.315*   PCO2, ARTERIAL 52.8*   PO2 .0*   FIO2 28   HCO3 ART 26.9*   BASE EXCESS ART -0.3*   CARBOXYHEMOGLOBIN 0.8     Brief Urine Lab Results       None          Microbiology Results (last 10 days)       Procedure Component Value - Date/Time    COVID PRE-OP /  PRE-PROCEDURE SCREENING ORDER (NO ISOLATION) - Swab, Nasopharynx [[id number]]  (Normal) Collected: 08/01/24 0736    Lab Status: Final result Specimen: Swab from Nasopharynx Updated: 08/01/24 0842    Narrative:      The following orders were created for panel order COVID PRE-OP / PRE-PROCEDURE SCREENING ORDER (NO ISOLATION) - Swab, Nasopharynx.  Procedure                               Abnormality         Status                     ---------                               -----------         ------                     Respiratory Panel PCR w/...[[id number]]  Normal              Final result                 Please view results for these tests on the individual orders.    Respiratory Panel PCR w/COVID-19(SARS-CoV-2) D[facility]OTAH/[facility]/ISAIAS/PAD/COR/CHRISTAL In-House, NP Swab in UTM/VTM, 2 HR TAT - Swab, Nasopharynx [[id number]]  (Normal) Collected: 08/01/24 0736    Lab Status: Final result Specimen: Swab from Nasopharynx Updated: 08/01/24 0842     ADENOVIRUS, PCR Not Detected     Coronavirus 229E Not Detected     Coronavirus HKU1 Not Detected     Coronavirus NL63 Not Detected     Coronavirus OC43 Not Detected     COVID19 Not Detected     Human Metapneumovirus Not Detected     Human Rhinovirus/Enterovirus Not Detected     Influenza A PCR Not Detected     Influenza B PCR Not Detected     Parainfluenza Virus 1 Not Detected     Parainfluenza Virus 2 Not Detected     Parainfluenza Virus 3 Not Detected     Parainfluenza Virus 4 Not Detected     RSV, PCR Not Detected     Bordetella pertussis pcr Not Detected     Bordetella parapertussis PCR Not Detected     Chlamydophila pneumoniae PCR Not Detected     Mycoplasma pneumo by PCR Not Detected    Narrative:      In the setting of a positive respiratory panel with a viral infection PLUS a negative procalcitonin without other underlying concern for bacterial infection, consider observing off antibiotics or discontinuation of antibiotics and continue supportive care. If the respiratory panel is positive  for atypical bacterial infection (Bordetella pertussis, Chlamydophila pneumoniae, or Mycoplasma pneumoniae), consider antibiotic de-escalation to target atypical bacterial infection.            Stress Test With Pet Myocardial Perfusion    Result Date: 8/2/2024    Myocardial perfusion imaging indicates a normal myocardial perfusion study with no evidence of ischemia. Impressions are consistent with a low risk study.   Patient denied any chest discomfort/pain during stress; he did experience some shortness of breath that resolved after a couple of minutes.   Left ventricular ejection fraction is normal (Calculated EF = 68%).   REST:  68%EF STRESS:  74%EF.   Coronary calcification noted on CT images.     Adult Transthoracic Echo Complete W/ Cont if Necessary Per Protocol    Result Date: 8/1/2024    Left ventricular systolic function is normal. Calculated left ventricular EF = 61.6% Left ventricular ejection fraction appears to be 61 - 65%.   Left ventricular diastolic function was normal.   No significant structural or functional valvular disease. No significant change compared to previous study     CT Angiogram Chest    Result Date: 8/1/2024  CT ANGIOGRAM CHEST Date of Exam: 8/1/2024 8:15 AM EDT Indication: SOB, hypoxia, tachycardia. Comparison: None available. Technique: CTA of the chest was performed after the uneventful intravenous administration of 85 mL Isovue-370. Reconstructed coronal and sagittal images were also obtained. In addition, a 3-D volume rendered image was created for interpretation. Automated exposure control and iterative reconstruction methods were used. FINDINGS: Thoracic inlet: Unremarkable. Pulmonary arteries: No filling defects are identified within the pulmonary arteries to suggest acute pulmonary embolism. Great vessels: Atherosclerotic plaque is seen within the thoracic aorta and proximal arch vessels. Mediastinum/Kayla: No pathologically enlarged mediastinal lymph nodes are seen. Possible  distal esophageal wall thickening. Lung parenchyma: Lungs are emphysematous. There is an unchanged 7 mm nodule within the left upper lobe (series 5, image 46). This appears similar since 8/29/2022. There are bullous changes of the right upper lobe with subpleural scarring also noted. Trachea and airways: The trachea and central airways appear unremarkable. Pleural space: No significant pleural effusion or pneumothorax is seen. Heart and pericardium: Coronary artery calcifications are noted. Otherwise, the heart and pericardium appear unremarkable. Chest wall: No acute or suspicious osseous or soft tissue lesion is identified. Upper abdomen: No acute abnormality is identified within the visualized upper abdomen. Bilateral punctate nonobstructive nephrolithiasis. Colonic diverticulosis noted. Partially visualized left renal cyst.     1.No acute abnormality is identified within the thorax. Specifically, there is no evidence of acute pulmonary embolism. 2.Pulmonary emphysema. Please correlate with patient's smoking history/risk factors to determine whether the patient meets criteria for routine lung cancer screening with low dose chest CT. 3.Stable 7 mm left upper lobe nodule, similar since 8/29/2022. 4.Possible distal esophageal wall thickening. Please correlate clinically for esophagitis. 5.Additional findings as detailed above. Electronically Signed: David Sawyer MD  8/1/2024 8:39 AM EDT  Workstation ID: [id number]    XR Chest 1 View    Result Date: 8/1/2024  XR CHEST 1 VW Date of Exam: 8/1/2024 7:38 AM EDT Indication: SOA triage protocol Comparison: Chest radiograph 7/23/2024. Findings: Cardiomediastinal silhouette is within normal limits. Emphysematous changes. No focal consolidation. No pleural effusion or pneumothorax. Osseous structures are unremarkable. Electronic device overlies the left upper chest.     Impression: Emphysematous changes without evidence of acute/superimposed process. Electronically Signed:  Dylan Best MD  8/1/2024 7:50 AM EDT  Workstation ID: [id number]    CT Chest With & Without Contrast Diagnostic    Result Date: 7/27/2024  CT CHEST W WO CONTRAST DIAGNOSTIC Date of Exam: 7/27/2024 7:32 AM EDT Indication: SOA, f/u nodules, effusion. Comparison: Chest radiograph 7/23/2024 and chest CT 8/29/2022. Technique: Axial CT images were obtained of the chest before and after the uneventful intravenous administration of 80 mL of Isovue-300.  Reconstructed coronal and sagittal images were also obtained. Automated exposure control and iterative construction methods were used. Findings: The thyroid, trachea and esophagus appear within normal limits. There is mild circumferential distal esophageal wall thickening that could be related to reflux and/or esophagitis. There is severe coronary artery calcification. Trace pericardial effusion.  Mild aortic and aortic branch vessel atherosclerosis. There is no evidence of pulmonary embolism. No mediastinal lymphadenopathy. There is moderate emphysema. There is apical lung scarring, right greater than left. Previous identified 7 mm nodule in the left upper lobe is unchanged on axial image 43. There is mild stable peribronchial thickening. No pneumothorax, pleural effusion or focal airspace consolidation. Airways are patent. There are no acute findings in the superficial soft tissues. Mild bilateral gynecomastia. There is a stable left adrenal nodule measuring 16 mm, likely adenoma. There are small simple left kidney cysts. There is a punctate nonobstructing stone in the superior pole of each kidney. Subcentimeter cyst is present in the dome of the liver. There are no acute osseous abnormalities or destructive bone lesions. There is significant thoracic degenerative changes.     Impression: 1.No acute cardiopulmonary abnormality. 2.Stable 7 mm left upper lobe lung nodule. This is unchanged for 2 years and considered benign. 3.Moderate emphysema. 4.Severe coronary artery  disease. 5.Stable left adrenal nodule, likely adenoma. 6.Punctate nonobstructing bilateral kidney stones. Electronically Signed: Taye Christie MD  7/27/2024 7:52 AM EDT  Workstation ID: [id number]             Results for orders placed during the hospital encounter of 08/01/24    Adult Transthoracic Echo Complete W/ Cont if Necessary Per Protocol    Interpretation Summary    Left ventricular systolic function is normal. Calculated left ventricular EF = 61.6% Left ventricular ejection fraction appears to be 61 - 65%.    Left ventricular diastolic function was normal.    No significant structural or functional valvular disease.    No significant change compared to previous study      Plan for Follow-up of Pending Labs/Results: Inbox     Discharge Details        Discharge Medications        New Medications        Instructions Start Date   doxycycline 100 MG capsule  Commonly known as: MONODOX   100 mg, Oral, Every 12 Hours Scheduled      guaiFENesin 600 MG 12 hr tablet  Commonly known as: MUCINEX   1,200 mg, Oral, Every 12 Hours Scheduled      pantoprazole 40 MG EC tablet  Commonly known as: PROTONIX   40 mg, Oral, Every Early Morning   Start Date: August 7, 2024     predniSONE 10 MG tablet  Commonly known as: DELTASONE   Take 4 tablets by mouth Daily for 5 days, THEN 3 tablets Daily for 5 days, THEN 2 tablets Daily for 5 days, THEN 1 tablet Daily for 5 days.   Start Date: August 6, 2024            Continue These Medications        Instructions Start Date   albuterol (2.5 MG/3ML) 0.083% nebulizer solution  Commonly known as: PROVENTIL   2.5 mg, Nebulization, 4 Times Daily PRN      amLODIPine 10 MG tablet  Commonly known as: NORVASC   10 mg, Oral, Daily      arformoterol 15 MCG/2ML nebulizer solution  Commonly known as: BROVANA   15 mcg, Nebulization, 2 Times Daily - RT      Aspirin Low Dose 81 MG EC tablet  Generic drug: aspirin   81 mg, Oral, Daily      atorvastatin 40 MG tablet  Commonly known as: LIPITOR   40 mg,  Oral, Daily      busPIRone 15 MG tablet  Commonly known as: BUSPAR   15 mg, Oral, 2 Times Daily      carvedilol 6.25 MG tablet  Commonly known as: COREG   6.25 mg, Oral, 2 Times Daily      isosorbide mononitrate 30 MG 24 hr tablet  Commonly known as: IMDUR   30 mg, Oral, Nightly      mirtazapine 15 MG tablet  Commonly known as: REMERON   15 mg, Oral, Nightly      spironolactone 50 MG tablet  Commonly known as: ALDACTONE   50 mg, Oral, Daily      Symbicort 160-4.5 MCG/ACT inhaler  Generic drug: budesonide-formoterol   2 puffs, Inhalation, 2 Times Daily             Stop These Medications      Klor-Con M20 20 MEQ CR tablet  Generic drug: potassium chloride              No Known Allergies      Discharge Disposition:  Home or Self Care    Diet:  Hospital:  Diet Order   Procedures    Diet: Regular/House; Fluid Consistency: Thin (IDDSI 0)            Activity:      Restrictions or Other Recommendations:       CODE STATUS:    Code Status and Medical Interventions: CPR (Attempt to Resuscitate); Full Support   Ordered at: 08/01/24 0949     Level Of Support Discussed With:    Patient     Code Status (Patient has no pulse and is not breathing):    CPR (Attempt to Resuscitate)     Medical Interventions (Patient has pulse or is breathing):    Full Support       Future Appointments   Date Time Provider Department Center   8/14/2024  9:00 AM [facility]   8/14/2024  9:30 AM RAD TECH [facility]   8/14/2024 10:00 AM Debora Parada APRN [facility]   8/22/2024  3:00 PM Brent Miles MD [facility]   9/10/2024 10:15 AM Dorothy Rogers APRN [facility]       Additional Instructions for the Follow-ups that You Need to Schedule       Discharge Follow-up with PCP   As directed       Currently Documented PCP:    Kenya Martinez MD    PCP Phone Number:    [phone]     Follow Up Details: 1 week        Discharge Follow-up with Specified  Provider: [facility] as scheduled 8/14/24   As directed      To: [facility] as scheduled 8/14/24                      Danika Dorsey DO  08/06/24      Time Spent on Discharge:  I spent  50  minutes on this discharge activity which included: face-to-face encounter with the patient, reviewing the data in the system, coordination of the care with the nursing staff as well as consultants, documentation, and entering orders.

## 2024-08-07 ENCOUNTER — TRANSITIONAL CARE MANAGEMENT TELEPHONE ENCOUNTER (OUTPATIENT)
Dept: CALL CENTER | Facility: HOSPITAL | Age: 59
End: 2024-08-07
Payer: COMMERCIAL

## 2024-08-07 NOTE — OUTREACH NOTE
Call Center TCM Note      Flowsheet Row Responses   [facility] patient discharged from? [place]   Does the patient have one of the following disease processes/diagnoses(primary or secondary)? COPD   TCM attempt successful? No   Unsuccessful attempts Attempt 1   Call Status Comments attempted patient/ Stacey- spouse            Kacie Murphy, ZULMA    8/7/2024, 14:00 EDT

## 2024-08-07 NOTE — OUTREACH NOTE
Call Center TCM Note      Flowsheet Row Responses   [facility] patient discharged from? [place]   Does the patient have one of the following disease processes/diagnoses(primary or secondary)? COPD   TCM attempt successful? Yes   Call start time 1439   Call end time 1439   Discharge diagnosis COPD exacerbation   Person spoke with today (if not patient) and relationship patient   Meds reviewed with patient/caregiver? Yes   Does the patient have all medications ordered at discharge? Yes   Prescription comments no concerns or questions noted.   Is the patient taking all medications as directed (includes completed medication regime)? Yes   Comments 8/12/2024  9:00 AM  HOSPITAL FOLLOW UP 30 min [facility] Kenya Martinez MD   Does the patient have an appointment with their PCP within 7-14 days of discharge? Yes   Has home health visited the patient within 72 hours of discharge? N/A   Psychosocial issues? No   Did the patient receive a copy of their discharge instructions? Yes   Nursing interventions Reviewed instructions with patient   What is the patient's perception of their health status since discharge? Improving   Nursing Interventions Nurse provided patient education   Is the patient able to teach back COPD zones? Yes   Patient reports what zone on this call? Green Zone   Green Zone Reports doing well, Breathing without shortness of breath   Green Zone interventions: Take daily medications   TCM call completed? Yes   Wrap up additional comments Patient reports doing well. On 2LNC. Taking medication as advised. Aware when to report to the ER for COPDE. No concerns or questions noted.   Call end time 1439   Would this patient benefit from a Referral to Amb Social Work? No   Is the patient interested in additional calls from an ambulatory ? No            Kacie Murphy RN    8/7/2024, 14:40 EDT

## 2024-08-12 ENCOUNTER — LAB (OUTPATIENT)
Dept: INTERNAL MEDICINE | Facility: CLINIC | Age: 59
End: 2024-08-12
Payer: COMMERCIAL

## 2024-08-12 ENCOUNTER — OFFICE VISIT (OUTPATIENT)
Dept: INTERNAL MEDICINE | Facility: CLINIC | Age: 59
End: 2024-08-12
Payer: COMMERCIAL

## 2024-08-12 VITALS
HEART RATE: 96 BPM | WEIGHT: 176 LBS | SYSTOLIC BLOOD PRESSURE: 132 MMHG | OXYGEN SATURATION: 99 % | DIASTOLIC BLOOD PRESSURE: 80 MMHG | HEIGHT: 69 IN | BODY MASS INDEX: 26.07 KG/M2

## 2024-08-12 DIAGNOSIS — J44.9 CHRONIC OBSTRUCTIVE PULMONARY DISEASE, UNSPECIFIED COPD TYPE: Primary | ICD-10-CM

## 2024-08-12 DIAGNOSIS — E87.6 CHRONIC HYPOKALEMIA: ICD-10-CM

## 2024-08-12 DIAGNOSIS — K21.9 GASTROESOPHAGEAL REFLUX DISEASE, UNSPECIFIED WHETHER ESOPHAGITIS PRESENT: ICD-10-CM

## 2024-08-12 DIAGNOSIS — I10 ESSENTIAL HYPERTENSION: ICD-10-CM

## 2024-08-12 DIAGNOSIS — J96.21 ACUTE ON CHRONIC RESPIRATORY FAILURE WITH HYPOXIA: ICD-10-CM

## 2024-08-12 DIAGNOSIS — I10 ESSENTIAL HYPERTENSION: Primary | ICD-10-CM

## 2024-08-12 DIAGNOSIS — K22.89 ESOPHAGEAL THICKENING: ICD-10-CM

## 2024-08-12 LAB
ANION GAP SERPL CALCULATED.3IONS-SCNC: 7 MMOL/L (ref 5–15)
BUN SERPL-MCNC: 24 MG/DL (ref 6–20)
BUN/CREAT SERPL: 20.7 (ref 7–25)
CALCIUM SPEC-SCNC: 8.2 MG/DL (ref 8.6–10.5)
CHLORIDE SERPL-SCNC: 106 MMOL/L (ref 98–107)
CO2 SERPL-SCNC: 29 MMOL/L (ref 22–29)
CREAT SERPL-MCNC: 1.16 MG/DL (ref 0.76–1.27)
EGFRCR SERPLBLD CKD-EPI 2021: 72.6 ML/MIN/1.73
GLUCOSE SERPL-MCNC: 97 MG/DL (ref 65–99)
MAGNESIUM SERPL-MCNC: 2.1 MG/DL (ref 1.6–2.6)
POTASSIUM SERPL-SCNC: 4 MMOL/L (ref 3.5–5.2)
SODIUM SERPL-SCNC: 142 MMOL/L (ref 136–145)

## 2024-08-12 PROCEDURE — 83735 ASSAY OF MAGNESIUM: CPT | Performed by: INTERNAL MEDICINE

## 2024-08-12 PROCEDURE — 99495 TRANSJ CARE MGMT MOD F2F 14D: CPT | Performed by: INTERNAL MEDICINE

## 2024-08-12 PROCEDURE — 36415 COLL VENOUS BLD VENIPUNCTURE: CPT | Performed by: INTERNAL MEDICINE

## 2024-08-12 PROCEDURE — 80048 BASIC METABOLIC PNL TOTAL CA: CPT | Performed by: INTERNAL MEDICINE

## 2024-08-12 RX ORDER — PANTOPRAZOLE SODIUM 40 MG/1
40 TABLET, DELAYED RELEASE ORAL
Qty: 90 TABLET | Refills: 0 | Status: SHIPPED | OUTPATIENT
Start: 2024-08-12

## 2024-08-12 NOTE — PROGRESS NOTES
Transitional Care Follow Up Visit  Subjective     Aguila Finn Jr. is a 58 y.o. male who presents for a transitional care management visit.    Within 48 business hours after discharge our office contacted him via telephone to coordinate his care and needs.      I reviewed and discussed the details of that call along with the discharge summary, hospital problems, inpatient lab results, inpatient diagnostic studies, and consultation reports with Aguila.     Current outpatient and discharge medications have been reconciled for the patient.  Reviewed by: Kendra Justice MA    Current outpatient and discharge medications have been reconciled for the patient.  Reviewed by: Kenya Martinez MD       Current Outpatient Medications:     albuterol (PROVENTIL) (2.5 MG/3ML) 0.083% nebulizer solution, Take 2.5 mg by nebulization 4 (Four) Times a Day As Needed for Wheezing or Shortness of Air., Disp: 60 each, Rfl: 11    amLODIPine (NORVASC) 10 MG tablet, Take 1 tablet by mouth Daily., Disp: 90 tablet, Rfl: 2    arformoterol (BROVANA) 15 MCG/2ML nebulizer solution, Take 2 mL by nebulization 2 (Two) Times a Day., Disp: 120 mL, Rfl: 11    aspirin (Aspirin Low Dose) 81 MG EC tablet, TAKE 1 TABLET BY MOUTH DAILY, Disp: 90 tablet, Rfl: 3    atorvastatin (LIPITOR) 40 MG tablet, Take 1 tablet by mouth Daily., Disp: 90 tablet, Rfl: 2    busPIRone (BUSPAR) 15 MG tablet, Take 1 tablet by mouth 2 (Two) Times a Day., Disp: 60 tablet, Rfl: 1    carvedilol (COREG) 6.25 MG tablet, Take 1 tablet by mouth 2 (Two) Times a Day., Disp: 60 tablet, Rfl: 1    guaiFENesin (MUCINEX) 600 MG 12 hr tablet, Take 2 tablets by mouth Every 12 (Twelve) Hours., Disp: 30 tablet, Rfl: 0    isosorbide mononitrate (IMDUR) 30 MG 24 hr tablet, Take 1 tablet by mouth Every Night., Disp: 30 tablet, Rfl: 1    mirtazapine (REMERON) 15 MG tablet, Take 1 tablet by mouth Every Night., Disp: 30 tablet, Rfl: 1    pantoprazole (PROTONIX) 40 MG EC tablet, Take  "1 tablet by mouth Every Morning., Disp: 30 tablet, Rfl: 0    predniSONE (DELTASONE) 10 MG tablet, Take 4 tablets by mouth Daily for 5 days, THEN 3 tablets Daily for 5 days, THEN 2 tablets Daily for 5 days, THEN 1 tablet Daily for 5 days., Disp: 50 tablet, Rfl: 0    spironolactone (ALDACTONE) 50 MG tablet, Take 1 tablet by mouth Daily., Disp: 90 tablet, Rfl: 2    Symbicort 160-4.5 MCG/ACT inhaler, Inhale 2 puffs 2 (Two) Times a Day., Disp: 10.2 each, Rfl: 3            8/6/2024     7:39 PM   Date of TCM Phone Call   [facility]   Date of Admission 8/1/2024   Date of Discharge 8/6/2024   Discharge Disposition Home or Self Care     Risk for Readmission (LACE) Score: 9 (8/6/2024  6:00 AM)      History of Present Illness   Course During Hospital Stay: Per discharge summary \"Hospital Course:  Aguila Finn Jr. is a 58 y.o. male  with history of HTN, HLD, Nephrolithiasis, anxiety, depression and COPD (on nocturnal O2) presents with progressive shortness of breath for one month.     COPD with AE  Pulmonary nodule - appears stable by CT  - Wean IV Solumedrol to Prednisone taper   - Continue Symbicort  - Continue Doxycycline   - has followup with Pulmonary 8/14  - CM has arranged O2 for home      Renal insufficiency  - resolved      CAD  - stress test 8/2/24, consistent with low risk study  - echo 8/2 shows normal EF  - has followup with Cardiology Dr Miles 8/22     HTN  HLD  -Home meds     Anxiety and depression     Chronic hypokalemia  - resolved      Possible Esophageal thickening  - distal thickening incidentally noted on CT imaging at admission  - started PPI this admit  - consider outpatient GI evaluation      Discharge Follow Up Recommendations for outpatient labs/diagnostics:  -PCP 1 week  -Keep Pulmonary follow-up 8/14 \"    Interval history: Patient feeling much better.  He is breathing much better, on 2 L O2.  Says when he was in the hospital he was not breathing very well when going from bed to " "bathroom but his endurance has improved.  He is not having any palpitations.  He is wearing a 30-day Holter monitor.  Mood has been okay.  He does have significant acid reflux and his wife says he \"chugs\" Pepto-Bismol.  He is taking the pantoprazole.  He has a pulmonology appointment in 2 days.    Objective   /80 (BP Location: Left arm)   Pulse 96   Ht 175.3 cm (69\")   Wt 79.8 kg (176 lb)   SpO2 99% Comment: 2L O2  BMI 25.99 kg/m²   Physical Exam  Vitals reviewed.   Constitutional:       Appearance: He is well-developed.   Cardiovascular:      Rate and Rhythm: Normal rate and regular rhythm.      Heart sounds: Normal heart sounds.   Pulmonary:      Effort: Pulmonary effort is normal.      Breath sounds: Normal breath sounds. No wheezing or rales.   Skin:     General: Skin is warm and dry.   Neurological:      Mental Status: He is alert and oriented to person, place, and time.   Psychiatric:         Behavior: Behavior normal.         Thought Content: Thought content normal.         Assessment & Plan   Diagnoses and all orders for this visit:    1. Chronic obstructive pulmonary disease, unspecified COPD type (Primary)  -much improved sx on 2L O2. Has pulm f/u in 2 days  -continue inhalers    2. Chronic hypokalemia  -     Basic Metabolic Panel  -Potassium stopped in hospital    3. Esophageal thickening  -     pantoprazole (PROTONIX) 40 MG EC tablet; Take 1 tablet by mouth Every Morning.  Dispense: 90 tablet; Refill: 0  -Advised patient he may need to see GI for the possible esophageal thickening but he prefers to defer that now.  Will bring it back up to him in a few months after he has been on Protonix for a few months.  Also advised him he may need EGD to investigate    4. Essential hypertension  -     Magnesium    5. Gastroesophageal reflux disease, unspecified whether esophagitis present  -     pantoprazole (PROTONIX) 40 MG EC tablet; Take 1 tablet by mouth Every Morning.  Dispense: 90 tablet; Refill: " 0    6. Acute on chronic respiratory failure with hypoxia

## 2024-08-14 ENCOUNTER — OFFICE VISIT (OUTPATIENT)
Age: 59
End: 2024-08-14
Payer: COMMERCIAL

## 2024-08-14 VITALS
TEMPERATURE: 98.8 F | HEART RATE: 118 BPM | OXYGEN SATURATION: 95 % | SYSTOLIC BLOOD PRESSURE: 124 MMHG | BODY MASS INDEX: 26.5 KG/M2 | WEIGHT: 178.9 LBS | DIASTOLIC BLOOD PRESSURE: 76 MMHG | HEIGHT: 69 IN

## 2024-08-14 DIAGNOSIS — J44.9 CHRONIC OBSTRUCTIVE PULMONARY DISEASE, UNSPECIFIED COPD TYPE: ICD-10-CM

## 2024-08-14 DIAGNOSIS — J96.21 ACUTE ON CHRONIC RESPIRATORY FAILURE WITH HYPOXIA: ICD-10-CM

## 2024-08-14 DIAGNOSIS — Z87.891 PERSONAL HISTORY OF SMOKING: ICD-10-CM

## 2024-08-14 DIAGNOSIS — J96.11 CHRONIC RESPIRATORY FAILURE WITH HYPOXIA: Primary | ICD-10-CM

## 2024-08-14 PROCEDURE — 94010 BREATHING CAPACITY TEST: CPT | Performed by: NURSE PRACTITIONER

## 2024-08-14 PROCEDURE — 94726 PLETHYSMOGRAPHY LUNG VOLUMES: CPT | Performed by: NURSE PRACTITIONER

## 2024-08-14 PROCEDURE — 94729 DIFFUSING CAPACITY: CPT | Performed by: NURSE PRACTITIONER

## 2024-08-14 PROCEDURE — 99214 OFFICE O/P EST MOD 30 MIN: CPT | Performed by: NURSE PRACTITIONER

## 2024-08-14 RX ORDER — BUDESONIDE 0.5 MG/2ML
0.5 INHALANT ORAL 2 TIMES DAILY
Qty: 360 ML | Refills: 3 | Status: SHIPPED | OUTPATIENT
Start: 2024-08-14

## 2024-08-14 NOTE — PROGRESS NOTES
[facility] Pulmonary Follow up    CHIEF COMPLAINT    Shortness of breath    HISTORY OF PRESENT ILLNESS    Aguila Finn Jr. is a 58 y.o.male here today for a hospital follow-up.  He was hospitalized at [facility] On 8/1 to 8/6 for worsening shortness of breath.  He was treated for an exacerbation with antibiotics and steroids.  He was also arranged to have portable oxygen during the day.  He continues on his prednisone taper.  He finished his antibiotics.    He was last seen in the office in September 2022 by Dr. Francisco Javier Rogers.  He has been seen in the past for COPD/abnormal low-dose CT screening.  He had a 1.7 cm noncalcified nodule in the medial/posterior right base abutting the pleura.  This was followed for several years and it 2022 the nodule was smaller with a central calcification.  He did have a new left millimeter left upper lobe nodule on his scan in 2022.    He is currently using Symbicort 2 puffs twice a day and a formoterol nebulizers twice a day.  He has albuterol and is trying to use these nebulizers at least twice a day.    He complains of shortness of breath with any exertion.  He states his shortness of breath is slightly better since before his hospitalization.  He remains on 30 mg of prednisone daily.    He denies any sputum production.  He denies any hemoptysis.  He denies any fever, chills or night sweats.    He is continuing to take Mucinex twice a day.    He denies any chest pain or chest tightness.  He denies any palpitations.  He denies any lower extremity edema or calf tenderness.    He ran out of his Protonix and has had some reflux symptoms.  He has been taking Tums.  He has a prescription to  today.    He continues to wear 2 L at all times.  His portable concentrator is not working properly today.  He is wanting to purchase a POC through [facility].    He denies any sleeping difficulties currently.    He does have a good appetite due to being on the  prednisone.    He quit smoking in 2019.  He has a 30-pack-year history.    He is companied today by his wife.  Patient Active Problem List   Diagnosis    Essential hypertension    COPD (chronic obstructive pulmonary disease)    Anxiety and depression    Recurrent kidney stones    Tobacco abuse    Chronic hypokalemia    Chronic bronchitis    Hyperlipidemia    Personal history of smoking    Nocturnal hypoxemia    COPD exacerbation    Acute-on-chronic respiratory failure       No Known Allergies    Current Outpatient Medications:     albuterol (PROVENTIL) (2.5 MG/3ML) 0.083% nebulizer solution, Take 2.5 mg by nebulization 4 (Four) Times a Day As Needed for Wheezing or Shortness of Air., Disp: 60 each, Rfl: 11    amLODIPine (NORVASC) 10 MG tablet, Take 1 tablet by mouth Daily., Disp: 90 tablet, Rfl: 2    arformoterol (BROVANA) 15 MCG/2ML nebulizer solution, Take 2 mL by nebulization 2 (Two) Times a Day., Disp: 120 mL, Rfl: 11    aspirin (Aspirin Low Dose) 81 MG EC tablet, TAKE 1 TABLET BY MOUTH DAILY, Disp: 90 tablet, Rfl: 3    atorvastatin (LIPITOR) 40 MG tablet, Take 1 tablet by mouth Daily., Disp: 90 tablet, Rfl: 2    busPIRone (BUSPAR) 15 MG tablet, Take 1 tablet by mouth 2 (Two) Times a Day., Disp: 60 tablet, Rfl: 1    carvedilol (COREG) 6.25 MG tablet, Take 1 tablet by mouth 2 (Two) Times a Day., Disp: 60 tablet, Rfl: 1    guaiFENesin (MUCINEX) 600 MG 12 hr tablet, Take 2 tablets by mouth Every 12 (Twelve) Hours., Disp: 30 tablet, Rfl: 0    isosorbide mononitrate (IMDUR) 30 MG 24 hr tablet, Take 1 tablet by mouth Every Night., Disp: 30 tablet, Rfl: 1    mirtazapine (REMERON) 15 MG tablet, Take 1 tablet by mouth Every Night., Disp: 30 tablet, Rfl: 1    pantoprazole (PROTONIX) 40 MG EC tablet, Take 1 tablet by mouth Every Morning., Disp: 90 tablet, Rfl: 0    predniSONE (DELTASONE) 10 MG tablet, Take 4 tablets by mouth Daily for 5 days, THEN 3 tablets Daily for 5 days, THEN 2 tablets Daily for 5 days, THEN 1 tablet  Daily for 5 days., Disp: 50 tablet, Rfl: 0    spironolactone (ALDACTONE) 50 MG tablet, Take 1 tablet by mouth Daily., Disp: 90 tablet, Rfl: 2    budesonide (Pulmicort) 0.5 MG/2ML nebulizer solution, Take 2 mL by nebulization 2 (Two) Times a Day., Disp: 360 mL, Rfl: 3  MEDICATION LIST AND ALLERGIES REVIEWED.    Social History     Tobacco Use    Smoking status: Former     Current packs/day: 0.00     Average packs/day: 2.0 packs/day for 15.0 years (30.0 ttl pk-yrs)     Types: Cigarettes     Start date: 9/18/2005     Quit date: 9/18/2020     Years since quitting: 3.9     Passive exposure: Past    Smokeless tobacco: Never   Vaping Use    Vaping status: Never Used   Substance Use Topics    Alcohol use: Yes     Alcohol/week: 1.0 standard drink of alcohol     Types: 1 Cans of beer per week     Comment: a few drinks on occasion, not excessive per patient    Drug use: Yes     Comment: Cannibus gummies       FAMILY AND SOCIAL HISTORY REVIEWED.    Review of Systems   Constitutional:  Positive for activity change and fatigue. Negative for appetite change, fever and unexpected weight change.   HENT:  Negative for congestion, postnasal drip, rhinorrhea, sinus pressure, sore throat and voice change.    Eyes:  Negative for visual disturbance.   Respiratory:  Positive for shortness of breath. Negative for cough, chest tightness and wheezing.    Cardiovascular:  Negative for chest pain, palpitations and leg swelling.   Gastrointestinal:  Negative for abdominal distention, abdominal pain, nausea and vomiting.   Endocrine: Negative for cold intolerance and heat intolerance.   Genitourinary:  Negative for difficulty urinating and urgency.   Musculoskeletal:  Negative for arthralgias, back pain and neck pain.   Skin:  Negative for color change and pallor.   Allergic/Immunologic: Negative for environmental allergies and food allergies.   Neurological:  Negative for dizziness, syncope, weakness and light-headedness.   Hematological:   "Negative for adenopathy. Does not bruise/bleed easily.   Psychiatric/Behavioral:  Negative for agitation and behavioral problems.    .    /76   Pulse 118   Temp 98.8 °F (37.1 °C)   Ht 175.3 cm (69\")   Wt 81.1 kg (178 lb 14.4 oz)   SpO2 95% Comment: Room air at rest  BMI 26.42 kg/m²     Immunization History   Administered Date(s) Administered    COVID-19 (PFIZER) BIVALENT 12+YRS 10/29/2022    COVID-19 (PFIZER) Purple Cap Monovalent 02/22/2021, 03/15/2021, 09/30/2021    Covid-19 (Pfizer) Gray Cap Monovalent 05/21/2022    Fluzone (or Fluarix & Flulaval for VFC) >6mos 11/08/2018, 10/05/2020, 10/29/2022    Fluzone Quad >6mos (Multi-dose) 09/30/2016    Influenza, Unspecified 05/10/2019, 12/28/2023    Pneumococcal Conjugate 13-Valent (PCV13) 05/09/2019    Shingrix 05/10/2019    flucelvax quad pfs =>4 YRS 11/26/2019       Physical Exam  Vitals and nursing note reviewed.   Constitutional:       Appearance: He is well-developed. He is not diaphoretic.   HENT:      Head: Normocephalic and atraumatic.   Eyes:      Pupils: Pupils are equal, round, and reactive to light.   Neck:      Thyroid: No thyromegaly.   Cardiovascular:      Rate and Rhythm: Normal rate and regular rhythm.      Heart sounds: Normal heart sounds. No murmur heard.     No friction rub. No gallop.   Pulmonary:      Effort: Pulmonary effort is normal. No respiratory distress.      Breath sounds: Normal breath sounds. No wheezing or rales.   Chest:      Chest wall: No tenderness.   Abdominal:      General: Bowel sounds are normal.      Palpations: Abdomen is soft.      Tenderness: There is no abdominal tenderness.   Musculoskeletal:         General: No swelling. Normal range of motion.      Cervical back: Normal range of motion and neck supple.   Lymphadenopathy:      Cervical: No cervical adenopathy.   Skin:     General: Skin is warm and dry.      Capillary Refill: Capillary refill takes less than 2 seconds.   Neurological:      Mental Status: He is " alert and oriented to person, place, and time.   Psychiatric:         Mood and Affect: Mood normal.         Behavior: Behavior normal.           RESULTS    Spirometry Interpretation: FVC 1.84 41% predicted, FEV1 0.69 20% predicted, FEV1/FVC 37% predicted, TLC 5.80 83% predicted, DLCO 52% predicted, severe obstruction, no restriction and severe diffusion.    CT Angiogram Chest    Result Date: 8/1/2024  1.No acute abnormality is identified within the thorax. Specifically, there is no evidence of acute pulmonary embolism. 2.Pulmonary emphysema. Please correlate with patient's smoking history/risk factors to determine whether the patient meets criteria for routine lung cancer screening with low dose chest CT. 3.Stable 7 mm left upper lobe nodule, similar since 8/29/2022. 4.Possible distal esophageal wall thickening. Please correlate clinically for esophagitis. 5.Additional findings as detailed above. Electronically Signed: David Sawyer MD  8/1/2024 8:39 AM EDT  Workstation ID: [id number]    CT Chest With & Without Contrast Diagnostic    Result Date: 7/27/2024  Impression: 1.No acute cardiopulmonary abnormality. 2.Stable 7 mm left upper lobe lung nodule. This is unchanged for 2 years and considered benign. 3.Moderate emphysema. 4.Severe coronary artery disease. 5.Stable left adrenal nodule, likely adenoma. 6.Punctate nonobstructing bilateral kidney stones. Electronically Signed: Taye Christie MD  7/27/2024 7:52 AM EDT  Workstation ID: [id number]    PROBLEM LIST    Problem List Items Addressed This Visit          Pulmonary and Pneumonias    COPD (chronic obstructive pulmonary disease)    Overview     Severe COPD, FEV1 1.35 L, 36%.         Relevant Medications    budesonide (Pulmicort) 0.5 MG/2ML nebulizer solution    Acute-on-chronic respiratory failure       Tobacco    Personal history of smoking    Relevant Orders    CT Chest Low Dose Wo     Other Visit Diagnoses       Chronic respiratory failure with hypoxia    -   Primary    Relevant Orders    Oxygen Therapy    Ambulatory Referral to Physical Therapy for Evaluation & Treatment              DISCUSSION    Mr. Finn was here for a hospital follow-up.  He is on a prednisone taper and will continue to taper as scheduled.    We did review his PFTs in the office today and he continues to have severe COPD.  He will continue his a formoterol nebulizers twice a day.  I am going to add budesonide nebulizers twice a day and Yupelri.  I did give him some samples of Yupelri to try for the next couple of weeks and if he notices an improvement I will send this prescription in.    I did advise him to stop his Symbicort and only use his nebulizers.    He will continue to wear 2 L to maintain a saturation above 88% at all times.    Home BiPAP/CPAP considered and ruled out due to the need for adjustable pressure support provided by noninvasive ventilator (NIV).  Ordering trilogy for nocturnal and daytime usage due to his chronic respiratory failure with hypoxia.    I would like to get him in with physical therapy to increase his strength and deconditioning.  I do think this will help his breathing as well.  He wants to do outpatient physical therapy in [place] with [facility].  I did dress the importance of trying to get at least 15 minutes of regular activity and day.    We reviewed his CT scans of the chest that was performed in July that shows a stable pulmonary nodule.  Based on his smoking history he does qualify for yearly CT scans.  His next CT scan will be due in July 2025.    We will have him follow-up in 6 to 8 weeks.    I personally spent a total of 36 minutes on patient visit today including chart review, face to face with the patient obtaining the history and physical exam, review of pertinent images and tests, counseling and discussion and/or coordination of care as described above, and documentation.  Total time excludes time spent on other separate services such as performing  procedures or test interpretation, if applicable.        Debora Parada, APRN  08/14/202410:12 EDT  Electronically signed     Please note that portions of this note were completed with a voice recognition program.        CC: Kenya Martinez MD

## 2024-08-16 ENCOUNTER — READMISSION MANAGEMENT (OUTPATIENT)
Dept: CALL CENTER | Facility: HOSPITAL | Age: 59
End: 2024-08-16
Payer: COMMERCIAL

## 2024-08-16 NOTE — OUTREACH NOTE
COPD/PN Week 3 Survey      Flowsheet Row Responses   [facility] patient discharged from? [place]   Does the patient have one of the following disease processes/diagnoses(primary or secondary)? COPD   Call start time 0853   Call end time 0855   Discharge diagnosis COPD exacerbation   Meds reviewed with patient/caregiver? Yes   Is the patient taking all medications as directed (includes completed medication regime)? Yes   Does the patient have a primary care provider?  Yes   Does the patient have an appointment with their PCP or specialist within 7 days of discharge? Yes   Has the patient kept scheduled appointments due by today? Yes   Has home health visited the patient within 72 hours of discharge? N/A   Has all DME been delivered? Yes   Pulse Ox monitoring Intermittent   Pulse Ox device source Patient   O2 Sat comments above 90% 97-96   O2 Sat: education provided Sat levels, Monitoring frequency, When to seek care   Psychosocial issues? No   Did the patient receive a copy of their discharge instructions? Yes   Nursing interventions Reviewed instructions with patient   What is the patient's perception of their health status since discharge? Improving   Nursing Interventions Nurse provided patient education   Is the patient/caregiver able to teach back the hierarchy of who to call/visit for symptoms/problems? PCP, Specialist, Home health nurse, Urgent Care, ED, 911 Yes   Is the patient able to teach back COPD zones? Yes   Patient reports what zone on this call? Green Zone   Green Zone Reports doing well   Graduated Yes   Graduated/Revoked comments Pt reports he is doing well. No needs. Still taking prednisone as ordered   Call end time 0855            CARMEN SUNG - Registered Nurse

## 2024-08-21 ENCOUNTER — DOCUMENTATION (OUTPATIENT)
Dept: CARDIOLOGY | Facility: HOSPITAL | Age: 59
End: 2024-08-21
Payer: COMMERCIAL

## 2024-08-21 DIAGNOSIS — R07.9 CHEST PAIN, UNSPECIFIED TYPE: ICD-10-CM

## 2024-08-21 DIAGNOSIS — I48.0 PAROXYSMAL ATRIAL FIBRILLATION: Primary | ICD-10-CM

## 2024-08-21 DIAGNOSIS — I10 ESSENTIAL HYPERTENSION: ICD-10-CM

## 2024-08-21 RX ORDER — CARVEDILOL 12.5 MG/1
12.5 TABLET ORAL 2 TIMES DAILY
Qty: 60 TABLET | Refills: 2 | Status: ON HOLD | OUTPATIENT
Start: 2024-08-21

## 2024-08-21 NOTE — PROGRESS NOTES
New onset atrial fibrillation overnight on MCOT. Contacted patient and overall asymptomatic but reports his heart rate is 100 currently, SBP greater than 130 this morning.     Instructed to initiate apixaban 5mg every 12h for stroke prohylaxis, increased carvedilol to 12.5mg BID for further rate control.  Monitor for bleeding symptoms on apixaban.  Instructed to continue with coming cardiology appointment as scheduled.  Will continue to monitor for [employer] monitor reports.

## 2024-08-21 NOTE — PROGRESS NOTES
Received report from [facility] on cardiac report- Day 23- Critical for transmission received on 8/21 at 1:54am with report analysis of sinus rhythm, atrial fibrillation RVR w/ PVCS (1 in 1 minute). This was an auto trigger and no symptoms were reported. Fax was given to Jose Marcelino for review.

## 2024-08-22 ENCOUNTER — HOSPITAL ENCOUNTER (INPATIENT)
Facility: HOSPITAL | Age: 59
LOS: 7 days | Discharge: HOME | DRG: 190 | End: 2024-08-29
Attending: EMERGENCY MEDICINE | Admitting: INTERNAL MEDICINE
Payer: COMMERCIAL

## 2024-08-22 ENCOUNTER — TELEPHONE (OUTPATIENT)
Dept: CARDIOLOGY | Facility: HOSPITAL | Age: 59
End: 2024-08-22
Payer: COMMERCIAL

## 2024-08-22 ENCOUNTER — APPOINTMENT (OUTPATIENT)
Dept: GENERAL RADIOLOGY | Facility: HOSPITAL | Age: 59
DRG: 190 | End: 2024-08-22
Payer: COMMERCIAL

## 2024-08-22 DIAGNOSIS — J96.21 ACUTE ON CHRONIC RESPIRATORY FAILURE WITH HYPOXIA: ICD-10-CM

## 2024-08-22 DIAGNOSIS — F41.1 GENERALIZED ANXIETY DISORDER: ICD-10-CM

## 2024-08-22 DIAGNOSIS — J96.01 ACUTE RESPIRATORY FAILURE WITH HYPOXIA: Primary | ICD-10-CM

## 2024-08-22 DIAGNOSIS — J44.1 COPD WITH ACUTE EXACERBATION: ICD-10-CM

## 2024-08-22 LAB
ALBUMIN SERPL-MCNC: 3.6 G/DL (ref 3.5–5.2)
ALBUMIN/GLOB SERPL: 1.3 G/DL
ALP SERPL-CCNC: 72 U/L (ref 39–117)
ALT SERPL W P-5'-P-CCNC: 35 U/L (ref 1–41)
ANION GAP SERPL CALCULATED.3IONS-SCNC: 10 MMOL/L (ref 5–15)
ARTERIAL PATENCY WRIST A: ABNORMAL
AST SERPL-CCNC: 17 U/L (ref 1–40)
ATMOSPHERIC PRESS: ABNORMAL MM[HG]
B PARAPERT DNA SPEC QL NAA+PROBE: NOT DETECTED
B PERT DNA SPEC QL NAA+PROBE: NOT DETECTED
BASE EXCESS BLDA CALC-SCNC: 6.2 MMOL/L (ref 0–2)
BASOPHILS # BLD AUTO: 0.03 10*3/MM3 (ref 0–0.2)
BASOPHILS NFR BLD AUTO: 0.3 % (ref 0–1.5)
BDY SITE: ABNORMAL
BILIRUB SERPL-MCNC: 0.6 MG/DL (ref 0–1.2)
BODY TEMPERATURE: 37
BUN SERPL-MCNC: 31 MG/DL (ref 6–20)
BUN/CREAT SERPL: 27.9 (ref 7–25)
C PNEUM DNA NPH QL NAA+NON-PROBE: NOT DETECTED
CALCIUM SPEC-SCNC: 8.7 MG/DL (ref 8.6–10.5)
CHLORIDE SERPL-SCNC: 106 MMOL/L (ref 98–107)
CO2 BLDA-SCNC: 32.8 MMOL/L (ref 22–33)
CO2 SERPL-SCNC: 31 MMOL/L (ref 22–29)
COHGB MFR BLD: 0.9 % (ref 0–2)
CREAT SERPL-MCNC: 1.11 MG/DL (ref 0.76–1.27)
D DIMER PPP FEU-MCNC: 0.45 MCGFEU/ML (ref 0–0.59)
DEPRECATED RDW RBC AUTO: 47.4 FL (ref 37–54)
EGFRCR SERPLBLD CKD-EPI 2021: 76.5 ML/MIN/1.73
EOSINOPHIL # BLD AUTO: 0.17 10*3/MM3 (ref 0–0.4)
EOSINOPHIL NFR BLD AUTO: 1.9 % (ref 0.3–6.2)
EPAP: 0
ERYTHROCYTE [DISTWIDTH] IN BLOOD BY AUTOMATED COUNT: 14 % (ref 12.3–15.4)
FLUAV SUBTYP SPEC NAA+PROBE: NOT DETECTED
FLUBV RNA ISLT QL NAA+PROBE: NOT DETECTED
GLOBULIN UR ELPH-MCNC: 2.8 GM/DL
GLUCOSE SERPL-MCNC: 117 MG/DL (ref 65–99)
HADV DNA SPEC NAA+PROBE: NOT DETECTED
HCO3 BLDA-SCNC: 31.4 MMOL/L (ref 20–26)
HCOV 229E RNA SPEC QL NAA+PROBE: NOT DETECTED
HCOV HKU1 RNA SPEC QL NAA+PROBE: NOT DETECTED
HCOV NL63 RNA SPEC QL NAA+PROBE: NOT DETECTED
HCOV OC43 RNA SPEC QL NAA+PROBE: NOT DETECTED
HCT VFR BLD AUTO: 39.2 % (ref 37.5–51)
HCT VFR BLD CALC: 39.8 % (ref 38–51)
HGB BLD-MCNC: 12.5 G/DL (ref 13–17.7)
HGB BLDA-MCNC: 13 G/DL (ref 13.5–17.5)
HMPV RNA NPH QL NAA+NON-PROBE: NOT DETECTED
HOLD SPECIMEN: NORMAL
HPIV1 RNA ISLT QL NAA+PROBE: NOT DETECTED
HPIV2 RNA SPEC QL NAA+PROBE: NOT DETECTED
HPIV3 RNA NPH QL NAA+PROBE: NOT DETECTED
HPIV4 P GENE NPH QL NAA+PROBE: NOT DETECTED
IMM GRANULOCYTES # BLD AUTO: 0.05 10*3/MM3 (ref 0–0.05)
IMM GRANULOCYTES NFR BLD AUTO: 0.6 % (ref 0–0.5)
INHALED O2 CONCENTRATION: 28 %
IPAP: 0
LYMPHOCYTES # BLD AUTO: 0.3 10*3/MM3 (ref 0.7–3.1)
LYMPHOCYTES NFR BLD AUTO: 3.4 % (ref 19.6–45.3)
M PNEUMO IGG SER IA-ACNC: NOT DETECTED
MCH RBC QN AUTO: 29.1 PG (ref 26.6–33)
MCHC RBC AUTO-ENTMCNC: 31.9 G/DL (ref 31.5–35.7)
MCV RBC AUTO: 91.2 FL (ref 79–97)
METHGB BLD QL: ABNORMAL
MODALITY: ABNORMAL
MONOCYTES # BLD AUTO: 0.49 10*3/MM3 (ref 0.1–0.9)
MONOCYTES NFR BLD AUTO: 5.5 % (ref 5–12)
NEUTROPHILS NFR BLD AUTO: 7.83 10*3/MM3 (ref 1.7–7)
NEUTROPHILS NFR BLD AUTO: 88.3 % (ref 42.7–76)
NRBC BLD AUTO-RTO: 0 /100 WBC (ref 0–0.2)
NT-PROBNP SERPL-MCNC: 55.5 PG/ML (ref 0–900)
OXYHGB MFR BLDV: 97.1 % (ref 94–99)
PAW @ PEAK INSP FLOW SETTING VENT: 0 CMH2O
PCO2 BLDA: 46.4 MM HG (ref 35–45)
PCO2 TEMP ADJ BLD: 46.4 MM HG (ref 35–48)
PH BLDA: 7.44 PH UNITS (ref 7.35–7.45)
PH, TEMP CORRECTED: 7.44 PH UNITS
PLATELET # BLD AUTO: 115 10*3/MM3 (ref 140–450)
PMV BLD AUTO: 11.2 FL (ref 6–12)
PO2 BLDA: 88 MM HG (ref 83–108)
PO2 TEMP ADJ BLD: 88 MM HG (ref 83–108)
POTASSIUM SERPL-SCNC: 4.3 MMOL/L (ref 3.5–5.2)
PROT SERPL-MCNC: 6.4 G/DL (ref 6–8.5)
RBC # BLD AUTO: 4.3 10*6/MM3 (ref 4.14–5.8)
RHINOVIRUS RNA SPEC NAA+PROBE: NOT DETECTED
RSV RNA NPH QL NAA+NON-PROBE: NOT DETECTED
SARS-COV-2 RNA NPH QL NAA+NON-PROBE: NOT DETECTED
SODIUM SERPL-SCNC: 147 MMOL/L (ref 136–145)
TOTAL RATE: 0 BREATHS/MINUTE
TROPONIN T SERPL HS-MCNC: 19 NG/L
WBC NRBC COR # BLD AUTO: 8.87 10*3/MM3 (ref 3.4–10.8)
WHOLE BLOOD HOLD COAG: NORMAL
WHOLE BLOOD HOLD SPECIMEN: NORMAL

## 2024-08-22 PROCEDURE — 36415 COLL VENOUS BLD VENIPUNCTURE: CPT

## 2024-08-22 PROCEDURE — 94799 UNLISTED PULMONARY SVC/PX: CPT

## 2024-08-22 PROCEDURE — 36600 WITHDRAWAL OF ARTERIAL BLOOD: CPT

## 2024-08-22 PROCEDURE — 82805 BLOOD GASES W/O2 SATURATION: CPT

## 2024-08-22 PROCEDURE — 83880 ASSAY OF NATRIURETIC PEPTIDE: CPT | Performed by: EMERGENCY MEDICINE

## 2024-08-22 PROCEDURE — G0378 HOSPITAL OBSERVATION PER HR: HCPCS

## 2024-08-22 PROCEDURE — 63710000001 PREDNISONE PER 5 MG: Performed by: INTERNAL MEDICINE

## 2024-08-22 PROCEDURE — 80053 COMPREHEN METABOLIC PANEL: CPT | Performed by: EMERGENCY MEDICINE

## 2024-08-22 PROCEDURE — 94660 CPAP INITIATION&MGMT: CPT

## 2024-08-22 PROCEDURE — 83050 HGB METHEMOGLOBIN QUAN: CPT

## 2024-08-22 PROCEDURE — 93005 ELECTROCARDIOGRAM TRACING: CPT | Performed by: EMERGENCY MEDICINE

## 2024-08-22 PROCEDURE — 85025 COMPLETE CBC W/AUTO DIFF WBC: CPT | Performed by: EMERGENCY MEDICINE

## 2024-08-22 PROCEDURE — 99285 EMERGENCY DEPT VISIT HI MDM: CPT

## 2024-08-22 PROCEDURE — 99222 1ST HOSP IP/OBS MODERATE 55: CPT | Performed by: INTERNAL MEDICINE

## 2024-08-22 PROCEDURE — 84484 ASSAY OF TROPONIN QUANT: CPT | Performed by: EMERGENCY MEDICINE

## 2024-08-22 PROCEDURE — 82375 ASSAY CARBOXYHB QUANT: CPT

## 2024-08-22 PROCEDURE — 85379 FIBRIN DEGRADATION QUANT: CPT | Performed by: INTERNAL MEDICINE

## 2024-08-22 PROCEDURE — 71045 X-RAY EXAM CHEST 1 VIEW: CPT

## 2024-08-22 PROCEDURE — 0202U NFCT DS 22 TRGT SARS-COV-2: CPT

## 2024-08-22 RX ORDER — ATORVASTATIN CALCIUM 40 MG/1
40 TABLET, FILM COATED ORAL NIGHTLY
Status: DISCONTINUED | OUTPATIENT
Start: 2024-08-22 | End: 2024-08-29 | Stop reason: HOSPADM

## 2024-08-22 RX ORDER — BISACODYL 10 MG
10 SUPPOSITORY, RECTAL RECTAL DAILY PRN
Status: DISCONTINUED | OUTPATIENT
Start: 2024-08-22 | End: 2024-08-29 | Stop reason: HOSPADM

## 2024-08-22 RX ORDER — AMOXICILLIN 250 MG
2 CAPSULE ORAL 2 TIMES DAILY PRN
Status: DISCONTINUED | OUTPATIENT
Start: 2024-08-22 | End: 2024-08-29 | Stop reason: HOSPADM

## 2024-08-22 RX ORDER — BUDESONIDE 0.5 MG/2ML
0.5 INHALANT ORAL 2 TIMES DAILY
Status: DISCONTINUED | OUTPATIENT
Start: 2024-08-22 | End: 2024-08-29 | Stop reason: HOSPADM

## 2024-08-22 RX ORDER — ASPIRIN 81 MG/1
81 TABLET ORAL DAILY
Status: DISCONTINUED | OUTPATIENT
Start: 2024-08-22 | End: 2024-08-29 | Stop reason: HOSPADM

## 2024-08-22 RX ORDER — SODIUM CHLORIDE 0.9 % (FLUSH) 0.9 %
10 SYRINGE (ML) INJECTION EVERY 12 HOURS SCHEDULED
Status: DISCONTINUED | OUTPATIENT
Start: 2024-08-22 | End: 2024-08-29 | Stop reason: HOSPADM

## 2024-08-22 RX ORDER — SODIUM CHLORIDE 9 MG/ML
40 INJECTION, SOLUTION INTRAVENOUS AS NEEDED
Status: DISCONTINUED | OUTPATIENT
Start: 2024-08-22 | End: 2024-08-29 | Stop reason: HOSPADM

## 2024-08-22 RX ORDER — PREDNISONE 5 MG/1
10 TABLET ORAL DAILY
Status: COMPLETED | OUTPATIENT
Start: 2024-08-22 | End: 2024-08-24

## 2024-08-22 RX ORDER — ALBUTEROL SULFATE 0.83 MG/ML
2.5 SOLUTION RESPIRATORY (INHALATION) 4 TIMES DAILY PRN
Status: DISCONTINUED | OUTPATIENT
Start: 2024-08-22 | End: 2024-08-29 | Stop reason: HOSPADM

## 2024-08-22 RX ORDER — CARVEDILOL 12.5 MG/1
12.5 TABLET ORAL 2 TIMES DAILY
Status: DISCONTINUED | OUTPATIENT
Start: 2024-08-22 | End: 2024-08-26

## 2024-08-22 RX ORDER — MIRTAZAPINE 15 MG/1
15 TABLET, FILM COATED ORAL NIGHTLY
Status: DISCONTINUED | OUTPATIENT
Start: 2024-08-22 | End: 2024-08-29 | Stop reason: HOSPADM

## 2024-08-22 RX ORDER — ACETAMINOPHEN 160 MG/5ML
650 SOLUTION ORAL EVERY 4 HOURS PRN
Status: DISCONTINUED | OUTPATIENT
Start: 2024-08-22 | End: 2024-08-29 | Stop reason: HOSPADM

## 2024-08-22 RX ORDER — ONDANSETRON 2 MG/ML
4 INJECTION INTRAMUSCULAR; INTRAVENOUS EVERY 6 HOURS PRN
Status: DISCONTINUED | OUTPATIENT
Start: 2024-08-22 | End: 2024-08-29 | Stop reason: HOSPADM

## 2024-08-22 RX ORDER — SODIUM CHLORIDE 0.9 % (FLUSH) 0.9 %
10 SYRINGE (ML) INJECTION AS NEEDED
Status: DISCONTINUED | OUTPATIENT
Start: 2024-08-22 | End: 2024-08-29

## 2024-08-22 RX ORDER — CALCIUM CARBONATE 500 MG/1
2 TABLET, CHEWABLE ORAL 2 TIMES DAILY PRN
Status: DISCONTINUED | OUTPATIENT
Start: 2024-08-22 | End: 2024-08-29 | Stop reason: HOSPADM

## 2024-08-22 RX ORDER — ACETAMINOPHEN 325 MG/1
650 TABLET ORAL EVERY 4 HOURS PRN
Status: DISCONTINUED | OUTPATIENT
Start: 2024-08-22 | End: 2024-08-29 | Stop reason: HOSPADM

## 2024-08-22 RX ORDER — ARFORMOTEROL TARTRATE 15 UG/2ML
15 SOLUTION RESPIRATORY (INHALATION)
Status: DISCONTINUED | OUTPATIENT
Start: 2024-08-22 | End: 2024-08-29 | Stop reason: HOSPADM

## 2024-08-22 RX ORDER — BISACODYL 5 MG/1
5 TABLET, DELAYED RELEASE ORAL DAILY PRN
Status: DISCONTINUED | OUTPATIENT
Start: 2024-08-22 | End: 2024-08-29 | Stop reason: HOSPADM

## 2024-08-22 RX ORDER — POLYETHYLENE GLYCOL 3350 17 G/17G
17 POWDER, FOR SOLUTION ORAL DAILY PRN
Status: DISCONTINUED | OUTPATIENT
Start: 2024-08-22 | End: 2024-08-29 | Stop reason: HOSPADM

## 2024-08-22 RX ORDER — ONDANSETRON 4 MG/1
4 TABLET, ORALLY DISINTEGRATING ORAL EVERY 6 HOURS PRN
Status: DISCONTINUED | OUTPATIENT
Start: 2024-08-22 | End: 2024-08-29 | Stop reason: HOSPADM

## 2024-08-22 RX ORDER — PANTOPRAZOLE SODIUM 40 MG/1
40 TABLET, DELAYED RELEASE ORAL
Status: DISCONTINUED | OUTPATIENT
Start: 2024-08-23 | End: 2024-08-29 | Stop reason: HOSPADM

## 2024-08-22 RX ORDER — LORAZEPAM 0.5 MG/1
0.5 TABLET ORAL EVERY 8 HOURS PRN
Status: DISCONTINUED | OUTPATIENT
Start: 2024-08-22 | End: 2024-08-26

## 2024-08-22 RX ORDER — ISOSORBIDE MONONITRATE 30 MG/1
30 TABLET, EXTENDED RELEASE ORAL NIGHTLY
Status: DISCONTINUED | OUTPATIENT
Start: 2024-08-22 | End: 2024-08-29 | Stop reason: HOSPADM

## 2024-08-22 RX ORDER — SPIRONOLACTONE 25 MG/1
50 TABLET ORAL DAILY
Status: DISCONTINUED | OUTPATIENT
Start: 2024-08-22 | End: 2024-08-29 | Stop reason: HOSPADM

## 2024-08-22 RX ORDER — SODIUM CHLORIDE 0.9 % (FLUSH) 0.9 %
10 SYRINGE (ML) INJECTION AS NEEDED
Status: DISCONTINUED | OUTPATIENT
Start: 2024-08-22 | End: 2024-08-29 | Stop reason: HOSPADM

## 2024-08-22 RX ORDER — NITROGLYCERIN 0.4 MG/1
0.4 TABLET SUBLINGUAL
Status: DISCONTINUED | OUTPATIENT
Start: 2024-08-22 | End: 2024-08-29 | Stop reason: HOSPADM

## 2024-08-22 RX ORDER — AMLODIPINE BESYLATE 10 MG/1
10 TABLET ORAL DAILY
Status: DISCONTINUED | OUTPATIENT
Start: 2024-08-23 | End: 2024-08-29 | Stop reason: HOSPADM

## 2024-08-22 RX ORDER — ACETAMINOPHEN 650 MG/1
650 SUPPOSITORY RECTAL EVERY 4 HOURS PRN
Status: DISCONTINUED | OUTPATIENT
Start: 2024-08-22 | End: 2024-08-29 | Stop reason: HOSPADM

## 2024-08-22 RX ORDER — ENOXAPARIN SODIUM 100 MG/ML
40 INJECTION SUBCUTANEOUS DAILY
Status: DISCONTINUED | OUTPATIENT
Start: 2024-08-22 | End: 2024-08-22

## 2024-08-22 RX ADMIN — BUSPIRONE HYDROCHLORIDE 15 MG: 10 TABLET ORAL at 20:53

## 2024-08-22 RX ADMIN — PREDNISONE 10 MG: 5 TABLET ORAL at 15:12

## 2024-08-22 RX ADMIN — ARFORMOTEROL TARTRATE 15 MCG: 15 SOLUTION RESPIRATORY (INHALATION) at 19:34

## 2024-08-22 RX ADMIN — Medication 5 MG: at 20:53

## 2024-08-22 RX ADMIN — Medication 10 ML: at 20:55

## 2024-08-22 RX ADMIN — BUDESONIDE 0.5 MG: 0.5 SUSPENSION RESPIRATORY (INHALATION) at 19:35

## 2024-08-22 RX ADMIN — MIRTAZAPINE 15 MG: 15 TABLET, FILM COATED ORAL at 20:53

## 2024-08-22 RX ADMIN — ASPIRIN 81 MG: 81 TABLET, COATED ORAL at 15:12

## 2024-08-22 RX ADMIN — CARVEDILOL 12.5 MG: 12.5 TABLET, FILM COATED ORAL at 20:53

## 2024-08-22 RX ADMIN — APIXABAN 5 MG: 5 TABLET, FILM COATED ORAL at 20:53

## 2024-08-22 RX ADMIN — ATORVASTATIN CALCIUM 40 MG: 40 TABLET, FILM COATED ORAL at 20:53

## 2024-08-22 RX ADMIN — SPIRONOLACTONE 50 MG: 25 TABLET ORAL at 16:41

## 2024-08-22 RX ADMIN — ISOSORBIDE MONONITRATE 30 MG: 30 TABLET, EXTENDED RELEASE ORAL at 20:53

## 2024-08-22 NOTE — TELEPHONE ENCOUNTER
Critical notification received for Day 24. Transmission was received at 3:21am on 8/22/2024. Report analysis showed atrial fibrillation RVR, Sinus rhythm w/ PVCs (4 in 1 min), PACs. Report has been printed for review.

## 2024-08-22 NOTE — ED PROVIDER NOTES
"Subjective   History of Present Illness patient is a 58-year-old male who presents via EMS from his home, after a sudden and severe difficulty breathing, suspected COPD exacerbation.  Patient wears 2 L/min nasal cannula oxygen flow, at approximately 9 AM after his wife got home from working night shift he had sudden worsening work of breathing, tachypnea, hypoxia, down to 72 to 73% on 4 L/min which he increased himself.  He states, regarding his recent hospitalization earlier this month and follow-up with pulmonary, \"I am so confused about my medicines doc, I do not know what I'm supposed to be taking.  They gave me for nebulizers but I do not understand what to take and when.\"  Patient reports that he smoked heavily for 30+ years but quit in 2019.  Denies cough, fevers, chills recently.    Review of Systems   Constitutional: Negative.    HENT: Negative.     Respiratory:  Positive for shortness of breath.    Cardiovascular: Negative.    Gastrointestinal: Negative.    Genitourinary: Negative.    Musculoskeletal: Negative.    Neurological: Negative.        Past Medical History:   Diagnosis Date    Anxiety     COPD (chronic obstructive pulmonary disease)     Depression     Hyperlipidemia     Hypertension     Recurrent kidney stones        No Known Allergies    Past Surgical History:   Procedure Laterality Date    CATARACT EXTRACTION, BILATERAL      COLONOSCOPY      EYE SURGERY  2017    WISDOM TOOTH EXTRACTION         Family History   Problem Relation Age of Onset    COPD Mother     Hyperlipidemia Father     Hypertension Father     Heart attack Father     Stroke Father     Diabetes Sister     Heart disease Sister     Hypertension Sister     Alcohol abuse Sister     Diabetes Sister     Hypertension Sister     Cancer Sister         Throat    Diabetes Sister     Hypertension Sister     Alcohol abuse Sister        Social History     Socioeconomic History    Marital status:    Tobacco Use    Smoking status: Former     " Current packs/day: 0.00     Average packs/day: 2.0 packs/day for 15.0 years (30.0 ttl pk-yrs)     Types: Cigarettes     Start date: 9/18/2005     Quit date: 9/18/2020     Years since quitting: 3.9     Passive exposure: Past    Smokeless tobacco: Never   Vaping Use    Vaping status: Never Used   Substance and Sexual Activity    Alcohol use: Yes     Alcohol/week: 1.0 standard drink of alcohol     Types: 1 Cans of beer per week     Comment: a few drinks on occasion, not excessive per patient    Drug use: Yes     Comment: Cannibus gummies    Sexual activity: Yes     Partners: Female     Birth control/protection: Partner of same sex           Objective   Physical Exam  Constitutional:       General: He is in acute distress.      Appearance: Normal appearance.   HENT:      Head: Normocephalic.      Right Ear: External ear normal.      Left Ear: External ear normal.   Eyes:      Extraocular Movements: Extraocular movements intact.      Pupils: Pupils are equal, round, and reactive to light.   Cardiovascular:      Rate and Rhythm: Tachycardia present.   Pulmonary:      Effort: Respiratory distress present.   Abdominal:      General: Abdomen is flat.      Palpations: Abdomen is soft.   Musculoskeletal:         General: Normal range of motion.      Cervical back: Normal range of motion.   Skin:     General: Skin is warm and dry.      Capillary Refill: Capillary refill takes less than 2 seconds.   Neurological:      General: No focal deficit present.      Mental Status: He is alert and oriented to person, place, and time.   Psychiatric:         Mood and Affect: Mood is anxious.         Critical Care    Performed by: Richard Angela APRN  Authorized by: Tolu Jesus DO    Critical care provider statement:     Critical care time (minutes):  15    Critical care time was exclusive of:  Separately billable procedures and treating other patients    Critical care was necessary to treat or prevent imminent or life-threatening  deterioration of the following conditions:  Respiratory failure    Critical care was time spent personally by me on the following activities:  Evaluation of patient's response to treatment, examination of patient, obtaining history from patient or surrogate, pulse oximetry, re-evaluation of patient's condition, review of old charts, ordering and performing treatments and interventions, ordering and review of laboratory studies and ordering and review of radiographic studies             ED Course  ED Course as of 08/22/24 1305   u Aug 22, 2024   0962 Patient initially evaluated ED bed 19.  His wife later arrived and joined him at the bedside. [JH]   1004 ABG resulted, his very slight hypercapnia, expected with his chronic lung disease, compensated pH, not hypoxic at this time, BiPAP has been applied.  Patient's plain film imaging the chest, without acute abnormality including effusion, pneumothorax, opacities.  He has overlying cardiac monitoring device [JH]   1004 . [JH]   1143 Serum chemistries, CBC, troponin and BMP reviewed without actionable abnormality.  Will reassess the patient especially his respiratory status and ventilatory effort, with consideration for disposition. [JH]   1202 Reevaluated the patient, he is able to tolerate the nose and face BiPAP mask better than any he is ever had he reports.  14/6, 30% where his settings. [JH]   1205 Reached out to the hospitalist Dr. Morales to recommend admission. [JH]   1218 Please call back, to inquire about the timing of the blood gas, as well as BiPAP administration, and discussed the possibility that the patient be trialed without BiPAP and potential for disposition home if he tolerates.  Will proceed to trial this with the patient if agreeable. [JH]   1230 Removed patient's BiPAP, placed on 2 L/min nasal cannula delivered oxygen.  He has call light close in hand, wife at bedside.  [JH]   1253 Hospitalist Dr. Regalado would like to admit the patient after  examining him, will provide education on home neb treatments. [JH]      ED Course User Index  [JH] Richard Angela APRN                                             Medical Decision Making  Given the patient's complaints, the acute and severe respiratory decline prehospital, and the patient's history and exam currently, differential diagnosis includes COPD exacerbation, acute respiratory failure, cannot exclude hypercapnia, pneumonia, upper respiratory viral infection.  Patient will have plain film imaging the chest, ongoing cardiac telemetry monitoring, serum screening labs, and viral swab obtained.  We will continue to administer oxygen via nasal cannula until BiPAP mask becomes available, and then apply, we will evaluate arterial blood gases as well, and plan patient's admission.    Problems Addressed:  Acute respiratory failure with hypoxia: complicated acute illness or injury  COPD with acute exacerbation: complicated acute illness or injury    Amount and/or Complexity of Data Reviewed  Labs: ordered.  Radiology: ordered.  ECG/medicine tests: ordered.    Risk  Prescription drug management.  Decision regarding hospitalization.        Final diagnoses:   Acute respiratory failure with hypoxia   COPD with acute exacerbation       ED Disposition  ED Disposition       ED Disposition   Decision to Admit    Condition   --    Comment   Level of Care: Telemetry [5]   Diagnosis: COPD exacerbation [914705]   Admitting Physician: SORAYA PAZ [[id number]]   Attending Physician: SORAYA PAZ [[id number]]                 No follow-up provider specified.       Medication List      No changes were made to your prescriptions during this visit.            Richard Angela APRN  08/22/24 [id number]

## 2024-08-22 NOTE — CASE MANAGEMENT/SOCIAL WORK
Discharge Planning Assessment  [facility]     Patient Name: Aguila Finn Jr.  MRN: [id number]  Today's Date: 8/22/2024    Admit Date: 8/22/2024    Plan: IDP   Discharge Needs Assessment       Row Name 08/22/24 1811       Living Environment    People in Home spouse    Name(s) of People in Home Stacey Finn    Current Living Arrangements home    Potentially Unsafe Housing Conditions unable to assess    In the past 12 months has the electric, gas, oil, or water company threatened to shut off services in your home? No    Primary Care Provided by self    Provides Primary Care For no one    Family Caregiver if Needed spouse    Family Caregiver Names Stacey Finn    Quality of Family Relationships unable to assess    Able to Return to Prior Arrangements yes       Resource/Environmental Concerns    Resource/Environmental Concerns none    Transportation Concerns none       Transportation Needs    In the past 12 months, has lack of transportation kept you from medical appointments or from getting medications? no    In the past 12 months, has lack of transportation kept you from meetings, work, or from getting things needed for daily living? No       Food Insecurity    Within the past 12 months, you worried that your food would run out before you got the money to buy more. Never true    Within the past 12 months, the food you bought just didn't last and you didn't have money to get more. Never true       Transition Planning    Patient/Family Anticipates Transition to home with family    Patient/Family Anticipated Services at Transition none    Transportation Anticipated family or friend will provide       Discharge Needs Assessment    Readmission Within the Last 30 Days unable to assess    Equipment Currently Used at Home oxygen                   Discharge Plan       Row Name 08/22/24 1816       Plan    Plan IDP    Plan Comments Pt. recently discharged on 8/6/24. Pt. lives with his spouse Stacey Finn in [facility]  [facility]. Pt.'s PCP Kenya Martinez. Pt.'s pharmacy is [facility]. Pt.'s insurance is [employer]. Pt. is independent at baseline. Pt. uses 2L of O2 serviced by [email or web address]. No HH currently. Pt. has transportation when medically ready to discharge. Pt. doesn't have an advanced directive or ACP documentation on file. CM will continue to follow pt. throughout his stay.    Final Discharge Disposition Code 01 - home or self-care                  Continued Care and Services - Admitted Since 8/22/2024    No active coordination exists for this encounter.       Selected Continued Care - Prior Encounters Includes continued care and service providers with selected services from prior encounters from 5/24/2024 to 8/22/2024      Discharged on 8/6/2024 Admission date: 8/1/2024 - Discharge disposition: Home or Self Care      Durable Medical Equipment       Service Provider Selected Services Address Phone Fax Patient Preferred    [facility] [place] [facility] [address] [facility] [address] [phone] --                             Demographic Summary       Row Name 08/22/24 1806       General Information    Admission Type observation    Arrived From home    Referral Source admission list    Reason for Consult discharge planning    Preferred Language English                   Functional Status       Row Name 08/22/24 1807       Functional Status, IADL    Medications independent    Meal Preparation independent    Housekeeping independent    Laundry independent    Shopping independent       Mental Status Summary    Recent Changes in Mental Status/Cognitive Functioning unable to assess       Employment/    Employment Status retired                   Psychosocial    No documentation.                  Abuse/Neglect    No documentation.                  Legal    No documentation.                  Substance Abuse    No documentation.                  Patient Forms    No  documentation.                     Rebecca Dunn MSW

## 2024-08-22 NOTE — TELEPHONE ENCOUNTER
Newly diagnosed atrial fibrillation was identified yesterday and carvedilol increased, as well as initiating apixaban for stroke prophylaxis.  Will continue to monitor.

## 2024-08-22 NOTE — ED NOTES
Aguila Finn Jr.    Nursing Report ED to Floor:  Mental status: A&O  Ambulatory status: AMB AT BASELINE  Oxygen Therapy:  2LPM  Cardiac Rhythm: SINUS  Admitted from: ED  Safety Concerns:  NONE  Social Issues: NONE  ED Room #:  19    ED Nurse Phone Extension - [phone] or may call [phone].      HPI:   Chief Complaint   Patient presents with    Respiratory Distress       Past Medical History:  Past Medical History:   Diagnosis Date    Anxiety     COPD (chronic obstructive pulmonary disease)     Depression     Hyperlipidemia     Hypertension     Recurrent kidney stones         Past Surgical History:  Past Surgical History:   Procedure Laterality Date    CATARACT EXTRACTION, BILATERAL      COLONOSCOPY      EYE SURGERY  2017    WISDOM TOOTH EXTRACTION          Admitting Doctor:   Chau Reglaado MD    Consulting Provider(s):  Consults       No orders found from 7/24/2024 to 8/23/2024.             Admitting Diagnosis:   The primary encounter diagnosis was Acute respiratory failure with hypoxia. A diagnosis of COPD with acute exacerbation was also pertinent to this visit.    Most Recent Vitals:   Vitals:    08/22/24 1132 08/22/24 1330 08/22/24 1402 08/22/24 1406   BP: 137/78   135/86   BP Location:       Patient Position:       Pulse: 97 89 86 92   Resp:       Temp:       SpO2: 97% 96% 96% 95%   Weight:       Height:           Active LDAs/IV Access:   Lines, Drains & Airways       Active LDAs       Name Placement date Placement time Site Days    Peripheral IV Anterior;Distal;Right;Upper Arm --  --  Arm  --                    Labs (abnormal labs have a star):   Labs Reviewed   COMPREHENSIVE METABOLIC PANEL - Abnormal; Notable for the following components:       Result Value    Glucose 117 (*)     BUN 31 (*)     Sodium 147 (*)     CO2 31.0 (*)     BUN/Creatinine Ratio 27.9 (*)     All other components within normal limits    Narrative:     GFR Normal >60  Chronic Kidney Disease <60  Kidney Failure <15     CBC WITH  AUTO DIFFERENTIAL - Abnormal; Notable for the following components:    Hemoglobin 12.5 (*)     Platelets 115 (*)     Neutrophil % 88.3 (*)     Lymphocyte % 3.4 (*)     Immature Grans % 0.6 (*)     Neutrophils, Absolute 7.83 (*)     Lymphocytes, Absolute 0.30 (*)     All other components within normal limits   BLOOD GAS, ARTERIAL W/CO-OXIMETRY - Abnormal; Notable for the following components:    pCO2, Arterial 46.4 (*)     HCO3, Arterial 31.4 (*)     Base Excess, Arterial 6.2 (*)     Hemoglobin, Blood Gas 13.0 (*)     All other components within normal limits   RESPIRATORY PANEL PCR W/ COVID-19 (SARS-COV-2), NP SWAB IN UTM/VTP, 2 HR TAT - Normal    Narrative:     In the setting of a positive respiratory panel with a viral infection PLUS a negative procalcitonin without other underlying concern for bacterial infection, consider observing off antibiotics or discontinuation of antibiotics and continue supportive care. If the respiratory panel is positive for atypical bacterial infection (Bordetella pertussis, Chlamydophila pneumoniae, or Mycoplasma pneumoniae), consider antibiotic de-escalation to target atypical bacterial infection.   BNP (IN-HOUSE) - Normal    Narrative:     This assay is used as an aid in the diagnosis of individuals suspected of having heart failure. It can be used as an aid in the diagnosis of acute decompensated heart failure (ADHF) in patients presenting with signs and symptoms of ADHF to the emergency department (ED). In addition, NT-proBNP of <300 pg/mL indicates ADHF is not likely.    Age Range Result Interpretation  NT-proBNP Concentration (pg/mL:      <50             Positive            >450                   Gray                 300-450                    Negative             <300    50-75           Positive            >900                  Gray                300-900                  Negative            <300      >75             Positive            >1800                  [place]                " 300-1800                  Negative            <300   SINGLE HS TROPONIN T - Normal    Narrative:     High Sensitive Troponin T Reference Range:  <14.0 ng/L- Negative Female for AMI  <22.0 ng/L- Negative Male for AMI  >=14 - Abnormal Female indicating possible myocardial injury.  >=22 - Abnormal Male indicating possible myocardial injury.   Clinicians would have to utilize clinical acumen, EKG, Troponin, and serial changes to determine if it is an Acute Myocardial Infarction or myocardial injury due to an underlying chronic condition.        D-DIMER, QUANTITATIVE - Normal    Narrative:     According to the assay 's published package insert, a normal (<0.50 MCGFEU/mL) D-dimer result in conjunction with a non-high clinical probability assessment, excludes deep vein thrombosis (DVT) and pulmonary embolism (PE) with high sensitivity.    D-dimer values increase with age and this can make VTE exclusion of an older population difficult. To address this, the American College of Physicians, based on best available evidence and recent guidelines, recommends that clinicians use age-adjusted D-dimer thresholds in patients greater than 50 years of age with: a) a low probability of PE who do not meet all Pulmonary Embolism Rule Out Criteria, or b) in those with intermediate probability of PE.   The formula for an age-adjusted D-dimer cut-off is \"age/100\".  For example, a 60 year old patient would have an age-adjusted cut-off of 0.60 MCGFEU/mL and an 80 year old 0.80 MCGFEU/mL.   RAINBOW DRAW    Narrative:     The following orders were created for panel order [place] Draw.  Procedure                               Abnormality         Status                     ---------                               -----------         ------                     Green Top (Gel)[[id number]]                                  Final result               Lavender Top[[id number]]                                     Final result             "   Gold Top - SST[[id number]]                                   Final result               Gray Top[[id number]]                                         Final result               Light Blue Top[[id number]]                                   Final result                 Please view results for these tests on the individual orders.   BLOOD GAS, ARTERIAL   CBC AND DIFFERENTIAL    Narrative:     The following orders were created for panel order CBC & Differential.  Procedure                               Abnormality         Status                     ---------                               -----------         ------                     CBC Auto Differential[[id number]]        Abnormal            Final result               Scan Slide[[id number]]                                                                    Please view results for these tests on the individual orders.   GREEN TOP   LAVENDER TOP   GOLD TOP - SST   GRAY TOP   LIGHT BLUE TOP       Meds Given in ED:   Medications   sodium chloride 0.9 % flush 10 mL (has no administration in time range)   nitroglycerin (NITROSTAT) SL tablet 0.4 mg (has no administration in time range)   sodium chloride 0.9 % flush 10 mL (has no administration in time range)   sodium chloride 0.9 % flush 10 mL (has no administration in time range)   sodium chloride 0.9 % infusion 40 mL (has no administration in time range)   acetaminophen (TYLENOL) tablet 650 mg (has no administration in time range)     Or   acetaminophen (TYLENOL) 160 MG/5ML oral solution 650 mg (has no administration in time range)     Or   acetaminophen (TYLENOL) suppository 650 mg (has no administration in time range)   calcium carbonate (TUMS) chewable tablet 500 mg (200 mg elemental) (has no administration in time range)   LORazepam (ATIVAN) tablet 0.5 mg (has no administration in time range)   sennosides-docusate (PERICOLACE) 8.6-50 MG per tablet 2 tablet (has no administration in time range)     And   polyethylene glycol  (MIRALAX) packet 17 g (has no administration in time range)     And   bisacodyl (DULCOLAX) EC tablet 5 mg (has no administration in time range)     And   bisacodyl (DULCOLAX) suppository 10 mg (has no administration in time range)   Potassium Replacement - Follow Nurse / BPA Driven Protocol (has no administration in time range)   Magnesium Standard Dose Replacement - Follow Nurse / BPA Driven Protocol (has no administration in time range)   Phosphorus Replacement - Follow Nurse / BPA Driven Protocol (has no administration in time range)   Calcium Replacement - Follow Nurse / BPA Driven Protocol (has no administration in time range)   melatonin tablet 5 mg (has no administration in time range)   ondansetron ODT (ZOFRAN-ODT) disintegrating tablet 4 mg (has no administration in time range)     Or   ondansetron (ZOFRAN) injection 4 mg (has no administration in time range)   albuterol (PROVENTIL) nebulizer solution 0.083% 2.5 mg/3mL (has no administration in time range)   amLODIPine (NORVASC) tablet 10 mg (has no administration in time range)   apixaban (ELIQUIS) tablet 5 mg (has no administration in time range)   arformoterol (BROVANA) nebulizer solution 15 mcg (15 mcg Nebulization Not Given 8/22/24 1349)   aspirin EC tablet 81 mg (has no administration in time range)   atorvastatin (LIPITOR) tablet 40 mg (has no administration in time range)   budesonide (PULMICORT) nebulizer solution 0.5 mg (has no administration in time range)   busPIRone (BUSPAR) tablet 15 mg (has no administration in time range)   carvedilol (COREG) tablet 12.5 mg (has no administration in time range)   mirtazapine (REMERON) tablet 15 mg (has no administration in time range)   isosorbide mononitrate (IMDUR) 24 hr tablet 30 mg (has no administration in time range)   pantoprazole (PROTONIX) EC tablet 40 mg (has no administration in time range)   predniSONE (DELTASONE) tablet 10 mg (has no administration in time range)   revefenacin (YUPELRI)  nebulizer solution 175 mcg (175 mcg Nebulization Not Given 8/22/24 2102)   spironolactone (ALDACTONE) tablet 50 mg (has no administration in time range)           Last NIH score:                                                          Dysphagia screening results:        [place] Coma Scale:  No data recorded     CIWA:        Restraint Type:            Isolation Status:  No active isolations

## 2024-08-22 NOTE — H&P
[facility] Medicine Services  HISTORY AND PHYSICAL    Patient Name: Aguila Finn Jr.  : 1965  MRN: [id number]  Primary Care Physician: Kenya Martinez MD  Date of admission: 2024      Subjective   Subjective     Chief Complaint:  Shortness of breath since last night    HPI:  Aguila Finn Jr. is a 58 y.o. male with past medical history of severe COPD (most recent PFT 2024 with FEV1= 20% of predicted, FEV1/FVC= 48% of predicted, DLCO=52% of predicted), who follows with pulmonary service at [facility], history of essential hypertension, dyslipidemia, nephrolithiasis, anxiety and depression, who presented to the hospital today with worsening shortness of breath and hypoxia since last night    He was recently hospitalized at [facility] from 2024 till 2024 when he was treated for COPD exacerbation and improved and discharged on prednisone taper, Symbicort and doxycycline.  During that hospital stay, he had a stress myocardial perfusion scan that was negative for reversible ischemia and echocardiogram with normal systolic and diastolic function.  He was seen by Ms. Parada/pulmonary clinic on 2024 and was advised to continue to take Brovana and as needed albuterol.  During that visit, his Symbicort was switched to budesonide, and he was given free sample of Yuplri for 2 weeks to see if he is going to improve with that regimen.  Unfortunately, patient was confused about his nebulizer treatments and continue to take Symbicort and Brovana.  Additionally, he was prescribed Triology machine but he did not use because the double facemask was making him smothering. Per his report, he was doing fine till yesterday when he started developing worsening shortness of breath.  This morning, his shortness of breath got worse and his oxygen saturations dropped to the 70s prompting him to come to the hospital to be evaluated.  Denied any fever or worsening cough or  sputum production.    In ER, he was found to be in respiratory distress, with respiratory in the 30s, tachycardic with heart rate in the 110 that quickly improved with BiPAP.  At the time of the encounter, he was wearing his oxygen at his home dose 2 L/min and his oxygen saturation was 95% and he was comfortable.  Lab workup unremarkable.  Chest x-ray with no acute infiltrates      Personal History     Past Medical History:   Diagnosis Date    Anxiety     COPD (chronic obstructive pulmonary disease)     Depression     Hyperlipidemia     Hypertension     Recurrent kidney stones            Past Surgical History:   Procedure Laterality Date    CATARACT EXTRACTION, BILATERAL      COLONOSCOPY      EYE SURGERY  2017    WISDOM TOOTH EXTRACTION         Family History: family history includes Alcohol abuse in his sister and sister; COPD in his mother; Cancer in his sister; Diabetes in his sister, sister, and sister; Heart attack in his father; Heart disease in his sister; Hyperlipidemia in his father; Hypertension in his father, sister, sister, and sister; Stroke in his father.     Social History:  reports that he quit smoking about 3 years ago. His smoking use included cigarettes. He started smoking about 18 years ago. He has a 30 pack-year smoking history. He has been exposed to tobacco smoke. He has never used smokeless tobacco. He reports current alcohol use of about 1.0 standard drink of alcohol per week. He reports current drug use.  Social History     Social History Narrative    He is a smoker of about a pack a day and has been smoking at least the past 39 years.  Denies drug use.  Occasional EtOH use.  Occasional caffeine, he has 2 dogs and a cat.  Recent travel to [place] on a cruise.  He is retired with a high school education and is  and lives at home with his wife.       Medications:  Available home medication information reviewed.  albuterol, amLODIPine, apixaban, arformoterol,  aspirin, atorvastatin, budesonide, busPIRone, carvedilol, isosorbide mononitrate, mirtazapine, pantoprazole, predniSONE, and spironolactone    No Known Allergies    Objective   Objective     Vital Signs:   Heart Rate:  [] 98  Resp:  [30] 30  BP: (127)/(85) 127/85  Flow (L/min):  [2] 2       Physical Exam   General: Comfortable, not in distress, conversant and cooperative  Head: Atraumatic and normocephalic  Eyes: No Icterus. No pallor  Ears:  Ears appear intact with no abnormalities noted  Throat: No oral lesions, no thrush  Neck: Supple, trachea midline  Lungs: Mild respiratory distress.  No wheezing.  Markedly diminished air entry both lung fields.  No crackles  Heart:  Normal S1 and S2, no murmur, no gallop, No JVD, no lower extremity swelling  Abdomen:  Soft, no tenderness, no organomegaly, normal bowel sounds, no organomegaly  Extremities: pulses equal bilaterally  Skin: No bleeding, bruising or rash, normal skin turgor and elasticity  Neurologic: Cranial nerves appear intact with no evidence of facial asymmetry, normal motor and sensory functions in all 4 extremities  Psych: Alert and oriented x 3, normal mood    Result Review:  I have personally reviewed the results from the time of this admission to 8/22/2024 12:40 EDT and agree with these findings:  [x]  Laboratory list / accordion  [x]  Microbiology  [x]  Radiology  [x]  EKG/Telemetry   [x]  Cardiology/Vascular   [x]  Pathology  [x]  Old records    LAB RESULTS:      Lab 08/22/24  1019   WBC 8.87   HEMOGLOBIN 12.5*   HEMATOCRIT 39.2   PLATELETS 115*   NEUTROS ABS 7.83*   IMMATURE GRANS (ABS) 0.05   LYMPHS ABS 0.30*   MONOS ABS 0.49   EOS ABS 0.17   MCV 91.2         Lab 08/22/24  1019   SODIUM 147*   POTASSIUM 4.3   CHLORIDE 106   CO2 31.0*   ANION GAP 10.0   BUN 31*   CREATININE 1.11   EGFR 76.5   GLUCOSE 117*   CALCIUM 8.7         Lab 08/22/24  1019   TOTAL PROTEIN 6.4   ALBUMIN 3.6   GLOBULIN 2.8   ALT (SGPT) 35   AST (SGOT) 17   BILIRUBIN 0.6   ALK  PHOS 72         Lab 08/22/24  1019   PROBNP 55.5   HSTROP T 19                 Lab 08/22/24  0959   PH, ARTERIAL 7.439   PCO2, ARTERIAL 46.4*   PO2 ART 88.0   FIO2 28   HCO3 ART 31.4*   BASE EXCESS ART 6.2*   CARBOXYHEMOGLOBIN 0.9         Microbiology Results (last 10 days)       Procedure Component Value - Date/Time    Respiratory Panel PCR w/COVID-19(SARS-CoV-2) DAKOTAH/JULIANA/ISAIAS/PAD/COR/CHRISTAL In-House, NP Swab in UTM/VTM, 2 HR TAT - Swab, Nasopharynx [[id number]]  (Normal) Collected: 08/22/24 0951    Lab Status: Final result Specimen: Swab from Nasopharynx Updated: 08/22/24 1102     ADENOVIRUS, PCR Not Detected     Coronavirus 229E Not Detected     Coronavirus HKU1 Not Detected     Coronavirus NL63 Not Detected     Coronavirus OC43 Not Detected     COVID19 Not Detected     Human Metapneumovirus Not Detected     Human Rhinovirus/Enterovirus Not Detected     Influenza A PCR Not Detected     Influenza B PCR Not Detected     Parainfluenza Virus 1 Not Detected     Parainfluenza Virus 2 Not Detected     Parainfluenza Virus 3 Not Detected     Parainfluenza Virus 4 Not Detected     RSV, PCR Not Detected     Bordetella pertussis pcr Not Detected     Bordetella parapertussis PCR Not Detected     Chlamydophila pneumoniae PCR Not Detected     Mycoplasma pneumo by PCR Not Detected    Narrative:      In the setting of a positive respiratory panel with a viral infection PLUS a negative procalcitonin without other underlying concern for bacterial infection, consider observing off antibiotics or discontinuation of antibiotics and continue supportive care. If the respiratory panel is positive for atypical bacterial infection (Bordetella pertussis, Chlamydophila pneumoniae, or Mycoplasma pneumoniae), consider antibiotic de-escalation to target atypical bacterial infection.            XR Chest 1 View    Result Date: 8/22/2024  XR CHEST 1 VW Date of Exam: 8/22/2024 9:31 AM EDT Indication: SOA triage protocol Comparison: CT chest August 1,  2024 Findings: There are emphysematous changes present. The heart is not enlarged. There is no idris consolidation or obvious pleural effusions.     Impression: Impression: 1.Emphysematous changes suggested. Electronically Signed: Telly Cao MD  8/22/2024 9:53 AM EDT  Workstation ID: [id number]     Results for orders placed during the hospital encounter of 08/01/24    Adult Transthoracic Echo Complete W/ Cont if Necessary Per Protocol    Interpretation Summary    Left ventricular systolic function is normal. Calculated left ventricular EF = 61.6% Left ventricular ejection fraction appears to be 61 - 65%.    Left ventricular diastolic function was normal.    No significant structural or functional valvular disease.    No significant change compared to previous study      Assessment & Plan   Assessment & Plan       * No active hospital problems. *    Summary:  Aguila Finn Jr. is a 58 y.o. male with past medical history of severe COPD (most recent PFT 8/14/2024 with FEV1= 20% of predicted, FEV1/FVC= 48% of predicted, DLCO=52% of predicted), who follows with pulmonary service at [facility], history of essential hypertension, dyslipidemia, nephrolithiasis, anxiety and depression, who presented to the hospital today with worsening shortness of breath and hypoxia since last night    Acute on chronic hypoxia, improved  COPD exacerbation, mild  Presented with worsening hypoxia since last night.  Oxygen saturation in the 70s per his home pulse oximeter.  Was in mild respiratory distress upon arrival to ER quickly improved with BiPAP  Likely cause of his COPD exacerbation and hypoxia is noncompliant with home medications.  No evidence of pneumonia on chest x-ray.  D-dimer negative.  Negative cardiac workup during last hospitalization in early August with normal echocardiogram and negative PET myocardial perfusion scan  Patient was confused about his new inhalers recently prescribed by his pulmonologist on 8/14/2024.   Education provided to him and his wife at bedside by myself and pharmacy team  He should continue to take Brovana and albuterol nebulizer treatment  He was advised to stop taking Symbicort and instead he will use Pulmicort and  Yupelri which were newly prescribed by his pulmonologist  Additionally, he was prescribed Triology but was not using it because the whole facemask was making him smother.  He was advised to bring his trilogy machine and use the nasal mask provided here in the hospital and see if he is kala do well with that  Continue steroid taper from his home regimen  No indication to initiate antibiotic therapy  Continue oxygen supplementation with target oxygen saturation above 88 to 92%    Coronary artery disease  Essential hypertension  Dyslipidemia  Continue amlodipine, Lipitor, Coreg, aspirin, Imdur  Continue Aldactone    Newly diagnosed paroxysmal A-fib  Patient wearing an event monitor.  Contacted by his cardiologist that it showed paroxysmal A-fib  Recently started on Eliquis.  Continue    Anxiety and depression  Continue mirtazapine    VTE Prophylaxis:  No VTE prophylaxis order currently exists.          CODE STATUS:    There are no questions and answers to display.       Expected Discharge   Expected discharge date/ time has not been documented.     Chau Regalado MD  08/22/24

## 2024-08-23 LAB
ALBUMIN SERPL-MCNC: 3.2 G/DL (ref 3.5–5.2)
ALBUMIN/GLOB SERPL: 1.2 G/DL
ALP SERPL-CCNC: 71 U/L (ref 39–117)
ALT SERPL W P-5'-P-CCNC: 33 U/L (ref 1–41)
ANION GAP SERPL CALCULATED.3IONS-SCNC: 12 MMOL/L (ref 5–15)
AST SERPL-CCNC: 14 U/L (ref 1–40)
BASOPHILS # BLD AUTO: 0.01 10*3/MM3 (ref 0–0.2)
BASOPHILS NFR BLD AUTO: 0.1 % (ref 0–1.5)
BILIRUB SERPL-MCNC: 0.3 MG/DL (ref 0–1.2)
BUN SERPL-MCNC: 43 MG/DL (ref 6–20)
BUN/CREAT SERPL: 30.5 (ref 7–25)
CALCIUM SPEC-SCNC: 8.6 MG/DL (ref 8.6–10.5)
CHLORIDE SERPL-SCNC: 104 MMOL/L (ref 98–107)
CO2 SERPL-SCNC: 28 MMOL/L (ref 22–29)
CREAT SERPL-MCNC: 1.41 MG/DL (ref 0.76–1.27)
DEPRECATED RDW RBC AUTO: 45.6 FL (ref 37–54)
EGFRCR SERPLBLD CKD-EPI 2021: 57.4 ML/MIN/1.73
EOSINOPHIL # BLD AUTO: 0 10*3/MM3 (ref 0–0.4)
EOSINOPHIL NFR BLD AUTO: 0 % (ref 0.3–6.2)
ERYTHROCYTE [DISTWIDTH] IN BLOOD BY AUTOMATED COUNT: 13.5 % (ref 12.3–15.4)
GEN 5 2HR TROPONIN T REFLEX: 12 NG/L
GLOBULIN UR ELPH-MCNC: 2.6 GM/DL
GLUCOSE BLDC GLUCOMTR-MCNC: 121 MG/DL (ref 70–130)
GLUCOSE BLDC GLUCOMTR-MCNC: 134 MG/DL (ref 70–130)
GLUCOSE SERPL-MCNC: 126 MG/DL (ref 65–99)
HCT VFR BLD AUTO: 36.5 % (ref 37.5–51)
HGB BLD-MCNC: 11.6 G/DL (ref 13–17.7)
IMM GRANULOCYTES # BLD AUTO: 0.05 10*3/MM3 (ref 0–0.05)
IMM GRANULOCYTES NFR BLD AUTO: 0.7 % (ref 0–0.5)
LYMPHOCYTES # BLD AUTO: 0.32 10*3/MM3 (ref 0.7–3.1)
LYMPHOCYTES NFR BLD AUTO: 4.5 % (ref 19.6–45.3)
MAGNESIUM SERPL-MCNC: 2.5 MG/DL (ref 1.6–2.6)
MCH RBC QN AUTO: 29 PG (ref 26.6–33)
MCHC RBC AUTO-ENTMCNC: 31.8 G/DL (ref 31.5–35.7)
MCV RBC AUTO: 91.3 FL (ref 79–97)
MONOCYTES # BLD AUTO: 0.69 10*3/MM3 (ref 0.1–0.9)
MONOCYTES NFR BLD AUTO: 9.7 % (ref 5–12)
NEUTROPHILS NFR BLD AUTO: 6.06 10*3/MM3 (ref 1.7–7)
NEUTROPHILS NFR BLD AUTO: 85 % (ref 42.7–76)
NRBC BLD AUTO-RTO: 0 /100 WBC (ref 0–0.2)
PHOSPHATE SERPL-MCNC: 3.8 MG/DL (ref 2.5–4.5)
PLATELET # BLD AUTO: 106 10*3/MM3 (ref 140–450)
PMV BLD AUTO: 11.6 FL (ref 6–12)
POTASSIUM SERPL-SCNC: 4.4 MMOL/L (ref 3.5–5.2)
PROT SERPL-MCNC: 5.8 G/DL (ref 6–8.5)
QT INTERVAL: 320 MS
QTC INTERVAL: 488 MS
RBC # BLD AUTO: 4 10*6/MM3 (ref 4.14–5.8)
SODIUM SERPL-SCNC: 144 MMOL/L (ref 136–145)
TROPONIN T DELTA: 0 NG/L
TROPONIN T SERPL HS-MCNC: 12 NG/L
WBC NRBC COR # BLD AUTO: 7.13 10*3/MM3 (ref 3.4–10.8)

## 2024-08-23 PROCEDURE — 82948 REAGENT STRIP/BLOOD GLUCOSE: CPT

## 2024-08-23 PROCEDURE — 63710000001 PREDNISONE PER 5 MG: Performed by: INTERNAL MEDICINE

## 2024-08-23 PROCEDURE — 84484 ASSAY OF TROPONIN QUANT: CPT | Performed by: INTERNAL MEDICINE

## 2024-08-23 PROCEDURE — 83735 ASSAY OF MAGNESIUM: CPT | Performed by: INTERNAL MEDICINE

## 2024-08-23 PROCEDURE — 94799 UNLISTED PULMONARY SVC/PX: CPT

## 2024-08-23 PROCEDURE — 85025 COMPLETE CBC W/AUTO DIFF WBC: CPT | Performed by: INTERNAL MEDICINE

## 2024-08-23 PROCEDURE — 93005 ELECTROCARDIOGRAM TRACING: CPT | Performed by: INTERNAL MEDICINE

## 2024-08-23 PROCEDURE — 97165 OT EVAL LOW COMPLEX 30 MIN: CPT

## 2024-08-23 PROCEDURE — 97161 PT EVAL LOW COMPLEX 20 MIN: CPT

## 2024-08-23 PROCEDURE — 93010 ELECTROCARDIOGRAM REPORT: CPT | Performed by: INTERNAL MEDICINE

## 2024-08-23 PROCEDURE — 63710000001 REVEFENACIN 175 MCG/3ML SOLUTION: Performed by: INTERNAL MEDICINE

## 2024-08-23 PROCEDURE — 63710000001 REVEFENACIN 175 MCG/3ML SOLUTION

## 2024-08-23 PROCEDURE — 99232 SBSQ HOSP IP/OBS MODERATE 35: CPT | Performed by: INTERNAL MEDICINE

## 2024-08-23 PROCEDURE — 80053 COMPREHEN METABOLIC PANEL: CPT | Performed by: INTERNAL MEDICINE

## 2024-08-23 PROCEDURE — 94664 DEMO&/EVAL PT USE INHALER: CPT

## 2024-08-23 PROCEDURE — 84100 ASSAY OF PHOSPHORUS: CPT | Performed by: INTERNAL MEDICINE

## 2024-08-23 PROCEDURE — G0378 HOSPITAL OBSERVATION PER HR: HCPCS

## 2024-08-23 RX ADMIN — PANTOPRAZOLE SODIUM 40 MG: 40 TABLET, DELAYED RELEASE ORAL at 06:14

## 2024-08-23 RX ADMIN — Medication 10 ML: at 08:32

## 2024-08-23 RX ADMIN — BUDESONIDE 0.5 MG: 0.5 SUSPENSION RESPIRATORY (INHALATION) at 20:46

## 2024-08-23 RX ADMIN — BUSPIRONE HYDROCHLORIDE 15 MG: 10 TABLET ORAL at 22:44

## 2024-08-23 RX ADMIN — BUSPIRONE HYDROCHLORIDE 15 MG: 10 TABLET ORAL at 08:32

## 2024-08-23 RX ADMIN — REVEFENACIN 175 MCG: 175 SOLUTION RESPIRATORY (INHALATION) at 08:36

## 2024-08-23 RX ADMIN — ISOSORBIDE MONONITRATE 30 MG: 30 TABLET, EXTENDED RELEASE ORAL at 22:44

## 2024-08-23 RX ADMIN — MIRTAZAPINE 15 MG: 15 TABLET, FILM COATED ORAL at 22:44

## 2024-08-23 RX ADMIN — ATORVASTATIN CALCIUM 40 MG: 40 TABLET, FILM COATED ORAL at 22:44

## 2024-08-23 RX ADMIN — AMLODIPINE BESYLATE 10 MG: 10 TABLET ORAL at 08:32

## 2024-08-23 RX ADMIN — ARFORMOTEROL TARTRATE 15 MCG: 15 SOLUTION RESPIRATORY (INHALATION) at 08:35

## 2024-08-23 RX ADMIN — LORAZEPAM 0.5 MG: 0.5 TABLET ORAL at 22:44

## 2024-08-23 RX ADMIN — CARVEDILOL 12.5 MG: 12.5 TABLET, FILM COATED ORAL at 22:44

## 2024-08-23 RX ADMIN — APIXABAN 5 MG: 5 TABLET, FILM COATED ORAL at 22:44

## 2024-08-23 RX ADMIN — PREDNISONE 10 MG: 5 TABLET ORAL at 08:32

## 2024-08-23 RX ADMIN — Medication 10 ML: at 22:46

## 2024-08-23 RX ADMIN — BUDESONIDE 0.5 MG: 0.5 SUSPENSION RESPIRATORY (INHALATION) at 08:35

## 2024-08-23 RX ADMIN — CARVEDILOL 12.5 MG: 12.5 TABLET, FILM COATED ORAL at 08:32

## 2024-08-23 RX ADMIN — SPIRONOLACTONE 50 MG: 25 TABLET ORAL at 08:32

## 2024-08-23 RX ADMIN — ASPIRIN 81 MG: 81 TABLET, COATED ORAL at 08:32

## 2024-08-23 RX ADMIN — Medication 5 MG: at 22:44

## 2024-08-23 RX ADMIN — APIXABAN 5 MG: 5 TABLET, FILM COATED ORAL at 08:32

## 2024-08-23 NOTE — PLAN OF CARE
Goal Outcome Evaluation:      Pt progressing well. Calm, Cooperative and accepting of plan of care.

## 2024-08-23 NOTE — PROGRESS NOTES
[facility] Medicine Services  PROGRESS NOTE    Patient Name: Aguila Finn Jr.  : 1965  MRN: [id number]    Date of Admission: 2024  Primary Care Physician: Kenya Martinez MD    Subjective   Subjective     CC:  Severe shortness of breath    HPI:  Resting in a chair in no acute distress and tells me he is better but still gets episodes of shortness of breath.  No fever or chills.  No chest pain or palpitation.  No nausea vomiting or diarrhea or abdominal pain.      Objective   Objective     Vital Signs:   Temp:  [97.6 °F (36.4 °C)-97.8 °F (36.6 °C)] 97.6 °F (36.4 °C)  Heart Rate:  [] 86  Resp:  [16-20] 16  BP: (127-161)/(71-94) 151/71  Flow (L/min):  [2] 2     Physical Exam:  Constitutional: No acute distress, awake, alert  HENT: NCAT, mucous membranes moist  Respiratory: Decreased breath sounds bilaterally, respiratory effort normal, on 3 L of nasal cannula and saturating in low 90s.  Desaturates quickly with minimal exertion.  Cardiovascular: RRR, no murmurs, rubs, or gallops  Gastrointestinal: Positive bowel sounds, soft, nontender, nondistended  Musculoskeletal: No bilateral ankle edema  Psychiatric: Appropriate affect, cooperative  Neurologic: Oriented x 3, no focality appreciated, speech clear  Skin: No rashes     Results Reviewed:  LAB RESULTS:      Lab 24  0208 24  1019   WBC 7.13 8.87   HEMOGLOBIN 11.6* 12.5*   HEMATOCRIT 36.5* 39.2   PLATELETS 106* 115*   NEUTROS ABS 6.06 7.83*   IMMATURE GRANS (ABS) 0.05 0.05   LYMPHS ABS 0.32* 0.30*   MONOS ABS 0.69 0.49   EOS ABS 0.00 0.17   MCV 91.3 91.2   D DIMER QUANT  --  0.45         Lab 24  0208 24  1019   SODIUM 144 147*   POTASSIUM 4.4 4.3   CHLORIDE 104 106   CO2 28.0 31.0*   ANION GAP 12.0 10.0   BUN 43* 31*   CREATININE 1.41* 1.11   EGFR 57.4* 76.5   GLUCOSE 126* 117*   CALCIUM 8.6 8.7   MAGNESIUM 2.5  --    PHOSPHORUS 3.8  --          Lab 24  0208 24  1019    TOTAL PROTEIN 5.8* 6.4   ALBUMIN 3.2* 3.6   GLOBULIN 2.6 2.8   ALT (SGPT) 33 35   AST (SGOT) 14 17   BILIRUBIN 0.3 0.6   ALK PHOS 71 72         Lab 08/23/24  0351 08/23/24  0208 08/22/24  1019   PROBNP  --   --  55.5   HSTROP T 12 12 19                 Lab 08/22/24  0959   PH, ARTERIAL 7.439   PCO2, ARTERIAL 46.4*   PO2 ART 88.0   FIO2 28   HCO3 ART 31.4*   BASE EXCESS ART 6.2*   CARBOXYHEMOGLOBIN 0.9     Brief Urine Lab Results       None            Microbiology Results Abnormal       Procedure Component Value - Date/Time    Respiratory Panel PCR w/COVID-19(SARS-CoV-2) DAKOTAH/JULIANA/ISAIAS/PAD/COR/CHRISTAL In-House, NP Swab in UTM/VTM, 2 HR TAT - Swab, Nasopharynx [[id number]]  (Normal) Collected: 08/22/24 0951    Lab Status: Final result Specimen: Swab from Nasopharynx Updated: 08/22/24 1102     ADENOVIRUS, PCR Not Detected     Coronavirus 229E Not Detected     Coronavirus HKU1 Not Detected     Coronavirus NL63 Not Detected     Coronavirus OC43 Not Detected     COVID19 Not Detected     Human Metapneumovirus Not Detected     Human Rhinovirus/Enterovirus Not Detected     Influenza A PCR Not Detected     Influenza B PCR Not Detected     Parainfluenza Virus 1 Not Detected     Parainfluenza Virus 2 Not Detected     Parainfluenza Virus 3 Not Detected     Parainfluenza Virus 4 Not Detected     RSV, PCR Not Detected     Bordetella pertussis pcr Not Detected     Bordetella parapertussis PCR Not Detected     Chlamydophila pneumoniae PCR Not Detected     Mycoplasma pneumo by PCR Not Detected    Narrative:      In the setting of a positive respiratory panel with a viral infection PLUS a negative procalcitonin without other underlying concern for bacterial infection, consider observing off antibiotics or discontinuation of antibiotics and continue supportive care. If the respiratory panel is positive for atypical bacterial infection (Bordetella pertussis, Chlamydophila pneumoniae, or Mycoplasma pneumoniae), consider antibiotic  de-escalation to target atypical bacterial infection.            XR Chest 1 View    Result Date: 8/22/2024  XR CHEST 1 VW Date of Exam: 8/22/2024 9:31 AM EDT Indication: SOA triage protocol Comparison: CT chest August 1, 2024 Findings: There are emphysematous changes present. The heart is not enlarged. There is no idris consolidation or obvious pleural effusions.     Impression: Impression: 1.Emphysematous changes suggested. Electronically Signed: Telly Cao MD  8/22/2024 9:53 AM EDT  Workstation ID: [id number]     Results for orders placed during the hospital encounter of 08/01/24    Adult Transthoracic Echo Complete W/ Cont if Necessary Per Protocol    Interpretation Summary    Left ventricular systolic function is normal. Calculated left ventricular EF = 61.6% Left ventricular ejection fraction appears to be 61 - 65%.    Left ventricular diastolic function was normal.    No significant structural or functional valvular disease.    No significant change compared to previous study      Current medications:  Scheduled Meds:amLODIPine, 10 mg, Oral, Daily  apixaban, 5 mg, Oral, Q12H  arformoterol, 15 mcg, Nebulization, BID - RT  aspirin, 81 mg, Oral, Daily  atorvastatin, 40 mg, Oral, Nightly  budesonide, 0.5 mg, Nebulization, BID  busPIRone, 15 mg, Oral, BID  carvedilol, 12.5 mg, Oral, BID  isosorbide mononitrate, 30 mg, Oral, Nightly  mirtazapine, 15 mg, Oral, Nightly  pantoprazole, 40 mg, Oral, Q AM  predniSONE, 10 mg, Oral, Daily  revefenacin, 175 mcg, Nebulization, Daily - RT  sodium chloride, 10 mL, Intravenous, Q12H  spironolactone, 50 mg, Oral, Daily      Continuous Infusions:   PRN Meds:.  acetaminophen **OR** acetaminophen **OR** acetaminophen    albuterol    senna-docusate sodium **AND** polyethylene glycol **AND** bisacodyl **AND** bisacodyl    calcium carbonate    Calcium Replacement - Follow Nurse / BPA Driven Protocol    LORazepam    Magnesium Standard Dose Replacement - Follow Nurse / BPA Driven  Protocol    melatonin    nitroglycerin    ondansetron ODT **OR** ondansetron    Phosphorus Replacement - Follow Nurse / BPA Driven Protocol    Potassium Replacement - Follow Nurse / BPA Driven Protocol    sodium chloride    sodium chloride    sodium chloride    Assessment & Plan   Assessment & Plan     Active Hospital Problems    Diagnosis  POA    **COPD exacerbation [J44.1]  Yes      Resolved Hospital Problems   No resolved problems to display.        Brief Hospital Course to date:  Aguila Finn Jr. is a 58 y.o. male  with past medical history of severe COPD who follows with pulmonary service at [facility], history of essential hypertension, dyslipidemia, nephrolithiasis, anxiety and depression, who presented to the hospital today with worsening shortness of breath and hypoxia since last night     Acute on chronic hypoxia, improved  COPD exacerbation  Presented with worsening hypoxia since last night.  Oxygen saturation in the 70s per his home pulse oximeter.  Was in mild respiratory distress upon arrival to ER quickly improved with BiPAP  Likely cause of his COPD exacerbation and hypoxia is noncompliant with home medications.  No evidence of pneumonia on chest x-ray.  D-dimer negative.  Negative cardiac workup during last hospitalization in early August with normal echocardiogram and negative PET myocardial perfusion scan  Patient was confused about his new inhalers recently prescribed by his pulmonologist on 8/14/2024.    He should continue to take Brovana and albuterol nebulizer treatment  He was advised to stop taking Symbicort and instead he will use Pulmicort and  Yupelri which were newly prescribed by his pulmonologist  Additionally, he was prescribed Triology but was not using it because the whole facemask was making him smother.  He was advised to bring his trilogy machine and use the nasal mask provided here in the hospital and see if he is kala do well with that  Continue steroid taper from his home  regimen       Coronary artery disease  Essential hypertension  Dyslipidemia  Continue amlodipine, Lipitor, Coreg, aspirin, Imdur  Continue Aldactone     Newly diagnosed paroxysmal A-fib  Patient wearing an event monitor.  Contacted by his cardiologist that it showed paroxysmal A-fib  Recently started on Eliquis.  Continue     Anxiety and depression  Continue mirtazapine      Expected Discharge Location and Transportation: Home  Expected Discharge when he feels a little more stable, hopefully in a day or 2  Expected discharge date/ time has not been documented.     VTE Prophylaxis:  Pharmacologic VTE prophylaxis orders are present.         AM-PAC 6 Clicks Score (PT): 20 (08/23/24 1119)    CODE STATUS:   Code Status and Medical Interventions: CPR (Attempt to Resuscitate); Full Support   Ordered at: 08/22/24 7477     Level Of Support Discussed With:    Patient    Next of Kin (If No Surrogate)     Code Status (Patient has no pulse and is not breathing):    CPR (Attempt to Resuscitate)     Medical Interventions (Patient has pulse or is breathing):    Full Support       Michael Salgado MD  08/23/24

## 2024-08-23 NOTE — NURSING NOTE
Pt had a run of AFIB RVR, HR - 139   unable to obtain a ECG. Machine would not connect to wifi. Pt converted be back to normal hr of 73.    Pt drank a 16 fl oz [employer] [place] drink earlier in the evening. Second unopened can removed from room with Pt permission.     Physician notified - Sanchez  Labs ordered

## 2024-08-23 NOTE — PLAN OF CARE
Goal Outcome Evaluation:  Plan of Care Reviewed With: patient           Outcome Evaluation: PT initial evaluation completed for pt presenting with generalized weakness, mild balance deficits, SOA, and decreased functional mobility. Pt ambulated 150ft with CGA, progressing to brief moments of SBA. Pt's decreased independence warrants PT skilled care. Recommend D/C home with assistance and OP PT services.      Anticipated Discharge Disposition (PT): home with assist, home with outpatient therapy services

## 2024-08-23 NOTE — THERAPY EVALUATION
Patient Name: Aguila Finn Jr.  : 1965    MRN: [id number]                              Today's Date: 2024       Admit Date: 2024    Visit Dx:     ICD-10-CM ICD-9-CM   1. Acute respiratory failure with hypoxia  J96.01 518.81   2. COPD with acute exacerbation  J44.1 491.21     Patient Active Problem List   Diagnosis    Essential hypertension    COPD (chronic obstructive pulmonary disease)    Anxiety and depression    Recurrent kidney stones    Tobacco abuse    Chronic hypokalemia    Chronic bronchitis    Hyperlipidemia    Personal history of smoking    Nocturnal hypoxemia    COPD exacerbation    Acute-on-chronic respiratory failure     Past Medical History:   Diagnosis Date    Anxiety     COPD (chronic obstructive pulmonary disease)     Depression     Hyperlipidemia     Hypertension     Recurrent kidney stones      Past Surgical History:   Procedure Laterality Date    CATARACT EXTRACTION, BILATERAL      COLONOSCOPY      EYE SURGERY  2017    WISDOM TOOTH EXTRACTION        General Information       Row Name 24 0939          OT Time and Intention    Document Type evaluation  -AC     Mode of Treatment occupational therapy  -AC       Row Name 24 0939          General Information    Patient Profile Reviewed yes  -AC     Prior Level of Function independent:;all household mobility;community mobility;feeding;grooming;dressing;min assist:;bathing  spouse assists with bathing  -AC     Existing Precautions/Restrictions oxygen therapy device and L/min;fall  -AC     Barriers to Rehab none identified  -AC       Row Name 24 0939          Occupational Profile    Environmental Supports and Barriers (Occupational Profile) tub shower, no DME  -AC       Row Name 24 0939          Living Environment    People in Home spouse  -AC       Row Name 24 0939          Home Main Entrance    Number of Stairs, Main Entrance none  -AC       Row Name 2439          Stairs Within Home,  Primary    Number of Stairs, Within Home, Primary none  -AC       Row Name 08/23/24 0939          Cognition    Orientation Status (Cognition) oriented x 4  -AC       Row Name 08/23/24 0939          Safety Issues, Functional Mobility    Safety Issues Affecting Function (Mobility) awareness of need for assistance;insight into deficits/self-awareness;safety precaution awareness  -     Impairments Affecting Function (Mobility) balance;endurance/activity tolerance;shortness of breath  -AC               User Key  (r) = Recorded By, (t) = Taken By, (c) = Cosigned By      Initials Name Provider Type    Rosario Bhakta, OT Occupational Therapist                     Mobility/ADL's       Row Name 08/23/24 1101          Bed Mobility    Bed Mobility supine-sit  -AC     Supine-Sit [place] (Bed Mobility) modified independence  -     Assistive Device (Bed Mobility) bed rails;head of bed elevated  -       Row Name 08/23/24 1101          Transfers    Transfers sit-stand transfer  -AC       Row Name 08/23/24 1101          Sit-Stand Transfer    Sit-Stand [place] (Transfers) standby assist  -     Assistive Device (Sit-Stand Transfers) other (see comments)  no AD  -AC       Row Name 08/23/24 1101          Functional Mobility    Functional Mobility- Ind. Level contact guard assist  SBA at times  -     Functional Mobility- Device other (see comments)  without AD  -AC     Functional Mobility-Distance (Feet) --  Household distance  -AC       Row Name 08/23/24 1101          Activities of Daily Living    BADL Assessment/Intervention lower body dressing  -AC       Row Name 08/23/24 1101          Lower Body Dressing Assessment/Training    [place] Level (Lower Body Dressing) doff;don;socks;supervision  -     Position (Lower Body Dressing) edge of bed sitting  -               User Key  (r) = Recorded By, (t) = Taken By, (c) = Cosigned By      Initials Name Provider Type    Rosario Bhakta, OT Occupational Therapist                    Obj/Interventions       Row Name 08/23/24 1105          Sensory Assessment (Somatosensory)    Sensory Assessment (Somatosensory) UE sensation intact  -       Row Name 08/23/24 1105          Range of Motion Comprehensive    General Range of Motion bilateral upper extremity ROM WNL  -[facility] Name 08/23/24 1105          Strength Comprehensive (MMT)    Comment, General Manual Muscle Testing (MMT) Assessment BUE grossly 4+/5  -       Row Name 08/23/24 1105          Balance    Balance Assessment sitting static balance;sitting dynamic balance;standing static balance;standing dynamic balance  -AC     Static Sitting Balance independent  -AC     Dynamic Sitting Balance supervision  -     Static Standing Balance standby assist  -AC     Dynamic Standing Balance contact guard  -AC     Position/Device Used, Standing Balance unsupported  -AC               User Key  (r) = Recorded By, (t) = Taken By, (c) = Cosigned By      Initials Name Provider Type    AC Rosario Lee, OT Occupational Therapist                   Goals/Plan       Row Name 08/23/24 1112          Transfer Goal 1 (OT)    Activity/Assistive Device (Transfer Goal 1, OT) bed-to-chair/chair-to-bed;toilet  -AC     [place] Level/Cues Needed (Transfer Goal 1, OT) supervision required  -AC     Time Frame (Transfer Goal 1, OT) long term goal (LTG);10 days  -AC     Progress/Outcome (Transfer Goal 1, OT) new goal;goal ongoing  -[facility] Name 08/23/24 1112          Toileting Goal 1 (OT)    Activity/Device (Toileting Goal 1, OT) adjust/manage clothing;perform perineal hygiene  -AC     [place] Level/Cues Needed (Toileting Goal 1, OT) supervision required  -AC     Time Frame (Toileting Goal 1, OT) short term goal (STG);5 days  -AC     Progress/Outcome (Toileting Goal 1, OT) new goal;goal ongoing  -[facility] Name 08/23/24 1112          Grooming Goal 1 (OT)    Activity/Device (Grooming Goal 1, OT) oral care;wash face, hands  -AC      [place] (Grooming Goal 1, OT) supervision required  -AC     Time Frame (Grooming Goal 1, OT) short term goal (STG);5 days  -AC     Strategies/Barriers (Grooming Goal 1, OT) standing sink side with O2 sat 90% or better  -AC     Progress/Outcome (Grooming Goal 1, OT) new goal;goal ongoing  -AC       Row Name 08/23/24 1112          Therapy Assessment/Plan (OT)    Planned Therapy Interventions (OT) activity tolerance training;BADL retraining;functional balance retraining;occupation/activity based interventions;patient/caregiver education/training;strengthening exercise;transfer/mobility retraining  -AC               User Key  (r) = Recorded By, (t) = Taken By, (c) = Cosigned By      Initials Name Provider Type    AC Rosario Lee, OT Occupational Therapist                   Clinical Impression       Row Name 08/23/24 1106          Pain Assessment    Pretreatment Pain Rating 0/10 - no pain  -AC     Posttreatment Pain Rating 0/10 - no pain  -AC       Row Name 08/23/24 1106          Plan of Care Review    Plan of Care Reviewed With patient  -AC     Outcome Evaluation Pt presents below baseline with self care/mobility d/t decr balance and activity tolerance.  Pt supervision LBD, CGA to ambulate without AD.  Pt's O2 sat dropped to 87% on 2LO2 and recovered to 90% with rest and PLB. OT will follow to advance pt toward PLOF. Recommend home with assist and OP pulmonary rehab.  -       Row Name 08/23/24 1106          Therapy Assessment/Plan (OT)    Patient/Family Therapy Goal Statement (OT) breathe better with activity  -AC     Rehab Potential (OT) good, to achieve stated therapy goals  -AC     Criteria for Skilled Therapeutic Interventions Met (OT) yes;skilled treatment is necessary  -AC     Therapy Frequency (OT) daily  -AC     Predicted Duration of Therapy Intervention (OT) 10 days  -AC       Row Name 08/23/24 1106          Therapy Plan Review/Discharge Plan (OT)    Anticipated Discharge Disposition (OT) home with  assist;home with outpatient therapy services  -       Row Name 08/23/24 1106          Vital Signs    Pre Systolic BP Rehab 149  -AC     Pre Treatment Diastolic BP 74  -AC     Pretreatment Heart Rate (beats/min) 96  -AC     Posttreatment Heart Rate (beats/min) 96  -AC     Pre SpO2 (%) 95  -AC     O2 Delivery Pre Treatment supplemental O2  -AC     Intra SpO2 (%) 87  -AC     O2 Delivery Intra Treatment supplemental O2  -AC     Post SpO2 (%) 90  -AC     O2 Delivery Post Treatment supplemental O2  -AC     Pre Patient Position Supine  -AC     Post Patient Position Sitting  -AC       Row Name 08/23/24 1106          Positioning and Restraints    Pre-Treatment Position in bed  -AC     Post Treatment Position chair  -AC     In Chair notified nsg;reclined;call light within reach;encouraged to call for assist  -AC               User Key  (r) = Recorded By, (t) = Taken By, (c) = Cosigned By      Initials Name Provider Type    AC Rosario Lee, OT Occupational Therapist                   Outcome Measures       Row Name 08/23/24 1114          How much help from another is currently needed...    Putting on and taking off regular lower body clothing? 3  -AC     Bathing (including washing, rinsing, and drying) 3  -AC     Toileting (which includes using toilet bed pan or urinal) 3  -AC     Putting on and taking off regular upper body clothing 4  -AC     Taking care of personal grooming (such as brushing teeth) 3  -AC     Eating meals 4  -AC     AM-PAC 6 Clicks Score (OT) 20  -AC       Row Name 08/23/24 0500          How much help from another person do you currently need...    Turning from your back to your side while in flat bed without using bedrails? 4  -SW     Moving from lying on back to sitting on the side of a flat bed without bedrails? 4  -SW     Moving to and from a bed to a chair (including a wheelchair)? 4  -SW     Standing up from a chair using your arms (e.g., wheelchair, bedside chair)? 4  -SW     Climbing 3-5 steps  with a railing? 4  -SW     To walk in hospital room? 4  -SW     AM-PAC 6 Clicks Score (PT) 24  -SW     Highest Level of Mobility Goal 8 --> Walked 250 feet or more  -       Row Name 08/23/24 1114          Functional Assessment    Outcome Measure Options AM-PAC 6 Clicks Daily Activity (OT)  -               User Key  (r) = Recorded By, (t) = Taken By, (c) = Cosigned By      Initials Name Provider Type     Rosario Lee, OT Occupational Therapist    Celi Olivier, RN Registered Nurse                    Occupational Therapy Education       Title: PT OT SLP Therapies (In Progress)       Topic: Occupational Therapy (In Progress)       Point: ADL training (Done)       Description:   Instruct learner(s) on proper safety adaptation and remediation techniques during self care or transfers.   Instruct in proper use of assistive devices.                  Learning Progress Summary             Patient Acceptance, E, VU by  at 8/23/2024 1114                         Point: Home exercise program (Not Started)       Description:   Instruct learner(s) on appropriate technique for monitoring, assisting and/or progressing therapeutic exercises/activities.                  Learner Progress:  Not documented in this visit.              Point: Precautions (Not Started)       Description:   Instruct learner(s) on prescribed precautions during self-care and functional transfers.                  Learner Progress:  Not documented in this visit.              Point: Body mechanics (Not Started)       Description:   Instruct learner(s) on proper positioning and spine alignment during self-care, functional mobility activities and/or exercises.                  Learner Progress:  Not documented in this visit.                              User Key       Initials Effective Dates Name Provider Type Discipline     02/03/23 -  Rosario Lee, OT Occupational Therapist OT                  OT Recommendation and Plan  Planned Therapy  Interventions (OT): activity tolerance training, BADL retraining, functional balance retraining, occupation/activity based interventions, patient/caregiver education/training, strengthening exercise, transfer/mobility retraining  Therapy Frequency (OT): daily  Plan of Care Review  Plan of Care Reviewed With: patient  Outcome Evaluation: Pt presents below baseline with self care/mobility d/t decr balance and activity tolerance.  Pt supervision LBD, CGA to ambulate without AD.  Pt's O2 sat dropped to 87% on 2LO2 and recovered to 90% with rest and PLB. OT will follow to advance pt toward PLOF. Recommend home with assist and OP pulmonary rehab.     Time Calculation:   Evaluation Complexity (OT)  Review Occupational Profile/Medical/Therapy History Complexity: brief/low complexity  Assessment, Occupational Performance/Identification of Deficit Complexity: 1-3 performance deficits  Clinical Decision Making Complexity (OT): problem focused assessment/low complexity  Overall Complexity of Evaluation (OT): low complexity     Time Calculation- OT       Row Name 08/23/24 0939             Time Calculation- OT    OT Start Time 0939  -AC      OT Received On 08/23/24  -AC      OT Goal Re-Cert Due Date 09/02/24  -AC         Untimed Charges    OT Eval/Re-eval Minutes 48  -AC         Total Minutes    Untimed Charges Total Minutes 48  -AC       Total Minutes 48  -AC                User Key  (r) = Recorded By, (t) = Taken By, (c) = Cosigned By      Initials Name Provider Type    AC Rosario Lee OT Occupational Therapist                  Therapy Charges for Today       Code Description Service Date Service Provider Modifiers Qty    [id number] HC OT EVAL LOW COMPLEXITY 4 8/23/2024 Rosario Lee OT GO 1                 Rosario Lee OT  8/23/2024

## 2024-08-23 NOTE — PROGRESS NOTES
Continued Stay Note  [facility]     Patient Name: Aguila Finn Jr.  MRN: [id number]  Today's Date: 8/23/2024    Admit Date: 8/22/2024    Plan: IDP   Discharge Plan       Row Name 08/23/24 0957       Plan    Plan Comments Mr. Finn lives in [facility] and he is on 2 liters of home oxygen with [employer]. His goal will be to be discharged home when he is medically ready for discharge.    Final Discharge Disposition Code 01 - home or self-care                   Discharge Codes    No documentation.                       KORY Page

## 2024-08-23 NOTE — THERAPY EVALUATION
Patient Name: Aguila Finn Jr.  : 1965    MRN: [id number]                              Today's Date: 2024       Admit Date: 2024    Visit Dx:     ICD-10-CM ICD-9-CM   1. Acute respiratory failure with hypoxia  J96.01 518.81   2. COPD with acute exacerbation  J44.1 491.21     Patient Active Problem List   Diagnosis    Essential hypertension    COPD (chronic obstructive pulmonary disease)    Anxiety and depression    Recurrent kidney stones    Tobacco abuse    Chronic hypokalemia    Chronic bronchitis    Hyperlipidemia    Personal history of smoking    Nocturnal hypoxemia    COPD exacerbation    Acute-on-chronic respiratory failure     Past Medical History:   Diagnosis Date    Anxiety     COPD (chronic obstructive pulmonary disease)     Depression     Hyperlipidemia     Hypertension     Recurrent kidney stones      Past Surgical History:   Procedure Laterality Date    CATARACT EXTRACTION, BILATERAL      COLONOSCOPY      EYE SURGERY  2017    WISDOM TOOTH EXTRACTION        General Information       Row Name 24 1115          Physical Therapy Time and Intention    Document Type evaluation  -KG     Mode of Treatment physical therapy  -KG       Row Name 24 1115          General Information    Patient Profile Reviewed yes  -KG     Prior Level of Function independent:;all household mobility;gait;transfer;ADL's;dressing;min assist:;bathing  -KG     Existing Precautions/Restrictions oxygen therapy device and L/min  -KG     Barriers to Rehab none identified  -KG       Row Name 24 1115          Living Environment    People in Home spouse  -KG       Row Name 24 1115          Home Main Entrance    Number of Stairs, Main Entrance none  -KG       Row Name 24 1115          Stairs Within Home, Primary    Number of Stairs, Within Home, Primary none  -KG       Row Name 24 1115          Cognition    Orientation Status (Cognition) oriented x 4  -KG       Row Name 24 1115           Safety Issues, Functional Mobility    Safety Issues Affecting Function (Mobility) awareness of need for assistance;insight into deficits/self-awareness;safety precaution awareness;safety precautions follow-through/compliance  -KG     Impairments Affecting Function (Mobility) balance;endurance/activity tolerance;shortness of breath;strength  -KG               User Key  (r) = Recorded By, (t) = Taken By, (c) = Cosigned By      Initials Name Provider Type    KG Marie Gu, PT Physical Therapist                   Mobility       Row Name 08/23/24 1116          Bed Mobility    Comment, (Bed Mobility) UIC  -KG       Row Name 08/23/24 1116          Transfers    Comment, (Transfers) VC's for sequencing and safety awareness.  -KG       Row Name 08/23/24 1116          Sit-Stand Transfer    Sit-Stand [place] (Transfers) standby assist;verbal cues  -KG       Row Name 08/23/24 1116          Gait/Stairs (Locomotion)    [place] Level (Gait) contact guard;verbal cues  progressed to brief moments of SBA  -KG     Distance in Feet (Gait) 150  -KG     Deviations/Abnormal Patterns (Gait) noel decreased;stride length decreased  -KG     Bilateral Gait Deviations forward flexed posture  -KG     Comment, (Gait/Stairs) Pt demonstrated step through gait pattern with slow noel and decreased step length. VC's for upright posture. Pt with mild instability throughout ambulation, but no LOB. Pt declined additional ambulation due to c/o increased SOA.  -KG               User Key  (r) = Recorded By, (t) = Taken By, (c) = Cosigned By      Initials Name Provider Type    KG Marie Gu, PT Physical Therapist                   Obj/Interventions       Row Name 08/23/24 1117          Range of Motion Comprehensive    General Range of Motion no range of motion deficits identified  -KG     Comment, General Range of Motion B LE WFL  -KG       Row Name 08/23/24 1117          Strength Comprehensive (MMT)    Comment,  General Manual Muscle Testing (MMT) Assessment B LE grossly 4/5  -KG       Row Name 08/23/24 1117          Balance    Balance Assessment sitting static balance;standing static balance;standing dynamic balance  -KG     Static Sitting Balance independent  -KG     Position, Sitting Balance unsupported;sitting in chair  -KG     Static Standing Balance standby assist  -KG     Dynamic Standing Balance contact guard  -KG     Position/Device Used, Standing Balance unsupported  -KG       Row Name 08/23/24 1117          Sensory Assessment (Somatosensory)    Sensory Assessment (Somatosensory) LE sensation intact  -KG               User Key  (r) = Recorded By, (t) = Taken By, (c) = Cosigned By      Initials Name Provider Type    KG Marie Gu N, PT Physical Therapist                   Goals/Plan       Row Name 08/23/24 1119          Bed Mobility Goal 1 (PT)    Activity/Assistive Device (Bed Mobility Goal 1, PT) sit to supine;supine to sit  -KG     [place] Level/Cues Needed (Bed Mobility Goal 1, PT) independent  -KG     Time Frame (Bed Mobility Goal 1, PT) short term goal (STG);5 days  -KG     Progress/Outcomes (Bed Mobility Goal 1, PT) goal ongoing  -KG       Row Name 08/23/24 1119          Transfer Goal 1 (PT)    Activity/Assistive Device (Transfer Goal 1, PT) sit-to-stand/stand-to-sit;bed-to-chair/chair-to-bed  -KG     [place] Level/Cues Needed (Transfer Goal 1, PT) independent  -KG     Time Frame (Transfer Goal 1, PT) short term goal (STG);5 days  -KG     Progress/Outcome (Transfer Goal 1, PT) goal ongoing  -KG       Row Name 08/23/24 1119          Gait Training Goal 1 (PT)    Activity/Assistive Device (Gait Training Goal 1, PT) gait (walking locomotion)  -KG     [place] Level (Gait Training Goal 1, PT) independent  -KG     Distance (Gait Training Goal 1, PT) 400 feet  -KG     Time Frame (Gait Training Goal 1, PT) short term goal (STG);5 days  -KG     Progress/Outcome (Gait Training Goal 1, PT) goal  ongoing  -KG       Row Name 08/23/24 1119          Therapy Assessment/Plan (PT)    Planned Therapy Interventions (PT) balance training;bed mobility training;gait training;strengthening;transfer training  -KG               User Key  (r) = Recorded By, (t) = Taken By, (c) = Cosigned By      Initials Name Provider Type    KG Marie Gu, PT Physical Therapist                   Clinical Impression       Row Name 08/23/24 1118          Pain    Pretreatment Pain Rating 0/10 - no pain  -KG     Posttreatment Pain Rating 0/10 - no pain  -KG       Row Name 08/23/24 1118          Plan of Care Review    Plan of Care Reviewed With patient  -KG     Outcome Evaluation PT initial evaluation completed for pt presenting with generalized weakness, mild balance deficits, SOA, and decreased functional mobility. Pt ambulated 150ft with CGA, progressing to brief moments of SBA. Pt's decreased independence warrants PT skilled care. Recommend D/C home with assistance and OP PT services.  -KG       [facility] Name 08/23/24 1118          Therapy Assessment/Plan (PT)    Patient/Family Therapy Goals Statement (PT) return to PLOF  -KG     Rehab Potential (PT) good, to achieve stated therapy goals  -KG     Criteria for Skilled Interventions Met (PT) yes;skilled treatment is necessary  -KG     Therapy Frequency (PT) daily  -KG     Predicted Duration of Therapy Intervention (PT) 5 days  -KG       Row Name 08/23/24 1118          Vital Signs    Pre Systolic BP Rehab 149  -KG     Pre Treatment Diastolic BP 94  -KG     Pretreatment Heart Rate (beats/min) 93  -KG     Posttreatment Heart Rate (beats/min) 96  -KG     Pre SpO2 (%) 96  -KG     O2 Delivery Pre Treatment supplemental O2  -KG     Intra SpO2 (%) 87  -KG     O2 Delivery Intra Treatment supplemental O2  -KG     Post SpO2 (%) 91  -KG     O2 Delivery Post Treatment supplemental O2  -KG     Pre Patient Position Sitting  -KG     Intra Patient Position Standing  -KG     Post Patient Position  Sitting  -KG       Row Name 08/23/24 1118          Positioning and Restraints    Pre-Treatment Position in bed  -KG     Post Treatment Position chair  -KG     In Chair notified nsg;reclined;call light within reach;encouraged to call for assist;legs elevated  -KG               User Key  (r) = Recorded By, (t) = Taken By, (c) = Cosigned By      Initials Name Provider Type    Marie Claros, PT Physical Therapist                   Outcome Measures       Row Name 08/23/24 1119 08/23/24 0500       How much help from another person do you currently need...    Turning from your back to your side while in flat bed without using bedrails? 4  -KG 4  -SW    Moving from lying on back to sitting on the side of a flat bed without bedrails? 4  -KG 4  -SW    Moving to and from a bed to a chair (including a wheelchair)? 3  -KG 4  -SW    Standing up from a chair using your arms (e.g., wheelchair, bedside chair)? 3  -KG 4  -SW    Climbing 3-5 steps with a railing? 3  -KG 4  -SW    To walk in hospital room? 3  -KG 4  -SW    AM-PAC 6 Clicks Score (PT) 20  -KG 24  -SW    Highest Level of Mobility Goal 6 --> Walk 10 steps or more  -KG 8 --> Walked 250 feet or more  -SW      Row Name 08/23/24 1119 08/23/24 1114       Functional Assessment    Outcome Measure Options AM-PAC 6 Clicks Basic Mobility (PT)  -KG AM-PAC 6 Clicks Daily Activity (OT)  -AC              User Key  (r) = Recorded By, (t) = Taken By, (c) = Cosigned By      Initials Name Provider Type    Rosario Bhakta, OT Occupational Therapist    Marie Claros, PT Physical Therapist    SW Celi Justice, RN Registered Nurse                                 Physical Therapy Education       Title: PT OT SLP Therapies (In Progress)       Topic: Physical Therapy (In Progress)       Point: Mobility training (Done)       Learning Progress Summary             Patient Acceptance, E, VU,NR by JV at 8/23/2024 0950                         Point: Home exercise program (Not  Started)       Learner Progress:  Not documented in this visit.              Point: Body mechanics (Done)       Learning Progress Summary             Patient Acceptance, E, VU,NR by KG at 8/23/2024 0950                         Point: Precautions (Done)       Learning Progress Summary             Patient Acceptance, E, VU,NR by KG at 8/23/2024 0950                                         User Key       Initials Effective Dates Name Provider Type Discipline    KG 05/22/20 -  Marie Gu, PT Physical Therapist PT                  PT Recommendation and Plan  Planned Therapy Interventions (PT): balance training, bed mobility training, gait training, strengthening, transfer training  Plan of Care Reviewed With: patient  Outcome Evaluation: PT initial evaluation completed for pt presenting with generalized weakness, mild balance deficits, SOA, and decreased functional mobility. Pt ambulated 150ft with CGA, progressing to brief moments of SBA. Pt's decreased independence warrants PT skilled care. Recommend D/C home with assistance and OP PT services.     Time Calculation:   PT Evaluation Complexity  History, PT Evaluation Complexity: 1-2 personal factors and/or comorbidities  Examination of Body Systems (PT Eval Complexity): total of 3 or more elements  Clinical Presentation (PT Evaluation Complexity): stable  Clinical Decision Making (PT Evaluation Complexity): low complexity  Overall Complexity (PT Evaluation Complexity): low complexity     PT Charges       Row Name 08/23/24 0950             Time Calculation    Start Time 0950  -KG      PT Received On 08/23/24  -KG      PT Goal Re-Cert Due Date 09/02/24  -KG         Untimed Charges    PT Eval/Re-eval Minutes 46  -KG         Total Minutes    Untimed Charges Total Minutes 46  -KG       Total Minutes 46  -KG                User Key  (r) = Recorded By, (t) = Taken By, (c) = Cosigned By      Initials Name Provider Type    KG Marie Gu, PT Physical  Therapist                  Therapy Charges for Today       Code Description Service Date Service Provider Modifiers Qty    06819814608 HC PT EVAL LOW COMPLEXITY 4 8/23/2024 Marie Gu, PT GP 1            PT G-Codes  Outcome Measure Options: AM-PAC 6 Clicks Basic Mobility (PT)  AM-PAC 6 Clicks Score (PT): 20  AM-PAC 6 Clicks Score (OT): 20  PT Discharge Summary  Anticipated Discharge Disposition (PT): home with assist, home with outpatient therapy services    Kellie Gu, PT  8/23/2024

## 2024-08-23 NOTE — PLAN OF CARE
Goal Outcome Evaluation:  Plan of Care Reviewed With: patient           Outcome Evaluation: Pt presents below baseline with self care/mobility d/t decr balance and activity tolerance.  Pt sueprvision LBD, CGA to ambulate without AD.  Pt's O2 sat dropped to 87% on 2LO2 and recovered to 90% with rest and PLB. OT will follow to advance pt toward PLOF. Recommend home with assist and OP pulmonary rehab.      Anticipated Discharge Disposition (OT): home with assist, home with outpatient therapy services

## 2024-08-24 LAB
QT INTERVAL: 398 MS
QTC INTERVAL: 533 MS

## 2024-08-24 PROCEDURE — 94799 UNLISTED PULMONARY SVC/PX: CPT

## 2024-08-24 PROCEDURE — 63710000001 PREDNISONE PER 5 MG: Performed by: INTERNAL MEDICINE

## 2024-08-24 PROCEDURE — G0378 HOSPITAL OBSERVATION PER HR: HCPCS

## 2024-08-24 PROCEDURE — 99232 SBSQ HOSP IP/OBS MODERATE 35: CPT | Performed by: INTERNAL MEDICINE

## 2024-08-24 PROCEDURE — 94664 DEMO&/EVAL PT USE INHALER: CPT

## 2024-08-24 PROCEDURE — 25010000002 METHYLPREDNISOLONE PER 125 MG: Performed by: INTERNAL MEDICINE

## 2024-08-24 PROCEDURE — 94761 N-INVAS EAR/PLS OXIMETRY MLT: CPT

## 2024-08-24 RX ORDER — METOPROLOL TARTRATE 1 MG/ML
5 INJECTION, SOLUTION INTRAVENOUS ONCE
Status: COMPLETED | OUTPATIENT
Start: 2024-08-24 | End: 2024-08-24

## 2024-08-24 RX ORDER — METHYLPREDNISOLONE SODIUM SUCCINATE 125 MG/2ML
60 INJECTION, POWDER, LYOPHILIZED, FOR SOLUTION INTRAMUSCULAR; INTRAVENOUS ONCE
Status: COMPLETED | OUTPATIENT
Start: 2024-08-24 | End: 2024-08-24

## 2024-08-24 RX ADMIN — PANTOPRAZOLE SODIUM 40 MG: 40 TABLET, DELAYED RELEASE ORAL at 08:58

## 2024-08-24 RX ADMIN — APIXABAN 5 MG: 5 TABLET, FILM COATED ORAL at 21:47

## 2024-08-24 RX ADMIN — METHYLPREDNISOLONE SODIUM SUCCINATE 60 MG: 125 INJECTION, POWDER, FOR SOLUTION INTRAMUSCULAR; INTRAVENOUS at 13:20

## 2024-08-24 RX ADMIN — CARVEDILOL 12.5 MG: 12.5 TABLET, FILM COATED ORAL at 08:59

## 2024-08-24 RX ADMIN — LORAZEPAM 0.5 MG: 0.5 TABLET ORAL at 23:28

## 2024-08-24 RX ADMIN — ACETAMINOPHEN 650 MG: 325 TABLET ORAL at 08:57

## 2024-08-24 RX ADMIN — LORAZEPAM 0.5 MG: 0.5 TABLET ORAL at 08:37

## 2024-08-24 RX ADMIN — APIXABAN 5 MG: 5 TABLET, FILM COATED ORAL at 08:58

## 2024-08-24 RX ADMIN — MIRTAZAPINE 15 MG: 15 TABLET, FILM COATED ORAL at 21:47

## 2024-08-24 RX ADMIN — BUSPIRONE HYDROCHLORIDE 15 MG: 10 TABLET ORAL at 21:47

## 2024-08-24 RX ADMIN — SPIRONOLACTONE 50 MG: 25 TABLET ORAL at 08:58

## 2024-08-24 RX ADMIN — ARFORMOTEROL TARTRATE 15 MCG: 15 SOLUTION RESPIRATORY (INHALATION) at 19:43

## 2024-08-24 RX ADMIN — AMLODIPINE BESYLATE 10 MG: 10 TABLET ORAL at 08:59

## 2024-08-24 RX ADMIN — Medication 10 ML: at 09:00

## 2024-08-24 RX ADMIN — ISOSORBIDE MONONITRATE 30 MG: 30 TABLET, EXTENDED RELEASE ORAL at 21:47

## 2024-08-24 RX ADMIN — METOPROLOL TARTRATE 5 MG: 5 INJECTION INTRAVENOUS at 01:16

## 2024-08-24 RX ADMIN — BUDESONIDE 0.5 MG: 0.5 SUSPENSION RESPIRATORY (INHALATION) at 19:43

## 2024-08-24 RX ADMIN — CARVEDILOL 12.5 MG: 12.5 TABLET, FILM COATED ORAL at 21:47

## 2024-08-24 RX ADMIN — BUSPIRONE HYDROCHLORIDE 15 MG: 10 TABLET ORAL at 08:59

## 2024-08-24 RX ADMIN — PREDNISONE 10 MG: 5 TABLET ORAL at 08:59

## 2024-08-24 RX ADMIN — ATORVASTATIN CALCIUM 40 MG: 40 TABLET, FILM COATED ORAL at 21:47

## 2024-08-24 RX ADMIN — Medication 5 MG: at 23:28

## 2024-08-24 RX ADMIN — ASPIRIN 81 MG: 81 TABLET, COATED ORAL at 08:58

## 2024-08-24 RX ADMIN — BUDESONIDE 0.5 MG: 0.5 SUSPENSION RESPIRATORY (INHALATION) at 08:18

## 2024-08-24 NOTE — PLAN OF CARE
Goal Outcome Evaluation:   Pt progressing well. Calm, Cooperative and accepting of plan of care.

## 2024-08-24 NOTE — PLAN OF CARE
Goal Outcome Evaluation:   Patient experienced an exacerbation this morning. Post pulmicort tx he became extremely SOA, grunting, abdominal breathing and satting at 70%. Trilogy was put on and O2 came back up to 90s. 4Lnc was put on for comfort and titrated back down to 2Lnc. Patient has been resting in the bed for the rest of the day. VSS & no more acute events throughout shift. Needs met and appears asleep at this time.        Progress: no change    Problem: Noninvasive Ventilation Acute  Goal: Effective Unassisted Ventilation and Oxygenation  Outcome: Ongoing, Not Progressing     Problem: Adult Inpatient Plan of Care  Goal: Plan of Care Review  Outcome: Ongoing, Not Progressing  Flowsheets (Taken 8/24/2024 1656)  Progress: no change  Outcome Evaluation: Patient had an exacerbation this morning. After a  Goal: Patient-Specific Goal (Individualized)  Outcome: Ongoing, Not Progressing  Goal: Absence of Hospital-Acquired Illness or Injury  Outcome: Ongoing, Not Progressing  Intervention: Identify and Manage Fall Risk  Recent Flowsheet Documentation  Taken 8/24/2024 1800 by Lilibeth Cordova RN  Safety Promotion/Fall Prevention:   assistive device/personal items within reach   clutter free environment maintained   fall prevention program maintained   nonskid shoes/slippers when out of bed   room organization consistent   safety round/check completed  Taken 8/24/2024 1600 by Lilibeth Cordova, RN  Safety Promotion/Fall Prevention:   assistive device/personal items within reach   clutter free environment maintained   nonskid shoes/slippers when out of bed   safety round/check completed  Taken 8/24/2024 1400 by Lilibeth Cordova RN  Safety Promotion/Fall Prevention:   activity supervised   assistive device/personal items within reach   clutter free environment maintained   nonskid shoes/slippers when out of bed   safety round/check completed  Taken 8/24/2024 1200 by Lilibeth Cordova, RN  Safety Promotion/Fall Prevention:    assistive device/personal items within reach   clutter free environment maintained   nonskid shoes/slippers when out of bed  Taken 8/24/2024 1000 by Lilibeth Cordova RN  Safety Promotion/Fall Prevention:   assistive device/personal items within reach   clutter free environment maintained   nonskid shoes/slippers when out of bed   room organization consistent   safety round/check completed  Taken 8/24/2024 0840 by Lilibeth Cordova RN  Safety Promotion/Fall Prevention:   activity supervised   assistive device/personal items within reach   clutter free environment maintained   nonskid shoes/slippers when out of bed   room organization consistent   safety round/check completed  Intervention: Prevent Skin Injury  Recent Flowsheet Documentation  Taken 8/24/2024 1800 by Lilibeth Cordova RN  Body Position: position changed independently  Taken 8/24/2024 1600 by Lilibeth Cordova RN  Body Position: position changed independently  Taken 8/24/2024 1400 by Lilibeth Cordova RN  Body Position: position changed independently  Taken 8/24/2024 1200 by Lilibeth Cordova RN  Body Position: position changed independently  Skin Protection:   adhesive use limited   incontinence pads utilized   pouching devices used   protective footwear used   transparent dressing maintained  Taken 8/24/2024 1000 by Lilibeth Cordova RN  Body Position: position changed independently  Taken 8/24/2024 0840 by Lilibeth Cordova RN  Body Position: position changed independently  Skin Protection:   adhesive use limited   pouching devices used   protective footwear used   transparent dressing maintained  Intervention: Prevent and Manage VTE (Venous Thromboembolism) Risk  Recent Flowsheet Documentation  Taken 8/24/2024 1800 by Lilibeth Cordova RN  Activity Management: activity encouraged  Taken 8/24/2024 1600 by Lilibeth Cordova RN  Activity Management: activity encouraged  Taken 8/24/2024 1400 by Lilibeth Cordova RN  Activity Management: activity encouraged  Taken  8/24/2024 1200 by Lilibeth Cordova RN  Activity Management: activity encouraged  Taken 8/24/2024 1000 by Lilibeth Cordova RN  Activity Management: activity encouraged  Taken 8/24/2024 0840 by Lilibeth Cordova RN  Activity Management: activity encouraged  Range of Motion: ROM (range of motion) performed  Intervention: Prevent Infection  Recent Flowsheet Documentation  Taken 8/24/2024 1800 by Lilibeth Cordova RN  Infection Prevention:   hand hygiene promoted   personal protective equipment utilized   single patient room provided  Taken 8/24/2024 1600 by Lilibeth Cordova RN  Infection Prevention:   rest/sleep promoted   single patient room provided  Taken 8/24/2024 1400 by Lilibeth Cordova RN  Infection Prevention:   rest/sleep promoted   personal protective equipment utilized   single patient room provided  Taken 8/24/2024 1200 by Lilibeth Cordova RN  Infection Prevention:   rest/sleep promoted   single patient room provided  Taken 8/24/2024 1000 by Lilibeth Cordova RN  Infection Prevention:   personal protective equipment utilized   rest/sleep promoted   single patient room provided  Taken 8/24/2024 0840 by Lilibeth Cordova RN  Infection Prevention:   cohorting utilized   hand hygiene promoted   personal protective equipment utilized   single patient room provided  Goal: Optimal Comfort and Wellbeing  Outcome: Ongoing, Not Progressing  Intervention: Provide Person-Centered Care  Recent Flowsheet Documentation  Taken 8/24/2024 0840 by Lilibeth Cordova RN  Trust Relationship/Rapport:   care explained   emotional support provided   empathic listening provided   questions answered   thoughts/feelings acknowledged  Goal: Readiness for Transition of Care  Outcome: Ongoing, Not Progressing     Problem: COPD (Chronic Obstructive Pulmonary Disease) Comorbidity  Goal: Maintenance of COPD Symptom Control  Outcome: Ongoing, Not Progressing  Intervention: Maintain COPD-Symptom Control  Recent Flowsheet Documentation  Taken  8/24/2024 1200 by Lilibeth Cordova RN  Medication Review/Management: medications reviewed  Taken 8/24/2024 0840 by Lilibeth Cordova RN  Medication Review/Management: medications reviewed     Problem: Hypertension Comorbidity  Goal: Blood Pressure in Desired Range  Outcome: Ongoing, Not Progressing  Intervention: Maintain Blood Pressure Management  Recent Flowsheet Documentation  Taken 8/24/2024 1200 by Lilibeth Cordova RN  Medication Review/Management: medications reviewed  Taken 8/24/2024 0840 by Lilibeth Cordova RN  Medication Review/Management: medications reviewed     Problem: Fall Injury Risk  Goal: Absence of Fall and Fall-Related Injury  Outcome: Ongoing, Not Progressing  Intervention: Identify and Manage Contributors  Recent Flowsheet Documentation  Taken 8/24/2024 1200 by Lilibeth Cordova RN  Medication Review/Management: medications reviewed  Taken 8/24/2024 0840 by Lilibeth Cordova RN  Medication Review/Management: medications reviewed  Intervention: Promote Injury-Free Environment  Recent Flowsheet Documentation  Taken 8/24/2024 1800 by Lilibeth Cordova RN  Safety Promotion/Fall Prevention:   assistive device/personal items within reach   clutter free environment maintained   fall prevention program maintained   nonskid shoes/slippers when out of bed   room organization consistent   safety round/check completed  Taken 8/24/2024 1600 by Lilibeth Cordova RN  Safety Promotion/Fall Prevention:   assistive device/personal items within reach   clutter free environment maintained   nonskid shoes/slippers when out of bed   safety round/check completed  Taken 8/24/2024 1400 by Lilibeth Cordova RN  Safety Promotion/Fall Prevention:   activity supervised   assistive device/personal items within reach   clutter free environment maintained   nonskid shoes/slippers when out of bed   safety round/check completed  Taken 8/24/2024 1200 by Lilibeth Cordova RN  Safety Promotion/Fall Prevention:   assistive device/personal  items within reach   clutter free environment maintained   nonskid shoes/slippers when out of bed  Taken 8/24/2024 1000 by Lilibeth Cordova, RN  Safety Promotion/Fall Prevention:   assistive device/personal items within reach   clutter free environment maintained   nonskid shoes/slippers when out of bed   room organization consistent   safety round/check completed  Taken 8/24/2024 0840 by Lilibeth Cordova, RN  Safety Promotion/Fall Prevention:   activity supervised   assistive device/personal items within reach   clutter free environment maintained   nonskid shoes/slippers when out of bed   room organization consistent   safety round/check completed

## 2024-08-24 NOTE — PROGRESS NOTES
[facility] Medicine Services  PROGRESS NOTE    Patient Name: Aguila Finn Jr.  : 1965  MRN: [id number]    Date of Admission: 2024  Primary Care Physician: Kenya Martinez MD    Subjective   Subjective     CC:  COPD Exacerbation    HPI:  Patient resting in bed. Says he still feels very short of breath this morning and claims that the nebulizer treatments are making him feel worse.    Objective   Objective     Vital Signs:   Temp:  [97.6 °F (36.4 °C)-98.2 °F (36.8 °C)] 97.7 °F (36.5 °C)  Heart Rate:  [] 83  Resp:  [14-24] 24  BP: (136-184)/() 184/103  Flow (L/min):  [2-4.5] 4.5     Physical Exam:  Constitutional: No acute distress, awake, alert  HENT: NCAT, mucous membranes moist  Respiratory: moderately increasing WOB w/+conversational dyspnea, no wheezing however has very poor air movement bilaterally on 4L  Cardiovascular: RRR, no murmurs, rubs, or gallops  Gastrointestinal: soft, nontender, nondistended  Musculoskeletal: No bilateral ankle edema  Psychiatric: Appropriate affect, cooperative  Neurologic: Oriented x 3, speech clear, no focal deficits  Skin: No rashes    Results Reviewed:  LAB RESULTS:      Lab 24  0208 24  1019   WBC 7.13 8.87   HEMOGLOBIN 11.6* 12.5*   HEMATOCRIT 36.5* 39.2   PLATELETS 106* 115*   NEUTROS ABS 6.06 7.83*   IMMATURE GRANS (ABS) 0.05 0.05   LYMPHS ABS 0.32* 0.30*   MONOS ABS 0.69 0.49   EOS ABS 0.00 0.17   MCV 91.3 91.2   D DIMER QUANT  --  0.45         Lab 24  0208 24  1019   SODIUM 144 147*   POTASSIUM 4.4 4.3   CHLORIDE 104 106   CO2 28.0 31.0*   ANION GAP 12.0 10.0   BUN 43* 31*   CREATININE 1.41* 1.11   EGFR 57.4* 76.5   GLUCOSE 126* 117*   CALCIUM 8.6 8.7   MAGNESIUM 2.5  --    PHOSPHORUS 3.8  --          Lab 24  0208 24  1019   TOTAL PROTEIN 5.8* 6.4   ALBUMIN 3.2* 3.6   GLOBULIN 2.6 2.8   ALT (SGPT) 33 35   AST (SGOT) 14 17   BILIRUBIN 0.3 0.6   ALK PHOS 71 72          Lab 08/23/24  0351 08/23/24  0208 08/22/24  1019   PROBNP  --   --  55.5   HSTROP T 12 12 19                 Lab 08/22/24  0959   PH, ARTERIAL 7.439   PCO2, ARTERIAL 46.4*   PO2 ART 88.0   FIO2 28   HCO3 ART 31.4*   BASE EXCESS ART 6.2*   CARBOXYHEMOGLOBIN 0.9     Brief Urine Lab Results       None            Microbiology Results Abnormal       Procedure Component Value - Date/Time    Respiratory Panel PCR w/COVID-19(SARS-CoV-2) DAKOTAH/JULIANA/ISAIAS/PAD/COR/CHRISTAL In-House, NP Swab in UTM/VTM, 2 HR TAT - Swab, Nasopharynx [[id number]]  (Normal) Collected: 08/22/24 0951    Lab Status: Final result Specimen: Swab from Nasopharynx Updated: 08/22/24 1102     ADENOVIRUS, PCR Not Detected     Coronavirus 229E Not Detected     Coronavirus HKU1 Not Detected     Coronavirus NL63 Not Detected     Coronavirus OC43 Not Detected     COVID19 Not Detected     Human Metapneumovirus Not Detected     Human Rhinovirus/Enterovirus Not Detected     Influenza A PCR Not Detected     Influenza B PCR Not Detected     Parainfluenza Virus 1 Not Detected     Parainfluenza Virus 2 Not Detected     Parainfluenza Virus 3 Not Detected     Parainfluenza Virus 4 Not Detected     RSV, PCR Not Detected     Bordetella pertussis pcr Not Detected     Bordetella parapertussis PCR Not Detected     Chlamydophila pneumoniae PCR Not Detected     Mycoplasma pneumo by PCR Not Detected    Narrative:      In the setting of a positive respiratory panel with a viral infection PLUS a negative procalcitonin without other underlying concern for bacterial infection, consider observing off antibiotics or discontinuation of antibiotics and continue supportive care. If the respiratory panel is positive for atypical bacterial infection (Bordetella pertussis, Chlamydophila pneumoniae, or Mycoplasma pneumoniae), consider antibiotic de-escalation to target atypical bacterial infection.            No radiology results from the last 24 hrs    Results for orders placed during the hospital  encounter of 08/01/24    Adult Transthoracic Echo Complete W/ Cont if Necessary Per Protocol    Interpretation Summary    Left ventricular systolic function is normal. Calculated left ventricular EF = 61.6% Left ventricular ejection fraction appears to be 61 - 65%.    Left ventricular diastolic function was normal.    No significant structural or functional valvular disease.    No significant change compared to previous study      Current medications:  Scheduled Meds:amLODIPine, 10 mg, Oral, Daily  apixaban, 5 mg, Oral, Q12H  arformoterol, 15 mcg, Nebulization, BID - RT  aspirin, 81 mg, Oral, Daily  atorvastatin, 40 mg, Oral, Nightly  budesonide, 0.5 mg, Nebulization, BID  busPIRone, 15 mg, Oral, BID  carvedilol, 12.5 mg, Oral, BID  isosorbide mononitrate, 30 mg, Oral, Nightly  mirtazapine, 15 mg, Oral, Nightly  pantoprazole, 40 mg, Oral, Q AM  revefenacin, 175 mcg, Nebulization, Daily - RT  sodium chloride, 10 mL, Intravenous, Q12H  spironolactone, 50 mg, Oral, Daily      Continuous Infusions:   PRN Meds:.  acetaminophen **OR** acetaminophen **OR** acetaminophen    albuterol    senna-docusate sodium **AND** polyethylene glycol **AND** bisacodyl **AND** bisacodyl    calcium carbonate    Calcium Replacement - Follow Nurse / BPA Driven Protocol    LORazepam    Magnesium Standard Dose Replacement - Follow Nurse / BPA Driven Protocol    melatonin    nitroglycerin    ondansetron ODT **OR** ondansetron    Phosphorus Replacement - Follow Nurse / BPA Driven Protocol    Potassium Replacement - Follow Nurse / BPA Driven Protocol    sodium chloride    sodium chloride    sodium chloride    Assessment & Plan   Assessment & Plan     Active Hospital Problems    Diagnosis  POA    **COPD exacerbation [J44.1]  Yes      Resolved Hospital Problems   No resolved problems to display.        Brief Hospital Course to date:  Aguila Marks Aakash Gerardo. is a 58 y.o. male  with past medical history of severe COPD who follows with pulmonary  service at [facility], history of essential hypertension, dyslipidemia, nephrolithiasis, anxiety and depression, who presented to the hospital today with worsening shortness of breath and hypoxia since last night     Acute on Chronic Hypoxic Respiratory Failure- baseline 2L  Acute Exacerbation of COPD  - Likely cause of his COPD exacerbation and hypoxia is noncompliant with home medications.  No evidence of pneumonia on chest x-ray.  D-dimer negative.  Negative cardiac workup during last hospitalization in early August with normal echocardiogram and negative PET myocardial perfusion scan  - recent outpatient medication changes per pulmonology, should continue to take Brovana and albuterol nebulizer treatments He was advised to stop taking Symbicort and instead he will use Pulmicort and Yupelri, Additionally, he was prescribed Trilogy but was not using it because the whole facemask was making him feel like he was smothering  - reports significant worsening today off steroids now for 2 days, will give IV dose today to see if symptoms improve    Coronary artery disease  Essential hypertension  Dyslipidemia  - Continue amlodipine, Lipitor, Coreg, aspirin, Imdur  - Continue Aldactone     Newly diagnosed paroxysmal A-fib  -Patient wearing an event monitor.  Contacted by his cardiologist that it showed paroxysmal A-fib  - Recently started on Eliquis.  Continue     Anxiety and depression  - Continue mirtazapine      Expected Discharge Location and Transportation: Home  Expected Discharge   Expected discharge date/ time has not been documented.     VTE Prophylaxis:  Pharmacologic VTE prophylaxis orders are present.         AM-PAC 6 Clicks Score (PT): 21 (08/24/24 0840)    CODE STATUS:   Code Status and Medical Interventions: CPR (Attempt to Resuscitate); Full Support   Ordered at: 08/22/24 1254     Level Of Support Discussed With:    Patient    Next of Kin (If No Surrogate)     Code Status (Patient has no pulse and is not  breathing):    CPR (Attempt to Resuscitate)     Medical Interventions (Patient has pulse or is breathing):    Full Support       May Dunn, DO  08/24/24

## 2024-08-25 LAB
ANION GAP SERPL CALCULATED.3IONS-SCNC: 5 MMOL/L (ref 5–15)
BASOPHILS # BLD AUTO: 0.02 10*3/MM3 (ref 0–0.2)
BASOPHILS NFR BLD AUTO: 0.2 % (ref 0–1.5)
BUN SERPL-MCNC: 24 MG/DL (ref 6–20)
BUN/CREAT SERPL: 20.9 (ref 7–25)
CALCIUM SPEC-SCNC: 8.5 MG/DL (ref 8.6–10.5)
CHLORIDE SERPL-SCNC: 106 MMOL/L (ref 98–107)
CO2 SERPL-SCNC: 36 MMOL/L (ref 22–29)
CREAT SERPL-MCNC: 1.15 MG/DL (ref 0.76–1.27)
DEPRECATED RDW RBC AUTO: 45.9 FL (ref 37–54)
EGFRCR SERPLBLD CKD-EPI 2021: 73.3 ML/MIN/1.73
EOSINOPHIL # BLD AUTO: 0 10*3/MM3 (ref 0–0.4)
EOSINOPHIL NFR BLD AUTO: 0 % (ref 0.3–6.2)
ERYTHROCYTE [DISTWIDTH] IN BLOOD BY AUTOMATED COUNT: 13.5 % (ref 12.3–15.4)
GLUCOSE SERPL-MCNC: 97 MG/DL (ref 65–99)
HCT VFR BLD AUTO: 37.6 % (ref 37.5–51)
HGB BLD-MCNC: 12 G/DL (ref 13–17.7)
IMM GRANULOCYTES # BLD AUTO: 0.11 10*3/MM3 (ref 0–0.05)
IMM GRANULOCYTES NFR BLD AUTO: 1.3 % (ref 0–0.5)
LYMPHOCYTES # BLD AUTO: 0.33 10*3/MM3 (ref 0.7–3.1)
LYMPHOCYTES NFR BLD AUTO: 3.9 % (ref 19.6–45.3)
MCH RBC QN AUTO: 29.3 PG (ref 26.6–33)
MCHC RBC AUTO-ENTMCNC: 31.9 G/DL (ref 31.5–35.7)
MCV RBC AUTO: 91.7 FL (ref 79–97)
MONOCYTES # BLD AUTO: 0.86 10*3/MM3 (ref 0.1–0.9)
MONOCYTES NFR BLD AUTO: 10 % (ref 5–12)
NEUTROPHILS NFR BLD AUTO: 7.24 10*3/MM3 (ref 1.7–7)
NEUTROPHILS NFR BLD AUTO: 84.6 % (ref 42.7–76)
NRBC BLD AUTO-RTO: 0 /100 WBC (ref 0–0.2)
PLATELET # BLD AUTO: 154 10*3/MM3 (ref 140–450)
PMV BLD AUTO: 11 FL (ref 6–12)
POTASSIUM SERPL-SCNC: 4.8 MMOL/L (ref 3.5–5.2)
RBC # BLD AUTO: 4.1 10*6/MM3 (ref 4.14–5.8)
SODIUM SERPL-SCNC: 147 MMOL/L (ref 136–145)
WBC NRBC COR # BLD AUTO: 8.56 10*3/MM3 (ref 3.4–10.8)

## 2024-08-25 PROCEDURE — 80048 BASIC METABOLIC PNL TOTAL CA: CPT | Performed by: INTERNAL MEDICINE

## 2024-08-25 PROCEDURE — 94799 UNLISTED PULMONARY SVC/PX: CPT

## 2024-08-25 PROCEDURE — G0378 HOSPITAL OBSERVATION PER HR: HCPCS

## 2024-08-25 PROCEDURE — 99232 SBSQ HOSP IP/OBS MODERATE 35: CPT | Performed by: INTERNAL MEDICINE

## 2024-08-25 PROCEDURE — 94664 DEMO&/EVAL PT USE INHALER: CPT

## 2024-08-25 PROCEDURE — 63710000001 REVEFENACIN 175 MCG/3ML SOLUTION: Performed by: INTERNAL MEDICINE

## 2024-08-25 PROCEDURE — 93005 ELECTROCARDIOGRAM TRACING: CPT | Performed by: INTERNAL MEDICINE

## 2024-08-25 PROCEDURE — 85025 COMPLETE CBC W/AUTO DIFF WBC: CPT | Performed by: INTERNAL MEDICINE

## 2024-08-25 PROCEDURE — 63710000001 REVEFENACIN 175 MCG/3ML SOLUTION

## 2024-08-25 RX ORDER — GUAIFENESIN 600 MG/1
600 TABLET, EXTENDED RELEASE ORAL EVERY 12 HOURS SCHEDULED
Status: DISCONTINUED | OUTPATIENT
Start: 2024-08-25 | End: 2024-08-29 | Stop reason: HOSPADM

## 2024-08-25 RX ADMIN — ARFORMOTEROL TARTRATE 15 MCG: 15 SOLUTION RESPIRATORY (INHALATION) at 18:36

## 2024-08-25 RX ADMIN — GUAIFENESIN 600 MG: 600 TABLET, EXTENDED RELEASE ORAL at 22:09

## 2024-08-25 RX ADMIN — SPIRONOLACTONE 50 MG: 25 TABLET ORAL at 09:40

## 2024-08-25 RX ADMIN — PANTOPRAZOLE SODIUM 40 MG: 40 TABLET, DELAYED RELEASE ORAL at 09:40

## 2024-08-25 RX ADMIN — Medication 10 ML: at 22:16

## 2024-08-25 RX ADMIN — AMLODIPINE BESYLATE 10 MG: 10 TABLET ORAL at 09:40

## 2024-08-25 RX ADMIN — ARFORMOTEROL TARTRATE 15 MCG: 15 SOLUTION RESPIRATORY (INHALATION) at 09:17

## 2024-08-25 RX ADMIN — ASPIRIN 81 MG: 81 TABLET, COATED ORAL at 09:40

## 2024-08-25 RX ADMIN — CARVEDILOL 12.5 MG: 12.5 TABLET, FILM COATED ORAL at 09:40

## 2024-08-25 RX ADMIN — APIXABAN 5 MG: 5 TABLET, FILM COATED ORAL at 22:10

## 2024-08-25 RX ADMIN — BUDESONIDE 0.5 MG: 0.5 SUSPENSION RESPIRATORY (INHALATION) at 09:18

## 2024-08-25 RX ADMIN — CARVEDILOL 12.5 MG: 12.5 TABLET, FILM COATED ORAL at 22:10

## 2024-08-25 RX ADMIN — APIXABAN 5 MG: 5 TABLET, FILM COATED ORAL at 09:40

## 2024-08-25 RX ADMIN — Medication 10 ML: at 09:43

## 2024-08-25 RX ADMIN — Medication 5 MG: at 22:16

## 2024-08-25 RX ADMIN — LORAZEPAM 0.5 MG: 0.5 TABLET ORAL at 22:16

## 2024-08-25 RX ADMIN — BUSPIRONE HYDROCHLORIDE 15 MG: 10 TABLET ORAL at 09:40

## 2024-08-25 RX ADMIN — MIRTAZAPINE 15 MG: 15 TABLET, FILM COATED ORAL at 22:10

## 2024-08-25 RX ADMIN — ISOSORBIDE MONONITRATE 30 MG: 30 TABLET, EXTENDED RELEASE ORAL at 22:09

## 2024-08-25 RX ADMIN — GUAIFENESIN 600 MG: 600 TABLET, EXTENDED RELEASE ORAL at 10:19

## 2024-08-25 RX ADMIN — ATORVASTATIN CALCIUM 40 MG: 40 TABLET, FILM COATED ORAL at 22:10

## 2024-08-25 RX ADMIN — BUSPIRONE HYDROCHLORIDE 15 MG: 10 TABLET ORAL at 22:09

## 2024-08-25 RX ADMIN — REVEFENACIN 175 MCG: 175 SOLUTION RESPIRATORY (INHALATION) at 09:17

## 2024-08-25 RX ADMIN — BUDESONIDE 0.5 MG: 0.5 SUSPENSION RESPIRATORY (INHALATION) at 18:36

## 2024-08-25 NOTE — PROGRESS NOTES
[facility] Medicine Services  PROGRESS NOTE    Patient Name: Aguila Finn Jr.  : 1965  MRN: [id number]    Date of Admission: 2024  Primary Care Physician: Kenya Martinez MD    Subjective   Subjective     CC:  COPD Exacerbation    HPI:  Patient sitting up in bed. Doing better today, feels like he might have had a reaction to pulmicort yesterday however has been tolerating it at home just fine and while in the hospital. No further episodes. Breathing overall improved.    Objective   Objective     Vital Signs:   Temp:  [97.7 °F (36.5 °C)-98.2 °F (36.8 °C)] 97.7 °F (36.5 °C)  Heart Rate:  [] 85  Resp:  [14-22] 16  BP: (101-144)/(59-97) 144/97  Flow (L/min):  [2-3.5] 3.5     Physical Exam:  Constitutional: No acute distress, awake, alert  HENT: NCAT, mucous membranes moist  Respiratory: improvement in WOB, however still with very poor air movement bilaterally on 2L  Cardiovascular: RRR, no murmurs, rubs, or gallops  Gastrointestinal: soft, nontender, nondistended  Musculoskeletal: No bilateral ankle edema  Psychiatric: Appropriate affect, cooperative  Neurologic: Oriented x 3, speech clear, no focal deficits  Skin: No rashes    Results Reviewed:  LAB RESULTS:      Lab 24  0644 24  0208 24  1019   WBC 8.56 7.13 8.87   HEMOGLOBIN 12.0* 11.6* 12.5*   HEMATOCRIT 37.6 36.5* 39.2   PLATELETS 154 106* 115*   NEUTROS ABS 7.24* 6.06 7.83*   IMMATURE GRANS (ABS) 0.11* 0.05 0.05   LYMPHS ABS 0.33* 0.32* 0.30*   MONOS ABS 0.86 0.69 0.49   EOS ABS 0.00 0.00 0.17   MCV 91.7 91.3 91.2   D DIMER QUANT  --   --  0.45         Lab 24  0644 24  0208 24  1019   SODIUM 147* 144 147*   POTASSIUM 4.8 4.4 4.3   CHLORIDE 106 104 106   CO2 36.0* 28.0 31.0*   ANION GAP 5.0 12.0 10.0   BUN 24* 43* 31*   CREATININE 1.15 1.41* 1.11   EGFR 73.3 57.4* 76.5   GLUCOSE 97 126* 117*   CALCIUM 8.5* 8.6 8.7   MAGNESIUM  --  2.5  --    PHOSPHORUS  --  3.8  --           Lab 08/23/24  0208 08/22/24  1019   TOTAL PROTEIN 5.8* 6.4   ALBUMIN 3.2* 3.6   GLOBULIN 2.6 2.8   ALT (SGPT) 33 35   AST (SGOT) 14 17   BILIRUBIN 0.3 0.6   ALK PHOS 71 72         Lab 08/23/24  0351 08/23/24  0208 08/22/24  1019   PROBNP  --   --  55.5   HSTROP T 12 12 19                 Lab 08/22/24  0959   PH, ARTERIAL 7.439   PCO2, ARTERIAL 46.4*   PO2 ART 88.0   FIO2 28   HCO3 ART 31.4*   BASE EXCESS ART 6.2*   CARBOXYHEMOGLOBIN 0.9     Brief Urine Lab Results       None            Microbiology Results Abnormal       Procedure Component Value - Date/Time    Respiratory Panel PCR w/COVID-19(SARS-CoV-2) DAKOTAH/JULIANA/ISAIAS/PAD/COR/CHRISTAL In-House, NP Swab in UTM/VTM, 2 HR TAT - Swab, Nasopharynx [[id number]]  (Normal) Collected: 08/22/24 0951    Lab Status: Final result Specimen: Swab from Nasopharynx Updated: 08/22/24 1102     ADENOVIRUS, PCR Not Detected     Coronavirus 229E Not Detected     Coronavirus HKU1 Not Detected     Coronavirus NL63 Not Detected     Coronavirus OC43 Not Detected     COVID19 Not Detected     Human Metapneumovirus Not Detected     Human Rhinovirus/Enterovirus Not Detected     Influenza A PCR Not Detected     Influenza B PCR Not Detected     Parainfluenza Virus 1 Not Detected     Parainfluenza Virus 2 Not Detected     Parainfluenza Virus 3 Not Detected     Parainfluenza Virus 4 Not Detected     RSV, PCR Not Detected     Bordetella pertussis pcr Not Detected     Bordetella parapertussis PCR Not Detected     Chlamydophila pneumoniae PCR Not Detected     Mycoplasma pneumo by PCR Not Detected    Narrative:      In the setting of a positive respiratory panel with a viral infection PLUS a negative procalcitonin without other underlying concern for bacterial infection, consider observing off antibiotics or discontinuation of antibiotics and continue supportive care. If the respiratory panel is positive for atypical bacterial infection (Bordetella pertussis, Chlamydophila pneumoniae, or Mycoplasma  pneumoniae), consider antibiotic de-escalation to target atypical bacterial infection.            No radiology results from the last 24 hrs    Results for orders placed during the hospital encounter of 08/01/24    Adult Transthoracic Echo Complete W/ Cont if Necessary Per Protocol    Interpretation Summary    Left ventricular systolic function is normal. Calculated left ventricular EF = 61.6% Left ventricular ejection fraction appears to be 61 - 65%.    Left ventricular diastolic function was normal.    No significant structural or functional valvular disease.    No significant change compared to previous study      Current medications:  Scheduled Meds:amLODIPine, 10 mg, Oral, Daily  apixaban, 5 mg, Oral, Q12H  arformoterol, 15 mcg, Nebulization, BID - RT  aspirin, 81 mg, Oral, Daily  atorvastatin, 40 mg, Oral, Nightly  budesonide, 0.5 mg, Nebulization, BID  busPIRone, 15 mg, Oral, BID  carvedilol, 12.5 mg, Oral, BID  guaiFENesin, 600 mg, Oral, Q12H  isosorbide mononitrate, 30 mg, Oral, Nightly  mirtazapine, 15 mg, Oral, Nightly  pantoprazole, 40 mg, Oral, Q AM  revefenacin, 175 mcg, Nebulization, Daily - RT  sodium chloride, 10 mL, Intravenous, Q12H  spironolactone, 50 mg, Oral, Daily      Continuous Infusions:   PRN Meds:.  acetaminophen **OR** acetaminophen **OR** acetaminophen    albuterol    senna-docusate sodium **AND** polyethylene glycol **AND** bisacodyl **AND** bisacodyl    calcium carbonate    Calcium Replacement - Follow Nurse / BPA Driven Protocol    LORazepam    Magnesium Standard Dose Replacement - Follow Nurse / BPA Driven Protocol    melatonin    nitroglycerin    ondansetron ODT **OR** ondansetron    Phosphorus Replacement - Follow Nurse / BPA Driven Protocol    Potassium Replacement - Follow Nurse / BPA Driven Protocol    sodium chloride    sodium chloride    sodium chloride    Assessment & Plan   Assessment & Plan     Active Hospital Problems    Diagnosis  POA    **COPD exacerbation [J44.1]  Yes       Resolved Hospital Problems   No resolved problems to display.        Brief Hospital Course to date:  Aguila Finn Jr. is a 58 y.o. male  with past medical history of severe COPD who follows with pulmonary service at [facility], history of essential hypertension, dyslipidemia, nephrolithiasis, anxiety and depression, who presented to the hospital today with worsening shortness of breath and hypoxia since last night     Acute on Chronic Hypoxic Respiratory Failure- baseline 2L  Acute Exacerbation of COPD  - Likely cause of his COPD exacerbation and hypoxia is noncompliance with home medications.  No evidence of pneumonia on chest x-ray.  D-dimer negative.  Negative cardiac workup during last hospitalization in early August with normal echocardiogram and negative PET myocardial perfusion scan  - recent outpatient medication changes per pulmonology, should continue to take Brovana and albuterol nebulizer treatments He was advised to stop taking Symbicort and instead he will use Pulmicort and Yupelri, Additionally, he was prescribed Trilogy machine, now using it and states he feels like it does help him breathe better  - s/p IV solumedrol yesterday with improvement today, given desaturation episode into 70's yesterday will monitor today off steroids while inpatient    Coronary artery disease  Essential hypertension  Dyslipidemia  - Continue amlodipine, Lipitor, Coreg, aspirin, Imdur  - Continue Aldactone     Newly diagnosed paroxysmal A-fib  - recent event monitor placement that showed paroxysmal A-fib  - started on Eliquis, Coreg dose adjusted. Continue     Anxiety and depression  - Continue mirtazapine    Expected Discharge Location and Transportation: Home, if has no further acute desaturation episodes today can hopefully discharge home in the AM  Expected Discharge   Expected discharge date/ time has not been documented.     VTE Prophylaxis:  Pharmacologic VTE prophylaxis orders are present.         AM-PAC 6  Clicks Score (PT): 22 (08/25/24 0800)    CODE STATUS:   Code Status and Medical Interventions: CPR (Attempt to Resuscitate); Full Support   Ordered at: 08/22/24 1254     Level Of Support Discussed With:    Patient    Next of Kin (If No Surrogate)     Code Status (Patient has no pulse and is not breathing):    CPR (Attempt to Resuscitate)     Medical Interventions (Patient has pulse or is breathing):    Full Support       May Dunn, DO  08/25/24

## 2024-08-26 ENCOUNTER — APPOINTMENT (OUTPATIENT)
Dept: PULMONOLOGY | Facility: HOSPITAL | Age: 59
DRG: 190 | End: 2024-08-26
Payer: COMMERCIAL

## 2024-08-26 LAB
QT INTERVAL: 274 MS
QTC INTERVAL: 409 MS

## 2024-08-26 PROCEDURE — 94799 UNLISTED PULMONARY SVC/PX: CPT

## 2024-08-26 PROCEDURE — 94010 BREATHING CAPACITY TEST: CPT

## 2024-08-26 PROCEDURE — 99222 1ST HOSP IP/OBS MODERATE 55: CPT | Performed by: INTERNAL MEDICINE

## 2024-08-26 PROCEDURE — 99221 1ST HOSP IP/OBS SF/LOW 40: CPT | Performed by: INTERNAL MEDICINE

## 2024-08-26 PROCEDURE — 63710000001 REVEFENACIN 175 MCG/3ML SOLUTION

## 2024-08-26 PROCEDURE — 63710000001 REVEFENACIN 175 MCG/3ML SOLUTION: Performed by: INTERNAL MEDICINE

## 2024-08-26 PROCEDURE — 99232 SBSQ HOSP IP/OBS MODERATE 35: CPT | Performed by: INTERNAL MEDICINE

## 2024-08-26 PROCEDURE — 63710000001 PREDNISONE PER 1 MG: Performed by: INTERNAL MEDICINE

## 2024-08-26 RX ORDER — METOPROLOL TARTRATE 25 MG/1
25 TABLET, FILM COATED ORAL EVERY 12 HOURS SCHEDULED
Status: DISCONTINUED | OUTPATIENT
Start: 2024-08-26 | End: 2024-08-29 | Stop reason: HOSPADM

## 2024-08-26 RX ORDER — LORAZEPAM 1 MG/1
1 TABLET ORAL EVERY 8 HOURS PRN
Status: DISCONTINUED | OUTPATIENT
Start: 2024-08-26 | End: 2024-08-29 | Stop reason: HOSPADM

## 2024-08-26 RX ORDER — PREDNISONE 20 MG/1
40 TABLET ORAL DAILY
Status: COMPLETED | OUTPATIENT
Start: 2024-08-26 | End: 2024-08-29

## 2024-08-26 RX ADMIN — Medication 10 ML: at 08:41

## 2024-08-26 RX ADMIN — ISOSORBIDE MONONITRATE 30 MG: 30 TABLET, EXTENDED RELEASE ORAL at 21:02

## 2024-08-26 RX ADMIN — Medication 10 ML: at 21:03

## 2024-08-26 RX ADMIN — ARFORMOTEROL TARTRATE 15 MCG: 15 SOLUTION RESPIRATORY (INHALATION) at 07:55

## 2024-08-26 RX ADMIN — BUSPIRONE HYDROCHLORIDE 15 MG: 10 TABLET ORAL at 21:02

## 2024-08-26 RX ADMIN — APIXABAN 5 MG: 5 TABLET, FILM COATED ORAL at 21:03

## 2024-08-26 RX ADMIN — LORAZEPAM 1 MG: 1 TABLET ORAL at 21:03

## 2024-08-26 RX ADMIN — ASPIRIN 81 MG: 81 TABLET, COATED ORAL at 08:41

## 2024-08-26 RX ADMIN — BUSPIRONE HYDROCHLORIDE 15 MG: 10 TABLET ORAL at 08:40

## 2024-08-26 RX ADMIN — GUAIFENESIN 600 MG: 600 TABLET, EXTENDED RELEASE ORAL at 21:02

## 2024-08-26 RX ADMIN — MIRTAZAPINE 15 MG: 15 TABLET, FILM COATED ORAL at 21:03

## 2024-08-26 RX ADMIN — PREDNISONE 40 MG: 20 TABLET ORAL at 11:31

## 2024-08-26 RX ADMIN — BUDESONIDE 0.5 MG: 0.5 SUSPENSION RESPIRATORY (INHALATION) at 21:53

## 2024-08-26 RX ADMIN — APIXABAN 5 MG: 5 TABLET, FILM COATED ORAL at 08:40

## 2024-08-26 RX ADMIN — CARVEDILOL 12.5 MG: 12.5 TABLET, FILM COATED ORAL at 08:40

## 2024-08-26 RX ADMIN — ATORVASTATIN CALCIUM 40 MG: 40 TABLET, FILM COATED ORAL at 21:03

## 2024-08-26 RX ADMIN — BUDESONIDE 0.5 MG: 0.5 SUSPENSION RESPIRATORY (INHALATION) at 07:55

## 2024-08-26 RX ADMIN — ARFORMOTEROL TARTRATE 15 MCG: 15 SOLUTION RESPIRATORY (INHALATION) at 21:53

## 2024-08-26 RX ADMIN — GUAIFENESIN 600 MG: 600 TABLET, EXTENDED RELEASE ORAL at 08:41

## 2024-08-26 RX ADMIN — PANTOPRAZOLE SODIUM 40 MG: 40 TABLET, DELAYED RELEASE ORAL at 05:47

## 2024-08-26 RX ADMIN — METOPROLOL TARTRATE 25 MG: 25 TABLET, FILM COATED ORAL at 21:02

## 2024-08-26 RX ADMIN — REVEFENACIN 175 MCG: 175 SOLUTION RESPIRATORY (INHALATION) at 07:55

## 2024-08-26 RX ADMIN — SPIRONOLACTONE 50 MG: 25 TABLET ORAL at 08:41

## 2024-08-26 RX ADMIN — AMLODIPINE BESYLATE 10 MG: 10 TABLET ORAL at 08:40

## 2024-08-26 NOTE — PLAN OF CARE
Goal Outcome Evaluation:  Plan of Care Reviewed With: patient, spouse        Progress: no change  Outcome Evaluation: Palliative consult for GOC/ACP and symptom management; seen by NATALIA AMAYA. Increased Ativan dosage, recommended premedicate for nebulizer treatments, as pt reported they seem to increase his feeling of air hunger, possibly anxiety-related. ACP consult for discussion and completion of AC document. APRN reported pt and spouse feeling very overwhelmed today after discussion with Dr. Cortez; Palliative RN will follow up tomorrow for initial nursing symptom assessment and support to pt and family.      Problem: Palliative Care  Goal: Enhanced Quality of Life  Outcome: Ongoing, Progressing  Intervention: Promote Advance Care Planning  Flowsheets (Taken 8/26/2024 9085)  Life Transition/Adjustment:   palliative care discussed   palliative care initiated

## 2024-08-26 NOTE — CONSULTS
[facility] at [facility]  CARDIOLOGY CONSULTATION NOTE    Aguila Finn Jr.  : 1965  MRN:[id number]  Home Phone:[phone]    Date of Admission:2024  Date of Consultation: 24    PCP: Kenya Martinez MD    IDENTIFICATION: A 58 y.o. male resident of [place], [place]     Chief Complaint   Patient presents with    Respiratory Distress     PROBLEM LIST:     COPD exacerbation    ALLERGIES: No Known Allergies    HOME MEDICINES:   Current Outpatient Medications   Medication Instructions    albuterol (PROVENTIL) 2.5 mg, Nebulization, 4 Times Daily PRN    amLODIPine (NORVASC) 10 mg, Oral, Daily    apixaban (ELIQUIS) 5 mg, Oral, Every 12 Hours Scheduled    arformoterol (BROVANA) 15 mcg, Nebulization, 2 Times Daily - RT    Aspirin Low Dose 81 mg, Oral, Daily    atorvastatin (LIPITOR) 40 mg, Oral, Daily    budesonide (PULMICORT) 0.5 mg, Nebulization, 2 Times Daily    busPIRone (BUSPAR) 15 mg, Oral, 2 Times Daily    carvedilol (COREG) 12.5 mg, Oral, 2 Times Daily    isosorbide mononitrate (IMDUR) 30 mg, Oral, Nightly    mirtazapine (REMERON) 15 mg, Oral, Nightly    pantoprazole (PROTONIX) 40 mg, Oral, Every Early Morning    predniSONE (DELTASONE) 10 MG tablet Take 4 tablets by mouth Daily for 5 days, THEN 3 tablets Daily for 5 days, THEN 2 tablets Daily for 5 days, THEN 1 tablet Daily for 5 days.    revefenacin (YUPELRI) 175 mcg, Nebulization, Daily - RT    spironolactone (ALDACTONE) 50 mg, Oral, Daily       HPI: Mr. Finn is a 57 y/o male with severe COPD, HTN, HLD, anxiety/depression and recently diagnosed PAF whom we are asked to see in consultation. This is his second admission for COPD exacerbation this month, FEV1 20% on recent PFTs. He denies any chest pain or angina. During earlier admission he had a normal stress PET 24 with no ischemia, coronary calcifications noted. Echo  with normal EF, no significant valvular disease. He has recently seen  Jose AMAYA in clinic for palpitations. Monitor was ordered and he was found to have Afib, started on Eliquis. Currently still wearing the monitor but he would like to take it off. He was also started on Coreg. Cardiology is asked to see by Dr. Cortez for consideration of right and possible left heart cath. Again, patient denies chest pain or symptoms to suggest angina. He has CASTELLON with any activity. Wears 2L O2 at all times. HS troponins negative on admission x3.       ROS: All systems have been reviewed and are negative with the exception of those mentioned in the HPI and problem list above.    Surgical History:   Past Surgical History:   Procedure Laterality Date    CATARACT EXTRACTION, BILATERAL      COLONOSCOPY      EYE SURGERY  2017    WISDOM TOOTH EXTRACTION         Social History:   Social History     Socioeconomic History    Marital status:    Tobacco Use    Smoking status: Former     Current packs/day: 0.00     Average packs/day: 2.0 packs/day for 15.0 years (30.0 ttl pk-yrs)     Types: Cigarettes     Start date: 9/18/2005     Quit date: 9/18/2020     Years since quitting: 3.9     Passive exposure: Past    Smokeless tobacco: Never   Vaping Use    Vaping status: Never Used   Substance and Sexual Activity    Alcohol use: Yes     Alcohol/week: 1.0 standard drink of alcohol     Types: 1 Cans of beer per week     Comment: a few drinks on occasion, not excessive per patient    Drug use: Yes     Comment: Cannibus gummies    Sexual activity: Yes     Partners: Female     Birth control/protection: Partner of same sex       Family History:   Family History   Problem Relation Age of Onset    COPD Mother     Hyperlipidemia Father     Hypertension Father     Heart attack Father     Stroke Father     Diabetes Sister     Heart disease Sister     Hypertension Sister     Alcohol abuse Sister     Diabetes Sister     Hypertension Sister     Cancer Sister         Throat    Diabetes Sister     Hypertension  "Sister     Alcohol abuse Sister        Objective     /53 (BP Location: Right arm, Patient Position: Lying)   Pulse 103   Temp 98.3 °F (36.8 °C) (Oral)   Resp 18   Ht 175.3 cm (69.02\")   Wt 79.8 kg (175 lb 14.8 oz)   SpO2 94%   BMI 25.97 kg/m²   No intake or output data in the 24 hours ending 08/26/24 1347    PHYSICAL EXAM:  CONSTITUTIONAL: Well nourished, cooperative, in no acute distress  CARDIOVASCULAR:  Regular rhythm and normal rate, no murmur, gallop, rub.   RESPIRATORY: Diminished breath sounds throughout, no wheezing, rales or rhonchi. On 2L NC  EXTREMITIES: No gross deformities, no edema.  NEUROLOGICAL: No focal deficits    Labs/Diagnostic Data  Results from last 7 days   Lab Units 08/25/24  0644 08/23/24  0208 08/22/24  1019   SODIUM mmol/L 147* 144 147*   POTASSIUM mmol/L 4.8 4.4 4.3   CHLORIDE mmol/L 106 104 106   CO2 mmol/L 36.0* 28.0 31.0*   BUN mg/dL 24* 43* 31*   CREATININE mg/dL 1.15 1.41* 1.11   GLUCOSE mg/dL 97 126* 117*   CALCIUM mg/dL 8.5* 8.6 8.7     Results from last 7 days   Lab Units 08/23/24  0351 08/23/24  0208 08/22/24  1019   HSTROP T ng/L 12 12 19     Results from last 7 days   Lab Units 08/25/24  0644 08/23/24  0208 08/22/24  1019   WBC 10*3/mm3 8.56 7.13 8.87   HEMOGLOBIN g/dL 12.0* 11.6* 12.5*   HEMATOCRIT % 37.6 36.5* 39.2   PLATELETS 10*3/mm3 154 106* 115*     Results from last 7 days   Lab Units 08/23/24  0208   MAGNESIUM mg/dL 2.5         Results from last 7 days   Lab Units 08/22/24  1019   PROBNP pg/mL 55.5         I personally reviewed the patient's EKG/Telemetry data    Radiology Data:   No radiology results for the last day    Results for orders placed during the hospital encounter of 08/01/24    Adult Transthoracic Echo Complete W/ Cont if Necessary Per Protocol    Interpretation Summary    Left ventricular systolic function is normal. Calculated left ventricular EF = 61.6% Left ventricular ejection fraction appears to be 61 - 65%.    Left ventricular diastolic " function was normal.    No significant structural or functional valvular disease.    No significant change compared to previous study    Stress PET 8/2/24:  Interpretation Summary         Myocardial perfusion imaging indicates a normal myocardial perfusion study with no evidence of ischemia. Impressions are consistent with a low risk study.    Patient denied any chest discomfort/pain during stress; he did experience some shortness of breath that resolved after a couple of minutes.    Left ventricular ejection fraction is normal (Calculated EF = 68%).    REST:  68%EF STRESS:  74%EF.    Coronary calcification noted on CT images.       Current Medications:    amLODIPine, 10 mg, Oral, Daily  apixaban, 5 mg, Oral, Q12H  arformoterol, 15 mcg, Nebulization, BID - RT  aspirin, 81 mg, Oral, Daily  atorvastatin, 40 mg, Oral, Nightly  budesonide, 0.5 mg, Nebulization, BID  busPIRone, 15 mg, Oral, BID  carvedilol, 12.5 mg, Oral, BID  guaiFENesin, 600 mg, Oral, Q12H  isosorbide mononitrate, 30 mg, Oral, Nightly  mirtazapine, 15 mg, Oral, Nightly  pantoprazole, 40 mg, Oral, Q AM  predniSONE, 40 mg, Oral, Daily  revefenacin, 175 mcg, Nebulization, Daily - RT  sodium chloride, 10 mL, Intravenous, Q12H  spironolactone, 50 mg, Oral, Daily           Assessment and Plan:     1. Coronary calcifications on CT  - recent stress PET 8/2 with no ischemia  - no chest pain, HS troponins negative x3 on admission  - continue secondary prevention with ASA, statin  - no strong indication for left heart cath unless he develops chest pain or signs of cardiac ischemia     2. Severe COPD with acute exacerbations  - per Pulmonary    3. PAF  - recently diagnosed on monitor by H&V center  - agree with Eliquis for stroke prophylaxis, Coreg for rate control  - currently in NSR    4. Hypertension   - controlled       Electronically signed by Debo Crews PA-C, 08/26/24, 2:14 PM  "EDT.      ___________________________________________________________  The patient was seen and examined by me.  Agree and verified/discussed key components of E/M as outlined by the PA.    /87 (BP Location: Right arm, Patient Position: Sitting)   Pulse 103   Temp 98 °F (36.7 °C) (Oral)   Resp 18   Ht 175.3 cm (69.02\")   Wt 79.8 kg (175 lb 14.8 oz)   SpO2 94%   BMI 25.97 kg/m²   Gen: well developed, lying in bed, comfortable appearing  HEENT: MMM, sclera anicteric, conjunctiva normal  CV: tachycardic rate, regular rhythm, no murmurs or rubs, normal S1, S2. 2+ radial and DP pulses  Pulm: RA, normal work of breathing, decreased air movement globally, scattered mild wheezes  Ext: normal bulk for age, normal tone, no dependent edema  Neuro: alert, oriented, face symmetrical, moving all extremities well  Psych: normal mood, appropriate affect     Assessment / Plan:    Stress PET from 8/2 was negative for ischemia, did have CAC and global decrease in MBFR but no regional defects in perfusion or MBFR suggestive of focal ischemia. Pt has been free of CP and no troponin elevation.     No strong indication for LHC at this time, RVSP unable to be assessed on last TTE however RV size and function and RA size appeared normal despite difficult acoustic windows    For his PAF, will change BB to cardioselective agent, continue GRACIELA Gonzalez MD  [facility]            "

## 2024-08-26 NOTE — CONSULTS
PULMONARY  PROGRESS NOTE        SUBJECTIVE     We were asked to see Aguila Finn Jr. is a 58 y.o. male for Respiratory Distress  .    COPD exacerbation    As an Intensivist, we provide an integrated approach to the ICU patient and family, medical management of comorbid conditions, including but not limited to electrolytes, glycemic control, organ dysfunction, lead interdisciplinary rounds and coordinate the care with all other services, including those from other specialists.     HPI:    Aguila is a 58 y.o. male who presented to this Hospital on 8/22/2024 because of increasing dyspnea and hypoxemia (SpO2  down to 70%), which started prior to his ED visit. His CXR did not show an acute infiltrate.    He states that his nebs make his breathing worse.     He has multiple periods during the day of significant dyspnea.    He was seen at the Pulmonary Office by JOSE LUIS Lawrence on 08/14/24. And he was hospitalized at [facility] from 08/01/24 to 08/06/24 for exacerbation of his underlying disease.    Temp  Min: 97.9 °F (36.6 °C)  Max: 98.4 °F (36.9 °C)     PMH: He  has a past medical history of Anxiety, COPD (chronic obstructive pulmonary disease), Depression, Hyperlipidemia, Hypertension, and Recurrent kidney stones.   PSxH: He  has a past surgical history that includes Cataract extraction, bilateral; Colonoscopy; [place] tooth extraction; and Eye surgery (2017).     Immunization History   Administered Date(s) Administered    COVID-19 (PFIZER) BIVALENT 12+YRS 10/29/2022    COVID-19 (PFIZER) Purple Cap Monovalent 02/22/2021, 03/15/2021, 09/30/2021    Covid-19 (Pfizer) Gray Cap Monovalent 05/21/2022    Fluzone (or Fluarix & Flulaval for VFC) >6mos 11/08/2018, 10/05/2020, 10/29/2022    Fluzone Quad >6mos (Multi-dose) 09/30/2016    Influenza, Unspecified 05/10/2019, 12/28/2023    Pneumococcal Conjugate 13-Valent (PCV13) 05/09/2019    Shingrix 05/10/2019    flucelvax quad pfs =>4 YRS 11/26/2019        Medications:  No current  facility-administered medications on file prior to encounter.     Current Outpatient Medications on File Prior to Encounter   Medication Sig    albuterol (PROVENTIL) (2.5 MG/3ML) 0.083% nebulizer solution Take 2.5 mg by nebulization 4 (Four) Times a Day As Needed for Wheezing or Shortness of Air.    amLODIPine (NORVASC) 10 MG tablet Take 1 tablet by mouth Daily.    arformoterol (BROVANA) 15 MCG/2ML nebulizer solution Take 2 mL by nebulization 2 (Two) Times a Day.    aspirin (Aspirin Low Dose) 81 MG EC tablet TAKE 1 TABLET BY MOUTH DAILY    atorvastatin (LIPITOR) 40 MG tablet Take 1 tablet by mouth Daily. (Patient taking differently: Take 1 tablet by mouth Every Night.)    budesonide (Pulmicort) 0.5 MG/2ML nebulizer solution Take 2 mL by nebulization 2 (Two) Times a Day.    busPIRone (BUSPAR) 15 MG tablet Take 1 tablet by mouth 2 (Two) Times a Day.    carvedilol (COREG) 12.5 MG tablet Take 1 tablet by mouth 2 (Two) Times a Day.    isosorbide mononitrate (IMDUR) 30 MG 24 hr tablet Take 1 tablet by mouth Every Night.    mirtazapine (REMERON) 15 MG tablet Take 1 tablet by mouth Every Night.    pantoprazole (PROTONIX) 40 MG EC tablet Take 1 tablet by mouth Every Morning.    predniSONE (DELTASONE) 10 MG tablet Take 4 tablets by mouth Daily for 5 days, THEN 3 tablets Daily for 5 days, THEN 2 tablets Daily for 5 days, THEN 1 tablet Daily for 5 days.    spironolactone (ALDACTONE) 50 MG tablet Take 1 tablet by mouth Daily.    apixaban (ELIQUIS) 5 MG tablet tablet Take 1 tablet by mouth Every 12 (Twelve) Hours. (Patient taking differently: Take 1 tablet by mouth Every 12 (Twelve) Hours. Not started)    revefenacin (YUPELRI) 175 MCG/3ML nebulizer solution Take 3 mL by nebulization Daily.       Allergies: He has No Known Allergies.   FH: His family history includes Alcohol abuse in his sister and sister; COPD in his mother; Cancer in his sister; Diabetes in his sister, sister, and sister; Heart attack in his father; Heart  disease in his sister; Hyperlipidemia in his father; Hypertension in his father, sister, sister, and sister; Stroke in his father.   SH: He  reports that he quit smoking about 3 years ago. His smoking use included cigarettes. He started smoking about 18 years ago. He has a 30 pack-year smoking history. He has been exposed to tobacco smoke. He has never used smokeless tobacco. He reports current alcohol use of about 1.0 standard drink of alcohol per week. He reports current drug use.        History     Last Reviewed by Herb Cortez MD on 2024 at 10:11 AM    Sections Reviewed    Medical, Surgical, Family, Tobacco, Alcohol, Drug Use, Sexual Activity,   Social Documentation    Problem list reviewed by Herb Cortez MD on 2024 at 10:11 AM  Medicines reviewed by Herb Cortez MD on 2024 at 10:11 AM  Allergies reviewed by Herb Cortez MD on 2024 at 10:11 AM       The patient's relevant past medical, surgical and social history were reviewed and updated in Epic as appropriate.     ROS:  As described in the HPI.       OBJECTIVE     Vitals:  Temp: 98.1 °F (36.7 °C) (24 0700) Temp  Min: 97.9 °F (36.6 °C)  Max: 98.4 °F (36.9 °C)   Temp core:      BP: 164/98 (24 0846) BP  Min: 106/68  Max: 164/98   MAP (non-invasive) Noninvasive MAP (mmHg): 121 (24 0846) Noninvasive MAP (mmHg)  Av.2  Min: 71  Max: 126   Pulse: 103 (24 0846) Pulse  Min: 68  Max: 150   Resp: 18 (24 0846) Resp  Min: 16  Max: 20   SpO2: 94 % (24) SpO2  Min: 90 %  Max: 95 %   Device: nasal cannula (24 0758)    Flow Rate: 4 (24) Flow (L/min)  Min: 2  Max: 4         24  0933 24  1500   Weight: 79.8 kg (176 lb) 79.8 kg (175 lb 14.8 oz)        NIV:   Settings: Observed:   Mode of Delivery: BiPAP, ST (24 1000)          IPAP (cm H2O): 14 (24 1000)    EPAP (cm H2O): 6 (24 1000)    Pressure Support (cm H20): 8 cm H2O (24 1000)         Set Rate  (Breaths/Min): 14 breaths/min (08/22/24 1000) Respiratory Rate Total (Breaths/Min): 24 breaths/min (08/22/24 1000)         Tidal Volume (mL): 738 mL (08/22/24 1000)   Oxygen Concentration (%): 30 (08/22/24 1000)  Minute Ventilation (L/Min): 18.1 (08/22/24 1000)      Physical Examination  Telemetry:  Rhythm: sinus tachycardia (08/26/24 0843)      Constitutional:  Mild distress.   Cardiovascular: RRR.    Respiratory: Normal breath sounds  (+) Rhonchi  (+) Wheezes   Abdominal:  Soft with no tenderness.   Extremities: No Edema   Neurological:   Alert, Oriented, Cooperative.    Best Eye Response: 4-->(E4) spontaneous (08/26/24 0843)  Best Motor Response: 6-->(M6) obeys commands (08/26/24 0843)  Best Verbal Response: 5-->(V5) oriented (08/26/24 0843)  [place] Coma Scale Score: 15 (08/26/24 0843)               Results Reviewed:  Laboratory  Microbiology  Radiology  Pathology    Hematology:  Results from last 7 days   Lab Units 08/25/24  0644 08/23/24  0208 08/22/24  1019   WBC 10*3/mm3 8.56 7.13 8.87   HEMOGLOBIN g/dL 12.0* 11.6* 12.5*   MCV fL 91.7 91.3 91.2   PLATELETS 10*3/mm3 154 106* 115*     Results from last 7 days   Lab Units 08/25/24  0644 08/23/24  0208 08/22/24  1019   NEUTROS ABS 10*3/mm3 7.24* 6.06 7.83*   LYMPHS ABS 10*3/mm3 0.33* 0.32* 0.30*   EOS ABS 10*3/mm3 0.00 0.00 0.17     Chemistry:  Estimated Creatinine Clearance: 78.1 mL/min (by C-G formula based on SCr of 1.15 mg/dL).    Results from last 7 days   Lab Units 08/25/24  0644 08/23/24  0208   SODIUM mmol/L 147* 144   POTASSIUM mmol/L 4.8 4.4   CHLORIDE mmol/L 106 104   CO2 mmol/L 36.0* 28.0   BUN mg/dL 24* 43*   CREATININE mg/dL 1.15 1.41*   GLUCOSE mg/dL 97 126*     Results from last 7 days   Lab Units 08/25/24  0644 08/23/24  0208   CALCIUM mg/dL 8.5* 8.6   MAGNESIUM mg/dL  --  2.5   PHOSPHORUS mg/dL  --  3.8     Hepatic Panel:  Results from last 7 days   Lab Units 08/23/24  0208 08/22/24  1019   ALBUMIN g/dL 3.2* 3.6   TOTAL PROTEIN g/dL 5.8* 6.4    BILIRUBIN mg/dL 0.3 0.6   AST (SGOT) U/L 14 17   ALT (SGPT) U/L 33 35   ALK PHOS U/L 71 72     Cardiac Labs:  Results from last 7 days   Lab Units 08/23/24  0351 08/23/24  0208 08/22/24  1019   PROBNP pg/mL  --   --  55.5   HSTROP T ng/L 12 12 19     Biomarkers:  Results from last 7 days   Lab Units 08/22/24  1019   D DIMER QUANT MCGFEU/mL 0.45       COVID-19  Lab Results   Component Value Date    COVID19 Not Detected 08/22/2024    COVID19 Not Detected 08/01/2024     Arterial Blood Gases:  Results from last 7 days   Lab Units 08/22/24  0959   PH, ARTERIAL pH units 7.439   PCO2, ARTERIAL mm Hg 46.4*   PO2 ART mm Hg 88.0   FIO2 % 28       Images:  XR Chest 1 View    Result Date: 8/22/2024  Impression: 1.Emphysematous changes suggested. Electronically Signed: Telly Cao MD  8/22/2024 9:53 AM EDT  Workstation ID: [id number]       Echo:  Results for orders placed during the hospital encounter of 08/01/24    Adult Transthoracic Echo Complete W/ Cont if Necessary Per Protocol    Interpretation Summary    Left ventricular systolic function is normal. Calculated left ventricular EF = 61.6% Left ventricular ejection fraction appears to be 61 - 65%.    Left ventricular diastolic function was normal.    No significant structural or functional valvular disease.    No significant change compared to previous study      Results: Reviewed.  I reviewed the patient's new laboratory and imaging results.  I independently reviewed the patient's new images.    Medications: Reviewed.    Assessment   A/P     Hospital:  LOS: 0 days     Active Hospital Problems    Diagnosis  POA    **COPD exacerbation [J44.1]  Yes     Aguila is a 58 y.o. male admitted on 8/22/2024 with COPD exacerbation [J44.1]    Assessment/Management/Treatment Plan:    08/22/24        Chronic respiratory failure, type 2  Emphysema GOLD spirometry grade: 4, group: E.   Spirometry 08/14/24: FEV1 - 0.69 L, 20% of predicted, -4.85 z-score.Spirometry Interpretation: Airflow  "obstruction. Severe (z-score is < -4.1)  (\"Lung age: 178.9 years\")  Lung Volumes 08/14/24: TLC 83% of predicted. % of predicted  Diffusion study 08/14/24: 52% of predicted.  CT Chest 08/01/24: Emphysema. Stable 7 mm XANDER nodule as compared to previous images from August 2022.   (CT scan Chest from 07/27/24 suggested \"severe coronary disease\").  Cardiovascular  A Fib  HTN  Dyslipidemia   Recent Stress PET 08/02/24: Normal myocardial perfusion, no evidence of ischemia.  Endocrine   Body mass index is 25.97 kg/m². Overweight: 25.0-29.9kg/m2   No history of Diabetes    Lab Results   Lab Value Date/Time    HGBA1C 5.30 05/10/2024 1108    HGBA1C 5.4 07/25/2017 0855     Results from last 7 days   Lab Units 08/23/24  2107 08/23/24  0129   GLUCOSE mg/dL 121 134*       Diet: Diet: Regular/House; Fluid Consistency: Thin (IDDSI 0)   Advance Directives: Code Status and Medical Interventions: CPR (Attempt to Resuscitate); Full Support   Ordered at: 08/22/24 1254     Level Of Support Discussed With:    Patient    Next of Kin (If No Surrogate)     Code Status (Patient has no pulse and is not breathing):    CPR (Attempt to Resuscitate)     Medical Interventions (Patient has pulse or is breathing):    Full Support        VTE Prophylaxis:  Pharmacologic VTE prophylaxis orders are present.         In brief:  Continue with LAMA nebs, LABA nebs and Steroid nebs. With his very low FEV1, MDIs unlikely to be efficient in the delivery of  the medications.  BARBI nebs prn.  Trilogy started about 1 week ago.  NIV at hs and prn during the day.  Spirometry today. Done. FEV1 0.36L 10% of predicted.  Cardiology to evaluate. May need R and L HC. He sees Dr. Miles.  Palliative consult for symptoms management  Lung transplant referral  Consider inpatient Pulmonary Rehab  Discussed with Patient and Family at bedside. They also need to consider goals of care, long term Invasive Mechanical Ventilation, Code Status, etc., and those decisions may " change whether or not he ends up being a Lung Transplant candidate.    Plan of care and goals reviewed during interdisciplinary rounds.  I discussed the patient's findings and my recommendations with patient, family, and nursing staff    MDM:    Problem(s) High due to: Acute or Chronic illness or injury that may poses a threat to life or bodily function  Data: Moderate due to: Review of prior external records from each unique source, Review or results of each unique test, and Ordering of each unique test    Moderate    [x] Inpatient Pulmonary Consult Service    Copied text in this note has been reviewed and is accurate as of 08/26/24

## 2024-08-26 NOTE — PROGRESS NOTES
Continued Stay Note  [facility]     Patient Name: Aguila Finn Jr.  MRN: [id number]  Today's Date: 8/26/2024    Admit Date: 8/22/2024    Plan: IDP   Discharge Plan       Row Name 08/26/24 1021       Plan    Final Discharge Disposition Code 01 - home or self-care    Final Note Mr. Finn lives in [facility] with his spouse and he is indep at baseline and at home he is on 2 liters of  oxygen through [employer]. He has transportation when he is medially ready to be discharged and he has [employer] insurance.                   Discharge Codes    No documentation.                       KORY Page  Transportation Services  Private: Car    Final Discharge Disposition Code: 01 - home or self-care

## 2024-08-26 NOTE — PROGRESS NOTES
[facility] Medicine Services  PROGRESS NOTE    Patient Name: Aguila Finn Jr.  : 1965  MRN: [id number]    Date of Admission: 2024  Primary Care Physician: Kenya Martinez MD    Subjective   Subjective     CC:  COPD Exacerbation    HPI:  Patient continues to complains of worsening breathing this morning, says he feels like the nebulizers trigger his symptoms, although states he does not wear his trilogy at night as he needs help with putting it on and has not asked.    Objective   Objective     Vital Signs:   Temp:  [97.9 °F (36.6 °C)-98.4 °F (36.9 °C)] 98.1 °F (36.7 °C)  Heart Rate:  [] 103  Resp:  [16-20] 18  BP: (106-164)/(65-98) 164/98  Flow (L/min):  [2-4] 4     Physical Exam:  Constitutional: No acute distress, awake, alert  HENT: NCAT, mucous membranes moist  Respiratory: increased WOB, still with very poor air movement bilaterally on 2L  Cardiovascular: RRR, no murmurs, rubs, or gallops  Gastrointestinal: soft, nontender, nondistended  Musculoskeletal: No bilateral ankle edema  Psychiatric: Appropriate affect, cooperative  Neurologic: Oriented x 3, speech clear, no focal deficits  Skin: No rashes    Results Reviewed:  LAB RESULTS:      Lab 24  0644 24  0208 24  1019   WBC 8.56 7.13 8.87   HEMOGLOBIN 12.0* 11.6* 12.5*   HEMATOCRIT 37.6 36.5* 39.2   PLATELETS 154 106* 115*   NEUTROS ABS 7.24* 6.06 7.83*   IMMATURE GRANS (ABS) 0.11* 0.05 0.05   LYMPHS ABS 0.33* 0.32* 0.30*   MONOS ABS 0.86 0.69 0.49   EOS ABS 0.00 0.00 0.17   MCV 91.7 91.3 91.2   D DIMER QUANT  --   --  0.45         Lab 24  0644 24  0208 24  1019   SODIUM 147* 144 147*   POTASSIUM 4.8 4.4 4.3   CHLORIDE 106 104 106   CO2 36.0* 28.0 31.0*   ANION GAP 5.0 12.0 10.0   BUN 24* 43* 31*   CREATININE 1.15 1.41* 1.11   EGFR 73.3 57.4* 76.5   GLUCOSE 97 126* 117*   CALCIUM 8.5* 8.6 8.7   MAGNESIUM  --  2.5  --    PHOSPHORUS  --  3.8  --          Lab  08/23/24  0208 08/22/24  1019   TOTAL PROTEIN 5.8* 6.4   ALBUMIN 3.2* 3.6   GLOBULIN 2.6 2.8   ALT (SGPT) 33 35   AST (SGOT) 14 17   BILIRUBIN 0.3 0.6   ALK PHOS 71 72         Lab 08/23/24  0351 08/23/24  0208 08/22/24  1019   PROBNP  --   --  55.5   HSTROP T 12 12 19                 Lab 08/22/24  0959   PH, ARTERIAL 7.439   PCO2, ARTERIAL 46.4*   PO2 ART 88.0   FIO2 28   HCO3 ART 31.4*   BASE EXCESS ART 6.2*   CARBOXYHEMOGLOBIN 0.9     Brief Urine Lab Results       None            Microbiology Results Abnormal       Procedure Component Value - Date/Time    Respiratory Panel PCR w/COVID-19(SARS-CoV-2) DAKOTAH/JULIANA/ISAIAS/PAD/COR/CHRISTAL In-House, NP Swab in UTM/VTM, 2 HR TAT - Swab, Nasopharynx [[id number]]  (Normal) Collected: 08/22/24 0951    Lab Status: Final result Specimen: Swab from Nasopharynx Updated: 08/22/24 1102     ADENOVIRUS, PCR Not Detected     Coronavirus 229E Not Detected     Coronavirus HKU1 Not Detected     Coronavirus NL63 Not Detected     Coronavirus OC43 Not Detected     COVID19 Not Detected     Human Metapneumovirus Not Detected     Human Rhinovirus/Enterovirus Not Detected     Influenza A PCR Not Detected     Influenza B PCR Not Detected     Parainfluenza Virus 1 Not Detected     Parainfluenza Virus 2 Not Detected     Parainfluenza Virus 3 Not Detected     Parainfluenza Virus 4 Not Detected     RSV, PCR Not Detected     Bordetella pertussis pcr Not Detected     Bordetella parapertussis PCR Not Detected     Chlamydophila pneumoniae PCR Not Detected     Mycoplasma pneumo by PCR Not Detected    Narrative:      In the setting of a positive respiratory panel with a viral infection PLUS a negative procalcitonin without other underlying concern for bacterial infection, consider observing off antibiotics or discontinuation of antibiotics and continue supportive care. If the respiratory panel is positive for atypical bacterial infection (Bordetella pertussis, Chlamydophila pneumoniae, or Mycoplasma pneumoniae),  consider antibiotic de-escalation to target atypical bacterial infection.            No radiology results from the last 24 hrs    Results for orders placed during the hospital encounter of 08/01/24    Adult Transthoracic Echo Complete W/ Cont if Necessary Per Protocol    Interpretation Summary    Left ventricular systolic function is normal. Calculated left ventricular EF = 61.6% Left ventricular ejection fraction appears to be 61 - 65%.    Left ventricular diastolic function was normal.    No significant structural or functional valvular disease.    No significant change compared to previous study      Current medications:  Scheduled Meds:amLODIPine, 10 mg, Oral, Daily  apixaban, 5 mg, Oral, Q12H  arformoterol, 15 mcg, Nebulization, BID - RT  aspirin, 81 mg, Oral, Daily  atorvastatin, 40 mg, Oral, Nightly  budesonide, 0.5 mg, Nebulization, BID  busPIRone, 15 mg, Oral, BID  carvedilol, 12.5 mg, Oral, BID  guaiFENesin, 600 mg, Oral, Q12H  isosorbide mononitrate, 30 mg, Oral, Nightly  mirtazapine, 15 mg, Oral, Nightly  pantoprazole, 40 mg, Oral, Q AM  predniSONE, 40 mg, Oral, Daily  revefenacin, 175 mcg, Nebulization, Daily - RT  sodium chloride, 10 mL, Intravenous, Q12H  spironolactone, 50 mg, Oral, Daily      Continuous Infusions:   PRN Meds:.  acetaminophen **OR** acetaminophen **OR** acetaminophen    albuterol    senna-docusate sodium **AND** polyethylene glycol **AND** bisacodyl **AND** bisacodyl    calcium carbonate    Calcium Replacement - Follow Nurse / BPA Driven Protocol    LORazepam    Magnesium Standard Dose Replacement - Follow Nurse / BPA Driven Protocol    melatonin    nitroglycerin    ondansetron ODT **OR** ondansetron    Phosphorus Replacement - Follow Nurse / BPA Driven Protocol    Potassium Replacement - Follow Nurse / BPA Driven Protocol    sodium chloride    sodium chloride    sodium chloride    Assessment & Plan   Assessment & Plan     Active Hospital Problems    Diagnosis  POA    **COPD  exacerbation [J44.1]  Yes      Resolved Hospital Problems   No resolved problems to display.        Brief Hospital Course to date:  Aguila Finn Jr. is a 58 y.o. male  with past medical history of severe COPD who follows with pulmonary service at [facility], history of essential hypertension, dyslipidemia, nephrolithiasis, anxiety and depression, who presented to the hospital today with worsening shortness of breath and hypoxia since last night     Acute on Chronic Hypoxic Respiratory Failure- baseline 2L  Acute Exacerbation of COPD  - Likely cause of his COPD exacerbation and hypoxia is noncompliance with home medications.  No evidence of pneumonia on chest x-ray.  D-dimer negative.  Negative cardiac workup during last hospitalization in early August with normal echocardiogram and negative PET myocardial perfusion scan  - recent outpatient medication changes per pulmonology, should continue to take Brovana and albuterol nebulizer treatments He was advised to stop taking Symbicort and instead use Pulmicort and Yupelri, Additionally, he was prescribed Trilogy machine, now using it and states he feels like it does help him breathe better, although does not wear it QHS like he should at home  - pulmonary symptoms continue to wax and wane, feels like nebulizer treatments make him worse, however was told he was not getting any benefit from inhalers, will have pulmonology evaluate. May need daily steroid to control symptoms?    Coronary artery disease  Essential hypertension  Dyslipidemia  - Continue amlodipine, Lipitor, Coreg, aspirin, Imdur  - Continue Aldactone     Newly diagnosed paroxysmal A-fib  - recent event monitor placement that showed paroxysmal A-fib  - started on Eliquis, Coreg dose adjusted. Continue     Anxiety and depression  - Continue mirtazapine    Expected Discharge Location and Transportation: Home, when symptoms improved, pulmonology evaluation today  Expected Discharge   Expected discharge date/  time has not been documented.     VTE Prophylaxis:  Pharmacologic VTE prophylaxis orders are present.         AM-PAC 6 Clicks Score (PT): 23 (08/26/24 0843)    CODE STATUS:   Code Status and Medical Interventions: CPR (Attempt to Resuscitate); Full Support   Ordered at: 08/22/24 1254     Level Of Support Discussed With:    Patient    Next of Kin (If No Surrogate)     Code Status (Patient has no pulse and is not breathing):    CPR (Attempt to Resuscitate)     Medical Interventions (Patient has pulse or is breathing):    Full Support       May Dunn, DO  08/26/24

## 2024-08-26 NOTE — PLAN OF CARE
Problem: Noninvasive Ventilation Acute  Goal: Effective Unassisted Ventilation and Oxygenation  Outcome: Ongoing, Progressing  Intervention: Monitor and Manage Noninvasive Ventilation  Recent Flowsheet Documentation  Taken 8/26/2024 0200 by Diony Beaulieu RN  Airway/Ventilation Management: pulmonary hygiene promoted  Taken 8/26/2024 0000 by Diony Beaulieu RN  Airway/Ventilation Management: pulmonary hygiene promoted  Taken 8/25/2024 2000 by Diony Beaulieu RN  Airway/Ventilation Management: pulmonary hygiene promoted     Problem: Adult Inpatient Plan of Care  Goal: Plan of Care Review  Outcome: Ongoing, Progressing  Goal: Patient-Specific Goal (Individualized)  Outcome: Ongoing, Progressing  Goal: Absence of Hospital-Acquired Illness or Injury  Outcome: Ongoing, Progressing  Intervention: Identify and Manage Fall Risk  Recent Flowsheet Documentation  Taken 8/26/2024 0600 by Diony Beaulieu RN  Safety Promotion/Fall Prevention: nonskid shoes/slippers when out of bed  Taken 8/26/2024 0200 by Diony Beaulieu RN  Safety Promotion/Fall Prevention: nonskid shoes/slippers when out of bed  Taken 8/26/2024 0000 by Diony Beaulieu RN  Safety Promotion/Fall Prevention: safety round/check completed  Taken 8/25/2024 2200 by Diony Beaulieu RN  Safety Promotion/Fall Prevention: safety round/check completed  Taken 8/25/2024 2000 by Diony Beaulieu RN  Safety Promotion/Fall Prevention: safety round/check completed  Intervention: Prevent Skin Injury  Recent Flowsheet Documentation  Taken 8/26/2024 0600 by Diony Beaulieu RN  Body Position: position changed independently  Taken 8/26/2024 0200 by Diony Beaulieu RN  Body Position: position changed independently  Taken 8/26/2024 0000 by Diony Beaulieu RN  Body Position: position changed independently  Taken 8/25/2024 2200 by Diony Beaulieu RN  Body Position: position changed  independently  Taken 8/25/2024 2000 by Diony Beaulieu RN  Body Position: position changed independently  Skin Protection: adhesive use limited  Intervention: Prevent and Manage VTE (Venous Thromboembolism) Risk  Recent Flowsheet Documentation  Taken 8/26/2024 0600 by Diony Beaulieu RN  Activity Management: activity minimized  Taken 8/26/2024 0200 by Diony Beaulieu RN  Activity Management: activity minimized  Taken 8/26/2024 0000 by Diony Beaulieu RN  Activity Management: up ad willow  Taken 8/25/2024 2200 by Diony Beaulieu RN  Activity Management: activity minimized  Taken 8/25/2024 2000 by Diony Beaulieu RN  Activity Management: up ad willow  VTE Prevention/Management: (see eMAR) other (see comments)  Range of Motion: ROM (range of motion) performed  Intervention: Prevent Infection  Recent Flowsheet Documentation  Taken 8/26/2024 0600 by Diony Beaulieu RN  Infection Prevention:   environmental surveillance performed   hand hygiene promoted   rest/sleep promoted  Taken 8/26/2024 0200 by Diony Beaulieu RN  Infection Prevention:   environmental surveillance performed   hand hygiene promoted   rest/sleep promoted  Taken 8/26/2024 0000 by Diony Beaulieu RN  Infection Prevention: environmental surveillance performed  Taken 8/25/2024 2200 by Diony Beaulieu RN  Infection Prevention:   environmental surveillance performed   hand hygiene promoted   rest/sleep promoted  Taken 8/25/2024 2000 by Diony Beaulieu RN  Infection Prevention: environmental surveillance performed  Goal: Optimal Comfort and Wellbeing  Outcome: Ongoing, Progressing  Intervention: Provide Person-Centered Care  Recent Flowsheet Documentation  Taken 8/26/2024 0400 by Diony Beaulieu RN  Trust Relationship/Rapport: care explained  Taken 8/25/2024 2000 by Diony Beaulieu RN  Trust Relationship/Rapport: care explained  Goal: Readiness for Transition of  Care  Outcome: Ongoing, Progressing     Problem: COPD (Chronic Obstructive Pulmonary Disease) Comorbidity  Goal: Maintenance of COPD Symptom Control  Outcome: Ongoing, Progressing  Intervention: Maintain COPD-Symptom Control  Recent Flowsheet Documentation  Taken 8/26/2024 0600 by Diony Beaulieu RN  Medication Review/Management: medications reviewed  Taken 8/26/2024 0400 by Diony Beaulieu RN  Supportive Measures: active listening utilized  Taken 8/26/2024 0000 by Diony Beaulieu RN  Supportive Measures: active listening utilized  Medication Review/Management: medications reviewed  Taken 8/25/2024 2000 by Diony Beaulieu RN  Supportive Measures: active listening utilized     Problem: Hypertension Comorbidity  Goal: Blood Pressure in Desired Range  Outcome: Ongoing, Progressing  Intervention: Maintain Blood Pressure Management  Recent Flowsheet Documentation  Taken 8/26/2024 0600 by Diony Beaulieu RN  Medication Review/Management: medications reviewed  Taken 8/26/2024 0000 by Diony Beaulieu RN  Medication Review/Management: medications reviewed     Problem: Fall Injury Risk  Goal: Absence of Fall and Fall-Related Injury  Outcome: Ongoing, Progressing  Intervention: Identify and Manage Contributors  Recent Flowsheet Documentation  Taken 8/26/2024 0600 by Diony Beaulieu RN  Medication Review/Management: medications reviewed  Taken 8/26/2024 0000 by Diony Beaulieu RN  Medication Review/Management: medications reviewed  Intervention: Promote Injury-Free Environment  Recent Flowsheet Documentation  Taken 8/26/2024 0600 by Diony Beaulieu RN  Safety Promotion/Fall Prevention: nonskid shoes/slippers when out of bed  Taken 8/26/2024 0200 by Diony Beaulieu RN  Safety Promotion/Fall Prevention: nonskid shoes/slippers when out of bed  Taken 8/26/2024 0000 by Diony Beaulieu RN  Safety Promotion/Fall Prevention: safety round/check  completed  Taken 8/25/2024 2200 by Diony Beaulieu, RN  Safety Promotion/Fall Prevention: safety round/check completed  Taken 8/25/2024 2000 by Diony Beaulieu, RN  Safety Promotion/Fall Prevention: safety round/check completed   Goal Outcome Evaluation:

## 2024-08-26 NOTE — CONSULTS
Palliative Care Initial Consult   Attending Physician: May Dunn DO  Referring Provider: Dr. Herb Cortez    Reason for Referral:  assistance with advance directives and assistance with clarification of goals of care    Code Status:   Code Status and Medical Interventions: CPR (Attempt to Resuscitate); Full Support   Ordered at: 08/22/24 1254     Level Of Support Discussed With:    Patient    Next of Kin (If No Surrogate)     Code Status (Patient has no pulse and is not breathing):    CPR (Attempt to Resuscitate)     Medical Interventions (Patient has pulse or is breathing):    Full Support      Advanced Directives: Advance Directive Status: Patient does not have advance directive   Family/Support: Stacey Aakash (spouse)  Goals of Care: TBD.    HPI: Aguila Finn Jr is a 58 y.o. male with PMH significant for severe COPD, anxiety, depression, HLD, HTN, paroxysmal A-fib, CAD. Recent hospitalization at [facility] 8/1-8/6 for COPD exacerbation, most recent PFT with FEV1 20%, FEV1/FVC 48%, follows with pulmonary services. Patient presented to [facility] ED on 8/22 with increased shortness of breath and hypoxia. Work up revealed acute on chronic hypoxia, COPD exacerbation most likely due to confusion regarding home medications. Palliative Care consulted for GO in the context of complex medical decision making.      Patient with increased anxiety post-nebulizer treatment. He reports that Buspar does not appear to be effective for him as it once was. He reports current dose of Lorazepam is not fully effective for anxiety. He endorses shortness of breath. Wife at bedside, appropriately tearful during conversation. He is hesitant to increase medications and add additional medications.     ROS: +shortness of breath. +anxiety. +debility, due to increased shortness of breath. +decreased PO intake. Denies nausea, vomiting.       Past Medical History:   Diagnosis Date    Anxiety     COPD (chronic obstructive pulmonary disease)      "Depression     Hyperlipidemia     Hypertension     Recurrent kidney stones      Past Surgical History:   Procedure Laterality Date    CATARACT EXTRACTION, BILATERAL      COLONOSCOPY      EYE SURGERY  2017    WISDOM TOOTH EXTRACTION       Social History     Socioeconomic History    Marital status:    Tobacco Use    Smoking status: Former     Current packs/day: 0.00     Average packs/day: 2.0 packs/day for 15.0 years (30.0 ttl pk-yrs)     Types: Cigarettes     Start date: 9/18/2005     Quit date: 9/18/2020     Years since quitting: 3.9     Passive exposure: Past    Smokeless tobacco: Never   Vaping Use    Vaping status: Never Used   Substance and Sexual Activity    Alcohol use: Yes     Alcohol/week: 1.0 standard drink of alcohol     Types: 1 Cans of beer per week     Comment: a few drinks on occasion, not excessive per patient    Drug use: Yes     Comment: Cannibus gummies    Sexual activity: Yes     Partners: Female     Birth control/protection: Partner of same sex     Family History   Problem Relation Age of Onset    COPD Mother     Hyperlipidemia Father     Hypertension Father     Heart attack Father     Stroke Father     Diabetes Sister     Heart disease Sister     Hypertension Sister     Alcohol abuse Sister     Diabetes Sister     Hypertension Sister     Cancer Sister         Throat    Diabetes Sister     Hypertension Sister     Alcohol abuse Sister        No Known Allergies    Current medication reviewed for route, type, dose and frequency and are current per MAR at time of dictation.    Palliative Performance Scale Score:  60%    /53 (BP Location: Right arm, Patient Position: Lying)   Pulse 103   Temp 98.3 °F (36.8 °C) (Oral)   Resp 18   Ht 175.3 cm (69.02\")   Wt 79.8 kg (175 lb 14.8 oz)   SpO2 94%   BMI 25.97 kg/m²   No intake or output data in the 24 hours ending 08/26/24 1332    Physical Exam:    General Appearance:    Patient sitting up in bed, awake, alert, A/C ill appearing,  " cooperative, NAD   HEENT:    NC/AT, EOMI, anicteric, MMM, face relaxed   Neck:   supple, trachea midline, no JVD   Lungs:     CTA bilat, diminished in bases; respirations regular, even and unlabored; RR 20-22 on exam, 4LNC    Heart:    RRR, normal S1 and S2, no M/R/G,    Abdomen:     Normal bowel sounds, soft, nontender, nondistended   G/U:   Deferred   MSK/Extremities:   no edema   Pulses:   Pulses palpable and equal bilaterally   Skin:   Warm, dry   Neurologic:   A/Ox3, cooperative, NAGY   Psych:   Calm, appropriate         Labs:   Results from last 7 days   Lab Units 08/25/24  0644   WBC 10*3/mm3 8.56   HEMOGLOBIN g/dL 12.0*   HEMATOCRIT % 37.6   PLATELETS 10*3/mm3 154     Results from last 7 days   Lab Units 08/25/24  0644   SODIUM mmol/L 147*   POTASSIUM mmol/L 4.8   CHLORIDE mmol/L 106   CO2 mmol/L 36.0*   BUN mg/dL 24*   CREATININE mg/dL 1.15   GLUCOSE mg/dL 97   CALCIUM mg/dL 8.5*     Results from last 7 days   Lab Units 08/25/24  0644 08/23/24  0208   SODIUM mmol/L 147* 144   POTASSIUM mmol/L 4.8 4.4   CHLORIDE mmol/L 106 104   CO2 mmol/L 36.0* 28.0   BUN mg/dL 24* 43*   CREATININE mg/dL 1.15 1.41*   CALCIUM mg/dL 8.5* 8.6   BILIRUBIN mg/dL  --  0.3   ALK PHOS U/L  --  71   ALT (SGPT) U/L  --  33   AST (SGOT) U/L  --  14   GLUCOSE mg/dL 97 126*     Imaging Results (Last 72 Hours)       ** No results found for the last 72 hours. **                  Diagnostics: Reviewed    A:   COPD exacerbation     58 y.o. male with COPD exacerbation, dyspnea, anxiety.   S/S:   Dyspnea -currently on 4LNC  -nebs, steroids    2. Anxiety -continue Buspar 15mg PO BID  -increased Lorazepam to 1mg PO q 8 hours prn anxiety, recommending pre-medication prior to scheduled neb treatments as these seem to be source of anxiety    3. GOC -Full Code/Full Support -patient considering code status  -ACP consult placed for tomorrow per patient request  -patient appropriately tearful regarding severity of disease process    P: Introduced  Palliative Care and services to patient and wife at bedside. Reviewed patient's symptoms as above. Medications adjusted as above. Reviewed LW/ACP and patient would like to discuss with  tomorrow. Will continue to address GOC and symptom management.     Thank you for this consult and allowing us to participate in patient's plan of care. Palliative Care Team will continue to follow patient. Please do not hesitate to contact us regarding further symptom management or goals of care needs.  Time: 45 minutes spent reviewing medical and medication records, assessing and examining patient, discussing with family, answering questions, providing some guidance about a plan and documentation of care, and coordinating care with other healthcare members, with > 50% time spent face to face.         Irina Motta, APRN  8/26/2024

## 2024-08-27 PROBLEM — J43.2 CENTRILOBULAR EMPHYSEMA: Status: ACTIVE | Noted: 2024-08-01

## 2024-08-27 PROCEDURE — 94010 BREATHING CAPACITY TEST: CPT | Performed by: INTERNAL MEDICINE

## 2024-08-27 PROCEDURE — 63710000001 REVEFENACIN 175 MCG/3ML SOLUTION

## 2024-08-27 PROCEDURE — 94664 DEMO&/EVAL PT USE INHALER: CPT

## 2024-08-27 PROCEDURE — 94799 UNLISTED PULMONARY SVC/PX: CPT

## 2024-08-27 PROCEDURE — 63710000001 PREDNISONE PER 1 MG: Performed by: INTERNAL MEDICINE

## 2024-08-27 PROCEDURE — 99232 SBSQ HOSP IP/OBS MODERATE 35: CPT | Performed by: INTERNAL MEDICINE

## 2024-08-27 PROCEDURE — 97535 SELF CARE MNGMENT TRAINING: CPT | Performed by: OCCUPATIONAL THERAPIST

## 2024-08-27 PROCEDURE — 97110 THERAPEUTIC EXERCISES: CPT

## 2024-08-27 PROCEDURE — 97110 THERAPEUTIC EXERCISES: CPT | Performed by: OCCUPATIONAL THERAPIST

## 2024-08-27 PROCEDURE — 94761 N-INVAS EAR/PLS OXIMETRY MLT: CPT

## 2024-08-27 RX ADMIN — REVEFENACIN 175 MCG: 175 SOLUTION RESPIRATORY (INHALATION) at 12:22

## 2024-08-27 RX ADMIN — MIRTAZAPINE 15 MG: 15 TABLET, FILM COATED ORAL at 20:03

## 2024-08-27 RX ADMIN — BUSPIRONE HYDROCHLORIDE 15 MG: 10 TABLET ORAL at 20:03

## 2024-08-27 RX ADMIN — ISOSORBIDE MONONITRATE 30 MG: 30 TABLET, EXTENDED RELEASE ORAL at 20:03

## 2024-08-27 RX ADMIN — GUAIFENESIN 600 MG: 600 TABLET, EXTENDED RELEASE ORAL at 20:04

## 2024-08-27 RX ADMIN — METOPROLOL TARTRATE 25 MG: 25 TABLET, FILM COATED ORAL at 08:54

## 2024-08-27 RX ADMIN — APIXABAN 5 MG: 5 TABLET, FILM COATED ORAL at 08:54

## 2024-08-27 RX ADMIN — ARFORMOTEROL TARTRATE 15 MCG: 15 SOLUTION RESPIRATORY (INHALATION) at 20:41

## 2024-08-27 RX ADMIN — ARFORMOTEROL TARTRATE 15 MCG: 15 SOLUTION RESPIRATORY (INHALATION) at 09:48

## 2024-08-27 RX ADMIN — PREDNISONE 40 MG: 20 TABLET ORAL at 08:54

## 2024-08-27 RX ADMIN — METOPROLOL TARTRATE 25 MG: 25 TABLET, FILM COATED ORAL at 20:03

## 2024-08-27 RX ADMIN — LORAZEPAM 1 MG: 1 TABLET ORAL at 19:42

## 2024-08-27 RX ADMIN — BUDESONIDE 0.5 MG: 0.5 SUSPENSION RESPIRATORY (INHALATION) at 09:48

## 2024-08-27 RX ADMIN — ASPIRIN 81 MG: 81 TABLET, COATED ORAL at 08:54

## 2024-08-27 RX ADMIN — GUAIFENESIN 600 MG: 600 TABLET, EXTENDED RELEASE ORAL at 08:54

## 2024-08-27 RX ADMIN — Medication 10 ML: at 08:55

## 2024-08-27 RX ADMIN — PANTOPRAZOLE SODIUM 40 MG: 40 TABLET, DELAYED RELEASE ORAL at 05:54

## 2024-08-27 RX ADMIN — AMLODIPINE BESYLATE 10 MG: 10 TABLET ORAL at 08:54

## 2024-08-27 RX ADMIN — LORAZEPAM 1 MG: 1 TABLET ORAL at 08:55

## 2024-08-27 RX ADMIN — BUDESONIDE 0.5 MG: 0.5 SUSPENSION RESPIRATORY (INHALATION) at 20:41

## 2024-08-27 RX ADMIN — ATORVASTATIN CALCIUM 40 MG: 40 TABLET, FILM COATED ORAL at 20:03

## 2024-08-27 RX ADMIN — BUSPIRONE HYDROCHLORIDE 15 MG: 10 TABLET ORAL at 08:54

## 2024-08-27 RX ADMIN — APIXABAN 5 MG: 5 TABLET, FILM COATED ORAL at 20:04

## 2024-08-27 RX ADMIN — SPIRONOLACTONE 50 MG: 25 TABLET ORAL at 08:54

## 2024-08-27 RX ADMIN — Medication 10 ML: at 20:04

## 2024-08-27 NOTE — PROGRESS NOTES
[facility] Medicine Services  PROGRESS NOTE    Patient Name: Aguila Finn Jr.  : 1965  MRN: [id number]    Date of Admission: 2024  Primary Care Physician: Kenya Martinez MD    Subjective   Subjective     CC:  COPD Exacerbation    HPI:  Patient resting in bed. Wife at bedside. Discussed palliative care and pulmonology evaluations. He is feelng better with some medications to help w/his anxiety.    Objective   Objective     Vital Signs:   Temp:  [97.5 °F (36.4 °C)-98.2 °F (36.8 °C)] 98 °F (36.7 °C)  Heart Rate:  [60-85] 85  Resp:  [16-18] 18  BP: (101-153)/() 101/68  Flow (L/min):  [2-4] 2     Physical Exam:  Constitutional: No acute distress, awake, alert  HENT: NCAT, mucous membranes moist  Respiratory: increased WOB, still with very poor air movement bilaterally on 2L  Cardiovascular: RRR, no murmurs, rubs, or gallops  Gastrointestinal: soft, nontender, nondistended  Musculoskeletal: No bilateral ankle edema  Psychiatric: Appropriate affect, cooperative  Neurologic: Oriented x 3, speech clear, no focal deficits  Skin: No rashes    Results Reviewed:  LAB RESULTS:      Lab 24  02024  1019   WBC 8.56 7.13 8.87   HEMOGLOBIN 12.0* 11.6* 12.5*   HEMATOCRIT 37.6 36.5* 39.2   PLATELETS 154 106* 115*   NEUTROS ABS 7.24* 6.06 7.83*   IMMATURE GRANS (ABS) 0.11* 0.05 0.05   LYMPHS ABS 0.33* 0.32* 0.30*   MONOS ABS 0.86 0.69 0.49   EOS ABS 0.00 0.00 0.17   MCV 91.7 91.3 91.2   D DIMER QUANT  --   --  0.45         Lab 24  0208 24  1019   SODIUM 147* 144 147*   POTASSIUM 4.8 4.4 4.3   CHLORIDE 106 104 106   CO2 36.0* 28.0 31.0*   ANION GAP 5.0 12.0 10.0   BUN 24* 43* 31*   CREATININE 1.15 1.41* 1.11   EGFR 73.3 57.4* 76.5   GLUCOSE 97 126* 117*   CALCIUM 8.5* 8.6 8.7   MAGNESIUM  --  2.5  --    PHOSPHORUS  --  3.8  --          Lab 24  0208 24  1019   TOTAL PROTEIN 5.8* 6.4   ALBUMIN 3.2* 3.6    GLOBULIN 2.6 2.8   ALT (SGPT) 33 35   AST (SGOT) 14 17   BILIRUBIN 0.3 0.6   ALK PHOS 71 72         Lab 08/23/24  0351 08/23/24  0208 08/22/24  1019   PROBNP  --   --  55.5   HSTROP T 12 12 19                 Lab 08/22/24  0959   PH, ARTERIAL 7.439   PCO2, ARTERIAL 46.4*   PO2 ART 88.0   FIO2 28   HCO3 ART 31.4*   BASE EXCESS ART 6.2*   CARBOXYHEMOGLOBIN 0.9     Brief Urine Lab Results       None            Microbiology Results Abnormal       Procedure Component Value - Date/Time    Respiratory Panel PCR w/COVID-19(SARS-CoV-2) DAKOTAH/JULIANA/ISAIAS/PAD/COR/CHRISTAL In-House, NP Swab in UTM/VTM, 2 HR TAT - Swab, Nasopharynx [[id number]]  (Normal) Collected: 08/22/24 0951    Lab Status: Final result Specimen: Swab from Nasopharynx Updated: 08/22/24 1102     ADENOVIRUS, PCR Not Detected     Coronavirus 229E Not Detected     Coronavirus HKU1 Not Detected     Coronavirus NL63 Not Detected     Coronavirus OC43 Not Detected     COVID19 Not Detected     Human Metapneumovirus Not Detected     Human Rhinovirus/Enterovirus Not Detected     Influenza A PCR Not Detected     Influenza B PCR Not Detected     Parainfluenza Virus 1 Not Detected     Parainfluenza Virus 2 Not Detected     Parainfluenza Virus 3 Not Detected     Parainfluenza Virus 4 Not Detected     RSV, PCR Not Detected     Bordetella pertussis pcr Not Detected     Bordetella parapertussis PCR Not Detected     Chlamydophila pneumoniae PCR Not Detected     Mycoplasma pneumo by PCR Not Detected    Narrative:      In the setting of a positive respiratory panel with a viral infection PLUS a negative procalcitonin without other underlying concern for bacterial infection, consider observing off antibiotics or discontinuation of antibiotics and continue supportive care. If the respiratory panel is positive for atypical bacterial infection (Bordetella pertussis, Chlamydophila pneumoniae, or Mycoplasma pneumoniae), consider antibiotic de-escalation to target atypical bacterial infection.             No radiology results from the last 24 hrs    Results for orders placed during the hospital encounter of 08/01/24    Adult Transthoracic Echo Complete W/ Cont if Necessary Per Protocol    Interpretation Summary    Left ventricular systolic function is normal. Calculated left ventricular EF = 61.6% Left ventricular ejection fraction appears to be 61 - 65%.    Left ventricular diastolic function was normal.    No significant structural or functional valvular disease.    No significant change compared to previous study      Current medications:  Scheduled Meds:amLODIPine, 10 mg, Oral, Daily  apixaban, 5 mg, Oral, Q12H  arformoterol, 15 mcg, Nebulization, BID - RT  aspirin, 81 mg, Oral, Daily  atorvastatin, 40 mg, Oral, Nightly  budesonide, 0.5 mg, Nebulization, BID  busPIRone, 15 mg, Oral, BID  guaiFENesin, 600 mg, Oral, Q12H  isosorbide mononitrate, 30 mg, Oral, Nightly  metoprolol tartrate, 25 mg, Oral, Q12H  mirtazapine, 15 mg, Oral, Nightly  pantoprazole, 40 mg, Oral, Q AM  predniSONE, 40 mg, Oral, Daily  revefenacin, 175 mcg, Nebulization, Daily - RT  sodium chloride, 10 mL, Intravenous, Q12H  spironolactone, 50 mg, Oral, Daily      Continuous Infusions:   PRN Meds:.  acetaminophen **OR** acetaminophen **OR** acetaminophen    albuterol    senna-docusate sodium **AND** polyethylene glycol **AND** bisacodyl **AND** bisacodyl    calcium carbonate    Calcium Replacement - Follow Nurse / BPA Driven Protocol    LORazepam    Magnesium Standard Dose Replacement - Follow Nurse / BPA Driven Protocol    melatonin    nitroglycerin    ondansetron ODT **OR** ondansetron    Phosphorus Replacement - Follow Nurse / BPA Driven Protocol    Potassium Replacement - Follow Nurse / BPA Driven Protocol    sodium chloride    sodium chloride    sodium chloride    Assessment & Plan   Assessment & Plan     Active Hospital Problems    Diagnosis  POA    **COPD exacerbation [J44.1]  Yes      Resolved Hospital Problems   No resolved  problems to display.        Brief Hospital Course to date:  Aguila Finn Jr. is a 58 y.o. male  with past medical history of severe COPD who follows with pulmonary service at [facility], history of essential hypertension, dyslipidemia, nephrolithiasis, anxiety and depression, who presented to the hospital today with worsening shortness of breath and hypoxia since last night     Acute on Chronic Hypoxic Respiratory Failure- baseline 2L  Acute Exacerbation of COPD  - Likely cause of his COPD exacerbation and hypoxia is noncompliance with home medications.  No evidence of pneumonia on chest x-ray.  D-dimer negative.  Negative cardiac workup during last hospitalization in early August with normal echocardiogram and negative PET myocardial perfusion scan, cardiology w/no plans for [facility] at this time  - appreciate pulm evaluation, continue nebs, with low FEV1 will not benefit from MDIs, patient w/significant anxiety associated w/neb treatments, improved w/addition of ativan PRN  - continue Trilogy, was able to wear it for several hours last night  - palliative care following, appreciate input  - may be candidate for lung transplant    Coronary artery disease  Essential hypertension  Dyslipidemia  - Continue amlodipine, Lipitor, Coreg, aspirin, Imdur  - Continue Aldactone     Newly diagnosed paroxysmal A-fib  - recent event monitor placement that showed paroxysmal A-fib  - started on Eliquis, Coreg dose adjusted. Continue     Anxiety and depression  - Continue mirtazapine    Expected Discharge Location and Transportation: Patient requesting rehab at [facility] now  Expected Discharge   Expected discharge date/ time has not been documented.     VTE Prophylaxis:  Pharmacologic VTE prophylaxis orders are present.         AM-PAC 6 Clicks Score (PT): 23 (08/27/24 1017)    CODE STATUS:   Code Status and Medical Interventions: CPR (Attempt to Resuscitate); Full Support   Ordered at: 08/22/24 1254     Level Of Support Discussed  With:    Patient    Next of Kin (If No Surrogate)     Code Status (Patient has no pulse and is not breathing):    CPR (Attempt to Resuscitate)     Medical Interventions (Patient has pulse or is breathing):    Full Support       May Dunn,   08/27/24

## 2024-08-27 NOTE — THERAPY TREATMENT NOTE
Patient Name: Aguila Finn Jr.  : 1965    MRN: [id number]                              Today's Date: 2024       Admit Date: 2024    Visit Dx:     ICD-10-CM ICD-9-CM   1. Acute respiratory failure with hypoxia  J96.01 518.81   2. COPD with acute exacerbation  J44.1 491.21     Patient Active Problem List   Diagnosis    Essential hypertension    COPD (chronic obstructive pulmonary disease)    Anxiety and depression    Recurrent kidney stones    Tobacco abuse    Chronic hypokalemia    Chronic bronchitis    Hyperlipidemia    Personal history of smoking    Nocturnal hypoxemia    COPD exacerbation    Acute-on-chronic respiratory failure     Past Medical History:   Diagnosis Date    Anxiety     COPD (chronic obstructive pulmonary disease)     Depression     Hyperlipidemia     Hypertension     Recurrent kidney stones      Past Surgical History:   Procedure Laterality Date    CATARACT EXTRACTION, BILATERAL      COLONOSCOPY      EYE SURGERY  2017    WISDOM TOOTH EXTRACTION        General Information       Row Name 24 1001          OT Time and Intention    Document Type therapy note (daily note)  -SD     Mode of Treatment occupational therapy  -SD       Row Name 24 1001          General Information    Patient Profile Reviewed yes  -SD     Existing Precautions/Restrictions oxygen therapy device and L/min  -SD     Barriers to Rehab none identified  -SD       Row Name 24 1001          Cognition    Orientation Status (Cognition) oriented x 4  -SD       Row Name 24 1001          Safety Issues, Functional Mobility    Safety Issues Affecting Function (Mobility) insight into deficits/self-awareness;safety precaution awareness;safety precautions follow-through/compliance  -SD     Impairments Affecting Function (Mobility) shortness of breath;strength;endurance/activity tolerance  -SD               User Key  (r) = Recorded By, (t) = Taken By, (c) = Cosigned By      Initials Name Provider  Type    SD Yulia Xavier OT Occupational Therapist                     Mobility/ADL's       Row Name 08/27/24 1044          Bed Mobility    Comment, (Bed Mobility) pt. sitting up in chair, just returned from walking in hallway with PT  -SD       Row Name 08/27/24 1044          Functional Mobility    Patient was able to Ambulate no, other medical factors prevent ambulation  -SD     Reason Patient was unable to Ambulate Other (Comment)  pt. just up with PT  -SD       Row Name 08/27/24 1044          Activities of Daily Living    BADL Assessment/Intervention bathing  -SD       Row Name 08/27/24 1044          Bathing Assessment/Intervention    [place] Level (Bathing) bathing skills  -SD     Assistive Devices (Bathing) long-handled sponge  -SD     Position (Bathing) supported sitting  -SD     Comment, (Bathing) pt. simulated TBB using Long handled bath sponge & educated on adaptations to bathing routine to maintain O2 sats >90% (shower chair, keep O2 on, decrease water temp)  -SD               User Key  (r) = Recorded By, (t) = Taken By, (c) = Cosigned By      Initials Name Provider Type    SD Yulia Xavier OT Occupational Therapist                   Obj/Interventions       Row Name 08/27/24 1047          Shoulder (Therapeutic Exercise)    Shoulder (Therapeutic Exercise) wand exercises;strengthening exercise  -SD     Shoulder Strengthening (Therapeutic Exercise) bilateral;flexion;extension;horizontal aBduction/aDduction;resisted diagonal exercise;scapular stabilization;red;resistance band;10 repetitions  -SD     Shoulder Wand Exercises (Therapeutic Exercise) AROM (active range of motion);flexion;extension;horizontal aBduction/aDduction;sitting;10 repetitions  -SD       Row Name 08/27/24 1047          Motor Skills    Therapeutic Exercise shoulder  -SD       Row Name 08/27/24 1047          Balance    Static Sitting Balance independent  -SD     Dynamic Sitting Balance independent  -SD     Position,  Sitting Balance unsupported;sitting in chair  -SD     Balance Interventions sitting;dynamic reaching;UE activity with balance activity;occupation based/functional task  -SD               User Key  (r) = Recorded By, (t) = Taken By, (c) = Cosigned By      Initials Name Provider Type    Yulia De La O, SAIGE Occupational Therapist                   Goals/Plan    No documentation.                  Clinical Impression       Row Name 08/27/24 1049          Pain Assessment    Pretreatment Pain Rating 0/10 - no pain  -SD     Posttreatment Pain Rating 0/10 - no pain  -SD       Row Name 08/27/24 1049          Plan of Care Review    Plan of Care Reviewed With patient;spouse  -SD     Progress improving  -SD     Outcome Evaluation Pt. with increasing knowledge of energy conservation techniques to increase ADL independence and occupational endurance. Pt. issued long handled bath sponge and reacher, and educated on Energy Conservation techniques. Pt. educated on wand exercises with red theraband. Pt. continues to present below functional baseline & continues to warrant OT skilled services to return to [facility]. Recommend IPRF upon d/c.  -SD       Row Name 08/27/24 1049          Therapy Assessment/Plan (OT)    Rehab Potential (OT) good, to achieve stated therapy goals  -SD     Criteria for Skilled Therapeutic Interventions Met (OT) yes;meets criteria;skilled treatment is necessary  -SD     Therapy Frequency (OT) daily  -SD       Row Name 08/27/24 1049          Therapy Plan Review/Discharge Plan (OT)    Anticipated Discharge Disposition (OT) inpatient rehabilitation facility  -SD       Row Name 08/27/24 1049          Vital Signs    Pre Systolic BP Rehab 101  -SD     Pre Treatment Diastolic BP 68  -SD     Posttreatment Heart Rate (beats/min) 64  -SD     Pre SpO2 (%) 92  -SD     O2 Delivery Pre Treatment nasal cannula  -SD     Intra SpO2 (%) 94  -SD     O2 Delivery Intra Treatment nasal cannula  -SD     Post SpO2 (%) 93  -SD      O2 Delivery Post Treatment nasal cannula  -SD     Pre Patient Position Sitting  -SD     Intra Patient Position Sitting  -SD     Post Patient Position Sitting  -SD       Row Name 08/27/24 1049          Positioning and Restraints    Pre-Treatment Position sitting in chair/recliner  -SD     Post Treatment Position chair  -SD     In Chair notified nsg;reclined;call light within reach;encouraged to call for assist;with family/caregiver  -SD               User Key  (r) = Recorded By, (t) = Taken By, (c) = Cosigned By      Initials Name Provider Type    Yulia De La O, OT Occupational Therapist                   Outcome Measures       Row Name 08/27/24 1001          How much help from another is currently needed...    Putting on and taking off regular lower body clothing? 3  -SD     Bathing (including washing, rinsing, and drying) 3  -SD     Toileting (which includes using toilet bed pan or urinal) 3  -SD     Putting on and taking off regular upper body clothing 4  -SD     Taking care of personal grooming (such as brushing teeth) 4  -SD     Eating meals 4  -SD     AM-PAC 6 Clicks Score (OT) 21  -SD       Row Name 08/27/24 1017          How much help from another person do you currently need...    Turning from your back to your side while in flat bed without using bedrails? 4  -CD     Moving from lying on back to sitting on the side of a flat bed without bedrails? 4  -CD     Moving to and from a bed to a chair (including a wheelchair)? 4  -CD     Standing up from a chair using your arms (e.g., wheelchair, bedside chair)? 4  -CD     Climbing 3-5 steps with a railing? 3  -CD     To walk in hospital room? 4  -CD     AM-PAC 6 Clicks Score (PT) 23  -CD     Highest Level of Mobility Goal 7 --> Walk 25 feet or more  -CD       Row Name 08/27/24 1017 08/27/24 1001       Functional Assessment    Outcome Measure Options AM-PAC 6 Clicks Basic Mobility (PT)  -CD AM-PAC 6 Clicks Daily Activity (OT)  -SD              User Key   (r) = Recorded By, (t) = Taken By, (c) = Cosigned By      Initials Name Provider Type    Yulia De La O, OT Occupational Therapist    Bibi Rangel, PT Physical Therapist                    Occupational Therapy Education       Title: PT OT SLP Therapies (In Progress)       Topic: Occupational Therapy (In Progress)       Point: ADL training (Done)       Description:   Instruct learner(s) on proper safety adaptation and remediation techniques during self care or transfers.   Instruct in proper use of assistive devices.                  Learning Progress Summary             Patient Acceptance, E, VU by  at 8/23/2024 1114                         Point: Home exercise program (Not Started)       Description:   Instruct learner(s) on appropriate technique for monitoring, assisting and/or progressing therapeutic exercises/activities.                  Learner Progress:  Not documented in this visit.              Point: Precautions (Not Started)       Description:   Instruct learner(s) on prescribed precautions during self-care and functional transfers.                  Learner Progress:  Not documented in this visit.              Point: Body mechanics (Not Started)       Description:   Instruct learner(s) on proper positioning and spine alignment during self-care, functional mobility activities and/or exercises.                  Learner Progress:  Not documented in this visit.                              User Key       Initials Effective Dates Name Provider Type Discipline     02/03/23 -  Rosario Lee OT Occupational Therapist OT                  OT Recommendation and Plan  Therapy Frequency (OT): daily  Plan of Care Review  Plan of Care Reviewed With: patient, spouse  Progress: improving  Outcome Evaluation: Pt. with increasing knowledge of energy conservation techniques to increase ADL independence and occupational endurance. Pt. issued long handled bath sponge and reacher, and educated on Energy  Conservation techniques. Pt. educated on wand exercises with red theraband. Pt. continues to present below functional baseline & continues to warrant OT skilled services to return to [facility]. Recommend IPRF upon d/c.     Time Calculation:         Time Calculation- OT       Row Name 08/27/24 1054             Time Calculation- OT    OT Received On 08/27/24  -SD      OT Goal Re-Cert Due Date 09/02/24  -SD         Timed Charges    73509 - OT Therapeutic Exercise Minutes 15  -SD      74401 - OT Self Care/Mgmt Minutes 26  -SD         Total Minutes    Timed Charges Total Minutes 41  -SD       Total Minutes 41  -SD                User Key  (r) = Recorded By, (t) = Taken By, (c) = Cosigned By      Initials Name Provider Type    Yulia De La O OT Occupational Therapist                  Therapy Charges for Today       Code Description Service Date Service Provider Modifiers Qty    [id number] HC OT THER PROC EA 15 MIN 8/27/2024 Yulia Xavier OT GO 1    [id number] HC OT SELF CARE/MGMT/TRAIN EA 15 MIN 8/27/2024 Yulia Xavier OT GO 2                 Yulia Xavier OT  8/27/2024

## 2024-08-27 NOTE — PROGRESS NOTES
PULMONARY  PROGRESS NOTE        SUBJECTIVE     We were asked to see Aguila Finn Jr. is a 58 y.o. male for Respiratory Distress  .    Centrilobular emphysema with exacerbation    As an Intensivist, we provide an integrated approach to the ICU patient and family, medical management of comorbid conditions, including but not limited to electrolytes, glycemic control, organ dysfunction, lead interdisciplinary rounds and coordinate the care with all other services, including those from other specialists.     Interval History:   Much better today.    Able to sleep all night with his Trilogy.     Tolerating nebs.    Temp  Min: 97.5 °F (36.4 °C)  Max: 98.2 °F (36.8 °C)       History     Last Reviewed by Herb Cortez MD on 2024 at 10:11 AM    Sections Reviewed    Medical, Surgical, Family, Tobacco, Alcohol, Drug Use, Sexual Activity,   Social Documentation    Problem list reviewed by Herb Cortez MD on 2024 at 10:11 AM  Medicines reviewed by Herb Cortez MD on 2024 at 10:11 AM  Allergies reviewed by Herb Cortez MD on 2024 at 10:11 AM     The patient's relevant past medical, surgical and social history were reviewed and updated in Epic as appropriate.          OBJECTIVE     Vitals:  Temp: 98 °F (36.7 °C) (24 1036) Temp  Min: 97.5 °F (36.4 °C)  Max: 98.2 °F (36.8 °C)   Temp core:      BP: 101/68 (24 1036) BP  Min: 101/68  Max: 153/91   MAP (non-invasive) Noninvasive MAP (mmHg): 80 (24 1036) Noninvasive MAP (mmHg)  Av.8  Min: 69  Max: 126   Pulse: 76 (24 1223) Pulse  Min: 60  Max: 85   Resp: 18 (24 1223) Resp  Min: 16  Max: 18   SpO2: 94 % (24 1223) SpO2  Min: 90 %  Max: 96 %   Device: room air (24 1223)    Flow Rate: 2 (24 1000) Flow (L/min)  Min: 2  Max: 4         24  0933 24  1500   Weight: 79.8 kg (176 lb) 79.8 kg (175 lb 14.8 oz)        NIV:   Settings: Observed:   Mode of Delivery: other (see comments) (TRILOGY)  (08/26/24 2159)          IPAP (cm H2O): 14 (08/22/24 1000)    EPAP (cm H2O): 6 (08/22/24 1000)    Pressure Support (cm H20): 8 cm H2O (08/22/24 1000)         Set Rate (Breaths/Min): 14 breaths/min (08/22/24 1000) Respiratory Rate Total (Breaths/Min): 24 breaths/min (08/22/24 1000)         Tidal Volume (mL): 738 mL (08/22/24 1000)   Oxygen Concentration (%): 30 (08/22/24 1000)  Minute Ventilation (L/Min): 18.1 (08/22/24 1000)      Physical Examination  Telemetry:  Rhythm: normal sinus rhythm, sinus tachycardia (08/27/24 1000)      Constitutional:  No acute distress.   Cardiovascular: RRR.    Respiratory: Normal breath sounds  No adventitious sounds   Abdominal:  Soft with no tenderness.   Extremities: No Edema   Neurological:   Alert, Oriented, Cooperative.    Best Eye Response: 4-->(E4) spontaneous (08/27/24 1000)  Best Motor Response: 6-->(M6) obeys commands (08/27/24 1000)  Best Verbal Response: 5-->(V5) oriented (08/27/24 1000)  Chad Coma Scale Score: 15 (08/27/24 1000)               Results Reviewed:  Laboratory  Microbiology  Radiology  Pathology    Hematology:  Results from last 7 days   Lab Units 08/25/24  0644 08/23/24  0208 08/22/24  1019   WBC 10*3/mm3 8.56 7.13 8.87   HEMOGLOBIN g/dL 12.0* 11.6* 12.5*   MCV fL 91.7 91.3 91.2   PLATELETS 10*3/mm3 154 106* 115*     Results from last 7 days   Lab Units 08/25/24  0644 08/23/24  0208 08/22/24  1019   NEUTROS ABS 10*3/mm3 7.24* 6.06 7.83*   LYMPHS ABS 10*3/mm3 0.33* 0.32* 0.30*   EOS ABS 10*3/mm3 0.00 0.00 0.17     Chemistry:  Estimated Creatinine Clearance: 78.1 mL/min (by C-G formula based on SCr of 1.15 mg/dL).    Results from last 7 days   Lab Units 08/25/24  0644 08/23/24  0208   SODIUM mmol/L 147* 144   POTASSIUM mmol/L 4.8 4.4   CHLORIDE mmol/L 106 104   CO2 mmol/L 36.0* 28.0   BUN mg/dL 24* 43*   CREATININE mg/dL 1.15 1.41*   GLUCOSE mg/dL 97 126*     COVID-19  Lab Results   Component Value Date    COVID19 Not Detected 08/22/2024    COVID19 Not  "Detected 08/01/2024     Arterial Blood Gases:  Results from last 7 days   Lab Units 08/22/24  0959   PH, ARTERIAL pH units 7.439   PCO2, ARTERIAL mm Hg 46.4*   PO2 ART mm Hg 88.0   FIO2 % 28       Images:  XR Chest 1 View    Result Date: 8/22/2024  Impression: 1.Emphysematous changes suggested. Electronically Signed: Telly Cao MD  8/22/2024 9:53 AM EDT  Workstation ID: [id number]       Echo:  Results for orders placed during the hospital encounter of 08/01/24    Adult Transthoracic Echo Complete W/ Cont if Necessary Per Protocol    Interpretation Summary    Left ventricular systolic function is normal. Calculated left ventricular EF = 61.6% Left ventricular ejection fraction appears to be 61 - 65%.    Left ventricular diastolic function was normal.    No significant structural or functional valvular disease.    No significant change compared to previous study      Results: Reviewed.  I reviewed the patient's new laboratory and imaging results.  I independently reviewed the patient's new images.    Medications: Reviewed.    Assessment   A/P     Hospital:  LOS: 1 day     Active Hospital Problems    Diagnosis  POA    **Centrilobular emphysema with exacerbation [J43.2]  Yes     >>OVERVIEW FOR COPD (CHRONIC OBSTRUCTIVE PULMONARY DISEASE) WRITTEN ON 2/9/2017  1:36 PM BY AIDE GARCIA APRN    Severe COPD, FEV1 1.35 L, 36%.       Aguila is a 58 y.o. male admitted on 8/22/2024 with COPD exacerbation [J44.1]    Assessment/Management/Treatment Plan:    08/22/24        Chronic respiratory failure, type 2  Emphysema GOLD spirometry grade: 4, group: E.   Spirometry 08/26/24. FEV1 - 0.36 L 10% of predicted.  Spirometry 08/14/24: FEV1 - 0.69 L, 20% of predicted, -4.85 z-score.Spirometry Interpretation: Airflow obstruction. Severe (z-score is < -4.1)  (\"Lung age: 178.9 years\")  Lung Volumes 08/14/24: TLC 83% of predicted. % of predicted  Diffusion study 08/14/24: 52% of predicted.  CT Chest 08/01/24: Emphysema. Stable 7 " "mm XANDER nodule as compared to previous images from August 2022.   (CT scan Chest from 07/27/24 suggested \"severe coronary disease\").  Cardiovascular  A Fib  HTN  Dyslipidemia   Recent Stress PET 08/02/24: Normal myocardial perfusion, no evidence of ischemia.  Endocrine   Body mass index is 25.97 kg/m². Overweight: 25.0-29.9kg/m2   No history of Diabetes    Lab Results   Lab Value Date/Time    HGBA1C 5.30 05/10/2024 1108    HGBA1C 5.4 07/25/2017 0855     Results from last 7 days   Lab Units 08/23/24  2107 08/23/24  0129   GLUCOSE mg/dL 121 134*       Diet: Diet: Regular/House; Fluid Consistency: Thin (IDDSI 0)   Advance Directives: Code Status and Medical Interventions: CPR (Attempt to Resuscitate); Full Support   Ordered at: 08/22/24 1254     Level Of Support Discussed With:    Patient    Next of Kin (If No Surrogate)     Code Status (Patient has no pulse and is not breathing):    CPR (Attempt to Resuscitate)     Medical Interventions (Patient has pulse or is breathing):    Full Support        VTE Prophylaxis:  Pharmacologic VTE prophylaxis orders are present.         In brief:  Continue with LAMA nebs, LABA nebs and Steroid nebs. With his very low FEV1, MDIs unlikely to be efficient in the delivery of  the medications.  BARBI nebs prn.  Continue Trilogy  Lung transplant referral  CM to evaluate inpatient Pulmonary Rehab  Discussed with Patient and Family at bedside on 08/26/24. They also need to consider goals of care, long term Invasive Mechanical Ventilation, Code Status, etc., and those decisions may change whether or not he ends up being a Lung Transplant candidate.    We will follow prn while in the hospital. Please call us back if questions or changes in his status.    Plan of care and goals reviewed during interdisciplinary rounds.  I discussed the patient's findings and my recommendations with patient, family, and nursing staff    MDM:    Problem(s) High due to: Acute or Chronic illness or injury that may " poses a threat to life or bodily function  Data: Moderate due to: Review of prior external records from each unique source, Review or results of each unique test, and Ordering of each unique test    Moderate    [x] Inpatient Pulmonary Consult Service    Copied text in this note has been reviewed and is accurate as of 08/27/24

## 2024-08-27 NOTE — THERAPY TREATMENT NOTE
Patient Name: Aguila Finn Jr.  : 1965    MRN: [id number]                              Today's Date: 2024       Admit Date: 2024    Visit Dx:     ICD-10-CM ICD-9-CM   1. Acute respiratory failure with hypoxia  J96.01 518.81   2. COPD with acute exacerbation  J44.1 491.21     Patient Active Problem List   Diagnosis    Essential hypertension    COPD (chronic obstructive pulmonary disease)    Anxiety and depression    Recurrent kidney stones    Tobacco abuse    Chronic hypokalemia    Chronic bronchitis    Hyperlipidemia    Personal history of smoking    Nocturnal hypoxemia    COPD exacerbation    Acute-on-chronic respiratory failure     Past Medical History:   Diagnosis Date    Anxiety     COPD (chronic obstructive pulmonary disease)     Depression     Hyperlipidemia     Hypertension     Recurrent kidney stones      Past Surgical History:   Procedure Laterality Date    CATARACT EXTRACTION, BILATERAL      COLONOSCOPY      EYE SURGERY  2017    WISDOM TOOTH EXTRACTION        General Information       Row Name 24 1006          Physical Therapy Time and Intention    Document Type evaluation  -CD     Mode of Treatment physical therapy  -CD       Row Name 24 1006          General Information    Patient Profile Reviewed yes  -CD     Existing Precautions/Restrictions oxygen therapy device and L/min  -CD     Barriers to Rehab none identified  -CD       Row Name 24 1006          Living Environment    People in Home spouse  -CD       Row Name 24 1006          Cognition    Orientation Status (Cognition) oriented x 4  -CD       Row Name 24 1006          Safety Issues, Functional Mobility    Safety Issues Affecting Function (Mobility) insight into deficits/self-awareness  -CD     Impairments Affecting Function (Mobility) endurance/activity tolerance;shortness of breath  -CD     Comment, Safety Issues/Impairments (Mobility) CUES FOR PLB AND PACING SELF WITH GAIT IN MYERS. REQUIRED  1 STANDING REST BREAK.  -CD               User Key  (r) = Recorded By, (t) = Taken By, (c) = Cosigned By      Initials Name Provider Type    Bibi Rangel, PT Physical Therapist                   Mobility       Row Name 08/27/24 1008          Bed Mobility    Supine-Sit Reeves (Bed Mobility) modified independence  -CD     Assistive Device (Bed Mobility) bed rails;head of bed elevated  -CD       Row Name 08/27/24 1008          Transfers    Comment, (Transfers) MILDLY SLOW AND GUARDED WITH STS TRANSITION BUT NO OVERT LOB.  -CD       Row Name 08/27/24 1008          Sit-Stand Transfer    Sit-Stand Reeves (Transfers) standby assist  -CD     Assistive Device (Sit-Stand Transfers) --  NO A.D.  -CD       Row Name 08/27/24 1008          Gait/Stairs (Locomotion)    Reeves Level (Gait) standby assist  -CD     Assistive Device (Gait) --  NO A.D.  -CD     Distance in Feet (Gait) 200  -CD     Deviations/Abnormal Patterns (Gait) gait speed decreased;stride length decreased  -CD     Bilateral Gait Deviations forward flexed posture  -CD     Comment, (Gait/Stairs) NO OVERT LOB WITH GAIT IN MYERS, REQUIRED 1 BRIEF STANDING REST BREAK. AMBULATED ON 3L O2 WITH STABLE O2 SATS.  -CD               User Key  (r) = Recorded By, (t) = Taken By, (c) = Cosigned By      Initials Name Provider Type    Bibi Rangel, PT Physical Therapist                   Obj/Interventions       Row Name 08/27/24 1012          Motor Skills    Therapeutic Exercise --  COMPLETED SEATED B LE THER EX: LAQ, HIP FLEX, AP'S X 10 REPS EACH, RECIPROCAL. O2 SATS STABLE.  -CD       Row Name 08/27/24 1012          Balance    Static Sitting Balance independent  -CD     Dynamic Sitting Balance independent  -CD     Position, Sitting Balance unsupported;sitting in chair  -CD     Static Standing Balance standby assist  -CD     Dynamic Standing Balance standby assist  -CD     Position/Device Used, Standing Balance unsupported  -CD     Comment, Balance NO  OVERT LOB WITH GAIT IN MYERS. DOES NOT REQUIRE A.D.  -CD               User Key  (r) = Recorded By, (t) = Taken By, (c) = Cosigned By      Initials Name Provider Type    CD Bibi Crowley, PT Physical Therapist                   Goals/Plan    No documentation.                  Clinical Impression       Row Name 08/27/24 1015          Pain    Pretreatment Pain Rating 0/10 - no pain  -CD     Posttreatment Pain Rating 0/10 - no pain  -CD       Row Name 08/27/24 1015          Plan of Care Review    Plan of Care Reviewed With patient;spouse  -CD     Progress improving  -CD     Outcome Evaluation INCREASED DISTANCE AMBULATED. NO OVERT LOB WITHOUT USE OF A.D. REQUIRED 1 BRIEF STANDING REST BREAK WITH GAIT IN MYERS. O2 SATS STABLE ON 2-3L O2. RECOMMEND HOME AT D/C WITH PULMONARY REHAB.  -CD       Row Name 08/27/24 1015          Therapy Assessment/Plan (PT)    Patient/Family Therapy Goals Statement (PT) TO RETURN TO PLOF.  -CD     Rehab Potential (PT) good, to achieve stated therapy goals  -CD     Criteria for Skilled Interventions Met (PT) yes;meets criteria;skilled treatment is necessary  -CD     Therapy Frequency (PT) daily  -CD       Row Name 08/27/24 1015          Vital Signs    Pre Systolic BP Rehab 149  -CD     Pre Treatment Diastolic   -CD     Post Systolic BP Rehab 126  -CD     Post Treatment Diastolic BP 82  -CD     Posttreatment Heart Rate (beats/min) 76  -CD     Posttreatment Resp Rate (breaths/min) --  2L  -CD     Pre SpO2 (%) 91  -CD     O2 Delivery Pre Treatment supplemental O2  -CD     Intra SpO2 (%) 92  -CD     O2 Delivery Intra Treatment supplemental O2  3L  -CD     Post SpO2 (%) 91  -CD     O2 Delivery Post Treatment supplemental O2  2L  -CD     Pre Patient Position Supine  -CD     Intra Patient Position Standing  -CD     Post Patient Position Sitting  -CD       Row Name 08/27/24 1015          Positioning and Restraints    Pre-Treatment Position in bed  -CD     Post Treatment Position chair  -CD     In  Chair reclined;call light within reach;encouraged to call for assist;legs elevated;notified nsg  -CD               User Key  (r) = Recorded By, (t) = Taken By, (c) = Cosigned By      Initials Name Provider Type    Bibi Rangel PT Physical Therapist                   Outcome Measures       Row Name 08/27/24 1017          How much help from another person do you currently need...    Turning from your back to your side while in flat bed without using bedrails? 4  -CD     Moving from lying on back to sitting on the side of a flat bed without bedrails? 4  -CD     Moving to and from a bed to a chair (including a wheelchair)? 4  -CD     Standing up from a chair using your arms (e.g., wheelchair, bedside chair)? 4  -CD     Climbing 3-5 steps with a railing? 3  -CD     To walk in hospital room? 4  -CD     AM-[facility] 6 Clicks Score (PT) 23  -CD     Highest Level of Mobility Goal 7 --> Walk 25 feet or more  -CD       Row Name 08/27/24 1017          Functional Assessment    Outcome Measure Options AM-PAC 6 Clicks Basic Mobility (PT)  -CD               User Key  (r) = Recorded By, (t) = Taken By, (c) = Cosigned By      Initials Name Provider Type    Bibi Rangel PT Physical Therapist                                 Physical Therapy Education       Title: PT OT SLP Therapies (In Progress)       Topic: Physical Therapy (Done)       Point: Mobility training (Done)       Learning Progress Summary             Patient Acceptance, E, VU by CD at 8/27/2024 1018    Comment: SEE FLOWSHEET.    Acceptance, E, VU,NR by KG at 8/23/2024 0950                         Point: Home exercise program (Done)       Learning Progress Summary             Patient Acceptance, E, VU by CD at 8/27/2024 1018    Comment: SEE FLOWSHEET.                         Point: Body mechanics (Done)       Learning Progress Summary             Patient Acceptance, E, VU by CD at 8/27/2024 1018    Comment: SEE FLOWSHEET.    Acceptance, E, VU,NR by KG at 8/23/2024  0950                         Point: Precautions (Done)       Learning Progress Summary             Patient Acceptance, E, VU by CD at 8/27/2024 1018    Comment: SEE FLOWSHEET.    Acceptance, E, VU,NR by KG at 8/23/2024 0950                                         User Key       Initials Effective Dates Name Provider Type Discipline    CD 02/03/23 -  Bibi Crowley PT Physical Therapist PT    KG 05/22/20 -  Marie Gu PT Physical Therapist PT                  PT Recommendation and Plan     Plan of Care Reviewed With: patient, spouse  Progress: improving  Outcome Evaluation: INCREASED DISTANCE AMBULATED. NO OVERT LOB WITHOUT USE OF A.D. REQUIRED 1 BRIEF STANDING REST BREAK WITH GAIT IN MYERS. O2 SATS STABLE ON 2-3L O2. RECOMMEND HOME AT D/C WITH PULMONARY REHAB.     Time Calculation:         PT Charges       Row Name 08/27/24 1018             Time Calculation    Start Time 0929  -CD      PT Received On 08/27/24  -CD         Time Calculation- PT    Total Timed Code Minutes- PT 16 minute(s)  -CD         Timed Charges    20586 - PT Therapeutic Exercise Minutes 16  -CD         Total Minutes    Timed Charges Total Minutes 16  -CD       Total Minutes 16  -CD                User Key  (r) = Recorded By, (t) = Taken By, (c) = Cosigned By      Initials Name Provider Type    CD Bbii Crowley PT Physical Therapist                  Therapy Charges for Today       Code Description Service Date Service Provider Modifiers Qty    88125751542 HC PT THER PROC EA 15 MIN 8/27/2024 Bibi Crowley PT GP 1            PT G-Codes  Outcome Measure Options: AM-PAC 6 Clicks Basic Mobility (PT)  AM-PAC 6 Clicks Score (PT): 23  AM-PAC 6 Clicks Score (OT): 20       Bibi Crowley, PT  8/27/2024

## 2024-08-27 NOTE — PLAN OF CARE
Problem: Adult Inpatient Plan of Care  Goal: Plan of Care Review  Recent Flowsheet Documentation  Taken 8/27/2024 1049 by Yulia Xavier, OT  Progress: improving  Plan of Care Reviewed With:   patient   spouse  Outcome Evaluation: Pt. with increasing knowledge of energy conservation techniques to increase ADL independence and occupational endurance. Pt. issued long handled bath sponge and reacher, and educated on Energy Conservation techniques. Pt. educated on wand exercises with red theraband. Pt. continues to present below functional baseline & continues to warrant OT skilled services to return to PLOF. Recommend IPRF upon d/c.   Goal Outcome Evaluation:  Plan of Care Reviewed With: patient, spouse        Progress: improving  Outcome Evaluation: Pt. with increasing knowledge of energy conservation techniques to increase ADL independence and occupational endurance. Pt. issued long handled bath sponge and reacher, and educated on Energy Conservation techniques. Pt. educated on wand exercises with red theraband. Pt. continues to present below functional baseline & continues to warrant OT skilled services to return to PLOF. Recommend IPRF upon d/c.      Anticipated Discharge Disposition (OT): inpatient rehabilitation facility

## 2024-08-27 NOTE — PROGRESS NOTES
Continued Stay Note  [facility]     Patient Name: Aguila Finn Jr.  MRN: [id number]  Today's Date: 8/27/2024    Admit Date: 8/22/2024    Plan: IDP   Discharge Plan       Row Name 08/27/24 1649       Plan    Plan Comments Met with Mr. Finn and he said he wanted to go to pulmonary rehab. [facility] has inpatient rehab but not inpatient pulmonary rehab. They do have outpatient pulmonary rehab. The patient has been to [facility] for outpatient rehab and may want to do that again when he is discharged.                   Discharge Codes    No documentation.                       KORY Page

## 2024-08-27 NOTE — PLAN OF CARE
Goal Outcome Evaluation:  Plan of Care Reviewed With: patient, spouse        Progress: improving  Outcome Evaluation: INCREASED DISTANCE AMBULATED. NO OVERT LOB WITHOUT USE OF A.D. REQUIRED 1 BRIEF STANDING REST BREAK WITH GAIT IN MYERS. O2 SATS STABLE ON 2-3L O2. RECOMMEND HOME AT D/C WITH PULMONARY REHAB.

## 2024-08-27 NOTE — PLAN OF CARE
"Goal Outcome Evaluation:      Problem: Palliative Care  Goal: Enhanced Quality of Life  Intervention: Optimize Psychosocial Wellbeing  Flowsheets (Taken 8/27/2024 1404)  Supportive Measures:   active listening utilized   self-care encouraged   verbalization of feelings encouraged  Family/Support System Care: (Pt's spouse not at bedside.) other (see comments)     Palliative RN visited bedside at 1107, and provided pt w/blanket, brochure and meal card. Pt's spouse wasn't present at bedside. Pt was up in chair, resting comfortably. He denied pain and current SOB. He said he does get SOB while ambulating, but it's not worse than his normal day to day. Pt stated that being medicated prior to nebs has helped tremendously, and \"I didn't realize how bad it was until the doctor came by and talked with me yesterday\". He was referring to Dr. Cortez. Pt said the referral for lung transplant really cemented the seriousness of it all. Pt endorsed feeling depressed and having tearful moments. Palliative care team to follow for continued support and [facility].    1937 Palliative IDT meeting:  MD, APRN, RN, SW,   After hours, weekends and holidays, contact Palliative Provider by calling [phone]  "

## 2024-08-27 NOTE — PROGRESS NOTES
[facility] at [facility]  INPATIENT PROGRESS NOTE         [facility] 5F    8/27/2024      PATIENT IDENTIFICATION:   Name:  Aguila Finn Jr.      MRN:  [id number]     58 y.o.  male             Reason for visit: Paroxysmal atrial fibrillation.      SUBJECTIVE:   Resting comfortably no new cardiac complaints today.     OBJECTIVE:  Vitals:    08/27/24 0700 08/27/24 0854 08/27/24 0949 08/27/24 1036   BP: 153/91 (!) 149/116  101/68   BP Location: Right arm   Right arm   Patient Position: Sitting   Sitting   Pulse:  85     Resp: 18  18 18   Temp: 98.1 °F (36.7 °C)   98 °F (36.7 °C)   TempSrc: Oral   Oral   SpO2:       Weight:       Height:               Body mass index is 25.97 kg/m².  No intake or output data in the 24 hours ending 08/27/24 1044    Telemetry: Personally reviewed, normal sinus rhythm, no arrhythmia       Exam:  General Appearance:   well developed  well nourished  Neck:  thyroid not enlarged  supple  Respiratory:  no respiratory distress  normal breath sounds  no rales  Cardiovascular:  no jugular venous distention  regular rhythm  apical impulse normal  S1 normal, S2 normal  no S3, no S4   no murmur  no rub, no thrill  carotid pulses normal; no bruit  pedal pulses normal  lower extremity edema: none    Skin:   warm, dry      No Known Allergies  Scheduled meds:       amLODIPine, 10 mg, Oral, Daily  apixaban, 5 mg, Oral, Q12H  arformoterol, 15 mcg, Nebulization, BID - RT  aspirin, 81 mg, Oral, Daily  atorvastatin, 40 mg, Oral, Nightly  budesonide, 0.5 mg, Nebulization, BID  busPIRone, 15 mg, Oral, BID  guaiFENesin, 600 mg, Oral, Q12H  isosorbide mononitrate, 30 mg, Oral, Nightly  metoprolol tartrate, 25 mg, Oral, Q12H  mirtazapine, 15 mg, Oral, Nightly  pantoprazole, 40 mg, Oral, Q AM  predniSONE, 40 mg, Oral, Daily  revefenacin, 175 mcg, Nebulization, Daily - RT  sodium chloride, 10 mL, Intravenous, Q12H  spironolactone, 50 mg, Oral, Daily      IV meds:          "                Data Review:  Results from last 7 days   Lab Units 08/25/24  0644 08/23/24  0208 08/22/24  1019   SODIUM mmol/L 147* 144 147*   BUN mg/dL 24* 43* 31*   CREATININE mg/dL 1.15 1.41* 1.11   GLUCOSE mg/dL 97 126* 117*     Results from last 7 days   Lab Units 08/25/24  0644 08/23/24  0208 08/22/24  1019   WBC 10*3/mm3 8.56 7.13 8.87   HEMOGLOBIN g/dL 12.0* 11.6* 12.5*         Results from last 7 days   Lab Units 08/23/24  0208 08/22/24  1019   ALT (SGPT) U/L 33 35   AST (SGOT) U/L 14 17     No results found for: \"DIGOXIN\"   No results found for: \"TSH\"        Invalid input(s): \"LDLCALC\"    Estimated Creatinine Clearance: 78.1 mL/min (by C-G formula based on SCr of 1.15 mg/dL).        Imaging (last 24 hr):   I personally reviewed the most recent chest x-ray and other pertinent imaging studies.  Results for orders placed during the hospital encounter of 08/22/24    XR Chest 1 View    Narrative  XR CHEST 1 VW    Date of Exam: 8/22/2024 9:31 AM EDT    Indication: SOA triage protocol    Comparison: CT chest August 1, 2024    Findings:  There are emphysematous changes present. The heart is not enlarged. There is no idris consolidation or obvious pleural effusions.    Impression  Impression:  1.Emphysematous changes suggested.      Electronically Signed: Telly Cao MD  8/22/2024 9:53 AM EDT  Workstation ID: [id number]        Last ECHO:  Results for orders placed during the hospital encounter of 08/01/24    Adult Transthoracic Echo Complete W/ Cont if Necessary Per Protocol    Interpretation Summary    Left ventricular systolic function is normal. Calculated left ventricular EF = 61.6% Left ventricular ejection fraction appears to be 61 - 65%.    Left ventricular diastolic function was normal.    No significant structural or functional valvular disease.    No significant change compared to previous study        PROBLEM LIST:     COPD exacerbation        Initial cardiac assessment: 58-year-old gentleman with a " history of severe COPD hypertension hyperlipidemia, newly diagnosed with paroxysmal atrial fibrillation admitted for COPD exacerbation.    ASSESSMENT/PLAN:  1.  Coronary calcification:  Low risk stress test 8/2/2024 and benign echo  No need for further ischemic evaluation at this time.  Continue medical therapy with aspirin, amlodipine, Lipitor, carvedilol, Imdur    2.  Paroxysmal atrial fibrillation:  Continue anticoagulation with Eliquis  Continue metoprolol   Converted to sinus rhythm 8/27/2024    3.  Severe COPD  Per hospitalist, improving.    4.  Hypertension:  Long-term goal blood pressure less than 130/80  Continue current medical therapy.        Brent Miles MD  8/27/2024    10:44 EDT

## 2024-08-28 PROCEDURE — 99232 SBSQ HOSP IP/OBS MODERATE 35: CPT | Performed by: PHYSICIAN ASSISTANT

## 2024-08-28 PROCEDURE — 94799 UNLISTED PULMONARY SVC/PX: CPT

## 2024-08-28 PROCEDURE — 63710000001 PREDNISONE PER 1 MG: Performed by: INTERNAL MEDICINE

## 2024-08-28 PROCEDURE — 94761 N-INVAS EAR/PLS OXIMETRY MLT: CPT

## 2024-08-28 PROCEDURE — 63710000001 REVEFENACIN 175 MCG/3ML SOLUTION

## 2024-08-28 PROCEDURE — 99232 SBSQ HOSP IP/OBS MODERATE 35: CPT | Performed by: INTERNAL MEDICINE

## 2024-08-28 PROCEDURE — 94664 DEMO&/EVAL PT USE INHALER: CPT

## 2024-08-28 RX ORDER — BUSPIRONE HYDROCHLORIDE 10 MG/1
20 TABLET ORAL 2 TIMES DAILY
Status: DISCONTINUED | OUTPATIENT
Start: 2024-08-28 | End: 2024-08-29 | Stop reason: HOSPADM

## 2024-08-28 RX ADMIN — SPIRONOLACTONE 50 MG: 25 TABLET ORAL at 08:01

## 2024-08-28 RX ADMIN — METOPROLOL TARTRATE 25 MG: 25 TABLET, FILM COATED ORAL at 08:01

## 2024-08-28 RX ADMIN — BUSPIRONE HYDROCHLORIDE 20 MG: 10 TABLET ORAL at 08:04

## 2024-08-28 RX ADMIN — BUSPIRONE HYDROCHLORIDE 20 MG: 10 TABLET ORAL at 20:00

## 2024-08-28 RX ADMIN — MIRTAZAPINE 15 MG: 15 TABLET, FILM COATED ORAL at 20:00

## 2024-08-28 RX ADMIN — BUDESONIDE 0.5 MG: 0.5 SUSPENSION RESPIRATORY (INHALATION) at 10:21

## 2024-08-28 RX ADMIN — ARFORMOTEROL TARTRATE 15 MCG: 15 SOLUTION RESPIRATORY (INHALATION) at 21:09

## 2024-08-28 RX ADMIN — LORAZEPAM 1 MG: 1 TABLET ORAL at 20:00

## 2024-08-28 RX ADMIN — ISOSORBIDE MONONITRATE 30 MG: 30 TABLET, EXTENDED RELEASE ORAL at 20:00

## 2024-08-28 RX ADMIN — BUDESONIDE 0.5 MG: 0.5 SUSPENSION RESPIRATORY (INHALATION) at 21:09

## 2024-08-28 RX ADMIN — REVEFENACIN 175 MCG: 175 SOLUTION RESPIRATORY (INHALATION) at 10:21

## 2024-08-28 RX ADMIN — AMLODIPINE BESYLATE 10 MG: 10 TABLET ORAL at 08:01

## 2024-08-28 RX ADMIN — ASPIRIN 81 MG: 81 TABLET, COATED ORAL at 08:02

## 2024-08-28 RX ADMIN — LORAZEPAM 1 MG: 1 TABLET ORAL at 08:01

## 2024-08-28 RX ADMIN — PREDNISONE 40 MG: 20 TABLET ORAL at 08:01

## 2024-08-28 RX ADMIN — APIXABAN 5 MG: 5 TABLET, FILM COATED ORAL at 08:01

## 2024-08-28 RX ADMIN — ARFORMOTEROL TARTRATE 15 MCG: 15 SOLUTION RESPIRATORY (INHALATION) at 10:21

## 2024-08-28 RX ADMIN — GUAIFENESIN 600 MG: 600 TABLET, EXTENDED RELEASE ORAL at 08:01

## 2024-08-28 RX ADMIN — Medication 10 ML: at 08:02

## 2024-08-28 RX ADMIN — APIXABAN 5 MG: 5 TABLET, FILM COATED ORAL at 20:01

## 2024-08-28 RX ADMIN — PANTOPRAZOLE SODIUM 40 MG: 40 TABLET, DELAYED RELEASE ORAL at 05:46

## 2024-08-28 RX ADMIN — GUAIFENESIN 600 MG: 600 TABLET, EXTENDED RELEASE ORAL at 20:01

## 2024-08-28 RX ADMIN — METOPROLOL TARTRATE 25 MG: 25 TABLET, FILM COATED ORAL at 20:01

## 2024-08-28 RX ADMIN — ATORVASTATIN CALCIUM 40 MG: 40 TABLET, FILM COATED ORAL at 20:01

## 2024-08-28 NOTE — CONSULTS
Gave ACP information to pt as requested. PT discussed getting [facility] a [facility] [employer]. Wants to discuss living will with wife.   Team 2 Surgery Post-Op Note, PCN:     Pre-Op Dx:   Procedure: EGD  Cholecystectomy, laparoscopic    Surgeon: Mary    Subjective:  Pt seen and examined at bedside 2 hours post operatively. Pt is doing well. Reporting pain her right abdomen, but controlled with pain medication. Pt denies CP, SOB, difficulty breathing, nausea, vomiting.     Vital Signs Last 24 Hrs  T(C): 37 (01 Aug 2019 19:25), Max: 37 (01 Aug 2019 07:26)  T(F): 98.6 (01 Aug 2019 19:25), Max: 98.6 (01 Aug 2019 07:26)  HR: 84 (01 Aug 2019 19:25) (58 - 97)  BP: 102/64 (01 Aug 2019 19:25) (95/53 - 122/61)  BP(mean): 71 (01 Aug 2019 18:30) (71 - 71)  RR: 16 (01 Aug 2019 19:25) (14 - 18)  SpO2: 95% (01 Aug 2019 19:25) (95% - 100%)    Physical Exam:  General: NAD, resting comfortably in bed  Pulmonary: Nonlabored breathing, no respiratory distress  Abdominal: soft, tender in the R side of the abdomen, incisions clean dry and intact. No rebound or guarding.   Extremities: warm, well perfused. SCDs inplace.         LABS:                        15.9   4.86  )-----------( 272      ( 01 Aug 2019 06:05 )             47.0     08-    138  |  100  |  8   ----------------------------<  92  4.2   |  27  |  0.74    Ca    9.8      01 Aug 2019 06:05  Mg     2.0     08-    TPro  8.2  /  Alb  5.3<H>  /  TBili  0.8  /  DBili  x   /  AST  24  /  ALT  12  /  AlkPhos  51  08-    PT/INR - ( 01 Aug 2019 06:05 )   PT: 13.6 sec;   INR: 1.20          PTT - ( 01 Aug 2019 06:05 )  PTT:37.9 sec  CAPILLARY BLOOD GLUCOSE        Urinalysis Basic - ( 01 Aug 2019 06:24 )    Color: Yellow / Appearance: Clear / S.020 / pH: x  Gluc: x / Ketone: NEGATIVE  / Bili: Negative / Urobili: 0.2 E.U./dL   Blood: x / Protein: NEGATIVE mg/dL / Nitrite: NEGATIVE   Leuk Esterase: NEGATIVE / RBC: x / WBC x   Sq Epi: x / Non Sq Epi: x / Bacteria: x      LIVER FUNCTIONS - ( 01 Aug 2019 06:05 )  Alb: 5.3 g/dL / Pro: 8.2 g/dL / ALK PHOS: 51 U/L / ALT: 12 U/L / AST: 24 U/L / GGT: x           ABO Interpretation: A ( @ 06:32)

## 2024-08-28 NOTE — PROGRESS NOTES
"  [facility] at [facility]  PROGRESS NOTE    Date of Admission: 8/22/2024  Date of Service: 08/28/24    Primary Care Physician: Kenya Martinez MD    Chief Complaint:  fu Paroxysmal atrial fibrillation.   Problem List:   Centrilobular emphysema with exacerbation      Subjective      Denies cardiac complaints, remains in NSR. Awaiting rehab placement       Objective   Vitals: /96 (BP Location: Left arm, Patient Position: Lying)   Pulse 91   Temp 97 °F (36.1 °C) (Oral)   Resp 20   Ht 175.3 cm (69.02\")   Wt 79.8 kg (175 lb 14.8 oz)   SpO2 95%   BMI 25.97 kg/m²     Physical Exam:  GENERAL: Alert, cooperative, in no acute distress.   HEENT: Normocephalic, no jugular venous distention  HEART: Regular rhythm, normal rate, and no murmurs, gallops, or rubs.   LUNGS: Diminished breath sounds throughout. No wheezing, rales or rhonchi.  NEUROLOGIC: No focal abnormalities involving strength or sensation are noted.   EXTREMITIES: No clubbing, cyanosis, or edema noted.     Results:  Results from last 7 days   Lab Units 08/25/24  0644 08/23/24  0208 08/22/24  1019   WBC 10*3/mm3 8.56 7.13 8.87   HEMOGLOBIN g/dL 12.0* 11.6* 12.5*   HEMATOCRIT % 37.6 36.5* 39.2   PLATELETS 10*3/mm3 154 106* 115*     Results from last 7 days   Lab Units 08/25/24  0644 08/23/24  0208 08/22/24  1019   SODIUM mmol/L 147* 144 147*   POTASSIUM mmol/L 4.8 4.4 4.3   CHLORIDE mmol/L 106 104 106   CO2 mmol/L 36.0* 28.0 31.0*   BUN mg/dL 24* 43* 31*   CREATININE mg/dL 1.15 1.41* 1.11   GLUCOSE mg/dL 97 126* 117*      Lab Results   Component Value Date    CHOL 157 08/28/2023    TRIG 120 08/28/2023    HDL 48 08/28/2023    LDL 88 08/28/2023    AST 14 08/23/2024    ALT 33 08/23/2024         Results from last 7 days   Lab Units 08/23/24  0351 08/23/24  0208 08/22/24  1019   HSTROP T ng/L 12 12 19     Results from last 7 days   Lab Units 08/22/24  1019   PROBNP pg/mL 55.5     No intake or output data in the 24 hours " ending 08/28/24 0847    I personally reviewed the patient's EKG/Telemetry data    Radiology Data:   No radiology results for the last day      Current Medications:  amLODIPine, 10 mg, Oral, Daily  apixaban, 5 mg, Oral, Q12H  arformoterol, 15 mcg, Nebulization, BID - RT  aspirin, 81 mg, Oral, Daily  atorvastatin, 40 mg, Oral, Nightly  budesonide, 0.5 mg, Nebulization, BID  busPIRone, 20 mg, Oral, BID  guaiFENesin, 600 mg, Oral, Q12H  isosorbide mononitrate, 30 mg, Oral, Nightly  metoprolol tartrate, 25 mg, Oral, Q12H  mirtazapine, 15 mg, Oral, Nightly  pantoprazole, 40 mg, Oral, Q AM  predniSONE, 40 mg, Oral, Daily  revefenacin, 175 mcg, Nebulization, Daily - RT  sodium chloride, 10 mL, Intravenous, Q12H  spironolactone, 50 mg, Oral, Daily           Initial cardiac assessment: 58-year-old gentleman with a history of severe COPD hypertension hyperlipidemia, newly diagnosed with paroxysmal atrial fibrillation admitted for COPD exacerbation.     ASSESSMENT/PLAN:  1.  Coronary calcification:  Low risk stress test 8/2/2024 and benign echo  No need for further ischemic evaluation at this time.  Continue medical therapy with aspirin, amlodipine, Lipitor, Metoprolol, Imdur     2.  Paroxysmal atrial fibrillation:  Continue anticoagulation with Eliquis  Continue metoprolol   Converted to sinus rhythm 8/27/2024     3.  Severe COPD  Per hospitalist/pulmonary, improving.     4.  Hypertension:  Long-term goal blood pressure less than 130/80    Will see again as needed this admission. Continue current medical regimen at discharge. Follow up already scheduled for 9/26 with Dr. Miles.      Electronically signed by Debo Crews PA-C, 08/28/24, 8:48 AM EDT.

## 2024-08-28 NOTE — CASE MANAGEMENT/SOCIAL WORK
Continued Stay Note   [facility]     Patient Name: Aguila Finn Jr.  MRN: [id number]  Today's Date: 8/28/2024    Admit Date: 8/22/2024    Plan: home   Discharge Plan       Row Name 08/28/24 1338       Plan    Plan home    Plan Comments Spoke with patient at the bedside to discuss discharge plan. PT recommends home with outpatient pulmonary rehab and OT recommends inpatient rehab. Patient is not interested in pulmonary rehab and wishes to go to [facility] for inpatient therapy. Spoke with Kimani liaison for [facility], who has reviewed patient's chart and says his insurance will not approve inpatient rehab because he ambulated 200 feet. Updated patient at the bedside. Patient would like to go home with outpatient PT at [facility]. Spoke with Mariana at [facility] in [place], who says they can add Mr. Finn to the therapy schedule for Friday 8/30 at 3pm. CM will continue to follow.    Final Discharge Disposition Code 01 - home or self-care                   Discharge Codes    No documentation.                       Enid Johnson RN     Mohs Case Number: [id number] Mohs Case Number: [id number]

## 2024-08-28 NOTE — PROGRESS NOTES
"Palliative Care Daily Progress Note     C/C: Patient reports anxiety improved with medications.     S: Medical record reviewed. Follow up visit for GOC and symptom management. Events noted. Patient reports improving anxiety with increase in Buspar and taking increased dose of Ativan prior to neb treatments. He is hoping for pulmonary rehab.     ROS: +shortness of breath. +anxiety, improving with medications adjustments. +debility, due to increased shortness of breath. +decreased PO intake. Denies nausea, vomiting.     O: Code Status:   Code Status and Medical Interventions: CPR (Attempt to Resuscitate); Full Support   Ordered at: 08/22/24 1254     Level Of Support Discussed With:    Patient    Next of Kin (If No Surrogate)     Code Status (Patient has no pulse and is not breathing):    CPR (Attempt to Resuscitate)     Medical Interventions (Patient has pulse or is breathing):    Full Support      Advanced Directives: Advance Directive Status: Patient does not have advance directive   Goals of Care: Ongoing.   Palliative Performance Scale Score: 80%     /96 (BP Location: Left arm, Patient Position: Lying)   Pulse 75   Temp 97 °F (36.1 °C) (Oral)   Resp 20   Ht 175.3 cm (69.02\")   Wt 79.8 kg (175 lb 14.8 oz)   SpO2 96%   BMI 25.97 kg/m²     Intake/Output Summary (Last 24 hours) at 8/28/2024 1127  Last data filed at 8/28/2024 0900  Gross per 24 hour   Intake 120 ml   Output --   Net 120 ml       PE:  General Appearance:    Patient sitting up in bed, awake, alert, A/C ill appearing,  cooperative, NAD   HEENT:    NC/AT, EOMI, anicteric, MMM, face relaxed   Neck:   supple, trachea midline, no JVD   Lungs:     CTA bilat, diminished in bases; respirations regular, even and unlabored; RR 20-22 on exam, 2LNC    Heart:    RRR, normal S1 and S2, no M/R/G, HR 75   Abdomen:     Normal bowel sounds, soft, nontender, nondistended   G/U:   Deferred   MSK/Extremities:   no edema   Pulses:   Pulses palpable and equal " bilaterally   Skin:   Warm, dry   Neurologic:   A/Ox3, cooperative, NAGY   Psych:   Calm, appropriate     Meds: Reviewed and changes noted    Labs:   Results from last 7 days   Lab Units 08/25/24  0644   WBC 10*3/mm3 8.56   HEMOGLOBIN g/dL 12.0*   HEMATOCRIT % 37.6   PLATELETS 10*3/mm3 154     Results from last 7 days   Lab Units 08/25/24  0644   SODIUM mmol/L 147*   POTASSIUM mmol/L 4.8   CHLORIDE mmol/L 106   CO2 mmol/L 36.0*   BUN mg/dL 24*   CREATININE mg/dL 1.15   GLUCOSE mg/dL 97   CALCIUM mg/dL 8.5*     Results from last 7 days   Lab Units 08/25/24  0644 08/23/24  0208   SODIUM mmol/L 147* 144   POTASSIUM mmol/L 4.8 4.4   CHLORIDE mmol/L 106 104   CO2 mmol/L 36.0* 28.0   BUN mg/dL 24* 43*   CREATININE mg/dL 1.15 1.41*   CALCIUM mg/dL 8.5* 8.6   BILIRUBIN mg/dL  --  0.3   ALK PHOS U/L  --  71   ALT (SGPT) U/L  --  33   AST (SGOT) U/L  --  14   GLUCOSE mg/dL 97 126*     Imaging Results (Last 72 Hours)       ** No results found for the last 72 hours. **                  Diagnostics: Reviewed    A:   Centrilobular emphysema with exacerbation     58 y.o. male with COPD exacerbation, dyspnea, anxiety.   S/S:   Dyspnea -currently on 2LNC  -nebs, steroids     2. Anxiety -continue Buspar 20mg PO BID  -continue Lorazepam to 1mg PO q 8 hours prn anxiety, recommending pre-medication prior to scheduled neb treatments as these seem to be source of anxiety     3. GOC -Full Code/Full Support -patient considering code status  -ACP consult to be completed  -patient requesting pulmonary rehab    P: Follow up visit for symptom management and GOC. Patient reports he is wanting pulmonary rehab, reviewed most likely outpatient. He reports symptoms well managed at present with medications. He is hopeful for ongoing treatment.   Palliative Care Team will continue to follow patient. Please do not hesitate to contact us regarding further sx mgmt or GOC needs.  Iirna Motta, APRN  8/28/2024  Time spent: 10 minutes

## 2024-08-28 NOTE — PROGRESS NOTES
[facility] Medicine Services  PROGRESS NOTE    Patient Name: Aguila Finn Jr.  : 1965  MRN: [id number]    Date of Admission: 2024  Primary Care Physician: Kenya Martinez MD    Subjective   Subjective     CC:  COPD Exacerbation    HPI:  Patient resting in bed. Wife at bedside. They are requesting [facility] inpatient rehab.    Objective   Objective     Vital Signs:   Temp:  [97 °F (36.1 °C)-99.1 °F (37.3 °C)] 97 °F (36.1 °C)  Heart Rate:  [75-96] 75  Resp:  [18-20] 20  BP: (113-174)/() 174/96  Flow (L/min):  [2-3] 2     Physical Exam:  Constitutional: No acute distress, awake, alert  HENT: NCAT, mucous membranes moist  Respiratory: increased WOB, still with very poor air movement bilaterally on 2L  Cardiovascular: RRR, no murmurs, rubs, or gallops  Gastrointestinal: soft, nontender, nondistended  Musculoskeletal: No bilateral ankle edema  Psychiatric: Appropriate affect, cooperative  Neurologic: Oriented x 3, speech clear, no focal deficits  Skin: No rashes    Results Reviewed:  LAB RESULTS:      Lab 24  0644 24  0208 24  1019   WBC 8.56 7.13 8.87   HEMOGLOBIN 12.0* 11.6* 12.5*   HEMATOCRIT 37.6 36.5* 39.2   PLATELETS 154 106* 115*   NEUTROS ABS 7.24* 6.06 7.83*   IMMATURE GRANS (ABS) 0.11* 0.05 0.05   LYMPHS ABS 0.33* 0.32* 0.30*   MONOS ABS 0.86 0.69 0.49   EOS ABS 0.00 0.00 0.17   MCV 91.7 91.3 91.2   D DIMER QUANT  --   --  0.45         Lab 24  0644 24  0208 24  1019   SODIUM 147* 144 147*   POTASSIUM 4.8 4.4 4.3   CHLORIDE 106 104 106   CO2 36.0* 28.0 31.0*   ANION GAP 5.0 12.0 10.0   BUN 24* 43* 31*   CREATININE 1.15 1.41* 1.11   EGFR 73.3 57.4* 76.5   GLUCOSE 97 126* 117*   CALCIUM 8.5* 8.6 8.7   MAGNESIUM  --  2.5  --    PHOSPHORUS  --  3.8  --          Lab 24  0208 24  1019   TOTAL PROTEIN 5.8* 6.4   ALBUMIN 3.2* 3.6   GLOBULIN 2.6 2.8   ALT (SGPT) 33 35   AST (SGOT) 14 17   BILIRUBIN 0.3 0.6    ALK PHOS 71 72         Lab 08/23/24  0351 08/23/24  0208 08/22/24  1019   PROBNP  --   --  55.5   HSTROP T 12 12 19                 Lab 08/22/24  0959   PH, ARTERIAL 7.439   PCO2, ARTERIAL 46.4*   PO2 ART 88.0   FIO2 28   HCO3 ART 31.4*   BASE EXCESS ART 6.2*   CARBOXYHEMOGLOBIN 0.9     Brief Urine Lab Results       None            Microbiology Results Abnormal       Procedure Component Value - Date/Time    Respiratory Panel PCR w/COVID-19(SARS-CoV-2) DAKOTAH/JULIANA/ISAIAS/PAD/COR/CHRISTAL In-House, NP Swab in UTM/VTM, 2 HR TAT - Swab, Nasopharynx [[id number]]  (Normal) Collected: 08/22/24 0951    Lab Status: Final result Specimen: Swab from Nasopharynx Updated: 08/22/24 1102     ADENOVIRUS, PCR Not Detected     Coronavirus 229E Not Detected     Coronavirus HKU1 Not Detected     Coronavirus NL63 Not Detected     Coronavirus OC43 Not Detected     COVID19 Not Detected     Human Metapneumovirus Not Detected     Human Rhinovirus/Enterovirus Not Detected     Influenza A PCR Not Detected     Influenza B PCR Not Detected     Parainfluenza Virus 1 Not Detected     Parainfluenza Virus 2 Not Detected     Parainfluenza Virus 3 Not Detected     Parainfluenza Virus 4 Not Detected     RSV, PCR Not Detected     Bordetella pertussis pcr Not Detected     Bordetella parapertussis PCR Not Detected     Chlamydophila pneumoniae PCR Not Detected     Mycoplasma pneumo by PCR Not Detected    Narrative:      In the setting of a positive respiratory panel with a viral infection PLUS a negative procalcitonin without other underlying concern for bacterial infection, consider observing off antibiotics or discontinuation of antibiotics and continue supportive care. If the respiratory panel is positive for atypical bacterial infection (Bordetella pertussis, Chlamydophila pneumoniae, or Mycoplasma pneumoniae), consider antibiotic de-escalation to target atypical bacterial infection.            Pulmonary Function Test    Result Date: 8/27/2024  Spirometry  Interpretation: Airflow obstruction. Very Severe (FEV1 < 35%) FEV1 only 10% predicted. (0.36L).     Results for orders placed during the hospital encounter of 08/01/24    Adult Transthoracic Echo Complete W/ Cont if Necessary Per Protocol    Interpretation Summary    Left ventricular systolic function is normal. Calculated left ventricular EF = 61.6% Left ventricular ejection fraction appears to be 61 - 65%.    Left ventricular diastolic function was normal.    No significant structural or functional valvular disease.    No significant change compared to previous study      Current medications:  Scheduled Meds:amLODIPine, 10 mg, Oral, Daily  apixaban, 5 mg, Oral, Q12H  arformoterol, 15 mcg, Nebulization, BID - RT  aspirin, 81 mg, Oral, Daily  atorvastatin, 40 mg, Oral, Nightly  budesonide, 0.5 mg, Nebulization, BID  busPIRone, 20 mg, Oral, BID  guaiFENesin, 600 mg, Oral, Q12H  isosorbide mononitrate, 30 mg, Oral, Nightly  metoprolol tartrate, 25 mg, Oral, Q12H  mirtazapine, 15 mg, Oral, Nightly  pantoprazole, 40 mg, Oral, Q AM  predniSONE, 40 mg, Oral, Daily  revefenacin, 175 mcg, Nebulization, Daily - RT  sodium chloride, 10 mL, Intravenous, Q12H  spironolactone, 50 mg, Oral, Daily      Continuous Infusions:   PRN Meds:.  acetaminophen **OR** acetaminophen **OR** acetaminophen    albuterol    senna-docusate sodium **AND** polyethylene glycol **AND** bisacodyl **AND** bisacodyl    calcium carbonate    Calcium Replacement - Follow Nurse / BPA Driven Protocol    LORazepam    Magnesium Standard Dose Replacement - Follow Nurse / BPA Driven Protocol    melatonin    nitroglycerin    ondansetron ODT **OR** ondansetron    Phosphorus Replacement - Follow Nurse / BPA Driven Protocol    Potassium Replacement - Follow Nurse / BPA Driven Protocol    sodium chloride    sodium chloride    sodium chloride    Assessment & Plan   Assessment & Plan     Active Hospital Problems    Diagnosis  POA    **Centrilobular emphysema with  exacerbation [J43.2]  Yes      Resolved Hospital Problems   No resolved problems to display.        Brief Hospital Course to date:  Aguila Finn Jr. is a 58 y.o. male  with past medical history of severe COPD who follows with pulmonary service at [facility], history of essential hypertension, dyslipidemia, nephrolithiasis, anxiety and depression, who presented to the hospital today with worsening shortness of breath and hypoxia since last night     Acute on Chronic Hypoxic Respiratory Failure- baseline 2L  Acute Exacerbation of COPD  - Likely cause of his COPD exacerbation and hypoxia is noncompliance with home medications.  No evidence of pneumonia on chest x-ray.  D-dimer negative.  Negative cardiac workup during last hospitalization in early August with normal echocardiogram and negative PET myocardial perfusion scan, cardiology w/no plans for [facility] at this time  - appreciate pulm evaluation, continue nebs, with low FEV1 will not benefit from MDIs, patient w/significant anxiety associated w/neb treatments, improved w/addition of ativan PRN with nebs.  - continue Trilogy, encouraged compliance  - palliative care following, appreciate input  - may be candidate for lung transplant, outpatient referral to  transplant clinic at discharge    Coronary artery disease  Essential hypertension  Dyslipidemia  - Continue amlodipine, Lipitor, Coreg, aspirin, Imdur  - Continue Aldactone     Newly diagnosed paroxysmal A-fib  - recent event monitor placement that showed paroxysmal A-fib  - started on Eliquis, Coreg dose adjusted. Continue     Anxiety and depression  - Continue mirtazapine    Expected Discharge Location and Transportation: Patient requesting inpatient rehab at [facility] to f/u today  Expected Discharge   Expected discharge date/ time has not been documented.     VTE Prophylaxis:  Pharmacologic VTE prophylaxis orders are present.         AM-PAC 6 Clicks Score (PT): 23 (08/27/24 2000)    CODE STATUS:   Code  Status and Medical Interventions: CPR (Attempt to Resuscitate); Full Support   Ordered at: 08/22/24 1254     Level Of Support Discussed With:    Patient    Next of Kin (If No Surrogate)     Code Status (Patient has no pulse and is not breathing):    CPR (Attempt to Resuscitate)     Medical Interventions (Patient has pulse or is breathing):    Full Support       May Dunn, DO  08/28/24

## 2024-08-29 ENCOUNTER — READMISSION MANAGEMENT (OUTPATIENT)
Dept: CALL CENTER | Facility: HOSPITAL | Age: 59
End: 2024-08-29
Payer: COMMERCIAL

## 2024-08-29 VITALS
OXYGEN SATURATION: 95 % | WEIGHT: 175.93 LBS | DIASTOLIC BLOOD PRESSURE: 69 MMHG | HEIGHT: 69 IN | RESPIRATION RATE: 18 BRPM | HEART RATE: 82 BPM | BODY MASS INDEX: 26.06 KG/M2 | TEMPERATURE: 98 F | SYSTOLIC BLOOD PRESSURE: 129 MMHG

## 2024-08-29 DIAGNOSIS — J43.2 CENTRILOBULAR EMPHYSEMA: Primary | ICD-10-CM

## 2024-08-29 PROCEDURE — 99239 HOSP IP/OBS DSCHRG MGMT >30: CPT | Performed by: INTERNAL MEDICINE

## 2024-08-29 PROCEDURE — 94660 CPAP INITIATION&MGMT: CPT

## 2024-08-29 PROCEDURE — 94799 UNLISTED PULMONARY SVC/PX: CPT

## 2024-08-29 PROCEDURE — 63710000001 REVEFENACIN 175 MCG/3ML SOLUTION

## 2024-08-29 PROCEDURE — 94664 DEMO&/EVAL PT USE INHALER: CPT

## 2024-08-29 PROCEDURE — 63710000001 PREDNISONE PER 1 MG: Performed by: INTERNAL MEDICINE

## 2024-08-29 PROCEDURE — 94761 N-INVAS EAR/PLS OXIMETRY MLT: CPT

## 2024-08-29 RX ORDER — LORAZEPAM 1 MG/1
1 TABLET ORAL EVERY 12 HOURS PRN
Qty: 6 TABLET | Refills: 0 | Status: SHIPPED | OUTPATIENT
Start: 2024-08-29 | End: 2024-08-29 | Stop reason: HOSPADM

## 2024-08-29 RX ORDER — LORAZEPAM 1 MG/1
1 TABLET ORAL EVERY 12 HOURS PRN
Qty: 10 TABLET | Refills: 0 | Status: SHIPPED | OUTPATIENT
Start: 2024-08-29 | End: 2024-09-04 | Stop reason: SDUPTHER

## 2024-08-29 RX ORDER — METOPROLOL TARTRATE 25 MG/1
25 TABLET, FILM COATED ORAL EVERY 12 HOURS SCHEDULED
Qty: 60 TABLET | Refills: 0 | Status: SHIPPED | OUTPATIENT
Start: 2024-08-29 | End: 2024-09-28

## 2024-08-29 RX ADMIN — REVEFENACIN 175 MCG: 175 SOLUTION RESPIRATORY (INHALATION) at 09:44

## 2024-08-29 RX ADMIN — PREDNISONE 40 MG: 20 TABLET ORAL at 08:28

## 2024-08-29 RX ADMIN — SPIRONOLACTONE 50 MG: 25 TABLET ORAL at 08:28

## 2024-08-29 RX ADMIN — BUDESONIDE 0.5 MG: 0.5 SUSPENSION RESPIRATORY (INHALATION) at 09:44

## 2024-08-29 RX ADMIN — APIXABAN 5 MG: 5 TABLET, FILM COATED ORAL at 08:28

## 2024-08-29 RX ADMIN — ARFORMOTEROL TARTRATE 15 MCG: 15 SOLUTION RESPIRATORY (INHALATION) at 09:44

## 2024-08-29 RX ADMIN — GUAIFENESIN 600 MG: 600 TABLET, EXTENDED RELEASE ORAL at 08:28

## 2024-08-29 RX ADMIN — AMLODIPINE BESYLATE 10 MG: 10 TABLET ORAL at 08:28

## 2024-08-29 RX ADMIN — LORAZEPAM 1 MG: 1 TABLET ORAL at 08:30

## 2024-08-29 RX ADMIN — BUSPIRONE HYDROCHLORIDE 20 MG: 10 TABLET ORAL at 08:28

## 2024-08-29 RX ADMIN — METOPROLOL TARTRATE 25 MG: 25 TABLET, FILM COATED ORAL at 08:28

## 2024-08-29 RX ADMIN — ASPIRIN 81 MG: 81 TABLET, COATED ORAL at 08:28

## 2024-08-29 RX ADMIN — PANTOPRAZOLE SODIUM 40 MG: 40 TABLET, DELAYED RELEASE ORAL at 05:24

## 2024-08-29 NOTE — PROGRESS NOTES
"          Clinical Nutrition Assessment     Patient Name: Aguila Finn Jr.  YOB: 1965  MRN: [id number]  Date of Encounter: 08/29/24 15:00 EDT  Admission date: 8/22/2024  Reason for Visit: LOS    Assessment   Nutrition Assessment   Admission Diagnosis:  COPD exacerbation [J44.1]    Problem List:    Centrilobular emphysema with exacerbation    Anxiety disorder      PMH:   He  has a past medical history of Anxiety, COPD (chronic obstructive pulmonary disease), Depression, Hyperlipidemia, Hypertension, and Recurrent kidney stones.    PSH:  He  has a past surgical history that includes Cataract extraction, bilateral; Colonoscopy; [place] tooth extraction; and Eye surgery (2017).    Applicable Nutrition History:       Anthropometrics     Height: Height: 175.3 cm (69.02\")  Last Filed Weight: Weight: 79.8 kg (175 lb 14.8 oz) (08/22/24 1500)  Method:    BMI: BMI (Calculated): 26    UBW:  175lbs  Weight change: No significant changes   Weight       Weight (kg) Weight (lbs) Weight Method Visit Report   9/12/2023 85.276 kg  188 lb      5/1/2024 80.74 kg  178 lb   --    5/21/2024 81.239 kg  179 lb 1.6 oz   --    7/23/2024 78.926 kg  174 lb   --    7/30/2024 78.835 kg  173 lb 12.8 oz   --     78.835 kg  173 lb 12.8 oz   --     79.153 kg  174 lb 8 oz   --        --        --        --    8/1/2024 76.476 kg  168 lb 9.6 oz  Bed scale      78.926 kg  174 lb  Stated      78.926 kg  174 lb      8/2/2024 76.5 kg  168 lb 10.4 oz      8/12/2024 79.833 kg  176 lb   --    8/14/2024 81.149 kg  178 lb 14.4 oz   --    8/22/2024 79.8 kg  175 lb 14.8 oz       79.833 kg  176 lb          Nutrition Focused Physical Exam    Date: 8/29    Unable to perform due to Defer pending indication     Subjective   Reported/Observed/Food/Nutrition Related History:     Spouse at bedside reports pt eating slightly less than baseline, states pt sleeping a lot and overall not feeling well. Pt agreeable to ONS, would like BID. Pt states wt " stable ~175lbs. Pt denies difficulty chewing/swallowing difficulty. NKFA.     Current Nutrition Prescription   PO: Diet: Regular/House; Fluid Consistency: Thin (IDDSI 0)  Oral Nutrition Supplement:   Intake:  46% x 6 meals    Assessment & Plan   Nutrition Diagnosis   Date:  8/29            Updated:    Problem Inadequate energy intake    Etiology Clinical status   Signs/Symptoms Po intake <50% avg   Status: New    Goal / Objectives:   Nutrition to support treatment and Increase intake      Nutrition Intervention      Follow treatment progress, Care plan reviewed, Encourage intake, Supplement provided    Encourage po intake  -Will send ONS if pt still here for dinner    Monitoring/Evaluation:   Per protocol, PO intake, Supplement intake    Elaine Mishra, MS,RD,LD  Time Spent:25

## 2024-08-29 NOTE — PLAN OF CARE
Problem: Noninvasive Ventilation Acute  Goal: Effective Unassisted Ventilation and Oxygenation  Outcome: Ongoing, Progressing     Problem: Adult Inpatient Plan of Care  Goal: Plan of Care Review  Outcome: Ongoing, Progressing  Goal: Patient-Specific Goal (Individualized)  Outcome: Ongoing, Progressing  Goal: Absence of Hospital-Acquired Illness or Injury  Outcome: Ongoing, Progressing  Intervention: Identify and Manage Fall Risk  Description: Perform standard risk assessment on admission using a validated tool or comprehensive approach appropriate to the patient; reassess fall risk frequently, with change in status or transfer to another level of care.  Communicate fall injury risk to interprofessional healthcare team.  Determine need for increased observation, equipment and environmental modification, such as low bed, signage and supportive, nonskid footwear.  Adjust safety measures to individual developmental age, stage and identified risk factors.  Reinforce the importance of safety and physical activity with patient and family.  Perform regular intentional rounding to assess need for position change, pain assessment and personal needs, including assistance with toileting.  Recent Flowsheet Documentation  Taken 8/29/2024 0400 by Praveena Lu, RN  Safety Promotion/Fall Prevention:   activity supervised   assistive device/personal items within reach   clutter free environment maintained   fall prevention program maintained   lighting adjusted   mobility aid in reach   nonskid shoes/slippers when out of bed   room organization consistent   safety round/check completed  Taken 8/29/2024 0200 by Praveena Lu RN  Safety Promotion/Fall Prevention:   activity supervised   assistive device/personal items within reach   clutter free environment maintained   fall prevention program maintained   lighting adjusted   mobility aid in reach   nonskid shoes/slippers when out of bed   room organization consistent   safety  round/check completed  Taken 8/29/2024 0000 by Praveena Lu RN  Safety Promotion/Fall Prevention:   activity supervised   assistive device/personal items within reach   clutter free environment maintained   fall prevention program maintained   lighting adjusted   mobility aid in reach   nonskid shoes/slippers when out of bed   room organization consistent   safety round/check completed  Taken 8/28/2024 2200 by Praveena Lu RN  Safety Promotion/Fall Prevention:   activity supervised   assistive device/personal items within reach   clutter free environment maintained   fall prevention program maintained   lighting adjusted   mobility aid in reach   nonskid shoes/slippers when out of bed   room organization consistent   safety round/check completed  Taken 8/28/2024 2000 by Praveena Lu RN  Safety Promotion/Fall Prevention:   activity supervised   assistive device/personal items within reach   clutter free environment maintained   fall prevention program maintained   lighting adjusted   mobility aid in reach   nonskid shoes/slippers when out of bed   room organization consistent   safety round/check completed  Intervention: Prevent Skin Injury  Description: Perform a screening for skin injury risk, such as pressure or moisture associated skin damage on admission and at regular intervals throughout hospital stay.  Keep all areas of skin (especially folds) clean and dry.  Maintain adequate skin hydration.  Relieve and redistribute pressure and protect bony prominences; implement measures based on patient-specific risk factors.  Match turning and repositioning schedule to clinical condition.  Encourage weight shift frequently; assist with reposition if unable to complete independently.  Float heels off bed; avoid pressure on the Achilles tendon.  Keep skin free from extended contact with medical devices.  Encourage functional activity and mobility, as early as tolerated.  Use aids (e.g., slide boards, mechanical  lift) during transfer.  Recent Flowsheet Documentation  Taken 8/29/2024 0400 by Praveena Lu RN  Body Position: position changed independently  Taken 8/29/2024 0200 by Praveena Lu RN  Body Position: position changed independently  Taken 8/29/2024 0000 by Praveena Lu RN  Body Position: position changed independently  Taken 8/28/2024 2200 by Praveena Lu RN  Body Position: position changed independently  Taken 8/28/2024 2000 by Praveena Lu RN  Body Position: position changed independently  Intervention: Prevent and Manage VTE (Venous Thromboembolism) Risk  Description: Assess for VTE (venous thromboembolism) risk.  Encourage and assist with early ambulation.  Initiate and maintain compression or other therapy, as indicated, based on identified risk in accordance with organizational protocol and provider order.  Encourage both active and passive leg exercises while in bed, if unable to ambulate.  Recent Flowsheet Documentation  Taken 8/29/2024 0400 by Praveena Lu RN  Activity Management: up ad willow  Taken 8/29/2024 0200 by Praveena Lu RN  Activity Management: up ad willow  Taken 8/29/2024 0000 by Praveena Lu RN  Activity Management: up ad willow  Taken 8/28/2024 2200 by Praveena Lu RN  Activity Management: up ad willow  Taken 8/28/2024 2000 by Praveena Lu RN  Activity Management: activity encouraged  Intervention: Prevent Infection  Description: Maintain skin and mucous membrane integrity; promote hand, oral and pulmonary hygiene.  Optimize fluid balance, nutrition, sleep and glycemic control to maximize infection resistance.  Identify potential sources of infection early to prevent or mitigate progression of infection (e.g., wound, lines, devices).  Evaluate ongoing need for invasive devices; remove promptly when no longer indicated.  Recent Flowsheet Documentation  Taken 8/28/2024 2000 by Praveena Lu RN  Infection Prevention:   environmental surveillance performed   equipment surfaces  disinfected   hand hygiene promoted   personal protective equipment utilized   rest/sleep promoted  Goal: Optimal Comfort and Wellbeing  Outcome: Ongoing, Progressing  Intervention: Provide Person-Centered Care  Description: Use a family-focused approach to care.  Develop trust and rapport by proactively providing information, encouraging questions, addressing concerns and offering reassurance.  Acknowledge emotional response to hospitalization.  Recognize and utilize personal coping strategies.  Honor spiritual and cultural preferences.  Recent Flowsheet Documentation  Taken 8/28/2024 2000 by Praveena Lu RN  Trust Relationship/Rapport:   care explained   choices provided   emotional support provided   empathic listening provided   questions answered   questions encouraged   thoughts/feelings acknowledged  Goal: Readiness for Transition of Care  Outcome: Ongoing, Progressing     Problem: COPD (Chronic Obstructive Pulmonary Disease) Comorbidity  Goal: Maintenance of COPD Symptom Control  Outcome: Ongoing, Progressing  Intervention: Maintain COPD-Symptom Control  Description: Evaluate adherence to management plan (e.g., medication, trigger avoidance, infection prevention, self-monitoring).  Advocate for continuation of home regimen, including medication, method of delivery, schedule and symptom monitoring.  Anticipate the need for breathing techniques and activity pacing to minimize fatigue and breathlessness.  Assess for proper use of inhaled medication and delivery technique; assist or reinstruct if needed.  Evaluate effectiveness of coping skills; encourage expression of feelings, expectations and concerns related to disease management and quality of life; reinforce education to enhance management plan and wellbeing.  Recent Flowsheet Documentation  Taken 8/29/2024 0400 by Praveena Lu RN  Medication Review/Management: medications reviewed  Taken 8/29/2024 0200 by Praveena Lu, RN  Medication  Review/Management: medications reviewed  Taken 8/29/2024 0000 by Praveena Lu RN  Medication Review/Management: medications reviewed  Taken 8/28/2024 2200 by Praveena Lu RN  Medication Review/Management: medications reviewed  Taken 8/28/2024 2000 by Praveena Lu RN  Medication Review/Management: medications reviewed     Problem: Hypertension Comorbidity  Goal: Blood Pressure in Desired Range  Outcome: Ongoing, Progressing  Intervention: Maintain Blood Pressure Management  Description: Evaluate adherence to home antihypertensive regimen (e.g., exercise and activity, diet modification, medication).  Provide scheduled antihypertensive medication; consider administration time and effects (e.g., avoid giving diuretic prior to bedtime).  Monitor response to antihypertensive medication therapy (e.g., blood pressure, electrolyte levels, medication effects).  Minimize risk of orthostatic hypotension; encourage caution with position changes, particularly if elderly.  Recent Flowsheet Documentation  Taken 8/29/2024 0400 by Praveena Lu RN  Medication Review/Management: medications reviewed  Taken 8/29/2024 0200 by Praveena Lu RN  Medication Review/Management: medications reviewed  Taken 8/29/2024 0000 by Praveena Lu RN  Medication Review/Management: medications reviewed  Taken 8/28/2024 2200 by Praveena Lu RN  Medication Review/Management: medications reviewed  Taken 8/28/2024 2000 by Praveena Lu RN  Medication Review/Management: medications reviewed     Problem: Fall Injury Risk  Goal: Absence of Fall and Fall-Related Injury  Outcome: Ongoing, Progressing  Intervention: Identify and Manage Contributors  Description: Develop a fall prevention plan with the patient and caregiver/family.  Provide reorientation, appropriate sensory stimulation and routines with changes in mental status to decrease risk of fall.  Promote use of personal vision and auditory aids.  Assess assistance level required for  safe and effective self-care; provide support as needed, such as toileting, mobilization. For age 65 and older, implement timed toileting with assistance.  Encourage physical activity, such as performance of mobility and self-care at highest level of patient ability, multicomponent exercise program and provision of appropriate assistive devices.  If fall occurs, assess the severity of injury; implement fall injury protocol. Determine the cause and revise fall injury prevention plan.  Regularly review medication contribution to fall risk; adjust medication administration times to minimize risk of falling.  Consider risk related to polypharmacy and age.  Balance adequate pain management with potential for oversedation.  Recent Flowsheet Documentation  Taken 8/29/2024 0400 by Praveena Lu, RN  Medication Review/Management: medications reviewed  Taken 8/29/2024 0200 by Praveena Lu RN  Medication Review/Management: medications reviewed  Taken 8/29/2024 0000 by Praveena Lu RN  Medication Review/Management: medications reviewed  Taken 8/28/2024 2200 by Praveena Lu RN  Medication Review/Management: medications reviewed  Taken 8/28/2024 2000 by Praveena Lu RN  Medication Review/Management: medications reviewed  Intervention: Promote Injury-Free Environment  Description: Provide a safe, barrier-free environment that encourages independent activity.  Keep care area uncluttered and well-lighted.  Determine need for increased observation or monitoring.  Avoid use of devices that minimize mobility, such as restraints or indwelling urinary catheter.  Recent Flowsheet Documentation  Taken 8/29/2024 0400 by Praveena Lu, RN  Safety Promotion/Fall Prevention:   activity supervised   assistive device/personal items within reach   clutter free environment maintained   fall prevention program maintained   lighting adjusted   mobility aid in reach   nonskid shoes/slippers when out of bed   room organization  consistent   safety round/check completed  Taken 8/29/2024 0200 by Praveena Lu RN  Safety Promotion/Fall Prevention:   activity supervised   assistive device/personal items within reach   clutter free environment maintained   fall prevention program maintained   lighting adjusted   mobility aid in reach   nonskid shoes/slippers when out of bed   room organization consistent   safety round/check completed  Taken 8/29/2024 0000 by Praveena Lu RN  Safety Promotion/Fall Prevention:   activity supervised   assistive device/personal items within reach   clutter free environment maintained   fall prevention program maintained   lighting adjusted   mobility aid in reach   nonskid shoes/slippers when out of bed   room organization consistent   safety round/check completed  Taken 8/28/2024 2200 by Praveena Lu RN  Safety Promotion/Fall Prevention:   activity supervised   assistive device/personal items within reach   clutter free environment maintained   fall prevention program maintained   lighting adjusted   mobility aid in reach   nonskid shoes/slippers when out of bed   room organization consistent   safety round/check completed  Taken 8/28/2024 2000 by Praveena Lu RN  Safety Promotion/Fall Prevention:   activity supervised   assistive device/personal items within reach   clutter free environment maintained   fall prevention program maintained   lighting adjusted   mobility aid in reach   nonskid shoes/slippers when out of bed   room organization consistent   safety round/check completed     Problem: Palliative Care  Goal: Enhanced Quality of Life  Outcome: Ongoing, Progressing   Goal Outcome Evaluation:

## 2024-08-29 NOTE — OUTREACH NOTE
Prep Survey      Flowsheet Row Responses   [facility] facility patient discharged from? [place]   Is LACE score < 7 ? No   Eligibility [facility]   Date of Admission 08/22/24   Date of Discharge 08/29/24   Discharge Disposition Home or Self Care   Discharge diagnosis Centrilobular emphysema with exacerbation   Does the patient have one of the following disease processes/diagnoses(primary or secondary)? Other   Does the patient have Home health ordered? No   Is there a DME ordered? Yes   What DME was ordered? 2L of oxygen through [employer]   Prep survey completed? Yes            LENORA LOVE - Registered Nurse

## 2024-08-30 ENCOUNTER — TRANSITIONAL CARE MANAGEMENT TELEPHONE ENCOUNTER (OUTPATIENT)
Dept: CALL CENTER | Facility: HOSPITAL | Age: 59
End: 2024-08-30
Payer: COMMERCIAL

## 2024-08-30 LAB
QT INTERVAL: 344 MS
QTC INTERVAL: 441 MS

## 2024-08-30 NOTE — OUTREACH NOTE
Call Center TCM Note      Flowsheet Row Responses   [facility] patient discharged from? [place]   Does the patient have one of the following disease processes/diagnoses(primary or secondary)? Other   TCM attempt successful? Yes   Call start time 1357   Call end time 1414   Discharge diagnosis Centrilobular emphysema with exacerbation   Person spoke with today (if not patient) and relationship patient   Meds reviewed with patient/caregiver? Yes   Is the patient having any side effects they believe may be caused by any medication additions or changes? No   Does the patient have all medications ordered at discharge? Yes   Is the patient taking all medications as directed (includes completed medication regime)? Yes   Comments Pulmonary 9/25/24,  Cardiology 9/26/24   Does the patient have an appointment with their PCP within 7-14 days of discharge? Yes  [9/4/2024 at 1:00 PM]   Psychosocial issues? No   Did the patient receive a copy of their discharge instructions? Yes   Nursing interventions Reviewed instructions with patient   What is the patient's perception of their health status since discharge? Improving   Is the patient/caregiver able to teach back signs and symptoms related to disease process for when to call PCP? Yes   Is the patient/caregiver able to teach back signs and symptoms related to disease process for when to call 911? Yes   Is the patient/caregiver able to teach back the hierarchy of who to call/visit for symptoms/problems? PCP, Specialist, Home health nurse, Urgent Care, ED, 911 Yes   If the patient is a current smoker, are they able to teach back resources for cessation? Not a smoker   TCM call completed? Yes   Wrap up additional comments Pt states he is doing ok, and SOB is about the same. Educated pt on doing albuteral first to open airways before using maintenance inhaler for a better outcome. Reiviewed AVS/meds with pt. Pt verified PCP, Pulmonary, Cardiology fu appts.   Call end time  1414   Would this patient benefit from a Referral to [facility] Social Work? No   Is the patient interested in additional calls from an ambulatory ? No            Geni Colmenares RN    8/30/2024, 14:15 EDT

## 2024-08-30 NOTE — DISCHARGE SUMMARY
[facility] Medicine Services  DISCHARGE SUMMARY    Patient Name: Aguila Finn Jr.  : 1965  MRN: [id number]    Date of Admission: 2024  9:30 AM  Date of Discharge:  2024  Primary Care Physician: Kenya Martinez MD    Consults       Date and Time Order Name Status Description    2024 11:33 AM Inpatient Palliative Care MD Consult Completed     2024 11:27 AM Inpatient Cardiology Consult Completed     2024  9:00 AM Inpatient Pulmonology Consult Completed             Hospital Course     Presenting Problem: COPD exacerbation c/b anxiety    Active Hospital Problems    Diagnosis  POA    **Centrilobular emphysema with exacerbation [J43.2]  Yes    Anxiety disorder [F41.9]  Yes      Resolved Hospital Problems   No resolved problems to display.          Hospital Course:  Aguila Finn Jr. is a 58 y.o. male w severe COPD (followed w  pulm) who re-presents with worsening shortness of breath and hypoxia after recent admission for same.    Appears anxiety significantly affected his ability to complete nebulizers at home. Palliative saw here - titrated ativan PRN dosing to effect to faciltiate these treatments. Also was using 2 liters n/c at rest but not with ambulation - clarified this and arranged supplies to use this with movement in the home. S/p prednisone x 5 days while here, no further steroids at discharge per pulmonology.   - transplant referral for evaluation  -[facility] in [place], nebs facilitated with benzo use  -OP pulm referral to  clinic, waitlist but placed patient on list. OP PT in meantime    D/w patient PCP who can continue benzo OP    Discharge Follow Up Recommendations for outpatient labs/diagnostics:   F/u PCP 1 week   F/u  pul. [facility] transplant referral placed    Day of Discharge     HPI:   Anxious for home if able  Feels better, short of breath w walking without oxygen (clarified for him to use it including w  exertion)      Vital Signs:      Vitals:    08/29/24 1059   BP: 129/69   Pulse:    Resp: 18   Temp: 98 °F (36.7 °C)   SpO2:          Physical Exam:  Constitutional: No acute distress, awake, alert  HENT: NCAT, mucous membranes moist  Respiratory: Diminished somewhat throughout, respiratory effort mild increased on 2 ltiers n/c  Cardiovascular: RRR, no murmurs, rubs, or gallops  Gastrointestinal: Soft, nontender, nondistended  Musculoskeletal: Muscle tone within normal limits, no joint effusions appreciated  Psychiatric: Anxious affect, cooperative  Neurologic: Alert and oriented, facial movements symmetric and spontaneous movement of all 4 extremities grossly equal bilaterally, speech clear  Skin: No rashes    Pertinent  and/or Most Recent Results     LAB RESULTS:      Lab 08/25/24  0644   WBC 8.56   HEMOGLOBIN 12.0*   HEMATOCRIT 37.6   PLATELETS 154   NEUTROS ABS 7.24*   IMMATURE GRANS (ABS) 0.11*   LYMPHS ABS 0.33*   MONOS ABS 0.86   EOS ABS 0.00   MCV 91.7         Lab 08/25/24  0644   SODIUM 147*   POTASSIUM 4.8   CHLORIDE 106   CO2 36.0*   ANION GAP 5.0   BUN 24*   CREATININE 1.15   EGFR 73.3   GLUCOSE 97   CALCIUM 8.5*                         Brief Urine Lab Results       None          Microbiology Results (last 10 days)       Procedure Component Value - Date/Time    Respiratory Panel PCR w/COVID-19(SARS-CoV-2) DAKOTAH/[facility]ULIANA/ISAIAS/PAD/COR/CHRISTAL In-House, NP Swab in UTM/VTM, 2 HR TAT - Swab, Nasopharynx [[id number]]  (Normal) Collected: 08/22/24 0951    Lab Status: Final result Specimen: Swab from Nasopharynx Updated: 08/22/24 1102     ADENOVIRUS, PCR Not Detected     Coronavirus 229E Not Detected     Coronavirus HKU1 Not Detected     Coronavirus NL63 Not Detected     Coronavirus OC43 Not Detected     COVID19 Not Detected     Human Metapneumovirus Not Detected     Human Rhinovirus/Enterovirus Not Detected     Influenza A PCR Not Detected     Influenza B PCR Not Detected     Parainfluenza Virus 1 Not Detected      Parainfluenza Virus 2 Not Detected     Parainfluenza Virus 3 Not Detected     Parainfluenza Virus 4 Not Detected     RSV, PCR Not Detected     Bordetella pertussis pcr Not Detected     Bordetella parapertussis PCR Not Detected     Chlamydophila pneumoniae PCR Not Detected     Mycoplasma pneumo by PCR Not Detected    Narrative:      In the setting of a positive respiratory panel with a viral infection PLUS a negative procalcitonin without other underlying concern for bacterial infection, consider observing off antibiotics or discontinuation of antibiotics and continue supportive care. If the respiratory panel is positive for atypical bacterial infection (Bordetella pertussis, Chlamydophila pneumoniae, or Mycoplasma pneumoniae), consider antibiotic de-escalation to target atypical bacterial infection.            Pulmonary Function Test    Result Date: 8/27/2024  Spirometry Interpretation: Airflow obstruction. Very Severe (FEV1 < 35%) FEV1 only 10% predicted. (0.36L).    XR Chest 1 View    Result Date: 8/22/2024  XR CHEST 1 VW Date of Exam: 8/22/2024 9:31 AM EDT Indication: SOA triage protocol Comparison: CT chest August 1, 2024 Findings: There are emphysematous changes present. The heart is not enlarged. There is no idris consolidation or obvious pleural effusions.     Impression: 1.Emphysematous changes suggested. Electronically Signed: Telly Cao MD  8/22/2024 9:53 AM EDT  Workstation ID: [id number]             Results for orders placed during the hospital encounter of 08/01/24    Adult Transthoracic Echo Complete W/ Cont if Necessary Per Protocol    Interpretation Summary    Left ventricular systolic function is normal. Calculated left ventricular EF = 61.6% Left ventricular ejection fraction appears to be 61 - 65%.    Left ventricular diastolic function was normal.    No significant structural or functional valvular disease.    No significant change compared to previous study      Plan for Follow-up of Pending  Labs/Results:     Discharge Details        Discharge Medications        New Medications        Instructions Start Date   LORazepam 1 MG tablet  Commonly known as: ATIVAN   1 mg, Oral, Every 12 Hours PRN      metoprolol tartrate 25 MG tablet  Commonly known as: LOPRESSOR   25 mg, Oral, Every 12 Hours Scheduled             Changes to Medications        Instructions Start Date   atorvastatin 40 MG tablet  Commonly known as: LIPITOR  What changed: when to take this   40 mg, Oral, Daily             Continue These Medications        Instructions Start Date   albuterol (2.5 MG/3ML) 0.083% nebulizer solution  Commonly known as: PROVENTIL   2.5 mg, Nebulization, 4 Times Daily PRN      amLODIPine 10 MG tablet  Commonly known as: NORVASC   10 mg, Oral, Daily      apixaban 5 MG tablet tablet  Commonly known as: ELIQUIS   5 mg, Oral, Every 12 Hours Scheduled      arformoterol 15 MCG/2ML nebulizer solution  Commonly known as: BROVANA   15 mcg, Nebulization, 2 Times Daily - RT      Aspirin Low Dose 81 MG EC tablet  Generic drug: aspirin   81 mg, Oral, Daily      budesonide 0.5 MG/2ML nebulizer solution  Commonly known as: Pulmicort   0.5 mg, Nebulization, 2 Times Daily      busPIRone 15 MG tablet  Commonly known as: BUSPAR   15 mg, Oral, 2 Times Daily      isosorbide mononitrate 30 MG 24 hr tablet  Commonly known as: IMDUR   30 mg, Oral, Nightly      mirtazapine 15 MG tablet  Commonly known as: REMERON   15 mg, Oral, Nightly      pantoprazole 40 MG EC tablet  Commonly known as: PROTONIX   40 mg, Oral, Every Early Morning      revefenacin 175 MCG/3ML nebulizer solution  Commonly known as: YUPELRI   175 mcg, Nebulization, Daily - RT      spironolactone 50 MG tablet  Commonly known as: ALDACTONE   50 mg, Oral, Daily             Stop These Medications      carvedilol 12.5 MG tablet  Commonly known as: COREG     predniSONE 10 MG tablet  Commonly known as: DELTASONE              No Known Allergies      Discharge Disposition:  Home or  Self Care    Diet:  Hospital:No active diet order           Activity:      Restrictions or Other Recommendations:         CODE STATUS:    Code Status and Medical Interventions: CPR (Attempt to Resuscitate); Full Support   Ordered at: 08/22/24 1254     Level Of Support Discussed With:    Patient    Next of Kin (If No Surrogate)     Code Status (Patient has no pulse and is not breathing):    CPR (Attempt to Resuscitate)     Medical Interventions (Patient has pulse or is breathing):    Full Support       Future Appointments   Date Time Provider Department Center   9/4/2024  1:00 PM Kenya Martinez MD [facility]   9/10/2024 10:15 AM Dorothy Rogers APRN [facility]   9/25/2024 10:30 AM Debora Parada APRN [facility]   9/26/2024  9:00 AM Brent Miles MD [facility]       Additional Instructions for the Follow-ups that You Need to Schedule       Discharge Follow-up with PCP   As directed       Currently Documented PCP:    Kenya Martinez MD    PCP Phone Number:    [phone]     Follow Up Details: 1 week        Discharge Follow-up with Specified Provider: [facility] pulm 4 weeks   As directed      To: [facility] pulm 4 weeks                      Mercedez León MD  08/30/24      Time Spent on Discharge:  I spent  55  minutes on this discharge activity which included: face-to-face encounter with the patient, reviewing the data in the system, coordination of the care with the nursing staff as well as consultants, documentation, and entering orders.  D/w PCP, pulmonologist, pulm clinic RT (head of OP pulm rehab)

## 2024-09-04 ENCOUNTER — OFFICE VISIT (OUTPATIENT)
Dept: INTERNAL MEDICINE | Facility: CLINIC | Age: 59
End: 2024-09-04
Payer: COMMERCIAL

## 2024-09-04 VITALS
HEART RATE: 82 BPM | OXYGEN SATURATION: 97 % | HEIGHT: 69 IN | DIASTOLIC BLOOD PRESSURE: 72 MMHG | WEIGHT: 172 LBS | BODY MASS INDEX: 25.48 KG/M2 | SYSTOLIC BLOOD PRESSURE: 138 MMHG

## 2024-09-04 DIAGNOSIS — J96.21 ACUTE ON CHRONIC RESPIRATORY FAILURE WITH HYPOXIA: Primary | ICD-10-CM

## 2024-09-04 DIAGNOSIS — I48.0 PAF (PAROXYSMAL ATRIAL FIBRILLATION): ICD-10-CM

## 2024-09-04 DIAGNOSIS — J43.2 CENTRILOBULAR EMPHYSEMA: ICD-10-CM

## 2024-09-04 DIAGNOSIS — F41.1 GENERALIZED ANXIETY DISORDER: ICD-10-CM

## 2024-09-04 DIAGNOSIS — F06.4 ANXIETY DISORDER DUE TO KNOWN PHYSIOLOGICAL CONDITION: ICD-10-CM

## 2024-09-04 PROCEDURE — 99214 OFFICE O/P EST MOD 30 MIN: CPT | Performed by: INTERNAL MEDICINE

## 2024-09-04 RX ORDER — GUAIFENESIN 600 MG/1
1200 TABLET, EXTENDED RELEASE ORAL 2 TIMES DAILY
Qty: 120 TABLET | Refills: 2 | Status: SHIPPED | OUTPATIENT
Start: 2024-09-04

## 2024-09-04 RX ORDER — LORAZEPAM 1 MG/1
1 TABLET ORAL EVERY 12 HOURS PRN
Qty: 30 TABLET | Refills: 2 | Status: SHIPPED | OUTPATIENT
Start: 2024-09-04

## 2024-09-04 NOTE — PROGRESS NOTES
Transitional Care Follow Up Visit  Subjective     Aguila Finn Jr. is a 58 y.o. male who presents for a transitional care management visit.    Within 48 business hours after discharge our office contacted him via telephone to coordinate his care and needs.      I reviewed and discussed the details of that call along with the discharge summary, hospital problems, inpatient lab results, inpatient diagnostic studies, and consultation reports with Aguila.     Current outpatient and discharge medications have been reconciled for the patient.  Reviewed by: Kenya Martinez MD      Current Outpatient Medications:     albuterol (PROVENTIL) (2.5 MG/3ML) 0.083% nebulizer solution, Take 2.5 mg by nebulization 4 (Four) Times a Day As Needed for Wheezing or Shortness of Air., Disp: 60 each, Rfl: 11    amLODIPine (NORVASC) 10 MG tablet, Take 1 tablet by mouth Daily., Disp: 90 tablet, Rfl: 2    apixaban (ELIQUIS) 5 MG tablet tablet, Take 1 tablet by mouth Every 12 (Twelve) Hours. (Patient taking differently: Take 1 tablet by mouth Every 12 (Twelve) Hours. Not started), Disp: 60 tablet, Rfl: 2    arformoterol (BROVANA) 15 MCG/2ML nebulizer solution, Take 2 mL by nebulization 2 (Two) Times a Day., Disp: 120 mL, Rfl: 11    aspirin (Aspirin Low Dose) 81 MG EC tablet, TAKE 1 TABLET BY MOUTH DAILY, Disp: 90 tablet, Rfl: 3    atorvastatin (LIPITOR) 40 MG tablet, Take 1 tablet by mouth Daily. (Patient taking differently: Take 1 tablet by mouth Every Night.), Disp: 90 tablet, Rfl: 2    budesonide (Pulmicort) 0.5 MG/2ML nebulizer solution, Take 2 mL by nebulization 2 (Two) Times a Day., Disp: 360 mL, Rfl: 3    busPIRone (BUSPAR) 15 MG tablet, Take 1 tablet by mouth 2 (Two) Times a Day., Disp: 60 tablet, Rfl: 1    isosorbide mononitrate (IMDUR) 30 MG 24 hr tablet, Take 1 tablet by mouth Every Night., Disp: 30 tablet, Rfl: 1    metoprolol tartrate (LOPRESSOR) 25 MG tablet, Take 1 tablet by mouth Every 12 (Twelve) Hours for  "30 days., Disp: 60 tablet, Rfl: 0    mirtazapine (REMERON) 15 MG tablet, Take 1 tablet by mouth Every Night., Disp: 30 tablet, Rfl: 1    pantoprazole (PROTONIX) 40 MG EC tablet, Take 1 tablet by mouth Every Morning., Disp: 90 tablet, Rfl: 0    revefenacin (YUPELRI) 175 MCG/3ML nebulizer solution, Take 3 mL by nebulization Daily., Disp: , Rfl:     spironolactone (ALDACTONE) 50 MG tablet, Take 1 tablet by mouth Daily., Disp: 90 tablet, Rfl: 2            8/29/2024     6:47 PM   Date of TCM Phone Call   [facility]   Date of Admission 8/22/2024   Date of Discharge 8/29/2024   Discharge Disposition Home or Self Care     Risk for Readmission (LACE) Score: 11 (8/29/2024  6:00 AM)      History of Present Illness   Course During Hospital Stay: Per d/c summary \"Hospital Course:  Aguila Finn Jr. is a 58 y.o. male w severe COPD (followed w  pulm) who re-presents with worsening shortness of breath and hypoxia after recent admission for same.     Appears anxiety significantly affected his ability to complete nebulizers at home. Palliative saw here - titrated ativan PRN dosing to effect to faciltiate these treatments. Also was using 2 liters n/c at rest but not with ambulation - clarified this and arranged supplies to use this with movement in the home. S/p prednisone x 5 days while here, no further steroids at discharge per pulmonology.   -[facility] transplant referral for evaluation  -[facility], nebs facilitated with benzo use  -OP pulm referral to  clinic, waitlist but placed patient on list. OP PT in meantime     D/w patient PCP who can continue benzo OP\"    Interval hx: Patient says he is breathing better.  He is prescribed 2 L O2, but he has turned it up to 3 L today.  He is getting around his house okay but gets winded pretty easily.  He has to do the nebs 4 times per day and he was given a medication when he was in the hospital that made these more tolerable, his wife notes that it was Ativan.  (The " "hospitalist had messaged me while he was still inpatient and asked if I would continue that prescription and I agreed).  Patient has not yet heard from [facility] transplant.  He is hopeful that he will not have to have a lung transplant and instead he can medically optimize.  He needs a refill on his guaifenesin, does note thickening of mucus.  It is easier to do the nebs when he has the guaifenesin.  Sleeping okay, mood has overall been okay.  MA did note A-fib during rooming, but patient was NSR when I examined him.    Objective   /72 (BP Location: Left arm)   Pulse 82   Ht 175.3 cm (69\")   Wt 78 kg (172 lb)   SpO2 97% Comment: 3L O2  BMI 25.40 kg/m²   Physical Exam  Vitals reviewed.   Constitutional:       Appearance: He is well-developed.   Cardiovascular:      Rate and Rhythm: Normal rate and regular rhythm.      Heart sounds: Normal heart sounds.   Pulmonary:      Effort: Respiratory distress present.      Breath sounds: Wheezing present. No rales.   Skin:     General: Skin is warm and dry.   Neurological:      Mental Status: He is alert and oriented to person, place, and time.   Psychiatric:         Behavior: Behavior normal.         Thought Content: Thought content normal.         Assessment & Plan   Diagnoses and all orders for this visit:    1. Acute on chronic respiratory failure with hypoxia (Primary)  -Still on 2 to 3 L O2, will be on this chronically  -Sees pulmonology at the end of the month  -Continue nebs  -[facility] transplant appointment pending    2. Centrilobular emphysema  -     guaiFENesin (Mucinex) 600 MG 12 hr tablet; Take 2 tablets by mouth 2 (Two) Times a Day.  Dispense: 120 tablet; Refill: 2    3. Anxiety disorder due to known physiological condition  -Will do Ativan as below, advised him he can take half to 1 up to twice a day    4. Generalized anxiety disorder  -     LORazepam (ATIVAN) 1 MG tablet; Take 1 tablet by mouth Every 12 (Twelve) Hours As Needed for Anxiety.  Dispense: 30 tablet; " Refill: 2    5. PAF (paroxysmal atrial fibrillation)  -Continue aspirin and Eliquis, has cardiology follow-up    Reviewed notes, labs, imaging from hospitalization

## 2024-09-10 ENCOUNTER — OFFICE VISIT (OUTPATIENT)
Age: 59
End: 2024-09-10
Payer: COMMERCIAL

## 2024-09-10 VITALS
SYSTOLIC BLOOD PRESSURE: 130 MMHG | BODY MASS INDEX: 24.9 KG/M2 | WEIGHT: 168.1 LBS | HEART RATE: 102 BPM | HEIGHT: 69 IN | DIASTOLIC BLOOD PRESSURE: 80 MMHG | OXYGEN SATURATION: 95 %

## 2024-09-10 DIAGNOSIS — F41.1 GAD (GENERALIZED ANXIETY DISORDER): ICD-10-CM

## 2024-09-10 DIAGNOSIS — F51.05 INSOMNIA DUE TO OTHER MENTAL DISORDER: ICD-10-CM

## 2024-09-10 DIAGNOSIS — F99 INSOMNIA DUE TO OTHER MENTAL DISORDER: ICD-10-CM

## 2024-09-10 DIAGNOSIS — F33.1 MODERATE EPISODE OF RECURRENT MAJOR DEPRESSIVE DISORDER: Primary | ICD-10-CM

## 2024-09-10 PROCEDURE — 99214 OFFICE O/P EST MOD 30 MIN: CPT | Performed by: NURSE PRACTITIONER

## 2024-09-10 RX ORDER — MIRTAZAPINE 30 MG/1
30 TABLET, FILM COATED ORAL NIGHTLY
Qty: 30 TABLET | Refills: 1 | Status: ON HOLD | OUTPATIENT
Start: 2024-09-10

## 2024-09-10 RX ORDER — BUSPIRONE HYDROCHLORIDE 15 MG/1
15 TABLET ORAL 2 TIMES DAILY
Qty: 60 TABLET | Refills: 1 | Status: ON HOLD | OUTPATIENT
Start: 2024-09-10

## 2024-09-10 NOTE — PROGRESS NOTES
Office  Follow Up Visit      Patient Name: Aguila Finn Jr.  : 1965   MRN: [id number]     Referring Provider: Kenya Martinez MD    Chief Complaint:       ICD-10-CM ICD-9-CM   1. Moderate episode of recurrent major depressive disorder  F33.1 296.32   2. JIMMY (generalized anxiety disorder)  F41.1 300.02   3. Insomnia due to other mental disorder  F51.05 300.9    F99 327.02        History of Present Illness     Aguila Finn Jr. is a 58 y.o. male who is here today for follow up related to anxiety,depression,insomnia.   His last visit was on 1/3/2024 at which time Remeron was increased to 15 at bedtime and BuSpar was increased to 15 twice daily.      Patient reports he woke up 1 morning and could not breathe and has had 2 subsequent hospital admissions due to his lungs. He has stopped using gummies, as advised by his wife, who fears they may interfere with a potential lung transplant.  He is waiting for response from [facility] transplant team to learn next steps.He is currently on a regimen of 2 liters of continuous oxygen. He is also undergoing albuterol breathing treatments four times daily.  Patient describes apathy stating he is just going through the motions for his wife.  He has no thoughts of harming himself but is tired.  He will listen to input from [facility]'s transplant team but is unsure if he wants to undergo the transplant. He finds BuSpar helpful and believes the dosage is appropriate. He reports no side effects from either BuSpar or Remeron. He questions the effectiveness of Remeron but reports no adverse effects from it.    His sleep is disrupted, even though he is taking melatonin. He has been prescribed Ativan, which he finds beneficial due to the neb treatments making him more anxious.  Over the past three weeks, he has experienced a weight loss of approximately 20 pounds. He admits to feeling fatigued. He was scheduled for therapy in [place] but did not attend. He would  like a referral to [facility].  He shares his wife is now driving him to appointments and she is very supportive.      Diagnosis: Anxiety, depression, bipolar 1  Medications: Buspar 7.5 mg BID  Therapy: none currently    Subjective      Review of Systems:   Review of Systems   Constitutional:  Positive for activity change, appetite change, fatigue and unexpected weight change.   Respiratory:  Positive for cough.         Using portable O2 tank   Psychiatric/Behavioral:  Positive for decreased concentration and sleep disturbance. The patient is nervous/anxious.        PHQ-9 Depression Screening  Little interest or pleasure in doing things? 3-->nearly every day   Feeling down, depressed, or hopeless? 2-->more than half the days   Trouble falling or staying asleep, or sleeping too much? 3-->nearly every day   Feeling tired or having little energy? 3-->nearly every day   Poor appetite or overeating? 3-->nearly every day   Feeling bad about yourself - or that you are a failure or have let yourself or your family down? 3-->nearly every day   Trouble concentrating on things, such as reading the newspaper or watching television? 3-->nearly every day   Moving or speaking so slowly that other people could have noticed? Or the opposite - being so fidgety or restless that you have been moving around a lot more than usual? 2-->more than half the days   Thoughts that you would be better off dead, or of hurting yourself in some way? 0-->not at all   PHQ-9 Total Score 22   If you checked off any problems, how difficult have these problems made it for you to do your work, take care of things at home, or get along with other people? not difficult at all       JIMMY-7      Over the last two weeks, how often have you been bothered by the following problems?  Feeling nervous, anxious or on edge: (P) Nearly every day  Not being able to stop or control worrying: (P) Nearly every day  Worrying too much about different things: (P) Nearly every  day  Trouble Relaxing: (P) Nearly every day  Being so restless that it is hard to sit still: (P) More than half the days  Becoming easily annoyed or irritable: (P) Several days  Feeling afraid as if something awful might happen: (P) Nearly every day  JIMMY 7 Total Score: (P) 18    Patient History:   The following portions of the patient's history were reviewed and updated as appropriate: allergies, current medications, past family history, past medical history, past social history, past surgical history and problem list.     Social History     Socioeconomic History    Marital status:    Tobacco Use    Smoking status: Former     Current packs/day: 0.00     Average packs/day: 2.0 packs/day for 35.0 years (70.0 ttl pk-yrs)     Types: Cigarettes     Start date: 9/15/1987     Quit date: 9/18/2020     Years since quitting: 3.9     Passive exposure: Past    Smokeless tobacco: Never   Vaping Use    Vaping status: Never Used   Substance and Sexual Activity    Alcohol use: Not Currently     Alcohol/week: 1.0 standard drink of alcohol     Types: 1 Cans of beer per week     Comment: a few drinks on occasion, not excessive per patient    Drug use: Yes     Comment: Cannibus gummies    Sexual activity: Yes     Partners: Female     Birth control/protection: Partner of same sex       Past Psychiatric History:   History of outpatient psychiatrist: yrs ago; unable to recall details  Diagnoses: Depression, anxiety, bipolar 1  History of outpatient therapy: yrs ago; cpl visits  Previous Inpatient hospitalizations: [facility] 5-8 yrs ago; voluntary for anxiety  Previous medication trials: Wellbutrin , BuSpar 7.5 twice daily, Seroquel 200 mg 2017 Vistaril 50 mg, Remeron 30 mg-2016  History of suicide/self harm attempts: denies  Medications:     Current Outpatient Medications:     albuterol (PROVENTIL) (2.5 MG/3ML) 0.083% nebulizer solution, Take 2.5 mg by nebulization 4 (Four) Times a Day As Needed for Wheezing or Shortness of Air.,  Disp: 60 each, Rfl: 11    amLODIPine (NORVASC) 10 MG tablet, Take 1 tablet by mouth Daily., Disp: 90 tablet, Rfl: 2    apixaban (ELIQUIS) 5 MG tablet tablet, Take 1 tablet by mouth Every 12 (Twelve) Hours. (Patient taking differently: Take 1 tablet by mouth Every 12 (Twelve) Hours. Not started), Disp: 60 tablet, Rfl: 2    arformoterol (BROVANA) 15 MCG/2ML nebulizer solution, Take 2 mL by nebulization 2 (Two) Times a Day., Disp: 120 mL, Rfl: 11    aspirin (Aspirin Low Dose) 81 MG EC tablet, TAKE 1 TABLET BY MOUTH DAILY, Disp: 90 tablet, Rfl: 3    atorvastatin (LIPITOR) 40 MG tablet, Take 1 tablet by mouth Daily. (Patient taking differently: Take 1 tablet by mouth Every Night.), Disp: 90 tablet, Rfl: 2    budesonide (Pulmicort) 0.5 MG/2ML nebulizer solution, Take 2 mL by nebulization 2 (Two) Times a Day., Disp: 360 mL, Rfl: 3    busPIRone (BUSPAR) 15 MG tablet, Take 1 tablet by mouth 2 (Two) Times a Day., Disp: 60 tablet, Rfl: 1    guaiFENesin (Mucinex) 600 MG 12 hr tablet, Take 2 tablets by mouth 2 (Two) Times a Day., Disp: 120 tablet, Rfl: 2    isosorbide mononitrate (IMDUR) 30 MG 24 hr tablet, Take 1 tablet by mouth Every Night., Disp: 30 tablet, Rfl: 1    LORazepam (ATIVAN) 1 MG tablet, Take 1 tablet by mouth Every 12 (Twelve) Hours As Needed for Anxiety., Disp: 30 tablet, Rfl: 2    melatonin 5 MG tablet tablet, Take 1 tablet by mouth Every Night., Disp: , Rfl:     metoprolol tartrate (LOPRESSOR) 25 MG tablet, Take 1 tablet by mouth Every 12 (Twelve) Hours for 30 days., Disp: 60 tablet, Rfl: 0    mirtazapine (REMERON) 15 MG tablet, Take 1 tablet by mouth Every Night., Disp: 30 tablet, Rfl: 1    pantoprazole (PROTONIX) 40 MG EC tablet, Take 1 tablet by mouth Every Morning., Disp: 90 tablet, Rfl: 0    revefenacin (YUPELRI) 175 MCG/3ML nebulizer solution, Take 3 mL by nebulization Daily., Disp: , Rfl:     spironolactone (ALDACTONE) 50 MG tablet, Take 1 tablet by mouth Daily., Disp: 90 tablet, Rfl: 2    Objective  "    Physical Exam    Vital Signs:   Vitals:    09/10/24 1005   BP: 130/80   Pulse: 102   SpO2: 95%   Weight: 76.2 kg (168 lb 1.6 oz)   Height: 175.3 cm (69.02\")       Body mass index is 24.81 kg/m².       Mental Status Exam:   MENTAL STATUS EXAM   General Appearance:  Unkempt and other  Other Comment:  Unshaven, appears tired  Eye Contact:  Good eye contact  Attitude:  Cooperative  Motor Activity:  Normal gait, posture  Muscle Strength:  Normal  Speech:  Normal rate, tone, volume  Language:  Spontaneous and stereotypical  Mood and affect:  Anxious, depressed and other  Other Comment:  Friendly  Hopelessness:  9  Loneliness: Denies  Thought Process:  Logical and goal-directed  Associations/ Thought Content:  No delusions  Hallucinations:  None  Suicidal Ideations:  Not present  Homicidal Ideation:  Not present  Sensorium:  Alert and clear  Orientation:  Person, place and time  Immediate Recall, Recent, and Remote Memory:  Intact  Attention Span/ Concentration:  Good  Fund of Knowledge:  Appropriate for age and educational level  Intellectual Functioning:  Average range  Insight:  Fair  Judgement:  Fair  Reliability:  Good  Impulse Control:  Fair       @RESULASTCBCDIFFPANEL,TSH,LABLIPI,[id number],[id number],MG,FOLATE,PROLACTIN,CRPRESULT,CMP,[id number])@    Lab Results   Component Value Date    GLUCOSE 97 08/25/2024    BUN 24 (H) 08/25/2024    CREATININE 1.15 08/25/2024    EGFR 73.3 08/25/2024    BCR 20.9 08/25/2024    K 4.8 08/25/2024    CO2 36.0 (H) 08/25/2024    CALCIUM 8.5 (L) 08/25/2024    ALBUMIN 3.2 (L) 08/23/2024    BILITOT 0.3 08/23/2024    AST 14 08/23/2024    ALT 33 08/23/2024       Lab Results   Component Value Date    WBC 8.56 08/25/2024    HGB 12.0 (L) 08/25/2024    HCT 37.6 08/25/2024    MCV 91.7 08/25/2024     08/25/2024       Lab Results   Component Value Date    CHOL 157 08/28/2023    TRIG 120 08/28/2023    HDL 48 08/28/2023    LDL 88 08/28/2023      Latest Reference Range & Units 05/10/24 " 11:08   Hemoglobin A1C 4.80 - 5.60 % 5.30       Results  Imaging  CT scan was conducted to check for cancer.       Test Reason : Rhythm Change  Blood Pressure :   */*   mmHG  Vent. Rate : 134 BPM     Atrial Rate : 122 BPM     P-R Int :   * ms          QRS Dur :  84 ms      QT Int : 274 ms       P-R-T Axes :   *  59  35 degrees     QTc Int : 409 ms     Atrial fibrillation with rapid ventricular response  Nonspecific ST abnormality  Abnormal ECG  When compared with ECG of 23-AUG-2024 21:07,  Atrial fibrillation has replaced Sinus rhythm  QRS duration has decreased  ST no longer elevated in Inferior leads  Nonspecific T wave abnormality now evident in Inferior leads  Nonspecific T wave abnormality no longer evident in Lateral leads     Referred By:            Confirmed By:         COOPER     09/04/2024 Lorazepam 1MG 30 each 15 [id number]   08/29/2024 [facility]       Assessment / Plan      Visit Diagnosis/Orders Placed This Visit:  Diagnoses and all orders for this visit:    1. Moderate episode of recurrent major depressive disorder (Primary)  -     mirtazapine (REMERON) 30 MG tablet; Take 1 tablet by mouth Every Night.  Dispense: 30 tablet; Refill: 1    2. JIMMY (generalized anxiety disorder)  -     Ambulatory Referral to Behavioral Health  -     mirtazapine (REMERON) 30 MG tablet; Take 1 tablet by mouth Every Night.  Dispense: 30 tablet; Refill: 1  -     busPIRone (BUSPAR) 15 MG tablet; Take 1 tablet by mouth 2 (Two) Times a Day.  Dispense: 60 tablet; Refill: 1    3. Insomnia due to other mental disorder  -     mirtazapine (REMERON) 30 MG tablet; Take 1 tablet by mouth Every Night.  Dispense: 30 tablet; Refill: 1      Assessment & Plan  1. Anxiety.  The current regimen of BuSpar 15 mg twice daily will be maintained as it is reported to be effective without side effects. A referral for virtual clinic therapy via Zoom has been arranged to  help manage anxiety and other stressors.    2. Depression.  The dosage of Remeron will be increased from 15 mg to 30 mg at night to help manage mood, anxiety, sleep, and appetite. The patient reports no side effects from the current dose.    3. Sleep disturbances.  The increased dose of Remeron is expected to aid in improving sleep. He is also using melatonin and Ativan as needed for sleep, which has been helpful.    4. Weight loss.  The patient has reported a weight loss of 20 pounds over the past three weeks. The increased dose of Remeron may help improve appetite.    5. Continuous oxygen therapy.  He is currently on 2 liters of continuous oxygen. The patient will continue this regimen.    6. Medication Management.  He will continue using albuterol nebulizers four times a day as prescribed.    Follow-up  The patient will follow up in 1 month by virtual visit    Plan:   -Increase  Remeron 30 mg at night  - Continue BuSpar to 15 mg twice daily  - Labs and Chandana reviewed  - [facility] referral sent      Continue supportive psychotherapy efforts and medications as indicated. Treatment and medication options discussed during today's visit. Patient ackowledged and verbally consented to continue with current treatment plan and was educated on the importance of compliance with treatment and follow-up appointments. Patient seems reasonably able to adhere to treatment plan.      Medication Considerations:  Discussed medication options and treatment plan of prescribed medication(s) as well as the risks, benefits, and potential side effects. Patient is agreeable to call the office with any worsening of symptoms or onset of side effects. Patient is agreeable to call 911 or go to the nearest ER should he/she begin having SI/HI.    Quality Measures:   Former smoker    I advised Aguila Finn Jr. of the risks of tobacco use.     Follow Up:   Return in about 4 weeks (around 10/8/2024).      JOSE LUIS Muñoz,  PMHNP-BC    Patient or patient representative verbalized consent for the use of Ambient Listening during the visit with  JOSE LUIS Gates for chart documentation. 9/10/2024  11:00 EDT

## 2024-09-12 ENCOUNTER — HOSPITAL ENCOUNTER (INPATIENT)
Facility: HOSPITAL | Age: 59
LOS: 4 days | Discharge: HOME | End: 2024-09-17
Attending: EMERGENCY MEDICINE | Admitting: PEDIATRICS
Payer: COMMERCIAL

## 2024-09-12 DIAGNOSIS — J96.21 ACUTE ON CHRONIC RESPIRATORY FAILURE WITH HYPOXIA: ICD-10-CM

## 2024-09-12 DIAGNOSIS — N18.9 ACUTE ON CHRONIC RENAL INSUFFICIENCY: ICD-10-CM

## 2024-09-12 DIAGNOSIS — J44.1 ACUTE EXACERBATION OF CHRONIC OBSTRUCTIVE PULMONARY DISEASE (COPD): ICD-10-CM

## 2024-09-12 DIAGNOSIS — J44.1 COPD EXACERBATION: ICD-10-CM

## 2024-09-12 DIAGNOSIS — Z74.09 IMPAIRED MOBILITY AND ADLS: ICD-10-CM

## 2024-09-12 DIAGNOSIS — I48.0 PAF (PAROXYSMAL ATRIAL FIBRILLATION): ICD-10-CM

## 2024-09-12 DIAGNOSIS — F06.4 ANXIETY DISORDER DUE TO KNOWN PHYSIOLOGICAL CONDITION: ICD-10-CM

## 2024-09-12 DIAGNOSIS — N28.9 ACUTE ON CHRONIC RENAL INSUFFICIENCY: ICD-10-CM

## 2024-09-12 DIAGNOSIS — R50.9 ACUTE FEBRILE ILLNESS: Primary | ICD-10-CM

## 2024-09-12 DIAGNOSIS — Z78.9 IMPAIRED MOBILITY AND ADLS: ICD-10-CM

## 2024-09-12 DIAGNOSIS — G47.34 NOCTURNAL HYPOXEMIA: ICD-10-CM

## 2024-09-12 PROCEDURE — 93005 ELECTROCARDIOGRAM TRACING: CPT

## 2024-09-12 PROCEDURE — 93005 ELECTROCARDIOGRAM TRACING: CPT | Performed by: EMERGENCY MEDICINE

## 2024-09-12 PROCEDURE — 36415 COLL VENOUS BLD VENIPUNCTURE: CPT

## 2024-09-12 PROCEDURE — 99285 EMERGENCY DEPT VISIT HI MDM: CPT

## 2024-09-12 RX ORDER — SODIUM CHLORIDE 0.9 % (FLUSH) 0.9 %
10 SYRINGE (ML) INJECTION AS NEEDED
Status: DISCONTINUED | OUTPATIENT
Start: 2024-09-12 | End: 2024-09-17 | Stop reason: HOSPADM

## 2024-09-13 ENCOUNTER — APPOINTMENT (OUTPATIENT)
Dept: GENERAL RADIOLOGY | Facility: HOSPITAL | Age: 59
End: 2024-09-13
Payer: COMMERCIAL

## 2024-09-13 PROBLEM — J44.1 COPD EXACERBATION: Status: ACTIVE | Noted: 2024-09-13

## 2024-09-13 LAB
ALBUMIN SERPL-MCNC: 3.5 G/DL (ref 3.5–5.2)
ALBUMIN/GLOB SERPL: 1.3 G/DL
ALP SERPL-CCNC: 65 U/L (ref 39–117)
ALT SERPL W P-5'-P-CCNC: 31 U/L (ref 1–41)
ANION GAP SERPL CALCULATED.3IONS-SCNC: 5 MMOL/L (ref 5–15)
ANION GAP SERPL CALCULATED.3IONS-SCNC: 9 MMOL/L (ref 5–15)
AST SERPL-CCNC: 16 U/L (ref 1–40)
BACTERIA UR QL AUTO: ABNORMAL /HPF
BASOPHILS # BLD AUTO: 0.05 10*3/MM3 (ref 0–0.2)
BASOPHILS # BLD AUTO: 0.06 10*3/MM3 (ref 0–0.2)
BASOPHILS NFR BLD AUTO: 0.8 % (ref 0–1.5)
BASOPHILS NFR BLD AUTO: 0.8 % (ref 0–1.5)
BILIRUB SERPL-MCNC: 0.5 MG/DL (ref 0–1.2)
BILIRUB UR QL STRIP: NEGATIVE
BUN SERPL-MCNC: 22 MG/DL (ref 6–20)
BUN SERPL-MCNC: 23 MG/DL (ref 6–20)
BUN/CREAT SERPL: 12.9 (ref 7–25)
BUN/CREAT SERPL: 12.9 (ref 7–25)
CALCIUM SPEC-SCNC: 8 MG/DL (ref 8.6–10.5)
CALCIUM SPEC-SCNC: 8.4 MG/DL (ref 8.6–10.5)
CHLORIDE SERPL-SCNC: 102 MMOL/L (ref 98–107)
CHLORIDE SERPL-SCNC: 104 MMOL/L (ref 98–107)
CLARITY UR: ABNORMAL
CO2 SERPL-SCNC: 36 MMOL/L (ref 22–29)
CO2 SERPL-SCNC: 36 MMOL/L (ref 22–29)
COLOR UR: YELLOW
CREAT SERPL-MCNC: 1.7 MG/DL (ref 0.76–1.27)
CREAT SERPL-MCNC: 1.78 MG/DL (ref 0.76–1.27)
CREAT UR-MCNC: 135.1 MG/DL
D-LACTATE SERPL-SCNC: 1.8 MMOL/L (ref 0.5–2)
DEPRECATED RDW RBC AUTO: 45.6 FL (ref 37–54)
DEPRECATED RDW RBC AUTO: 45.7 FL (ref 37–54)
EGFRCR SERPLBLD CKD-EPI 2021: 43.4 ML/MIN/1.73
EGFRCR SERPLBLD CKD-EPI 2021: 45.9 ML/MIN/1.73
EOSINOPHIL # BLD AUTO: 0.08 10*3/MM3 (ref 0–0.4)
EOSINOPHIL # BLD AUTO: 0.12 10*3/MM3 (ref 0–0.4)
EOSINOPHIL NFR BLD AUTO: 1.3 % (ref 0.3–6.2)
EOSINOPHIL NFR BLD AUTO: 1.6 % (ref 0.3–6.2)
ERYTHROCYTE [DISTWIDTH] IN BLOOD BY AUTOMATED COUNT: 13.2 % (ref 12.3–15.4)
ERYTHROCYTE [DISTWIDTH] IN BLOOD BY AUTOMATED COUNT: 13.4 % (ref 12.3–15.4)
FLUAV RNA RESP QL NAA+PROBE: NOT DETECTED
FLUBV RNA RESP QL NAA+PROBE: NOT DETECTED
GEN 5 2HR TROPONIN T REFLEX: 17 NG/L
GLOBULIN UR ELPH-MCNC: 2.8 GM/DL
GLUCOSE SERPL-MCNC: 111 MG/DL (ref 65–99)
GLUCOSE SERPL-MCNC: 183 MG/DL (ref 65–99)
GLUCOSE UR STRIP-MCNC: NEGATIVE MG/DL
GRAN CASTS URNS QL MICRO: ABNORMAL /LPF
HCT VFR BLD AUTO: 31 % (ref 37.5–51)
HCT VFR BLD AUTO: 37.5 % (ref 37.5–51)
HGB BLD-MCNC: 11.3 G/DL (ref 13–17.7)
HGB BLD-MCNC: 9.4 G/DL (ref 13–17.7)
HGB UR QL STRIP.AUTO: ABNORMAL
HOLD SPECIMEN: NORMAL
HYALINE CASTS UR QL AUTO: ABNORMAL /LPF
IMM GRANULOCYTES # BLD AUTO: 0.12 10*3/MM3 (ref 0–0.05)
IMM GRANULOCYTES # BLD AUTO: 0.13 10*3/MM3 (ref 0–0.05)
IMM GRANULOCYTES NFR BLD AUTO: 1.7 % (ref 0–0.5)
IMM GRANULOCYTES NFR BLD AUTO: 1.9 % (ref 0–0.5)
IRON 24H UR-MRATE: 36 MCG/DL (ref 59–158)
IRON SATN MFR SERPL: 14 % (ref 20–50)
KETONES UR QL STRIP: NEGATIVE
LEUKOCYTE ESTERASE UR QL STRIP.AUTO: NEGATIVE
LYMPHOCYTES # BLD AUTO: 0.43 10*3/MM3 (ref 0.7–3.1)
LYMPHOCYTES # BLD AUTO: 0.8 10*3/MM3 (ref 0.7–3.1)
LYMPHOCYTES NFR BLD AUTO: 12.7 % (ref 19.6–45.3)
LYMPHOCYTES NFR BLD AUTO: 5.7 % (ref 19.6–45.3)
MAGNESIUM SERPL-MCNC: 1.8 MG/DL (ref 1.6–2.6)
MCH RBC QN AUTO: 28.1 PG (ref 26.6–33)
MCH RBC QN AUTO: 28.3 PG (ref 26.6–33)
MCHC RBC AUTO-ENTMCNC: 30.1 G/DL (ref 31.5–35.7)
MCHC RBC AUTO-ENTMCNC: 30.3 G/DL (ref 31.5–35.7)
MCV RBC AUTO: 92.8 FL (ref 79–97)
MCV RBC AUTO: 94 FL (ref 79–97)
MONOCYTES # BLD AUTO: 0.71 10*3/MM3 (ref 0.1–0.9)
MONOCYTES # BLD AUTO: 0.94 10*3/MM3 (ref 0.1–0.9)
MONOCYTES NFR BLD AUTO: 14.9 % (ref 5–12)
MONOCYTES NFR BLD AUTO: 9.4 % (ref 5–12)
NEUTROPHILS NFR BLD AUTO: 4.33 10*3/MM3 (ref 1.7–7)
NEUTROPHILS NFR BLD AUTO: 6.12 10*3/MM3 (ref 1.7–7)
NEUTROPHILS NFR BLD AUTO: 68.4 % (ref 42.7–76)
NEUTROPHILS NFR BLD AUTO: 80.8 % (ref 42.7–76)
NITRITE UR QL STRIP: NEGATIVE
NRBC BLD AUTO-RTO: 0 /100 WBC (ref 0–0.2)
NRBC BLD AUTO-RTO: 0 /100 WBC (ref 0–0.2)
NT-PROBNP SERPL-MCNC: 553.2 PG/ML (ref 0–900)
PH UR STRIP.AUTO: 5.5 [PH] (ref 5–8)
PHOSPHATE SERPL-MCNC: 3.7 MG/DL (ref 2.5–4.5)
PLATELET # BLD AUTO: 138 10*3/MM3 (ref 140–450)
PLATELET # BLD AUTO: 167 10*3/MM3 (ref 140–450)
PMV BLD AUTO: 10.9 FL (ref 6–12)
PMV BLD AUTO: 11.4 FL (ref 6–12)
POTASSIUM SERPL-SCNC: 3.4 MMOL/L (ref 3.5–5.2)
POTASSIUM SERPL-SCNC: 3.8 MMOL/L (ref 3.5–5.2)
PROT ?TM UR-MCNC: 41.9 MG/DL
PROT SERPL-MCNC: 6.3 G/DL (ref 6–8.5)
PROT UR QL STRIP: ABNORMAL
QT INTERVAL: 314 MS
QT INTERVAL: 332 MS
QTC INTERVAL: 417 MS
QTC INTERVAL: 487 MS
RBC # BLD AUTO: 3.34 10*6/MM3 (ref 4.14–5.8)
RBC # BLD AUTO: 3.99 10*6/MM3 (ref 4.14–5.8)
RBC # UR STRIP: ABNORMAL /HPF
REF LAB TEST METHOD: ABNORMAL
SARS-COV-2 RNA RESP QL NAA+PROBE: NOT DETECTED
SODIUM SERPL-SCNC: 145 MMOL/L (ref 136–145)
SODIUM SERPL-SCNC: 147 MMOL/L (ref 136–145)
SODIUM UR-SCNC: 45 MMOL/L
SP GR UR STRIP: 1.02 (ref 1–1.03)
SQUAMOUS #/AREA URNS HPF: ABNORMAL /HPF
TIBC SERPL-MCNC: 256 MCG/DL (ref 298–536)
TRANSFERRIN SERPL-MCNC: 172 MG/DL (ref 200–360)
TROPONIN T DELTA: -2 NG/L
TROPONIN T SERPL HS-MCNC: 19 NG/L
TROPONIN T SERPL HS-MCNC: 22 NG/L
UROBILINOGEN UR QL STRIP: ABNORMAL
UUN 24H UR-MCNC: 623 MG/DL
WBC # UR STRIP: ABNORMAL /HPF
WBC NRBC COR # BLD AUTO: 6.32 10*3/MM3 (ref 3.4–10.8)
WBC NRBC COR # BLD AUTO: 7.57 10*3/MM3 (ref 3.4–10.8)
WHOLE BLOOD HOLD COAG: NORMAL
WHOLE BLOOD HOLD SPECIMEN: NORMAL

## 2024-09-13 PROCEDURE — 97165 OT EVAL LOW COMPLEX 30 MIN: CPT | Performed by: OCCUPATIONAL THERAPIST

## 2024-09-13 PROCEDURE — 94761 N-INVAS EAR/PLS OXIMETRY MLT: CPT

## 2024-09-13 PROCEDURE — 84484 ASSAY OF TROPONIN QUANT: CPT | Performed by: NURSE PRACTITIONER

## 2024-09-13 PROCEDURE — 84466 ASSAY OF TRANSFERRIN: CPT | Performed by: PEDIATRICS

## 2024-09-13 PROCEDURE — 94799 UNLISTED PULMONARY SVC/PX: CPT

## 2024-09-13 PROCEDURE — 94664 DEMO&/EVAL PT USE INHALER: CPT

## 2024-09-13 PROCEDURE — 25010000002 CEFTRIAXONE PER 250 MG: Performed by: EMERGENCY MEDICINE

## 2024-09-13 PROCEDURE — 85025 COMPLETE CBC W/AUTO DIFF WBC: CPT | Performed by: NURSE PRACTITIONER

## 2024-09-13 PROCEDURE — 84540 ASSAY OF URINE/UREA-N: CPT | Performed by: NURSE PRACTITIONER

## 2024-09-13 PROCEDURE — G0378 HOSPITAL OBSERVATION PER HR: HCPCS

## 2024-09-13 PROCEDURE — 93005 ELECTROCARDIOGRAM TRACING: CPT | Performed by: FAMILY MEDICINE

## 2024-09-13 PROCEDURE — 84156 ASSAY OF PROTEIN URINE: CPT | Performed by: NURSE PRACTITIONER

## 2024-09-13 PROCEDURE — 97162 PT EVAL MOD COMPLEX 30 MIN: CPT

## 2024-09-13 PROCEDURE — 63710000001 REVEFENACIN 175 MCG/3ML SOLUTION: Performed by: NURSE PRACTITIONER

## 2024-09-13 PROCEDURE — 83735 ASSAY OF MAGNESIUM: CPT | Performed by: NURSE PRACTITIONER

## 2024-09-13 PROCEDURE — 83880 ASSAY OF NATRIURETIC PEPTIDE: CPT | Performed by: EMERGENCY MEDICINE

## 2024-09-13 PROCEDURE — 82570 ASSAY OF URINE CREATININE: CPT | Performed by: NURSE PRACTITIONER

## 2024-09-13 PROCEDURE — 87040 BLOOD CULTURE FOR BACTERIA: CPT | Performed by: EMERGENCY MEDICINE

## 2024-09-13 PROCEDURE — 81001 URINALYSIS AUTO W/SCOPE: CPT | Performed by: EMERGENCY MEDICINE

## 2024-09-13 PROCEDURE — 87147 CULTURE TYPE IMMUNOLOGIC: CPT | Performed by: EMERGENCY MEDICINE

## 2024-09-13 PROCEDURE — 84100 ASSAY OF PHOSPHORUS: CPT | Performed by: NURSE PRACTITIONER

## 2024-09-13 PROCEDURE — 93010 ELECTROCARDIOGRAM REPORT: CPT | Performed by: INTERNAL MEDICINE

## 2024-09-13 PROCEDURE — 83605 ASSAY OF LACTIC ACID: CPT | Performed by: EMERGENCY MEDICINE

## 2024-09-13 PROCEDURE — 84300 ASSAY OF URINE SODIUM: CPT | Performed by: NURSE PRACTITIONER

## 2024-09-13 PROCEDURE — 71045 X-RAY EXAM CHEST 1 VIEW: CPT

## 2024-09-13 PROCEDURE — 25810000003 SODIUM CHLORIDE 0.9 % SOLUTION: Performed by: EMERGENCY MEDICINE

## 2024-09-13 PROCEDURE — 83540 ASSAY OF IRON: CPT | Performed by: PEDIATRICS

## 2024-09-13 PROCEDURE — 99223 1ST HOSP IP/OBS HIGH 75: CPT | Performed by: NURSE PRACTITIONER

## 2024-09-13 PROCEDURE — 85025 COMPLETE CBC W/AUTO DIFF WBC: CPT | Performed by: EMERGENCY MEDICINE

## 2024-09-13 PROCEDURE — 94640 AIRWAY INHALATION TREATMENT: CPT

## 2024-09-13 PROCEDURE — 87154 CUL TYP ID BLD PTHGN 6+ TRGT: CPT | Performed by: EMERGENCY MEDICINE

## 2024-09-13 PROCEDURE — 84484 ASSAY OF TROPONIN QUANT: CPT | Performed by: EMERGENCY MEDICINE

## 2024-09-13 PROCEDURE — 87636 SARSCOV2 & INF A&B AMP PRB: CPT | Performed by: EMERGENCY MEDICINE

## 2024-09-13 PROCEDURE — 80053 COMPREHEN METABOLIC PANEL: CPT | Performed by: EMERGENCY MEDICINE

## 2024-09-13 RX ORDER — BISACODYL 10 MG
10 SUPPOSITORY, RECTAL RECTAL DAILY PRN
Status: DISCONTINUED | OUTPATIENT
Start: 2024-09-13 | End: 2024-09-17 | Stop reason: HOSPADM

## 2024-09-13 RX ORDER — ATORVASTATIN CALCIUM 40 MG/1
40 TABLET, FILM COATED ORAL NIGHTLY
Status: DISCONTINUED | OUTPATIENT
Start: 2024-09-13 | End: 2024-09-17 | Stop reason: HOSPADM

## 2024-09-13 RX ORDER — SPIRONOLACTONE 25 MG/1
50 TABLET ORAL DAILY
Status: DISCONTINUED | OUTPATIENT
Start: 2024-09-13 | End: 2024-09-17 | Stop reason: HOSPADM

## 2024-09-13 RX ORDER — SODIUM CHLORIDE 9 MG/ML
40 INJECTION, SOLUTION INTRAVENOUS AS NEEDED
Status: DISCONTINUED | OUTPATIENT
Start: 2024-09-13 | End: 2024-09-17 | Stop reason: HOSPADM

## 2024-09-13 RX ORDER — ACETAMINOPHEN 500 MG
1000 TABLET ORAL ONCE
Status: COMPLETED | OUTPATIENT
Start: 2024-09-13 | End: 2024-09-13

## 2024-09-13 RX ORDER — AMLODIPINE BESYLATE 10 MG/1
10 TABLET ORAL DAILY
Status: DISCONTINUED | OUTPATIENT
Start: 2024-09-13 | End: 2024-09-17 | Stop reason: HOSPADM

## 2024-09-13 RX ORDER — SODIUM CHLORIDE 0.9 % (FLUSH) 0.9 %
10 SYRINGE (ML) INJECTION EVERY 12 HOURS SCHEDULED
Status: DISCONTINUED | OUTPATIENT
Start: 2024-09-13 | End: 2024-09-17 | Stop reason: HOSPADM

## 2024-09-13 RX ORDER — BISACODYL 5 MG/1
5 TABLET, DELAYED RELEASE ORAL DAILY PRN
Status: DISCONTINUED | OUTPATIENT
Start: 2024-09-13 | End: 2024-09-17 | Stop reason: HOSPADM

## 2024-09-13 RX ORDER — BUSPIRONE HYDROCHLORIDE 15 MG/1
15 TABLET ORAL 2 TIMES DAILY
Status: DISCONTINUED | OUTPATIENT
Start: 2024-09-13 | End: 2024-09-17 | Stop reason: HOSPADM

## 2024-09-13 RX ORDER — ACETAMINOPHEN 160 MG/5ML
650 SOLUTION ORAL EVERY 4 HOURS PRN
Status: DISCONTINUED | OUTPATIENT
Start: 2024-09-13 | End: 2024-09-17 | Stop reason: HOSPADM

## 2024-09-13 RX ORDER — LORAZEPAM 0.5 MG/1
0.5 TABLET ORAL EVERY 6 HOURS PRN
Status: DISCONTINUED | OUTPATIENT
Start: 2024-09-13 | End: 2024-09-17 | Stop reason: HOSPADM

## 2024-09-13 RX ORDER — GUAIFENESIN 600 MG/1
1200 TABLET, EXTENDED RELEASE ORAL 2 TIMES DAILY
Status: DISCONTINUED | OUTPATIENT
Start: 2024-09-13 | End: 2024-09-17 | Stop reason: HOSPADM

## 2024-09-13 RX ORDER — ARFORMOTEROL TARTRATE 15 UG/2ML
15 SOLUTION RESPIRATORY (INHALATION)
Status: DISCONTINUED | OUTPATIENT
Start: 2024-09-13 | End: 2024-09-17 | Stop reason: HOSPADM

## 2024-09-13 RX ORDER — LORAZEPAM 1 MG/1
1 TABLET ORAL EVERY 12 HOURS SCHEDULED
Status: DISCONTINUED | OUTPATIENT
Start: 2024-09-13 | End: 2024-09-14

## 2024-09-13 RX ORDER — ACETAMINOPHEN 325 MG/1
650 TABLET ORAL EVERY 4 HOURS PRN
Status: DISCONTINUED | OUTPATIENT
Start: 2024-09-13 | End: 2024-09-17 | Stop reason: HOSPADM

## 2024-09-13 RX ORDER — BUDESONIDE 0.5 MG/2ML
0.5 INHALANT ORAL
Status: DISCONTINUED | OUTPATIENT
Start: 2024-09-13 | End: 2024-09-17 | Stop reason: HOSPADM

## 2024-09-13 RX ORDER — MIRTAZAPINE 15 MG/1
30 TABLET, FILM COATED ORAL NIGHTLY
Status: DISCONTINUED | OUTPATIENT
Start: 2024-09-13 | End: 2024-09-17 | Stop reason: HOSPADM

## 2024-09-13 RX ORDER — ACETAMINOPHEN 650 MG/1
650 SUPPOSITORY RECTAL EVERY 4 HOURS PRN
Status: DISCONTINUED | OUTPATIENT
Start: 2024-09-13 | End: 2024-09-17 | Stop reason: HOSPADM

## 2024-09-13 RX ORDER — POLYETHYLENE GLYCOL 3350 17 G/17G
17 POWDER, FOR SOLUTION ORAL DAILY PRN
Status: DISCONTINUED | OUTPATIENT
Start: 2024-09-13 | End: 2024-09-17 | Stop reason: HOSPADM

## 2024-09-13 RX ORDER — IPRATROPIUM BROMIDE AND ALBUTEROL SULFATE 2.5; .5 MG/3ML; MG/3ML
3 SOLUTION RESPIRATORY (INHALATION) ONCE
Status: COMPLETED | OUTPATIENT
Start: 2024-09-13 | End: 2024-09-13

## 2024-09-13 RX ORDER — SODIUM CHLORIDE 0.9 % (FLUSH) 0.9 %
10 SYRINGE (ML) INJECTION AS NEEDED
Status: DISCONTINUED | OUTPATIENT
Start: 2024-09-13 | End: 2024-09-17 | Stop reason: HOSPADM

## 2024-09-13 RX ORDER — METOPROLOL TARTRATE 25 MG/1
25 TABLET, FILM COATED ORAL EVERY 12 HOURS SCHEDULED
Status: DISCONTINUED | OUTPATIENT
Start: 2024-09-13 | End: 2024-09-17 | Stop reason: HOSPADM

## 2024-09-13 RX ORDER — PANTOPRAZOLE SODIUM 40 MG/1
40 TABLET, DELAYED RELEASE ORAL
Status: DISCONTINUED | OUTPATIENT
Start: 2024-09-13 | End: 2024-09-17 | Stop reason: HOSPADM

## 2024-09-13 RX ORDER — ISOSORBIDE MONONITRATE 30 MG/1
30 TABLET, EXTENDED RELEASE ORAL NIGHTLY
Status: DISCONTINUED | OUTPATIENT
Start: 2024-09-13 | End: 2024-09-17 | Stop reason: HOSPADM

## 2024-09-13 RX ORDER — AMOXICILLIN 250 MG
2 CAPSULE ORAL 2 TIMES DAILY PRN
Status: DISCONTINUED | OUTPATIENT
Start: 2024-09-13 | End: 2024-09-17 | Stop reason: HOSPADM

## 2024-09-13 RX ORDER — ALBUTEROL SULFATE 0.83 MG/ML
2.5 SOLUTION RESPIRATORY (INHALATION) 4 TIMES DAILY PRN
Status: DISCONTINUED | OUTPATIENT
Start: 2024-09-13 | End: 2024-09-17 | Stop reason: HOSPADM

## 2024-09-13 RX ORDER — NITROGLYCERIN 0.4 MG/1
0.4 TABLET SUBLINGUAL
Status: DISCONTINUED | OUTPATIENT
Start: 2024-09-13 | End: 2024-09-17 | Stop reason: HOSPADM

## 2024-09-13 RX ORDER — ASPIRIN 81 MG/1
81 TABLET ORAL DAILY
Status: DISCONTINUED | OUTPATIENT
Start: 2024-09-13 | End: 2024-09-17 | Stop reason: HOSPADM

## 2024-09-13 RX ADMIN — GUAIFENESIN 1200 MG: 600 TABLET, EXTENDED RELEASE ORAL at 08:33

## 2024-09-13 RX ADMIN — ISOSORBIDE MONONITRATE 30 MG: 30 TABLET, EXTENDED RELEASE ORAL at 20:40

## 2024-09-13 RX ADMIN — SPIRONOLACTONE 50 MG: 25 TABLET ORAL at 08:33

## 2024-09-13 RX ADMIN — METOPROLOL TARTRATE 25 MG: 25 TABLET, FILM COATED ORAL at 20:40

## 2024-09-13 RX ADMIN — DOXYCYCLINE 100 MG: 100 INJECTION, POWDER, LYOPHILIZED, FOR SOLUTION INTRAVENOUS at 02:48

## 2024-09-13 RX ADMIN — METOPROLOL TARTRATE 25 MG: 25 TABLET, FILM COATED ORAL at 08:33

## 2024-09-13 RX ADMIN — BUDESONIDE 0.5 MG: 0.5 SUSPENSION RESPIRATORY (INHALATION) at 23:07

## 2024-09-13 RX ADMIN — AMLODIPINE BESYLATE 10 MG: 10 TABLET ORAL at 08:33

## 2024-09-13 RX ADMIN — BUSPIRONE HYDROCHLORIDE 15 MG: 15 TABLET ORAL at 20:41

## 2024-09-13 RX ADMIN — MIRTAZAPINE 30 MG: 15 TABLET, FILM COATED ORAL at 20:40

## 2024-09-13 RX ADMIN — APIXABAN 5 MG: 5 TABLET, FILM COATED ORAL at 20:41

## 2024-09-13 RX ADMIN — Medication 10 ML: at 20:41

## 2024-09-13 RX ADMIN — BUSPIRONE HYDROCHLORIDE 15 MG: 15 TABLET ORAL at 08:33

## 2024-09-13 RX ADMIN — ATORVASTATIN CALCIUM 40 MG: 40 TABLET, FILM COATED ORAL at 20:41

## 2024-09-13 RX ADMIN — LORAZEPAM 1 MG: 1 TABLET ORAL at 08:33

## 2024-09-13 RX ADMIN — LORAZEPAM 1 MG: 1 TABLET ORAL at 20:40

## 2024-09-13 RX ADMIN — Medication 5 MG: at 20:41

## 2024-09-13 RX ADMIN — LORAZEPAM 0.5 MG: 0.5 TABLET ORAL at 11:30

## 2024-09-13 RX ADMIN — IPRATROPIUM BROMIDE AND ALBUTEROL SULFATE 3 ML: 2.5; .5 SOLUTION RESPIRATORY (INHALATION) at 01:35

## 2024-09-13 RX ADMIN — GUAIFENESIN 1200 MG: 600 TABLET, EXTENDED RELEASE ORAL at 20:40

## 2024-09-13 RX ADMIN — Medication 10 ML: at 08:34

## 2024-09-13 RX ADMIN — PANTOPRAZOLE SODIUM 40 MG: 40 TABLET, DELAYED RELEASE ORAL at 06:29

## 2024-09-13 RX ADMIN — BUDESONIDE 0.5 MG: 0.5 SUSPENSION RESPIRATORY (INHALATION) at 12:00

## 2024-09-13 RX ADMIN — ASPIRIN 81 MG: 81 TABLET, COATED ORAL at 08:33

## 2024-09-13 RX ADMIN — ARFORMOTEROL TARTRATE 15 MCG: 15 SOLUTION RESPIRATORY (INHALATION) at 11:52

## 2024-09-13 RX ADMIN — ARFORMOTEROL TARTRATE 15 MCG: 15 SOLUTION RESPIRATORY (INHALATION) at 23:06

## 2024-09-13 RX ADMIN — REVEFENACIN 175 MCG: 175 SOLUTION RESPIRATORY (INHALATION) at 11:51

## 2024-09-13 RX ADMIN — APIXABAN 5 MG: 5 TABLET, FILM COATED ORAL at 08:33

## 2024-09-13 RX ADMIN — SODIUM CHLORIDE 2000 MG: 900 INJECTION INTRAVENOUS at 01:49

## 2024-09-13 RX ADMIN — DOXYCYCLINE 100 MG: 100 INJECTION, POWDER, LYOPHILIZED, FOR SOLUTION INTRAVENOUS at 17:01

## 2024-09-13 RX ADMIN — ACETAMINOPHEN 1000 MG: 500 TABLET ORAL at 01:49

## 2024-09-13 RX ADMIN — ACETAMINOPHEN 650 MG: 325 TABLET ORAL at 16:57

## 2024-09-13 RX ADMIN — SODIUM CHLORIDE 500 ML: 9 INJECTION, SOLUTION INTRAVENOUS at 02:07

## 2024-09-14 PROBLEM — E44.0 MODERATE MALNUTRITION: Status: ACTIVE | Noted: 2024-09-14

## 2024-09-14 LAB
ANION GAP SERPL CALCULATED.3IONS-SCNC: 4 MMOL/L (ref 5–15)
BACTERIA BLD CULT: ABNORMAL
BASOPHILS # BLD AUTO: 0.06 10*3/MM3 (ref 0–0.2)
BASOPHILS NFR BLD AUTO: 1 % (ref 0–1.5)
BOTTLE TYPE: ABNORMAL
BUN SERPL-MCNC: 18 MG/DL (ref 6–20)
BUN/CREAT SERPL: 13 (ref 7–25)
CALCIUM SPEC-SCNC: 8.2 MG/DL (ref 8.6–10.5)
CHLORIDE SERPL-SCNC: 105 MMOL/L (ref 98–107)
CO2 SERPL-SCNC: 37 MMOL/L (ref 22–29)
CREAT SERPL-MCNC: 1.38 MG/DL (ref 0.76–1.27)
DEPRECATED RDW RBC AUTO: 45.1 FL (ref 37–54)
EGFRCR SERPLBLD CKD-EPI 2021: 58.9 ML/MIN/1.73
EOSINOPHIL # BLD AUTO: 0.28 10*3/MM3 (ref 0–0.4)
EOSINOPHIL NFR BLD AUTO: 4.5 % (ref 0.3–6.2)
ERYTHROCYTE [DISTWIDTH] IN BLOOD BY AUTOMATED COUNT: 13.3 % (ref 12.3–15.4)
FOLATE SERPL-MCNC: 9.94 NG/ML (ref 4.78–24.2)
GLUCOSE SERPL-MCNC: 111 MG/DL (ref 65–99)
HCT VFR BLD AUTO: 31.5 % (ref 37.5–51)
HGB BLD-MCNC: 9.8 G/DL (ref 13–17.7)
IMM GRANULOCYTES # BLD AUTO: 0.09 10*3/MM3 (ref 0–0.05)
IMM GRANULOCYTES NFR BLD AUTO: 1.4 % (ref 0–0.5)
LYMPHOCYTES # BLD AUTO: 0.72 10*3/MM3 (ref 0.7–3.1)
LYMPHOCYTES NFR BLD AUTO: 11.6 % (ref 19.6–45.3)
MAGNESIUM SERPL-MCNC: 2 MG/DL (ref 1.6–2.6)
MCH RBC QN AUTO: 28.6 PG (ref 26.6–33)
MCHC RBC AUTO-ENTMCNC: 31.1 G/DL (ref 31.5–35.7)
MCV RBC AUTO: 91.8 FL (ref 79–97)
MONOCYTES # BLD AUTO: 0.8 10*3/MM3 (ref 0.1–0.9)
MONOCYTES NFR BLD AUTO: 12.9 % (ref 5–12)
NEUTROPHILS NFR BLD AUTO: 4.26 10*3/MM3 (ref 1.7–7)
NEUTROPHILS NFR BLD AUTO: 68.6 % (ref 42.7–76)
NRBC BLD AUTO-RTO: 0 /100 WBC (ref 0–0.2)
PHOSPHATE SERPL-MCNC: 2.5 MG/DL (ref 2.5–4.5)
PLATELET # BLD AUTO: 135 10*3/MM3 (ref 140–450)
PMV BLD AUTO: 11.5 FL (ref 6–12)
POTASSIUM SERPL-SCNC: 3.8 MMOL/L (ref 3.5–5.2)
RBC # BLD AUTO: 3.43 10*6/MM3 (ref 4.14–5.8)
SODIUM SERPL-SCNC: 146 MMOL/L (ref 136–145)
VIT B12 BLD-MCNC: 370 PG/ML (ref 211–946)
WBC NRBC COR # BLD AUTO: 6.21 10*3/MM3 (ref 3.4–10.8)

## 2024-09-14 PROCEDURE — 87070 CULTURE OTHR SPECIMN AEROBIC: CPT | Performed by: NURSE PRACTITIONER

## 2024-09-14 PROCEDURE — 63710000001 REVEFENACIN 175 MCG/3ML SOLUTION: Performed by: NURSE PRACTITIONER

## 2024-09-14 PROCEDURE — 94799 UNLISTED PULMONARY SVC/PX: CPT

## 2024-09-14 PROCEDURE — 94761 N-INVAS EAR/PLS OXIMETRY MLT: CPT

## 2024-09-14 PROCEDURE — 80048 BASIC METABOLIC PNL TOTAL CA: CPT | Performed by: PEDIATRICS

## 2024-09-14 PROCEDURE — 94664 DEMO&/EVAL PT USE INHALER: CPT

## 2024-09-14 PROCEDURE — 99232 SBSQ HOSP IP/OBS MODERATE 35: CPT | Performed by: PEDIATRICS

## 2024-09-14 PROCEDURE — 82746 ASSAY OF FOLIC ACID SERUM: CPT | Performed by: PEDIATRICS

## 2024-09-14 PROCEDURE — 85025 COMPLETE CBC W/AUTO DIFF WBC: CPT | Performed by: PEDIATRICS

## 2024-09-14 PROCEDURE — 25010000002 CEFTRIAXONE PER 250 MG: Performed by: NURSE PRACTITIONER

## 2024-09-14 PROCEDURE — 82607 VITAMIN B-12: CPT | Performed by: PEDIATRICS

## 2024-09-14 PROCEDURE — 94660 CPAP INITIATION&MGMT: CPT

## 2024-09-14 PROCEDURE — 84100 ASSAY OF PHOSPHORUS: CPT | Performed by: PEDIATRICS

## 2024-09-14 PROCEDURE — 63710000001 PREDNISONE PER 1 MG: Performed by: PEDIATRICS

## 2024-09-14 PROCEDURE — 83735 ASSAY OF MAGNESIUM: CPT | Performed by: PEDIATRICS

## 2024-09-14 PROCEDURE — 87205 SMEAR GRAM STAIN: CPT | Performed by: NURSE PRACTITIONER

## 2024-09-14 PROCEDURE — G0378 HOSPITAL OBSERVATION PER HR: HCPCS

## 2024-09-14 RX ORDER — PREDNISONE 20 MG/1
40 TABLET ORAL 2 TIMES DAILY WITH MEALS
Status: DISCONTINUED | OUTPATIENT
Start: 2024-09-14 | End: 2024-09-17 | Stop reason: HOSPADM

## 2024-09-14 RX ORDER — FERROUS SULFATE 325(65) MG
325 TABLET ORAL
Status: DISCONTINUED | OUTPATIENT
Start: 2024-09-14 | End: 2024-09-17 | Stop reason: HOSPADM

## 2024-09-14 RX ORDER — LORAZEPAM 1 MG/1
1 TABLET ORAL EVERY 12 HOURS SCHEDULED
Status: DISCONTINUED | OUTPATIENT
Start: 2024-09-14 | End: 2024-09-17 | Stop reason: HOSPADM

## 2024-09-14 RX ORDER — ASCORBIC ACID 500 MG
500 TABLET ORAL DAILY
Status: DISCONTINUED | OUTPATIENT
Start: 2024-09-14 | End: 2024-09-17 | Stop reason: HOSPADM

## 2024-09-14 RX ADMIN — ISOSORBIDE MONONITRATE 30 MG: 30 TABLET, EXTENDED RELEASE ORAL at 20:18

## 2024-09-14 RX ADMIN — GUAIFENESIN 1200 MG: 600 TABLET, EXTENDED RELEASE ORAL at 08:17

## 2024-09-14 RX ADMIN — Medication 10 ML: at 08:22

## 2024-09-14 RX ADMIN — ARFORMOTEROL TARTRATE 15 MCG: 15 SOLUTION RESPIRATORY (INHALATION) at 20:30

## 2024-09-14 RX ADMIN — ATORVASTATIN CALCIUM 40 MG: 40 TABLET, FILM COATED ORAL at 20:18

## 2024-09-14 RX ADMIN — METOPROLOL TARTRATE 25 MG: 25 TABLET, FILM COATED ORAL at 08:18

## 2024-09-14 RX ADMIN — METOPROLOL TARTRATE 25 MG: 25 TABLET, FILM COATED ORAL at 20:18

## 2024-09-14 RX ADMIN — LORAZEPAM 1 MG: 1 TABLET ORAL at 08:18

## 2024-09-14 RX ADMIN — ARFORMOTEROL TARTRATE 15 MCG: 15 SOLUTION RESPIRATORY (INHALATION) at 08:30

## 2024-09-14 RX ADMIN — PREDNISONE 40 MG: 20 TABLET ORAL at 17:35

## 2024-09-14 RX ADMIN — Medication 5 MG: at 20:18

## 2024-09-14 RX ADMIN — ACETAMINOPHEN 650 MG: 325 TABLET ORAL at 23:32

## 2024-09-14 RX ADMIN — PREDNISONE 40 MG: 20 TABLET ORAL at 11:49

## 2024-09-14 RX ADMIN — BUSPIRONE HYDROCHLORIDE 15 MG: 15 TABLET ORAL at 20:18

## 2024-09-14 RX ADMIN — Medication 10 ML: at 20:19

## 2024-09-14 RX ADMIN — FERROUS SULFATE TAB 325 MG (65 MG ELEMENTAL FE) 325 MG: 325 (65 FE) TAB at 11:49

## 2024-09-14 RX ADMIN — LORAZEPAM 1 MG: 1 TABLET ORAL at 20:18

## 2024-09-14 RX ADMIN — SODIUM CHLORIDE 1000 MG: 900 INJECTION INTRAVENOUS at 01:00

## 2024-09-14 RX ADMIN — GUAIFENESIN 1200 MG: 600 TABLET, EXTENDED RELEASE ORAL at 20:18

## 2024-09-14 RX ADMIN — BUDESONIDE 0.5 MG: 0.5 SUSPENSION RESPIRATORY (INHALATION) at 20:30

## 2024-09-14 RX ADMIN — OXYCODONE HYDROCHLORIDE AND ACETAMINOPHEN 500 MG: 500 TABLET ORAL at 11:49

## 2024-09-14 RX ADMIN — PANTOPRAZOLE SODIUM 40 MG: 40 TABLET, DELAYED RELEASE ORAL at 05:50

## 2024-09-14 RX ADMIN — BUSPIRONE HYDROCHLORIDE 15 MG: 15 TABLET ORAL at 08:19

## 2024-09-14 RX ADMIN — DOXYCYCLINE 100 MG: 100 INJECTION, POWDER, LYOPHILIZED, FOR SOLUTION INTRAVENOUS at 05:50

## 2024-09-14 RX ADMIN — MIRTAZAPINE 30 MG: 15 TABLET, FILM COATED ORAL at 20:18

## 2024-09-14 RX ADMIN — ASPIRIN 81 MG: 81 TABLET, COATED ORAL at 08:19

## 2024-09-14 RX ADMIN — BUDESONIDE 0.5 MG: 0.5 SUSPENSION RESPIRATORY (INHALATION) at 08:30

## 2024-09-14 RX ADMIN — AMLODIPINE BESYLATE 10 MG: 10 TABLET ORAL at 08:18

## 2024-09-14 RX ADMIN — REVEFENACIN 175 MCG: 175 SOLUTION RESPIRATORY (INHALATION) at 08:30

## 2024-09-14 RX ADMIN — SPIRONOLACTONE 50 MG: 25 TABLET ORAL at 08:18

## 2024-09-14 RX ADMIN — APIXABAN 5 MG: 5 TABLET, FILM COATED ORAL at 08:19

## 2024-09-14 RX ADMIN — DOXYCYCLINE 100 MG: 100 INJECTION, POWDER, LYOPHILIZED, FOR SOLUTION INTRAVENOUS at 17:35

## 2024-09-14 RX ADMIN — LORAZEPAM 0.5 MG: 0.5 TABLET ORAL at 23:32

## 2024-09-14 RX ADMIN — APIXABAN 5 MG: 5 TABLET, FILM COATED ORAL at 20:18

## 2024-09-15 LAB
BACTERIA SPEC AEROBE CULT: ABNORMAL
GRAM STN SPEC: ABNORMAL
ISOLATED FROM: ABNORMAL

## 2024-09-15 PROCEDURE — 63710000001 REVEFENACIN 175 MCG/3ML SOLUTION: Performed by: NURSE PRACTITIONER

## 2024-09-15 PROCEDURE — 99232 SBSQ HOSP IP/OBS MODERATE 35: CPT | Performed by: PEDIATRICS

## 2024-09-15 PROCEDURE — 94664 DEMO&/EVAL PT USE INHALER: CPT

## 2024-09-15 PROCEDURE — 94799 UNLISTED PULMONARY SVC/PX: CPT

## 2024-09-15 PROCEDURE — G0378 HOSPITAL OBSERVATION PER HR: HCPCS

## 2024-09-15 PROCEDURE — 25010000002 CEFTRIAXONE PER 250 MG: Performed by: NURSE PRACTITIONER

## 2024-09-15 PROCEDURE — 63710000001 PREDNISONE PER 1 MG: Performed by: PEDIATRICS

## 2024-09-15 PROCEDURE — 94761 N-INVAS EAR/PLS OXIMETRY MLT: CPT

## 2024-09-15 RX ORDER — UBIDECARENONE 75 MG
100 CAPSULE ORAL DAILY
Status: DISCONTINUED | OUTPATIENT
Start: 2024-09-15 | End: 2024-09-17 | Stop reason: HOSPADM

## 2024-09-15 RX ADMIN — SODIUM CHLORIDE 1000 MG: 900 INJECTION INTRAVENOUS at 04:28

## 2024-09-15 RX ADMIN — METOPROLOL TARTRATE 25 MG: 25 TABLET, FILM COATED ORAL at 20:23

## 2024-09-15 RX ADMIN — APIXABAN 5 MG: 5 TABLET, FILM COATED ORAL at 08:43

## 2024-09-15 RX ADMIN — REVEFENACIN 175 MCG: 175 SOLUTION RESPIRATORY (INHALATION) at 09:08

## 2024-09-15 RX ADMIN — LORAZEPAM 0.5 MG: 0.5 TABLET ORAL at 22:01

## 2024-09-15 RX ADMIN — GUAIFENESIN 1200 MG: 600 TABLET, EXTENDED RELEASE ORAL at 08:44

## 2024-09-15 RX ADMIN — OXYCODONE HYDROCHLORIDE AND ACETAMINOPHEN 500 MG: 500 TABLET ORAL at 08:43

## 2024-09-15 RX ADMIN — SPIRONOLACTONE 50 MG: 25 TABLET ORAL at 08:44

## 2024-09-15 RX ADMIN — APIXABAN 5 MG: 5 TABLET, FILM COATED ORAL at 20:23

## 2024-09-15 RX ADMIN — DOXYCYCLINE 100 MG: 100 INJECTION, POWDER, LYOPHILIZED, FOR SOLUTION INTRAVENOUS at 08:41

## 2024-09-15 RX ADMIN — Medication 5 MG: at 20:23

## 2024-09-15 RX ADMIN — PREDNISONE 40 MG: 20 TABLET ORAL at 17:17

## 2024-09-15 RX ADMIN — ARFORMOTEROL TARTRATE 15 MCG: 15 SOLUTION RESPIRATORY (INHALATION) at 09:08

## 2024-09-15 RX ADMIN — PANTOPRAZOLE SODIUM 40 MG: 40 TABLET, DELAYED RELEASE ORAL at 09:17

## 2024-09-15 RX ADMIN — PREDNISONE 40 MG: 20 TABLET ORAL at 08:43

## 2024-09-15 RX ADMIN — Medication 10 ML: at 20:31

## 2024-09-15 RX ADMIN — Medication 10 ML: at 08:44

## 2024-09-15 RX ADMIN — DOXYCYCLINE 100 MG: 100 INJECTION, POWDER, LYOPHILIZED, FOR SOLUTION INTRAVENOUS at 17:16

## 2024-09-15 RX ADMIN — ATORVASTATIN CALCIUM 40 MG: 40 TABLET, FILM COATED ORAL at 20:23

## 2024-09-15 RX ADMIN — MIRTAZAPINE 30 MG: 15 TABLET, FILM COATED ORAL at 20:23

## 2024-09-15 RX ADMIN — LORAZEPAM 1 MG: 1 TABLET ORAL at 20:23

## 2024-09-15 RX ADMIN — BUSPIRONE HYDROCHLORIDE 15 MG: 15 TABLET ORAL at 20:23

## 2024-09-15 RX ADMIN — LORAZEPAM 1 MG: 1 TABLET ORAL at 08:42

## 2024-09-15 RX ADMIN — BUDESONIDE 0.5 MG: 0.5 SUSPENSION RESPIRATORY (INHALATION) at 09:08

## 2024-09-15 RX ADMIN — BUDESONIDE 0.5 MG: 0.5 SUSPENSION RESPIRATORY (INHALATION) at 21:02

## 2024-09-15 RX ADMIN — ARFORMOTEROL TARTRATE 15 MCG: 15 SOLUTION RESPIRATORY (INHALATION) at 21:02

## 2024-09-15 RX ADMIN — ASPIRIN 81 MG: 81 TABLET, COATED ORAL at 08:44

## 2024-09-15 RX ADMIN — AMLODIPINE BESYLATE 10 MG: 10 TABLET ORAL at 08:44

## 2024-09-15 RX ADMIN — ISOSORBIDE MONONITRATE 30 MG: 30 TABLET, EXTENDED RELEASE ORAL at 20:23

## 2024-09-15 RX ADMIN — BUSPIRONE HYDROCHLORIDE 15 MG: 15 TABLET ORAL at 08:44

## 2024-09-15 RX ADMIN — GUAIFENESIN 1200 MG: 600 TABLET, EXTENDED RELEASE ORAL at 20:23

## 2024-09-15 RX ADMIN — FERROUS SULFATE TAB 325 MG (65 MG ELEMENTAL FE) 325 MG: 325 (65 FE) TAB at 08:44

## 2024-09-15 RX ADMIN — VITAM B12 100 MCG: 100 TAB at 15:52

## 2024-09-15 RX ADMIN — METOPROLOL TARTRATE 25 MG: 25 TABLET, FILM COATED ORAL at 08:43

## 2024-09-16 LAB
ANION GAP SERPL CALCULATED.3IONS-SCNC: 9 MMOL/L (ref 5–15)
BACTERIA SPEC RESP CULT: NORMAL
BUN SERPL-MCNC: 23 MG/DL (ref 6–20)
BUN/CREAT SERPL: 23 (ref 7–25)
CALCIUM SPEC-SCNC: 8.6 MG/DL (ref 8.6–10.5)
CHLORIDE SERPL-SCNC: 107 MMOL/L (ref 98–107)
CO2 SERPL-SCNC: 34 MMOL/L (ref 22–29)
CREAT SERPL-MCNC: 1 MG/DL (ref 0.76–1.27)
DEPRECATED RDW RBC AUTO: 44.1 FL (ref 37–54)
EGFRCR SERPLBLD CKD-EPI 2021: 86.7 ML/MIN/1.73
ERYTHROCYTE [DISTWIDTH] IN BLOOD BY AUTOMATED COUNT: 13.1 % (ref 12.3–15.4)
GLUCOSE SERPL-MCNC: 164 MG/DL (ref 65–99)
GRAM STN SPEC: NORMAL
HCT VFR BLD AUTO: 33.9 % (ref 37.5–51)
HGB BLD-MCNC: 10.2 G/DL (ref 13–17.7)
MCH RBC QN AUTO: 27.9 PG (ref 26.6–33)
MCHC RBC AUTO-ENTMCNC: 30.1 G/DL (ref 31.5–35.7)
MCV RBC AUTO: 92.6 FL (ref 79–97)
OSMOLALITY UR: 525 MOSM/KG (ref 300–1100)
PLATELET # BLD AUTO: 178 10*3/MM3 (ref 140–450)
PMV BLD AUTO: 11.4 FL (ref 6–12)
POTASSIUM SERPL-SCNC: 3.8 MMOL/L (ref 3.5–5.2)
RBC # BLD AUTO: 3.66 10*6/MM3 (ref 4.14–5.8)
SODIUM SERPL-SCNC: 150 MMOL/L (ref 136–145)
WBC NRBC COR # BLD AUTO: 12.11 10*3/MM3 (ref 3.4–10.8)

## 2024-09-16 PROCEDURE — 0 DEXTROSE 5 % SOLUTION: Performed by: PEDIATRICS

## 2024-09-16 PROCEDURE — 63710000001 PREDNISONE PER 1 MG: Performed by: PEDIATRICS

## 2024-09-16 PROCEDURE — 94799 UNLISTED PULMONARY SVC/PX: CPT

## 2024-09-16 PROCEDURE — 99232 SBSQ HOSP IP/OBS MODERATE 35: CPT | Performed by: PEDIATRICS

## 2024-09-16 PROCEDURE — 63710000001 REVEFENACIN 175 MCG/3ML SOLUTION: Performed by: NURSE PRACTITIONER

## 2024-09-16 PROCEDURE — 25010000002 CEFTRIAXONE PER 250 MG: Performed by: NURSE PRACTITIONER

## 2024-09-16 PROCEDURE — 94761 N-INVAS EAR/PLS OXIMETRY MLT: CPT

## 2024-09-16 PROCEDURE — 94664 DEMO&/EVAL PT USE INHALER: CPT

## 2024-09-16 PROCEDURE — 85027 COMPLETE CBC AUTOMATED: CPT | Performed by: PEDIATRICS

## 2024-09-16 PROCEDURE — 83921 ORGANIC ACID SINGLE QUANT: CPT | Performed by: PEDIATRICS

## 2024-09-16 PROCEDURE — 83935 ASSAY OF URINE OSMOLALITY: CPT | Performed by: PEDIATRICS

## 2024-09-16 PROCEDURE — 80048 BASIC METABOLIC PNL TOTAL CA: CPT | Performed by: PEDIATRICS

## 2024-09-16 RX ORDER — DEXTROSE MONOHYDRATE 50 MG/ML
50 INJECTION, SOLUTION INTRAVENOUS CONTINUOUS
Status: DISCONTINUED | OUTPATIENT
Start: 2024-09-16 | End: 2024-09-17

## 2024-09-16 RX ORDER — DOXYCYCLINE 100 MG/1
100 CAPSULE ORAL EVERY 12 HOURS SCHEDULED
Status: DISCONTINUED | OUTPATIENT
Start: 2024-09-16 | End: 2024-09-17 | Stop reason: HOSPADM

## 2024-09-16 RX ADMIN — APIXABAN 5 MG: 5 TABLET, FILM COATED ORAL at 21:21

## 2024-09-16 RX ADMIN — LORAZEPAM 1 MG: 1 TABLET ORAL at 08:14

## 2024-09-16 RX ADMIN — LORAZEPAM 1 MG: 1 TABLET ORAL at 21:21

## 2024-09-16 RX ADMIN — ATORVASTATIN CALCIUM 40 MG: 40 TABLET, FILM COATED ORAL at 21:21

## 2024-09-16 RX ADMIN — BUSPIRONE HYDROCHLORIDE 15 MG: 15 TABLET ORAL at 21:21

## 2024-09-16 RX ADMIN — DOXYCYCLINE 100 MG: 100 INJECTION, POWDER, LYOPHILIZED, FOR SOLUTION INTRAVENOUS at 05:48

## 2024-09-16 RX ADMIN — DEXTROSE MONOHYDRATE 50 ML/HR: 50 INJECTION, SOLUTION INTRAVENOUS at 08:20

## 2024-09-16 RX ADMIN — DOXYCYCLINE 100 MG: 100 CAPSULE ORAL at 21:21

## 2024-09-16 RX ADMIN — ISOSORBIDE MONONITRATE 30 MG: 30 TABLET, EXTENDED RELEASE ORAL at 21:21

## 2024-09-16 RX ADMIN — SPIRONOLACTONE 50 MG: 25 TABLET ORAL at 08:14

## 2024-09-16 RX ADMIN — PANTOPRAZOLE SODIUM 40 MG: 40 TABLET, DELAYED RELEASE ORAL at 05:49

## 2024-09-16 RX ADMIN — PREDNISONE 40 MG: 20 TABLET ORAL at 17:33

## 2024-09-16 RX ADMIN — VITAM B12 100 MCG: 100 TAB at 08:14

## 2024-09-16 RX ADMIN — Medication 10 ML: at 21:24

## 2024-09-16 RX ADMIN — Medication 10 ML: at 08:15

## 2024-09-16 RX ADMIN — BUDESONIDE 0.5 MG: 0.5 SUSPENSION RESPIRATORY (INHALATION) at 08:32

## 2024-09-16 RX ADMIN — ARFORMOTEROL TARTRATE 15 MCG: 15 SOLUTION RESPIRATORY (INHALATION) at 21:47

## 2024-09-16 RX ADMIN — FERROUS SULFATE TAB 325 MG (65 MG ELEMENTAL FE) 325 MG: 325 (65 FE) TAB at 08:14

## 2024-09-16 RX ADMIN — ARFORMOTEROL TARTRATE 15 MCG: 15 SOLUTION RESPIRATORY (INHALATION) at 08:32

## 2024-09-16 RX ADMIN — METOPROLOL TARTRATE 25 MG: 25 TABLET, FILM COATED ORAL at 21:21

## 2024-09-16 RX ADMIN — METOPROLOL TARTRATE 25 MG: 25 TABLET, FILM COATED ORAL at 08:14

## 2024-09-16 RX ADMIN — OXYCODONE HYDROCHLORIDE AND ACETAMINOPHEN 500 MG: 500 TABLET ORAL at 08:14

## 2024-09-16 RX ADMIN — BUDESONIDE 0.5 MG: 0.5 SUSPENSION RESPIRATORY (INHALATION) at 21:47

## 2024-09-16 RX ADMIN — PREDNISONE 40 MG: 20 TABLET ORAL at 08:15

## 2024-09-16 RX ADMIN — REVEFENACIN 175 MCG: 175 SOLUTION RESPIRATORY (INHALATION) at 08:32

## 2024-09-16 RX ADMIN — MIRTAZAPINE 30 MG: 15 TABLET, FILM COATED ORAL at 21:21

## 2024-09-16 RX ADMIN — SODIUM CHLORIDE 1000 MG: 900 INJECTION INTRAVENOUS at 01:24

## 2024-09-16 RX ADMIN — GUAIFENESIN 1200 MG: 600 TABLET, EXTENDED RELEASE ORAL at 21:21

## 2024-09-16 RX ADMIN — GUAIFENESIN 1200 MG: 600 TABLET, EXTENDED RELEASE ORAL at 08:14

## 2024-09-16 RX ADMIN — BUSPIRONE HYDROCHLORIDE 15 MG: 15 TABLET ORAL at 08:19

## 2024-09-16 RX ADMIN — Medication 5 MG: at 21:21

## 2024-09-16 RX ADMIN — APIXABAN 5 MG: 5 TABLET, FILM COATED ORAL at 08:14

## 2024-09-16 RX ADMIN — AMLODIPINE BESYLATE 10 MG: 10 TABLET ORAL at 08:14

## 2024-09-16 RX ADMIN — ASPIRIN 81 MG: 81 TABLET, COATED ORAL at 08:14

## 2024-09-17 ENCOUNTER — READMISSION MANAGEMENT (OUTPATIENT)
Dept: CALL CENTER | Facility: HOSPITAL | Age: 59
End: 2024-09-17
Payer: COMMERCIAL

## 2024-09-17 VITALS
TEMPERATURE: 97.8 F | DIASTOLIC BLOOD PRESSURE: 77 MMHG | OXYGEN SATURATION: 95 % | BODY MASS INDEX: 28.14 KG/M2 | HEART RATE: 68 BPM | SYSTOLIC BLOOD PRESSURE: 134 MMHG | WEIGHT: 190 LBS | RESPIRATION RATE: 18 BRPM | HEIGHT: 69 IN

## 2024-09-17 PROBLEM — D50.8 IRON DEFICIENCY ANEMIA SECONDARY TO INADEQUATE DIETARY IRON INTAKE: Status: ACTIVE | Noted: 2024-09-17

## 2024-09-17 LAB
ANION GAP SERPL CALCULATED.3IONS-SCNC: 11 MMOL/L (ref 5–15)
BUN SERPL-MCNC: 22 MG/DL (ref 6–20)
BUN/CREAT SERPL: 24.4 (ref 7–25)
CALCIUM SPEC-SCNC: 8.4 MG/DL (ref 8.6–10.5)
CHLORIDE SERPL-SCNC: 102 MMOL/L (ref 98–107)
CO2 SERPL-SCNC: 33 MMOL/L (ref 22–29)
CREAT SERPL-MCNC: 0.9 MG/DL (ref 0.76–1.27)
EGFRCR SERPLBLD CKD-EPI 2021: 98.4 ML/MIN/1.73
GLUCOSE SERPL-MCNC: 116 MG/DL (ref 65–99)
POTASSIUM SERPL-SCNC: 4.3 MMOL/L (ref 3.5–5.2)
QT INTERVAL: 274 MS
QTC INTERVAL: 409 MS
SODIUM SERPL-SCNC: 146 MMOL/L (ref 136–145)

## 2024-09-17 PROCEDURE — 94799 UNLISTED PULMONARY SVC/PX: CPT

## 2024-09-17 PROCEDURE — 63710000001 PREDNISONE PER 1 MG: Performed by: PEDIATRICS

## 2024-09-17 PROCEDURE — 63710000001 REVEFENACIN 175 MCG/3ML SOLUTION: Performed by: NURSE PRACTITIONER

## 2024-09-17 PROCEDURE — 94761 N-INVAS EAR/PLS OXIMETRY MLT: CPT

## 2024-09-17 PROCEDURE — 94664 DEMO&/EVAL PT USE INHALER: CPT

## 2024-09-17 PROCEDURE — 80048 BASIC METABOLIC PNL TOTAL CA: CPT | Performed by: PEDIATRICS

## 2024-09-17 PROCEDURE — 25010000002 CEFTRIAXONE PER 250 MG: Performed by: NURSE PRACTITIONER

## 2024-09-17 PROCEDURE — 99239 HOSP IP/OBS DSCHRG MGMT >30: CPT | Performed by: PEDIATRICS

## 2024-09-17 RX ORDER — FERROUS SULFATE 325(65) MG
325 TABLET ORAL
Start: 2024-09-17

## 2024-09-17 RX ORDER — PREDNISONE 20 MG/1
TABLET ORAL
Qty: 9 TABLET | Refills: 0 | Status: SHIPPED | OUTPATIENT
Start: 2024-09-17 | End: 2024-09-23

## 2024-09-17 RX ORDER — LORAZEPAM 0.5 MG/1
0.5 TABLET ORAL NIGHTLY PRN
Qty: 15 TABLET | Refills: 0 | Status: SHIPPED | OUTPATIENT
Start: 2024-09-17 | End: 2024-10-02

## 2024-09-17 RX ORDER — UBIDECARENONE 75 MG
100 CAPSULE ORAL DAILY
Qty: 30 TABLET | Refills: 0 | Status: SHIPPED | OUTPATIENT
Start: 2024-09-17 | End: 2024-09-25

## 2024-09-17 RX ORDER — DOXYCYCLINE 100 MG/1
100 CAPSULE ORAL EVERY 12 HOURS SCHEDULED
Qty: 1 CAPSULE | Refills: 0 | Status: SHIPPED | OUTPATIENT
Start: 2024-09-17 | End: 2024-09-18

## 2024-09-17 RX ORDER — ASCORBIC ACID 500 MG
500 TABLET ORAL DAILY
Start: 2024-09-17

## 2024-09-17 RX ORDER — POLYETHYLENE GLYCOL 3350 17 G/17G
17 POWDER, FOR SOLUTION ORAL DAILY PRN
Start: 2024-09-17

## 2024-09-17 RX ADMIN — APIXABAN 5 MG: 5 TABLET, FILM COATED ORAL at 09:45

## 2024-09-17 RX ADMIN — LORAZEPAM 1 MG: 1 TABLET ORAL at 09:45

## 2024-09-17 RX ADMIN — FERROUS SULFATE TAB 325 MG (65 MG ELEMENTAL FE) 325 MG: 325 (65 FE) TAB at 09:45

## 2024-09-17 RX ADMIN — GUAIFENESIN 1200 MG: 600 TABLET, EXTENDED RELEASE ORAL at 09:45

## 2024-09-17 RX ADMIN — BUSPIRONE HYDROCHLORIDE 15 MG: 15 TABLET ORAL at 09:45

## 2024-09-17 RX ADMIN — REVEFENACIN 175 MCG: 175 SOLUTION RESPIRATORY (INHALATION) at 10:10

## 2024-09-17 RX ADMIN — PANTOPRAZOLE SODIUM 40 MG: 40 TABLET, DELAYED RELEASE ORAL at 05:37

## 2024-09-17 RX ADMIN — ASPIRIN 81 MG: 81 TABLET, COATED ORAL at 09:45

## 2024-09-17 RX ADMIN — ARFORMOTEROL TARTRATE 15 MCG: 15 SOLUTION RESPIRATORY (INHALATION) at 10:10

## 2024-09-17 RX ADMIN — PREDNISONE 40 MG: 20 TABLET ORAL at 09:45

## 2024-09-17 RX ADMIN — AMLODIPINE BESYLATE 10 MG: 10 TABLET ORAL at 09:45

## 2024-09-17 RX ADMIN — BUDESONIDE 0.5 MG: 0.5 SUSPENSION RESPIRATORY (INHALATION) at 10:10

## 2024-09-17 RX ADMIN — VITAM B12 100 MCG: 100 TAB at 09:45

## 2024-09-17 RX ADMIN — OXYCODONE HYDROCHLORIDE AND ACETAMINOPHEN 500 MG: 500 TABLET ORAL at 09:45

## 2024-09-17 RX ADMIN — DOXYCYCLINE 100 MG: 100 CAPSULE ORAL at 05:37

## 2024-09-17 RX ADMIN — METOPROLOL TARTRATE 25 MG: 25 TABLET, FILM COATED ORAL at 09:45

## 2024-09-17 RX ADMIN — SODIUM CHLORIDE 1000 MG: 900 INJECTION INTRAVENOUS at 01:14

## 2024-09-17 RX ADMIN — SPIRONOLACTONE 50 MG: 25 TABLET ORAL at 09:45

## 2024-09-18 ENCOUNTER — TRANSITIONAL CARE MANAGEMENT TELEPHONE ENCOUNTER (OUTPATIENT)
Dept: CALL CENTER | Facility: HOSPITAL | Age: 59
End: 2024-09-18
Payer: COMMERCIAL

## 2024-09-18 LAB — BACTERIA SPEC AEROBE CULT: NORMAL

## 2024-09-19 ENCOUNTER — OFFICE VISIT (OUTPATIENT)
Dept: INTERNAL MEDICINE | Facility: CLINIC | Age: 59
End: 2024-09-19
Payer: COMMERCIAL

## 2024-09-19 ENCOUNTER — TRANSITIONAL CARE MANAGEMENT TELEPHONE ENCOUNTER (OUTPATIENT)
Dept: CALL CENTER | Facility: HOSPITAL | Age: 59
End: 2024-09-19
Payer: COMMERCIAL

## 2024-09-19 ENCOUNTER — LAB (OUTPATIENT)
Dept: INTERNAL MEDICINE | Facility: CLINIC | Age: 59
End: 2024-09-19
Payer: COMMERCIAL

## 2024-09-19 VITALS
BODY MASS INDEX: 25.48 KG/M2 | SYSTOLIC BLOOD PRESSURE: 96 MMHG | WEIGHT: 172 LBS | OXYGEN SATURATION: 94 % | DIASTOLIC BLOOD PRESSURE: 48 MMHG | HEART RATE: 80 BPM | HEIGHT: 69 IN

## 2024-09-19 DIAGNOSIS — I10 ESSENTIAL HYPERTENSION: Primary | ICD-10-CM

## 2024-09-19 DIAGNOSIS — J44.1 COPD EXACERBATION: ICD-10-CM

## 2024-09-19 DIAGNOSIS — J96.11 CHRONIC RESPIRATORY FAILURE WITH HYPOXIA: ICD-10-CM

## 2024-09-19 DIAGNOSIS — J44.9 CHRONIC OBSTRUCTIVE PULMONARY DISEASE, UNSPECIFIED COPD TYPE: Primary | ICD-10-CM

## 2024-09-19 DIAGNOSIS — F41.1 GENERALIZED ANXIETY DISORDER: ICD-10-CM

## 2024-09-19 DIAGNOSIS — E87.0 HYPERNATREMIA: ICD-10-CM

## 2024-09-19 DIAGNOSIS — R94.2 ABNORMAL PFT: ICD-10-CM

## 2024-09-19 DIAGNOSIS — Z23 NEED FOR INFLUENZA VACCINATION: ICD-10-CM

## 2024-09-19 DIAGNOSIS — Z23 NEED FOR PNEUMOCOCCAL 20-VALENT CONJUGATE VACCINATION: ICD-10-CM

## 2024-09-19 PROCEDURE — 90677 PCV20 VACCINE IM: CPT | Performed by: INTERNAL MEDICINE

## 2024-09-19 PROCEDURE — 80048 BASIC METABOLIC PNL TOTAL CA: CPT | Performed by: INTERNAL MEDICINE

## 2024-09-19 PROCEDURE — 90472 IMMUNIZATION ADMIN EACH ADD: CPT | Performed by: INTERNAL MEDICINE

## 2024-09-19 PROCEDURE — 99495 TRANSJ CARE MGMT MOD F2F 14D: CPT | Performed by: INTERNAL MEDICINE

## 2024-09-19 PROCEDURE — 36415 COLL VENOUS BLD VENIPUNCTURE: CPT | Performed by: INTERNAL MEDICINE

## 2024-09-19 PROCEDURE — 90662 IIV NO PRSV INCREASED AG IM: CPT | Performed by: INTERNAL MEDICINE

## 2024-09-19 PROCEDURE — 90471 IMMUNIZATION ADMIN: CPT | Performed by: INTERNAL MEDICINE

## 2024-09-19 RX ORDER — LORAZEPAM 1 MG/1
TABLET ORAL
Qty: 45 TABLET | Refills: 2 | Status: SHIPPED | OUTPATIENT
Start: 2024-09-19 | End: 2024-09-23 | Stop reason: SDUPTHER

## 2024-09-20 LAB
ANION GAP SERPL CALCULATED.3IONS-SCNC: 9.8 MMOL/L (ref 5–15)
BUN SERPL-MCNC: 26 MG/DL (ref 8–23)
BUN/CREAT SERPL: 21.3 (ref 7–25)
CALCIUM SPEC-SCNC: 8.7 MG/DL (ref 8.6–10.5)
CHLORIDE SERPL-SCNC: 101 MMOL/L (ref 98–107)
CO2 SERPL-SCNC: 35.2 MMOL/L (ref 22–29)
CREAT SERPL-MCNC: 1.22 MG/DL (ref 0.76–1.27)
EGFRCR SERPLBLD CKD-EPI 2021: 67.9 ML/MIN/1.73
GLUCOSE SERPL-MCNC: 141 MG/DL (ref 65–99)
POTASSIUM SERPL-SCNC: 3.7 MMOL/L (ref 3.5–5.2)
SODIUM SERPL-SCNC: 146 MMOL/L (ref 136–145)

## 2024-09-23 ENCOUNTER — OFFICE VISIT (OUTPATIENT)
Dept: CARDIOLOGY | Facility: HOSPITAL | Age: 59
End: 2024-09-23
Payer: COMMERCIAL

## 2024-09-23 VITALS
OXYGEN SATURATION: 95 % | DIASTOLIC BLOOD PRESSURE: 67 MMHG | BODY MASS INDEX: 25.96 KG/M2 | RESPIRATION RATE: 17 BRPM | HEART RATE: 63 BPM | WEIGHT: 175.25 LBS | HEIGHT: 69 IN | SYSTOLIC BLOOD PRESSURE: 134 MMHG | TEMPERATURE: 98.2 F

## 2024-09-23 DIAGNOSIS — F41.1 GENERALIZED ANXIETY DISORDER: ICD-10-CM

## 2024-09-23 DIAGNOSIS — I48.0 PAF (PAROXYSMAL ATRIAL FIBRILLATION): ICD-10-CM

## 2024-09-23 DIAGNOSIS — J96.21 ACUTE ON CHRONIC RESPIRATORY FAILURE WITH HYPOXIA AND HYPERCAPNIA: Primary | ICD-10-CM

## 2024-09-23 DIAGNOSIS — E78.2 MIXED HYPERLIPIDEMIA: ICD-10-CM

## 2024-09-23 DIAGNOSIS — J96.22 ACUTE ON CHRONIC RESPIRATORY FAILURE WITH HYPOXIA AND HYPERCAPNIA: Primary | ICD-10-CM

## 2024-09-23 DIAGNOSIS — I10 ESSENTIAL HYPERTENSION: ICD-10-CM

## 2024-09-23 LAB — METHYLMALONATE SERPL-SCNC: 1367 NMOL/L (ref 0–378)

## 2024-09-23 PROCEDURE — 99214 OFFICE O/P EST MOD 30 MIN: CPT | Performed by: NURSE PRACTITIONER

## 2024-09-23 RX ORDER — LORAZEPAM 1 MG/1
TABLET ORAL
Qty: 75 TABLET | Refills: 2 | Status: SHIPPED | OUTPATIENT
Start: 2024-09-23

## 2024-09-25 ENCOUNTER — OFFICE VISIT (OUTPATIENT)
Age: 59
End: 2024-09-25
Payer: COMMERCIAL

## 2024-09-25 VITALS
HEIGHT: 69 IN | BODY MASS INDEX: 25.76 KG/M2 | HEART RATE: 70 BPM | RESPIRATION RATE: 18 BRPM | OXYGEN SATURATION: 96 % | SYSTOLIC BLOOD PRESSURE: 118 MMHG | WEIGHT: 173.9 LBS | DIASTOLIC BLOOD PRESSURE: 66 MMHG | TEMPERATURE: 98.1 F

## 2024-09-25 DIAGNOSIS — I25.84 CORONARY ARTERY CALCIFICATION: ICD-10-CM

## 2024-09-25 DIAGNOSIS — F99 INSOMNIA DUE TO OTHER MENTAL DISORDER: ICD-10-CM

## 2024-09-25 DIAGNOSIS — F33.1 MODERATE EPISODE OF RECURRENT MAJOR DEPRESSIVE DISORDER: ICD-10-CM

## 2024-09-25 DIAGNOSIS — Z87.891 PERSONAL HISTORY OF SMOKING: ICD-10-CM

## 2024-09-25 DIAGNOSIS — J96.21 ACUTE ON CHRONIC RESPIRATORY FAILURE WITH HYPOXIA: ICD-10-CM

## 2024-09-25 DIAGNOSIS — F41.1 GAD (GENERALIZED ANXIETY DISORDER): ICD-10-CM

## 2024-09-25 DIAGNOSIS — F51.05 INSOMNIA DUE TO OTHER MENTAL DISORDER: ICD-10-CM

## 2024-09-25 DIAGNOSIS — I10 ESSENTIAL HYPERTENSION: ICD-10-CM

## 2024-09-25 DIAGNOSIS — J43.2 CENTRILOBULAR EMPHYSEMA: Primary | ICD-10-CM

## 2024-09-25 DIAGNOSIS — I25.10 CORONARY ARTERY CALCIFICATION: ICD-10-CM

## 2024-09-25 PROCEDURE — 99214 OFFICE O/P EST MOD 30 MIN: CPT | Performed by: NURSE PRACTITIONER

## 2024-09-25 RX ORDER — MIRTAZAPINE 15 MG/1
15 TABLET, FILM COATED ORAL NIGHTLY
Qty: 30 TABLET | Refills: 1 | OUTPATIENT
Start: 2024-09-25

## 2024-09-25 RX ORDER — ISOSORBIDE MONONITRATE 30 MG/1
30 TABLET, EXTENDED RELEASE ORAL NIGHTLY
Qty: 30 TABLET | Refills: 1 | Status: SHIPPED | OUTPATIENT
Start: 2024-09-25

## 2024-09-26 ENCOUNTER — PATIENT ROUNDING (BHMG ONLY) (OUTPATIENT)
Dept: CARDIOLOGY | Facility: CLINIC | Age: 59
End: 2024-09-26
Payer: COMMERCIAL

## 2024-09-26 ENCOUNTER — OFFICE VISIT (OUTPATIENT)
Dept: CARDIOLOGY | Facility: CLINIC | Age: 59
End: 2024-09-26
Payer: COMMERCIAL

## 2024-09-26 VITALS
WEIGHT: 175 LBS | BODY MASS INDEX: 25.92 KG/M2 | OXYGEN SATURATION: 95 % | DIASTOLIC BLOOD PRESSURE: 52 MMHG | SYSTOLIC BLOOD PRESSURE: 100 MMHG | HEART RATE: 72 BPM | HEIGHT: 69 IN

## 2024-09-26 DIAGNOSIS — I48.0 PAROXYSMAL ATRIAL FIBRILLATION: ICD-10-CM

## 2024-09-26 DIAGNOSIS — I10 ESSENTIAL HYPERTENSION: ICD-10-CM

## 2024-09-26 DIAGNOSIS — E78.2 MIXED HYPERLIPIDEMIA: ICD-10-CM

## 2024-09-26 DIAGNOSIS — I48.0 PAF (PAROXYSMAL ATRIAL FIBRILLATION): Primary | ICD-10-CM

## 2024-09-26 PROCEDURE — 99214 OFFICE O/P EST MOD 30 MIN: CPT | Performed by: INTERNAL MEDICINE

## 2024-09-26 RX ORDER — METOPROLOL TARTRATE 25 MG/1
25 TABLET, FILM COATED ORAL EVERY 12 HOURS SCHEDULED
Qty: 180 TABLET | Refills: 3 | Status: SHIPPED | OUTPATIENT
Start: 2024-09-26

## 2024-09-27 ENCOUNTER — APPOINTMENT (OUTPATIENT)
Dept: GENERAL RADIOLOGY | Facility: HOSPITAL | Age: 59
End: 2024-09-27
Payer: COMMERCIAL

## 2024-09-27 ENCOUNTER — HOSPITAL ENCOUNTER (EMERGENCY)
Facility: HOSPITAL | Age: 59
Discharge: HOME | End: 2024-09-27
Attending: EMERGENCY MEDICINE
Payer: COMMERCIAL

## 2024-09-27 VITALS
RESPIRATION RATE: 22 BRPM | HEART RATE: 92 BPM | TEMPERATURE: 98.7 F | SYSTOLIC BLOOD PRESSURE: 129 MMHG | BODY MASS INDEX: 26.07 KG/M2 | DIASTOLIC BLOOD PRESSURE: 109 MMHG | OXYGEN SATURATION: 96 % | WEIGHT: 176 LBS | HEIGHT: 69 IN

## 2024-09-27 DIAGNOSIS — Z86.39 HISTORY OF HYPERLIPIDEMIA: ICD-10-CM

## 2024-09-27 DIAGNOSIS — Z86.79 HISTORY OF HYPERTENSION: ICD-10-CM

## 2024-09-27 DIAGNOSIS — J44.1 ACUTE EXACERBATION OF CHRONIC OBSTRUCTIVE PULMONARY DISEASE (COPD): Primary | ICD-10-CM

## 2024-09-27 LAB
ALBUMIN SERPL-MCNC: 3.6 G/DL (ref 3.5–5.2)
ALBUMIN/GLOB SERPL: 1.4 G/DL
ALP SERPL-CCNC: 73 U/L (ref 39–117)
ALT SERPL W P-5'-P-CCNC: 23 U/L (ref 1–41)
ANION GAP SERPL CALCULATED.3IONS-SCNC: 10 MMOL/L (ref 5–15)
AST SERPL-CCNC: 16 U/L (ref 1–40)
BASOPHILS # BLD AUTO: 0.09 10*3/MM3 (ref 0–0.2)
BASOPHILS NFR BLD AUTO: 0.7 % (ref 0–1.5)
BILIRUB SERPL-MCNC: 0.5 MG/DL (ref 0–1.2)
BUN SERPL-MCNC: 18 MG/DL (ref 8–23)
BUN/CREAT SERPL: 15.9 (ref 7–25)
CALCIUM SPEC-SCNC: 8.5 MG/DL (ref 8.6–10.5)
CHLORIDE SERPL-SCNC: 103 MMOL/L (ref 98–107)
CO2 SERPL-SCNC: 34 MMOL/L (ref 22–29)
CREAT SERPL-MCNC: 1.13 MG/DL (ref 0.76–1.27)
DEPRECATED RDW RBC AUTO: 50.4 FL (ref 37–54)
EGFRCR SERPLBLD CKD-EPI 2021: 74.4 ML/MIN/1.73
EOSINOPHIL # BLD AUTO: 0.2 10*3/MM3 (ref 0–0.4)
EOSINOPHIL NFR BLD AUTO: 1.5 % (ref 0.3–6.2)
ERYTHROCYTE [DISTWIDTH] IN BLOOD BY AUTOMATED COUNT: 14.6 % (ref 12.3–15.4)
FLUAV RNA RESP QL NAA+PROBE: NOT DETECTED
FLUBV RNA RESP QL NAA+PROBE: NOT DETECTED
GLOBULIN UR ELPH-MCNC: 2.6 GM/DL
GLUCOSE SERPL-MCNC: 106 MG/DL (ref 65–99)
HCT VFR BLD AUTO: 40 % (ref 37.5–51)
HGB BLD-MCNC: 12.1 G/DL (ref 13–17.7)
HOLD SPECIMEN: NORMAL
IMM GRANULOCYTES # BLD AUTO: 0.33 10*3/MM3 (ref 0–0.05)
IMM GRANULOCYTES NFR BLD AUTO: 2.5 % (ref 0–0.5)
LYMPHOCYTES # BLD AUTO: 1.28 10*3/MM3 (ref 0.7–3.1)
LYMPHOCYTES NFR BLD AUTO: 9.8 % (ref 19.6–45.3)
MCH RBC QN AUTO: 28.7 PG (ref 26.6–33)
MCHC RBC AUTO-ENTMCNC: 30.3 G/DL (ref 31.5–35.7)
MCV RBC AUTO: 94.8 FL (ref 79–97)
MONOCYTES # BLD AUTO: 1.09 10*3/MM3 (ref 0.1–0.9)
MONOCYTES NFR BLD AUTO: 8.3 % (ref 5–12)
NEUTROPHILS NFR BLD AUTO: 10.1 10*3/MM3 (ref 1.7–7)
NEUTROPHILS NFR BLD AUTO: 77.2 % (ref 42.7–76)
NRBC BLD AUTO-RTO: 0 /100 WBC (ref 0–0.2)
NT-PROBNP SERPL-MCNC: 363.6 PG/ML (ref 0–900)
PLATELET # BLD AUTO: 155 10*3/MM3 (ref 140–450)
PMV BLD AUTO: 11.5 FL (ref 6–12)
POTASSIUM SERPL-SCNC: 3.6 MMOL/L (ref 3.5–5.2)
PROT SERPL-MCNC: 6.2 G/DL (ref 6–8.5)
QT INTERVAL: 334 MS
QTC INTERVAL: 455 MS
RBC # BLD AUTO: 4.22 10*6/MM3 (ref 4.14–5.8)
SARS-COV-2 RNA RESP QL NAA+PROBE: NOT DETECTED
SODIUM SERPL-SCNC: 147 MMOL/L (ref 136–145)
TROPONIN T SERPL HS-MCNC: 15 NG/L
WBC NRBC COR # BLD AUTO: 13.09 10*3/MM3 (ref 3.4–10.8)
WHOLE BLOOD HOLD COAG: NORMAL
WHOLE BLOOD HOLD SPECIMEN: NORMAL

## 2024-09-27 PROCEDURE — 93005 ELECTROCARDIOGRAM TRACING: CPT | Performed by: EMERGENCY MEDICINE

## 2024-09-27 PROCEDURE — 87636 SARSCOV2 & INF A&B AMP PRB: CPT | Performed by: EMERGENCY MEDICINE

## 2024-09-27 PROCEDURE — 71045 X-RAY EXAM CHEST 1 VIEW: CPT

## 2024-09-27 PROCEDURE — 84484 ASSAY OF TROPONIN QUANT: CPT | Performed by: EMERGENCY MEDICINE

## 2024-09-27 PROCEDURE — 94640 AIRWAY INHALATION TREATMENT: CPT

## 2024-09-27 PROCEDURE — 83880 ASSAY OF NATRIURETIC PEPTIDE: CPT | Performed by: EMERGENCY MEDICINE

## 2024-09-27 PROCEDURE — 99284 EMERGENCY DEPT VISIT MOD MDM: CPT

## 2024-09-27 PROCEDURE — 85025 COMPLETE CBC W/AUTO DIFF WBC: CPT | Performed by: EMERGENCY MEDICINE

## 2024-09-27 PROCEDURE — 80053 COMPREHEN METABOLIC PANEL: CPT | Performed by: EMERGENCY MEDICINE

## 2024-09-27 RX ORDER — SODIUM CHLORIDE 0.9 % (FLUSH) 0.9 %
10 SYRINGE (ML) INJECTION AS NEEDED
Status: DISCONTINUED | OUTPATIENT
Start: 2024-09-27 | End: 2024-09-27 | Stop reason: HOSPADM

## 2024-09-27 RX ORDER — DOXYCYCLINE 100 MG/1
100 CAPSULE ORAL 2 TIMES DAILY
Qty: 10 CAPSULE | Refills: 0 | Status: SHIPPED | OUTPATIENT
Start: 2024-09-27 | End: 2024-10-02

## 2024-09-27 RX ORDER — PREDNISONE 50 MG/1
50 TABLET ORAL DAILY
Qty: 5 TABLET | Refills: 0 | Status: SHIPPED | OUTPATIENT
Start: 2024-09-27

## 2024-09-27 RX ORDER — IPRATROPIUM BROMIDE AND ALBUTEROL SULFATE 2.5; .5 MG/3ML; MG/3ML
3 SOLUTION RESPIRATORY (INHALATION) ONCE
Status: COMPLETED | OUTPATIENT
Start: 2024-09-27 | End: 2024-09-27

## 2024-09-27 RX ADMIN — IPRATROPIUM BROMIDE AND ALBUTEROL SULFATE 3 ML: 2.5; .5 SOLUTION RESPIRATORY (INHALATION) at 15:57

## 2024-09-27 NOTE — Clinical Note
[facility] EMERGENCY DEPARTMENT  [address]  [facility] [address]  Phone: [phone]    Aguila Finn was seen and treated in our emergency department on 9/27/2024.  He may return to work on 09/28/2024.  Aguila Finn's wife was with him in the Emergency Room       Thank you for choosing [facility].    Santos Busch MD
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
## 2024-09-28 DIAGNOSIS — I10 ESSENTIAL HYPERTENSION: ICD-10-CM

## 2024-09-30 RX ORDER — AMLODIPINE BESYLATE 10 MG/1
10 TABLET ORAL DAILY
Qty: 90 TABLET | Refills: 2 | Status: SHIPPED | OUTPATIENT
Start: 2024-09-30

## 2024-10-02 ENCOUNTER — READMISSION MANAGEMENT (OUTPATIENT)
Dept: CALL CENTER | Facility: HOSPITAL | Age: 59
End: 2024-10-02
Payer: COMMERCIAL

## 2024-10-02 NOTE — OUTREACH NOTE
COPD/PN Week 3 Survey      Flowsheet Row Responses   [facility] facility patient discharged from? [facility]   Does the patient have one of the following disease processes/diagnoses(primary or secondary)? COPD   Week 3 attempt successful? No   Unsuccessful attempts Attempt 1            Kathy SALAZAR - Licensed Nurse

## 2024-10-03 LAB
QT INTERVAL: 334 MS
QTC INTERVAL: 455 MS

## 2024-10-08 ENCOUNTER — TELEMEDICINE (OUTPATIENT)
Age: 59
End: 2024-10-08
Payer: COMMERCIAL

## 2024-10-08 VITALS — WEIGHT: 180 LBS | BODY MASS INDEX: 26.58 KG/M2

## 2024-10-08 DIAGNOSIS — F33.1 MODERATE EPISODE OF RECURRENT MAJOR DEPRESSIVE DISORDER: Primary | ICD-10-CM

## 2024-10-08 DIAGNOSIS — F41.1 GAD (GENERALIZED ANXIETY DISORDER): ICD-10-CM

## 2024-10-08 DIAGNOSIS — F99 INSOMNIA DUE TO OTHER MENTAL DISORDER: ICD-10-CM

## 2024-10-08 DIAGNOSIS — F51.05 INSOMNIA DUE TO OTHER MENTAL DISORDER: ICD-10-CM

## 2024-10-08 RX ORDER — BUSPIRONE HYDROCHLORIDE 15 MG/1
15 TABLET ORAL 2 TIMES DAILY
Qty: 180 TABLET | Refills: 0 | Status: SHIPPED | OUTPATIENT
Start: 2024-10-08

## 2024-10-08 RX ORDER — MIRTAZAPINE 30 MG/1
30 TABLET, FILM COATED ORAL NIGHTLY
Qty: 90 TABLET | Refills: 0 | Status: SHIPPED | OUTPATIENT
Start: 2024-10-08

## 2024-10-08 NOTE — PROGRESS NOTES
Office  Follow Up Visit    This provider is located at  [facility], located at [address], [address] and is conducting  a secure MyChart Video Visit through [employer]. Patient is being evaluated today  at their home address in [place], and states they are  in a secure environment for this appointment. The patient consents to be seen remotely, and when consent is given they understand that the consent allows for patient identifiable information to be sent to a third party as needed. They may refuse to be seen remotely at any time. The electronic data is encrypted and password protected, and the patient  has been advised of the potential risks to privacy not withstanding such measures. The patient's condition being diagnosed/treated is appropriate for telemedicine. The provider identified herself as well as her credentials to patient. Patient identity confirmed by asking to confirm their name and         Patient Name: Aguila Finn Jr.  : 1965   MRN: [id number]     Referring Provider: Kenya Martinez MD    Chief Complaint:       ICD-10-CM ICD-9-CM   1. Moderate episode of recurrent major depressive disorder  F33.1 296.32   2. JIMMY (generalized anxiety disorder)  F41.1 300.02   3. Insomnia due to other mental disorder  F51.05 300.9    F99 327.02     History of Present Illness     Aguila Finn Jr. is a 59 y.o. male who is  being seen by telehealth visit for  follow up related to anxiety, depression, insomnia.  His last office visit was on 9/10/2024 at which time Remeron was increased to 30 at bedtime and BuSpar was increased to 15 twice daily.      Pt shares he  was recently hospitalized for pneumonia 2024  and is scheduled for lung testing on Thursday at  [facility]. He uses oxygen continuously at 2 L and takes breathing treatments at night.  Pt appears dyspneic at times during conversation.  Despite his health  difficulties patient states he  feels Buspar and Remeron are mostly effective.  He denies medication side effects. His PHQ score is 12 and JIMMY remains quite elevated at 17.  He denies SI/HI/AVH.    He reports doing well overall but has been waking every hour  during the night, approximately every other hour. Despite this, he does not have difficulty falling back asleep. He also notes an increase in appetite and weight gain since starting Remeron, with his current weight being 180 pounds,  up 4 lbs since last visit.   He believes that BuSpar and Remeron are  effective for him and does not wish to make any changes to his current medication regimen.     Diagnosis: Anxiety, depression, bipolar 1  Medications: Buspar 7.5 mg BID  Therapy: none currently    Subjective      Review of Systems:   Review of Systems   Constitutional:  Positive for activity change, appetite change, fatigue and unexpected weight change.   Respiratory:  Positive for cough and shortness of breath.         Wearing O2 during visit   Psychiatric/Behavioral:  Positive for decreased concentration and sleep disturbance. The patient is nervous/anxious.    All other systems reviewed and are negative.      PHQ-9 Depression Screening  Little interest or pleasure in doing things? (P) 1-->several days   Feeling down, depressed, or hopeless? (P) 1-->several days   Trouble falling or staying asleep, or sleeping too much? (P) 1-->several days   Feeling tired or having little energy? (P) 0-->not at all   Poor appetite or overeating? (P) 0-->not at all   Feeling bad about yourself - or that you are a failure or have let yourself or your family down? (P) 3-->nearly every day   Trouble concentrating on things, such as reading the newspaper or watching television? (P) 2-->more than half the days   Moving or speaking so slowly that other people could have noticed? Or the opposite - being so fidgety or restless that you have been moving around a lot more than usual? (P)  2-->more than half the days   Thoughts that you would be better off dead, or of hurting yourself in some way? (P) 0-->not at all   PHQ-9 Total Score (P) 10   If you checked off any problems, how difficult have these problems made it for you to do your work, take care of things at home, or get along with other people? (P) very difficult       JIMMY-7      Over the last two weeks, how often have you been bothered by the following problems?  Feeling nervous, anxious or on edge: (P) Nearly every day  Not being able to stop or control worrying: (P) Nearly every day  Worrying too much about different things: (P) Nearly every day  Trouble Relaxing: (P) Nearly every day  Being so restless that it is hard to sit still: (P) Several days  Becoming easily annoyed or irritable: (P) Several days  Feeling afraid as if something awful might happen: (P) Nearly every day  JIMMY 7 Total Score: (P) 17  If you checked any problems, how difficult have these problems made it for you to do your work, take care of things at home, or get along with other people: (P) Extremely difficult    Patient History:   The following portions of the patient's history were reviewed and updated as appropriate: allergies, current medications, past family history, past medical history, past social history, past surgical history and problem list.     Social History     Socioeconomic History    Marital status:    Tobacco Use    Smoking status: Former     Current packs/day: 0.00     Average packs/day: 2.0 packs/day for 35.0 years (70.0 ttl pk-yrs)     Types: Cigarettes     Start date: 9/15/1987     Quit date: 2020     Years since quittin.0     Passive exposure: Past    Smokeless tobacco: Never   Vaping Use    Vaping status: Never Used    Passive vaping exposure: Yes   Substance and Sexual Activity    Alcohol use: Not Currently     Alcohol/week: 1.0 standard drink of alcohol     Types: 1 Cans of beer per week     Comment: a few drinks on occasion,  not excessive per patient    Drug use: Not Currently     Comment: Cannibus gummies    Sexual activity: Yes     Partners: Female     Birth control/protection: Partner of same sex       Past Psychiatric History:   History of outpatient psychiatrist: yrs ago; unable to recall details  Diagnoses: Depression, anxiety, bipolar 1  History of outpatient therapy: yrs ago; cpl visits  Previous Inpatient hospitalizations: [facility] 5-8 yrs ago; voluntary for anxiety  Previous medication trials: Wellbutrin , BuSpar 7.5 twice daily, Seroquel 200 mg 2017 Vistaril 50 mg, Remeron 30 mg-2016  History of suicide/self harm attempts: denies  Medications:     Current Outpatient Medications:     albuterol (PROVENTIL) (2.5 MG/3ML) 0.083% nebulizer solution, Take 2.5 mg by nebulization 4 (Four) Times a Day As Needed for Wheezing or Shortness of Air., Disp: 60 each, Rfl: 11    amLODIPine (NORVASC) 10 MG tablet, Take 1 tablet by mouth Daily., Disp: 90 tablet, Rfl: 2    apixaban (ELIQUIS) 5 MG tablet tablet, Take 1 tablet by mouth Every 12 (Twelve) Hours., Disp: 180 tablet, Rfl: 3    arformoterol (BROVANA) 15 MCG/2ML nebulizer solution, Take 2 mL by nebulization 2 (Two) Times a Day., Disp: 120 mL, Rfl: 11    ascorbic acid (VITAMIN C) 500 MG tablet, Take 1 tablet by mouth Daily., Disp: , Rfl:     aspirin (Aspirin Low Dose) 81 MG EC tablet, TAKE 1 TABLET BY MOUTH DAILY, Disp: 90 tablet, Rfl: 3    atorvastatin (LIPITOR) 40 MG tablet, Take 1 tablet by mouth Daily. (Patient taking differently: Take 1 tablet by mouth Every Night.), Disp: 90 tablet, Rfl: 2    budesonide (Pulmicort) 0.5 MG/2ML nebulizer solution, Take 2 mL by nebulization 2 (Two) Times a Day., Disp: 360 mL, Rfl: 3    busPIRone (BUSPAR) 15 MG tablet, Take 1 tablet by mouth 2 (Two) Times a Day., Disp: 60 tablet, Rfl: 1    Cyanocobalamin 1000 MCG/ML kit, Inject 1 mL as directed Every 28 (Twenty-Eight) Days., Disp: 1 kit, Rfl: 2    ferrous sulfate 325 (65 FE) MG tablet, Take 1 tablet  by mouth Daily With Breakfast., Disp: , Rfl:     guaiFENesin (Mucinex) 600 MG 12 hr tablet, Take 2 tablets by mouth 2 (Two) Times a Day., Disp: 120 tablet, Rfl: 2    isosorbide mononitrate (IMDUR) 30 MG 24 hr tablet, Take 1 tablet by mouth Every Night., Disp: 30 tablet, Rfl: 1    LORazepam (ATIVAN) 1 MG tablet, Take 1 tab am and 1 tab pm and 0.5 tab qhs., Disp: 75 tablet, Rfl: 2    melatonin 5 MG tablet tablet, Take 1 tablet by mouth Every Night., Disp: , Rfl:     metoprolol tartrate (LOPRESSOR) 25 MG tablet, Take 1 tablet by mouth Every 12 (Twelve) Hours., Disp: 180 tablet, Rfl: 3    mirtazapine (REMERON) 30 MG tablet, Take 1 tablet by mouth Every Night., Disp: 30 tablet, Rfl: 1    pantoprazole (PROTONIX) 40 MG EC tablet, Take 1 tablet by mouth Every Morning., Disp: 90 tablet, Rfl: 0    polyethylene glycol (MIRALAX) 17 g packet, Take 17 g by mouth Daily As Needed (constipation)., Disp: , Rfl:     revefenacin (YUPELRI) 175 MCG/3ML nebulizer solution, Take 3 mL by nebulization Daily., Disp: 90 mL, Rfl: 11    spironolactone (ALDACTONE) 50 MG tablet, Take 1 tablet by mouth Daily., Disp: 90 tablet, Rfl: 2    Objective     Physical Exam    Vital Signs: MAINOR video visit  Vitals:    10/08/24 1021   Weight: 81.6 kg (180 lb)     Body mass index is 26.58 kg/m².       Mental Status Exam:   MENTAL STATUS EXAM   General Appearance:  Unkempt and other  Other Comment:  Unshaven, appears tired, facial edema  Eye Contact:  Good eye contact  Attitude:  Cooperative  Motor Activity:  Other  Other Comment:  UTS, sitting during visit  Muscle Strength:  Other  Other Comment:  MAINOR video visit; no reprots of problems  Speech:  Minimal spontaneity  Language:  Stereotypical and unspontaneous  Mood and affect:  Anxious, flat and other  Hopelessness:  9  Loneliness: Denies  Thought Process:  Logical and goal-directed  Associations/ Thought Content:  No delusions  Hallucinations:  None  Suicidal Ideations:  Not present  Homicidal Ideation:  Not  present  Sensorium:  Alert and clear  Orientation:  Person, place and time  Immediate Recall, Recent, and Remote Memory:  Intact  Attention Span/ Concentration:  Good  Fund of Knowledge:  Appropriate for age and educational level  Intellectual Functioning:  Average range  Insight:  Fair  Judgement:  Fair  Reliability:  Good  Impulse Control:  Fair       @RESULASTCBCDIFFPANEL,TSH,LABLIPI,HRPCFDRZ23,HDPNREKZ36,MG,FOLATE,PROLACTIN,CRPRESULT,CMP,[id number])@    Lab Results   Component Value Date    GLUCOSE 106 (H) 09/27/2024    BUN 18 09/27/2024    CREATININE 1.13 09/27/2024    EGFR 74.4 09/27/2024    BCR 15.9 09/27/2024    K 3.6 09/27/2024    CO2 34.0 (H) 09/27/2024    CALCIUM 8.5 (L) 09/27/2024    ALBUMIN 3.6 09/27/2024    BILITOT 0.5 09/27/2024    AST 16 09/27/2024    ALT 23 09/27/2024       Lab Results   Component Value Date    WBC 13.09 (H) 09/27/2024    HGB 12.1 (L) 09/27/2024    HCT 40.0 09/27/2024    MCV 94.8 09/27/2024     09/27/2024       Lab Results   Component Value Date    CHOL 157 08/28/2023    TRIG 120 08/28/2023    HDL 48 08/28/2023    LDL 88 08/28/2023      Latest Reference Range & Units 05/10/24 11:08   Hemoglobin A1C 4.80 - 5.60 % 5.30       Results  Imaging  CT scan was conducted to check for cancer.       Test Reason : Rhythm Change  Blood Pressure :   */*   mmHG  Vent. Rate : 134 BPM     Atrial Rate : 122 BPM     P-R Int :   * ms          QRS Dur :  84 ms      QT Int : 274 ms       P-R-T Axes :   *  59  35 degrees     QTc Int : 409 ms     Atrial fibrillation with rapid ventricular response  Nonspecific ST abnormality  Abnormal ECG  When compared with ECG of 23-AUG-2024 21:07,  Atrial fibrillation has replaced Sinus rhythm  QRS duration has decreased  ST no longer elevated in Inferior leads  Nonspecific T wave abnormality now evident in Inferior leads  Nonspecific T wave abnormality no longer evident in Lateral leads     Referred By:            Confirmed By:         COOPER     09/04/2024  Lorazepam 1MG 30 each 15 [id number]   08/29/2024 [facility]       Assessment / Plan      Visit Diagnosis/Orders Placed This Visit:  Diagnoses and all orders for this visit:    1. Moderate episode of recurrent major depressive disorder (Primary)  -     mirtazapine (REMERON) 30 MG tablet; Take 1 tablet by mouth Every Night.  Dispense: 90 tablet; Refill: 0    2. JIMMY (generalized anxiety disorder)  -     mirtazapine (REMERON) 30 MG tablet; Take 1 tablet by mouth Every Night.  Dispense: 90 tablet; Refill: 0  -     busPIRone (BUSPAR) 15 MG tablet; Take 1 tablet by mouth 2 (Two) Times a Day.  Dispense: 180 tablet; Refill: 0    3. Insomnia due to other mental disorder  -     mirtazapine (REMERON) 30 MG tablet; Take 1 tablet by mouth Every Night.  Dispense: 90 tablet; Refill: 0      Assessment & Plan     1. Depression, anxiety, insomnia   The patient reports waking up every other hour despite taking his medication. He does not experience difficulty falling back asleep. He has noticed an increase in appetite and weight gain since the dosage of Remeron was increased. Continuation of the current medication regimen is advised, as he feels the BuSpar is effective and reports no significant side effects.  Uses Ativan as needed prescribed by PCP    2. Medication Management.  The patient is on multiple medications including Eliquis, vitamin C, aspirin, Lipitor, iron pill, Ativan, and B12. The prednisone has been discontinued. The [facility] is [facility].    Follow-up  Patient will return in 3 months for a video visit.    Plan:   Continue Remeron 30 mg at night  Continue BuSpar to 15 mg twice daily  Ativan as needed prescribed PCP  Labs and Chandana reviewed  [facility] referral sent last visit 9/10      Continue supportive psychotherapy efforts and medications as indicated. Treatment and medication options discussed during  today's visit. Patient ackowledged and verbally consented to continue with current treatment plan and was educated on the importance of compliance with treatment and follow-up appointments. Patient seems reasonably able to adhere to treatment plan.      Medication Considerations:  Discussed medication options and treatment plan of prescribed medication(s) as well as the risks, benefits, and potential side effects. Patient is agreeable to call the office with any worsening of symptoms or onset of side effects. Patient is agreeable to call 911 or go to the nearest ER should he/she begin having SI/HI.    Quality Measures:   Former smoker    I advised Aguila Finn Jr. of the risks of tobacco use.     Follow Up:   Return in about 3 months (around 1/8/2025).      JOSE LUIS Muñoz, [facility]-BC    Patient or patient representative verbalized consent for the use of Ambient Listening during the visit with  JOSE LUIS Gates for chart documentation. 10/8/2024  11:00 EDT

## 2024-10-11 ENCOUNTER — READMISSION MANAGEMENT (OUTPATIENT)
Dept: CALL CENTER | Facility: HOSPITAL | Age: 59
End: 2024-10-11
Payer: COMMERCIAL

## 2024-10-11 ENCOUNTER — TELEPHONE (OUTPATIENT)
Dept: INTERNAL MEDICINE | Facility: CLINIC | Age: 59
End: 2024-10-11

## 2024-10-11 NOTE — TELEPHONE ENCOUNTER
Provider: DR. MADISON    Caller: BALDEMAR ECHEVARRIA    Relationship to Patient: SELF    Phone Number: [phone]     Reason for Call: PATIENT IS CURRENTLY AT  PULMONOLOGY AWAITING A LUNG TRANSPLANT.

## 2024-10-11 NOTE — OUTREACH NOTE
COPD/PN Week 3 Survey      Flowsheet Row Responses   [facility] facility patient discharged from? [place]   Does the patient have one of the following disease processes/diagnoses(primary or secondary)? COPD   Week 3 attempt successful? Yes   Call start time 1507   Revoke Readmitted  [Pt is in  hospital being triaged for a Lung transplant he reports]   Call end time 1511   Discharge diagnosis COPD exacerbation   Has home health visited the patient within 72 hours of discharge? No   Call end time 1511            Татьяна RAIN - Registered Nurse

## 2024-10-25 ENCOUNTER — READMISSION MANAGEMENT (OUTPATIENT)
Dept: CALL CENTER | Facility: HOSPITAL | Age: 59
End: 2024-10-25
Payer: COMMERCIAL

## 2024-10-25 NOTE — OUTREACH NOTE
Prep Survey      Flowsheet Row Responses   [facility] facility patient discharged from? Non-BH   Is LACE score < 7 ? Non-BH Discharge   Eligibility Not Eligible   What are the reasons patient is not eligible? Other  [no recent hospital admissions detected]   Does the patient have one of the following disease processes/diagnoses(primary or secondary)? Other   Prep survey completed? Yes            Marisela GARCIA - Registered Nurse

## 2024-11-15 DIAGNOSIS — K21.9 GASTROESOPHAGEAL REFLUX DISEASE, UNSPECIFIED WHETHER ESOPHAGITIS PRESENT: ICD-10-CM

## 2024-11-15 DIAGNOSIS — K22.89 ESOPHAGEAL THICKENING: ICD-10-CM

## 2024-11-15 RX ORDER — PANTOPRAZOLE SODIUM 40 MG/1
40 TABLET, DELAYED RELEASE ORAL
Qty: 90 TABLET | Refills: 0 | Status: SHIPPED | OUTPATIENT
Start: 2024-11-15

## 2024-11-22 DIAGNOSIS — I25.10 CORONARY ARTERY CALCIFICATION: ICD-10-CM

## 2024-11-22 DIAGNOSIS — I10 ESSENTIAL HYPERTENSION: ICD-10-CM

## 2024-11-22 RX ORDER — ISOSORBIDE MONONITRATE 30 MG/1
30 TABLET, EXTENDED RELEASE ORAL NIGHTLY
Qty: 30 TABLET | Refills: 11 | Status: SHIPPED | OUTPATIENT
Start: 2024-11-22

## 2024-12-23 RX ORDER — CYANOCOBALAMIN 1000 UG/ML
INJECTION, SOLUTION INTRAMUSCULAR; SUBCUTANEOUS
Qty: 3 ML | Refills: 1 | Status: SHIPPED | OUTPATIENT
Start: 2024-12-23

## 2025-02-10 ENCOUNTER — READMISSION MANAGEMENT (OUTPATIENT)
Dept: CALL CENTER | Facility: HOSPITAL | Age: 60
End: 2025-02-10
Payer: COMMERCIAL

## 2025-02-10 NOTE — OUTREACH NOTE
Prep Survey      Flowsheet Row Responses   [facility] facility patient discharged from? Non-BH   Is LACE score < 7 ? Non-BH Discharge   Eligibility [facility]   Date of Admission 02/09/25   Date of Discharge 02/10/25   Discharge diagnosis Lung transplant recipient   Does the patient have one of the following disease processes/diagnoses(primary or secondary)? Other   Prep survey completed? Yes            Kacie CORTEZ - Registered Nurse

## 2025-02-11 ENCOUNTER — TRANSITIONAL CARE MANAGEMENT TELEPHONE ENCOUNTER (OUTPATIENT)
Dept: CALL CENTER | Facility: HOSPITAL | Age: 60
End: 2025-02-11
Payer: COMMERCIAL

## 2025-02-11 NOTE — OUTREACH NOTE
Call Center TCM Note      Flowsheet Row Responses   [facility] patient discharged from? Non-BH   Does the patient have one of the following disease processes/diagnoses(primary or secondary)? Other   TCM attempt successful? No   Unsuccessful attempts Attempt 1            Geni Colmenares RN    2/11/2025, 14:19 EST

## 2025-02-11 NOTE — OUTREACH NOTE
Call Center TCM Note      Flowsheet Row Responses   [facility] patient discharged from? Non-   Does the patient have one of the following disease processes/diagnoses(primary or secondary)? Other   TCM attempt successful? Yes   Call start time 1726   Call end time 1728   Discharge diagnosis Lung transplant recipient   Person spoke with today (if not patient) and relationship pt   Meds reviewed with patient/caregiver? Yes   Is the patient having any side effects they believe may be caused by any medication additions or changes? No   Does the patient have all medications ordered at discharge? Yes   Is the patient taking all medications as directed (includes completed medication regime)? Yes   Comments Post-op 2/12/25   Does the patient have an appointment with their PCP within 7-14 days of discharge? No   Nursing Interventions Patient desires to follow up with specialty only   Psychosocial issues? No   Did the patient receive a copy of their discharge instructions? Yes   Nursing interventions Reviewed instructions with patient   What is the patient's perception of their health status since discharge? Improving   Is the patient/caregiver able to teach back signs and symptoms related to disease process for when to call PCP? Yes   Is the patient/caregiver able to teach back signs and symptoms related to disease process for when to call 911? Yes   Is the patient/caregiver able to teach back the hierarchy of who to call/visit for symptoms/problems? PCP, Specialist, Home health nurse, Urgent Care, ED, 911 Yes   If the patient is a current smoker, are they able to teach back resources for cessation? Not a smoker   TCM call completed? Yes   Wrap up additional comments Pt states he is doing good post lung transplant. Reviewed new meds with pt. Pt is not needing O2 any longer. Pt is excited to be home after a 2 month stay in hospital post-transplant. Pt sees surgeon tomorrow for post-op.   Call end time 1728   Would this  patient benefit from a Referral to [facility] Social Work? No   Is the patient interested in additional calls from an ambulatory ? No            Geni Colmenares RN    2/11/2025, 17:30 EST

## 2025-02-16 DIAGNOSIS — K22.89 ESOPHAGEAL THICKENING: ICD-10-CM

## 2025-02-16 DIAGNOSIS — K21.9 GASTROESOPHAGEAL REFLUX DISEASE, UNSPECIFIED WHETHER ESOPHAGITIS PRESENT: ICD-10-CM

## 2025-02-17 ENCOUNTER — PRIOR AUTHORIZATION (OUTPATIENT)
Dept: INTERNAL MEDICINE | Facility: CLINIC | Age: 60
End: 2025-02-17
Payer: COMMERCIAL

## 2025-02-17 RX ORDER — PANTOPRAZOLE SODIUM 40 MG/1
40 TABLET, DELAYED RELEASE ORAL
Qty: 90 TABLET | Refills: 1 | Status: SHIPPED | OUTPATIENT
Start: 2025-02-17

## 2025-03-18 ENCOUNTER — READMISSION MANAGEMENT (OUTPATIENT)
Dept: CALL CENTER | Facility: HOSPITAL | Age: 60
End: 2025-03-18
Payer: COMMERCIAL

## 2025-03-19 ENCOUNTER — TRANSITIONAL CARE MANAGEMENT TELEPHONE ENCOUNTER (OUTPATIENT)
Dept: CALL CENTER | Facility: HOSPITAL | Age: 60
End: 2025-03-19
Payer: COMMERCIAL

## 2025-03-19 NOTE — OUTREACH NOTE
Call Center TCM Note      Flowsheet Row Responses   [facility] patient discharged from? Non-  []   Does the patient have one of the following disease processes/diagnoses(primary or secondary)? Other   TCM attempt successful? No   Unsuccessful attempts Attempt 2  [attempted patient and spouse Stacey who is listed on PCP verbal release]            Paty Patricia RN    3/19/2025, 14:19 EDT

## 2025-03-19 NOTE — OUTREACH NOTE
Call Center TCM Note      Flowsheet Row Responses   [facility] patient discharged from? Non-  []   Does the patient have one of the following disease processes/diagnoses(primary or secondary)? Other   TCM attempt successful? No   Unsuccessful attempts Attempt 1  [attempted patient and spouse Stacey who is listed on PCP verbal release]            Paty Patricia RN    3/19/2025, 09:52 EDT

## 2025-03-19 NOTE — OUTREACH NOTE
Prep Survey      Flowsheet Row Responses   [facility] facility patient discharged from? Non-BH   Is LACE score < 7 ? Non-BH Discharge   Eligibility [facility]   Date of Admission 03/15/25   Date of Discharge 03/18/25   Discharge Disposition Home or Self Care   Discharge diagnosis Fever   Does the patient have one of the following disease processes/diagnoses(primary or secondary)? Other   Does the patient have Home health ordered? No   Prep survey completed? Yes            EDWIN CASTELLANOS - Registered Nurse

## 2025-03-20 ENCOUNTER — TRANSITIONAL CARE MANAGEMENT TELEPHONE ENCOUNTER (OUTPATIENT)
Dept: CALL CENTER | Facility: HOSPITAL | Age: 60
End: 2025-03-20
Payer: COMMERCIAL

## 2025-03-20 NOTE — OUTREACH NOTE
Call Center TCM Note      Flowsheet Row Responses   [facility] patient discharged from? Non-BH   Does the patient have one of the following disease processes/diagnoses(primary or secondary)? Other   TCM attempt successful? No   Unsuccessful attempts Attempt 3  [Stacey spouse on verbal release]            Isha Broderick RN    3/20/2025, 08:42 EDT

## 2025-03-22 ENCOUNTER — READMISSION MANAGEMENT (OUTPATIENT)
Dept: CALL CENTER | Facility: HOSPITAL | Age: 60
End: 2025-03-22
Payer: COMMERCIAL

## 2025-03-22 NOTE — OUTREACH NOTE
Prep Survey      Flowsheet Row Responses   [facility] facility patient discharged from? Non-BH   Is LACE score < 7 ? Non-BH Discharge   Eligibility Not Eligible   What are the reasons patient is not eligible? Other   Does the patient have one of the following disease processes/diagnoses(primary or secondary)? Other   Prep survey completed? Yes            Kacie CORTEZ - Registered Nurse

## 2025-03-26 ENCOUNTER — READMISSION MANAGEMENT (OUTPATIENT)
Dept: CALL CENTER | Facility: HOSPITAL | Age: 60
End: 2025-03-26
Payer: COMMERCIAL

## 2025-03-26 NOTE — OUTREACH NOTE
Prep Survey      Flowsheet Row Responses   [facility] facility patient discharged from? Non-BH   Is LACE score < 7 ? 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 Pneumonia   Does the patient have Home health ordered? No   Prep survey completed? Yes            EDWIN CASTELLANOS - Registered Nurse

## 2025-03-27 ENCOUNTER — TRANSITIONAL CARE MANAGEMENT TELEPHONE ENCOUNTER (OUTPATIENT)
Dept: CALL CENTER | Facility: HOSPITAL | Age: 60
End: 2025-03-27
Payer: COMMERCIAL

## 2025-03-27 NOTE — OUTREACH NOTE
Call Center TCM Note      Flowsheet Row Responses   [facility] patient discharged from? Non-  []   Does the patient have one of the following disease processes/diagnoses(primary or secondary)? Pneumonia   TCM attempt successful? 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 Yes   Is the patient having any side effects they believe may be caused by any medication additions or changes? No   Does the patient have all medications ordered at discharge? Yes   Is the patient taking all medications as directed (includes completed medication regime)? Yes   Comments Pt sees his lung specialist   Does the patient have an appointment with their PCP within 7-14 days of discharge? No   Nursing Interventions Patient desires to follow up with specialty only   Has home health visited the patient within 72 hours of discharge? N/A   Home health comments [facility] line care/dressing changes at lung transplant clinic weekly, [facility] outpatient PT 3 times a week restarting next week   DME comments pt uses a rollator   Psychosocial issues? No   Did the patient receive a copy of their discharge instructions? Yes   What is the patient's perception of their health status since discharge? Improving   Is the patient/caregiver able to teach back the hierarchy of who to call/visit for symptoms/problems? PCP, Specialist, Home health nurse, Urgent Care, ED, 911 Yes   TCM call completed? Yes   Call end time 1121   Would this patient benefit from a Referral to Amb Social Work? No   Is the patient interested in additional calls from an ambulatory ? No            Roxann GARCIA - Registered Nurse    3/27/2025, 11:21 EDT